# Patient Record
Sex: FEMALE | Race: BLACK OR AFRICAN AMERICAN | NOT HISPANIC OR LATINO
[De-identification: names, ages, dates, MRNs, and addresses within clinical notes are randomized per-mention and may not be internally consistent; named-entity substitution may affect disease eponyms.]

---

## 2017-06-22 ENCOUNTER — RESULT REVIEW (OUTPATIENT)
Age: 62
End: 2017-06-22

## 2017-08-17 ENCOUNTER — OUTPATIENT (OUTPATIENT)
Dept: OUTPATIENT SERVICES | Facility: HOSPITAL | Age: 62
LOS: 1 days | Discharge: HOME | End: 2017-08-17

## 2017-08-18 DIAGNOSIS — F11.20 OPIOID DEPENDENCE, UNCOMPLICATED: ICD-10-CM

## 2020-09-17 ENCOUNTER — APPOINTMENT (OUTPATIENT)
Dept: SURGERY | Facility: CLINIC | Age: 65
End: 2020-09-17

## 2021-06-06 ENCOUNTER — EMERGENCY (EMERGENCY)
Facility: HOSPITAL | Age: 66
LOS: 0 days | Discharge: LEFT BEFORE DISCHARGE | End: 2021-06-06
Attending: EMERGENCY MEDICINE
Payer: MEDICARE

## 2021-06-06 VITALS
OXYGEN SATURATION: 98 % | DIASTOLIC BLOOD PRESSURE: 87 MMHG | HEART RATE: 107 BPM | RESPIRATION RATE: 18 BRPM | WEIGHT: 182.1 LBS | SYSTOLIC BLOOD PRESSURE: 137 MMHG | HEIGHT: 64 IN | TEMPERATURE: 99 F

## 2021-06-06 VITALS
OXYGEN SATURATION: 95 % | HEART RATE: 94 BPM | SYSTOLIC BLOOD PRESSURE: 116 MMHG | TEMPERATURE: 99 F | RESPIRATION RATE: 18 BRPM | DIASTOLIC BLOOD PRESSURE: 78 MMHG

## 2021-06-06 DIAGNOSIS — E11.65 TYPE 2 DIABETES MELLITUS WITH HYPERGLYCEMIA: ICD-10-CM

## 2021-06-06 LAB — GLUCOSE BLDC GLUCOMTR-MCNC: 258 MG/DL — HIGH (ref 70–99)

## 2021-06-06 PROCEDURE — 99283 EMERGENCY DEPT VISIT LOW MDM: CPT

## 2021-06-06 NOTE — ED ADULT NURSE NOTE - OBJECTIVE STATEMENT
65 year ol d female, AAX4, presents to the ED for high glucose levels, PMH HTN.   pt denies headaches, n/v/d, cp, sob, dysuria, frequency, sick contacts, travel. NKDA

## 2021-06-06 NOTE — ED ADULT NURSE REASSESSMENT NOTE - NS ED NURSE REASSESS COMMENT FT1
Called his ride to come pick him up, discharge instructions given. No health info given.   pt refuses to have EKG done, refuses labs and wants to followup with PMD, MD Mikhail made aware

## 2021-06-06 NOTE — ED PROVIDER NOTE - CLINICAL SUMMARY MEDICAL DECISION MAKING FREE TEXT BOX
Hyperglycemia r/o DKA.  Will check labs give IVF and re-eval.  Patient strongly encouraged to follow-up with her PMD.

## 2021-06-06 NOTE — ED PROVIDER NOTE - PROGRESS NOTE DETAILS
Patient's pharmacy [facility] was called.  Patient not on diabetic medication was only prescribed test strips on May 19th

## 2021-06-06 NOTE — ED ADULT TRIAGE NOTE - CHIEF COMPLAINT QUOTE
Recently diagnosed with DM pt c/o of hyperglycemia. No symptoms reported. Pt stated doesn't know how to bring it down

## 2021-06-06 NOTE — ED PROVIDER NOTE - OBJECTIVE STATEMENT
This patient is a 65 year old woman recently diagnosed with DM who presents to the ER for evaluation.  Patient reports elevated blood sugar.  She denies increased frequency, increased thirst, abdominal pain and vomiting.  She states that she was not given medications for her diabetes when she was diagnosed at her primary care doctor office.  She was only given test strips and told to monitor her levels.

## 2023-04-04 ENCOUNTER — APPOINTMENT (OUTPATIENT)
Dept: MRI IMAGING | Facility: HOSPITAL | Age: 68
End: 2023-04-04
Payer: MEDICARE

## 2023-04-04 ENCOUNTER — OUTPATIENT (OUTPATIENT)
Dept: OUTPATIENT SERVICES | Facility: HOSPITAL | Age: 68
LOS: 1 days | End: 2023-04-04
Payer: COMMERCIAL

## 2023-04-04 DIAGNOSIS — K86.2 CYST OF PANCREAS: ICD-10-CM

## 2023-04-04 DIAGNOSIS — N28.1 CYST OF KIDNEY, ACQUIRED: ICD-10-CM

## 2023-04-04 PROCEDURE — 74183 MRI ABD W/O CNTR FLWD CNTR: CPT | Mod: 26

## 2023-04-04 PROCEDURE — 74183 MRI ABD W/O CNTR FLWD CNTR: CPT

## 2023-04-04 PROCEDURE — A9585: CPT

## 2023-04-08 ENCOUNTER — EMERGENCY (EMERGENCY)
Facility: HOSPITAL | Age: 68
LOS: 1 days | Discharge: HOME | End: 2023-04-08
Attending: EMERGENCY MEDICINE
Payer: COMMERCIAL

## 2023-04-08 VITALS
RESPIRATION RATE: 20 BRPM | DIASTOLIC BLOOD PRESSURE: 89 MMHG | SYSTOLIC BLOOD PRESSURE: 132 MMHG | OXYGEN SATURATION: 98 % | HEART RATE: 89 BPM

## 2023-04-08 VITALS
SYSTOLIC BLOOD PRESSURE: 135 MMHG | RESPIRATION RATE: 18 BRPM | HEIGHT: 64 IN | WEIGHT: 167.33 LBS | OXYGEN SATURATION: 98 % | TEMPERATURE: 99 F | DIASTOLIC BLOOD PRESSURE: 94 MMHG | HEART RATE: 100 BPM

## 2023-04-08 LAB
ALBUMIN SERPL ELPH-MCNC: 4 G/DL — SIGNIFICANT CHANGE UP (ref 3.5–5)
ALP SERPL-CCNC: 103 U/L — SIGNIFICANT CHANGE UP (ref 40–120)
ALT FLD-CCNC: 18 U/L DA — SIGNIFICANT CHANGE UP (ref 10–60)
ANION GAP SERPL CALC-SCNC: 9 MMOL/L — SIGNIFICANT CHANGE UP (ref 5–17)
APPEARANCE UR: CLEAR — SIGNIFICANT CHANGE UP
AST SERPL-CCNC: 9 U/L — LOW (ref 10–40)
BACTERIA # UR AUTO: ABNORMAL /HPF
BASOPHILS # BLD AUTO: 0.05 K/UL — SIGNIFICANT CHANGE UP (ref 0–0.2)
BASOPHILS NFR BLD AUTO: 0.3 % — SIGNIFICANT CHANGE UP (ref 0–2)
BILIRUB SERPL-MCNC: 0.3 MG/DL — SIGNIFICANT CHANGE UP (ref 0.2–1.2)
BILIRUB UR-MCNC: NEGATIVE — SIGNIFICANT CHANGE UP
BUN SERPL-MCNC: 16 MG/DL — SIGNIFICANT CHANGE UP (ref 7–18)
CALCIUM SERPL-MCNC: 10.4 MG/DL — SIGNIFICANT CHANGE UP (ref 8.4–10.5)
CHLORIDE SERPL-SCNC: 102 MMOL/L — SIGNIFICANT CHANGE UP (ref 96–108)
CO2 SERPL-SCNC: 29 MMOL/L — SIGNIFICANT CHANGE UP (ref 22–31)
COLOR SPEC: YELLOW — SIGNIFICANT CHANGE UP
CREAT SERPL-MCNC: 1.06 MG/DL — SIGNIFICANT CHANGE UP (ref 0.5–1.3)
DIFF PNL FLD: ABNORMAL
EGFR: 58 ML/MIN/1.73M2 — LOW
EOSINOPHIL # BLD AUTO: 0.07 K/UL — SIGNIFICANT CHANGE UP (ref 0–0.5)
EOSINOPHIL NFR BLD AUTO: 0.5 % — SIGNIFICANT CHANGE UP (ref 0–6)
EPI CELLS # UR: SIGNIFICANT CHANGE UP /HPF
GLUCOSE SERPL-MCNC: 295 MG/DL — HIGH (ref 70–99)
GLUCOSE UR QL: 1000 MG/DL
HCT VFR BLD CALC: 41.4 % — SIGNIFICANT CHANGE UP (ref 34.5–45)
HGB BLD-MCNC: 13.4 G/DL — SIGNIFICANT CHANGE UP (ref 11.5–15.5)
IMM GRANULOCYTES NFR BLD AUTO: 0.3 % — SIGNIFICANT CHANGE UP (ref 0–0.9)
KETONES UR-MCNC: ABNORMAL
LEUKOCYTE ESTERASE UR-ACNC: NEGATIVE — SIGNIFICANT CHANGE UP
LIDOCAIN IGE QN: 41 U/L — LOW (ref 73–393)
LYMPHOCYTES # BLD AUTO: 1.68 K/UL — SIGNIFICANT CHANGE UP (ref 1–3.3)
LYMPHOCYTES # BLD AUTO: 11.4 % — LOW (ref 13–44)
MCHC RBC-ENTMCNC: 26.8 PG — LOW (ref 27–34)
MCHC RBC-ENTMCNC: 32.4 GM/DL — SIGNIFICANT CHANGE UP (ref 32–36)
MCV RBC AUTO: 82.8 FL — SIGNIFICANT CHANGE UP (ref 80–100)
MONOCYTES # BLD AUTO: 0.53 K/UL — SIGNIFICANT CHANGE UP (ref 0–0.9)
MONOCYTES NFR BLD AUTO: 3.6 % — SIGNIFICANT CHANGE UP (ref 2–14)
NEUTROPHILS # BLD AUTO: 12.42 K/UL — HIGH (ref 1.8–7.4)
NEUTROPHILS NFR BLD AUTO: 83.9 % — HIGH (ref 43–77)
NITRITE UR-MCNC: NEGATIVE — SIGNIFICANT CHANGE UP
NRBC # BLD: 0 /100 WBCS — SIGNIFICANT CHANGE UP (ref 0–0)
PH UR: 5 — SIGNIFICANT CHANGE UP (ref 5–8)
PLATELET # BLD AUTO: 261 K/UL — SIGNIFICANT CHANGE UP (ref 150–400)
POTASSIUM SERPL-MCNC: 4.8 MMOL/L — SIGNIFICANT CHANGE UP (ref 3.5–5.3)
POTASSIUM SERPL-SCNC: 4.8 MMOL/L — SIGNIFICANT CHANGE UP (ref 3.5–5.3)
PROT SERPL-MCNC: 7.8 G/DL — SIGNIFICANT CHANGE UP (ref 6–8.3)
PROT UR-MCNC: 30 MG/DL
RBC # BLD: 5 M/UL — SIGNIFICANT CHANGE UP (ref 3.8–5.2)
RBC # FLD: 15.9 % — HIGH (ref 10.3–14.5)
RBC CASTS # UR COMP ASSIST: ABNORMAL /HPF (ref 0–2)
SODIUM SERPL-SCNC: 140 MMOL/L — SIGNIFICANT CHANGE UP (ref 135–145)
SP GR SPEC: 1.02 — SIGNIFICANT CHANGE UP (ref 1.01–1.02)
TROPONIN I, HIGH SENSITIVITY RESULT: 5.9 NG/L — SIGNIFICANT CHANGE UP
UROBILINOGEN FLD QL: NEGATIVE — SIGNIFICANT CHANGE UP
WBC # BLD: 14.8 K/UL — HIGH (ref 3.8–10.5)
WBC # FLD AUTO: 14.8 K/UL — HIGH (ref 3.8–10.5)
WBC UR QL: SIGNIFICANT CHANGE UP /HPF (ref 0–5)

## 2023-04-08 PROCEDURE — 96374 THER/PROPH/DIAG INJ IV PUSH: CPT | Mod: XU

## 2023-04-08 PROCEDURE — 85025 COMPLETE CBC W/AUTO DIFF WBC: CPT

## 2023-04-08 PROCEDURE — 93005 ELECTROCARDIOGRAM TRACING: CPT

## 2023-04-08 PROCEDURE — 74177 CT ABD & PELVIS W/CONTRAST: CPT | Mod: 26,MA

## 2023-04-08 PROCEDURE — 99284 EMERGENCY DEPT VISIT MOD MDM: CPT

## 2023-04-08 PROCEDURE — 81001 URINALYSIS AUTO W/SCOPE: CPT

## 2023-04-08 PROCEDURE — 36415 COLL VENOUS BLD VENIPUNCTURE: CPT

## 2023-04-08 PROCEDURE — 83690 ASSAY OF LIPASE: CPT

## 2023-04-08 PROCEDURE — 80053 COMPREHEN METABOLIC PANEL: CPT

## 2023-04-08 PROCEDURE — 84484 ASSAY OF TROPONIN QUANT: CPT

## 2023-04-08 PROCEDURE — 74177 CT ABD & PELVIS W/CONTRAST: CPT | Mod: MA

## 2023-04-08 PROCEDURE — 99285 EMERGENCY DEPT VISIT HI MDM: CPT | Mod: 25

## 2023-04-08 RX ORDER — FAMOTIDINE 10 MG/ML
1 INJECTION INTRAVENOUS
Qty: 14 | Refills: 0
Start: 2023-04-08 | End: 2023-04-14

## 2023-04-08 RX ORDER — FAMOTIDINE 10 MG/ML
20 INJECTION INTRAVENOUS ONCE
Refills: 0 | Status: COMPLETED | OUTPATIENT
Start: 2023-04-08 | End: 2023-04-08

## 2023-04-08 RX ADMIN — Medication 30 MILLILITER(S): at 13:32

## 2023-04-08 RX ADMIN — FAMOTIDINE 20 MILLIGRAM(S): 10 INJECTION INTRAVENOUS at 13:22

## 2023-04-08 NOTE — ED PROVIDER NOTE - PHYSICAL EXAMINATION
ABD: Abdomen soft, non-distended with epigastric tenderness, negative Castelan sign, no CVA tenderness.

## 2023-04-08 NOTE — ED ADULT NURSE NOTE - OBJECTIVE STATEMENT
68 yo female sitting on a chair c/o abdominal pain that started 2 weeks ago. Patient denies nausea, vomiting, or diarrhea at this time.

## 2023-04-08 NOTE — ED PROVIDER NOTE - PATIENT PORTAL LINK FT
You can access the FollowMyHealth Patient Portal offered by [facility] by registering at the following website: [email or web address]. By joining [facility]’s FollowMyHealth portal, you will also be able to view your health information using other applications (apps) compatible with our system.

## 2023-04-08 NOTE — ED PROVIDER NOTE - NSFOLLOWUPINSTRUCTIONS_ED_ALL_ED_FT
Follow up with your primary care doctor on Monday.  Follow up with the oncologist within 1 week.  The pain that you are experiencing is most likely related to the mass.  Because you felt much better after antacid medications in the ER, I will give you a prescription as it may help you with pain at home.  If you experience any new or worsening symptoms or if you are concerned you can always come back to the emergency for a re-evaluation.  If there were any prescriptions given to you during the visit today take them as prescribed. If you have any questions you can ask the pharmacist. Follow up with your primary care doctor on Monday.  Follow up with the oncologist within 1 week.  The pain that you are experiencing is most likely related to the mass.  Because you felt much better after antacid medications in the ER, I will give you a prescription as it may help you with pain at home.  If you experience any new or worsening symptoms or if you are concerned you can always come back to the emergency for a re-evaluation.  If there were any prescriptions given to you during the visit today take them as prescribed. If you have any questions you can ask the pharmacist.    Abdominal Pain, Adult  Pain in the abdomen (abdominal pain) can be caused by many things. Often, abdominal pain is not serious and it gets better with no treatment or by being treated at home. However, sometimes abdominal pain is serious.    Your health care provider will ask questions about your medical history and do a physical exam to try to determine the cause of your abdominal pain.    Follow these instructions at home:  Medicines    Take over-the-counter and prescription medicines only as told by your health care provider.  Do not take a laxative unless told by your health care provider.  General instructions      Watch your condition for any changes.  Drink enough fluid to keep your urine pale yellow.  Keep all follow-up visits as told by your health care provider. This is important.  Contact a health care provider if:  Your abdominal pain changes or gets worse.  You are not hungry or you lose weight without trying.  You are constipated or have diarrhea for more than 2–3 days.  You have pain when you urinate or have a bowel movement.  Your abdominal pain wakes you up at night.  Your pain gets worse with meals, after eating, or with certain foods.  You are vomiting and cannot keep anything down.  You have a fever.  You have blood in your urine.  Get help right away if:  Your pain does not go away as soon as your health care provider told you to expect.  You cannot stop vomiting.  Your pain is only in areas of the abdomen, such as the right side or the left lower portion of the abdomen. Pain on the right side could be caused by appendicitis.  You have bloody or black stools, or stools that look like tar.  You have severe pain, cramping, or bloating in your abdomen.  You have signs of dehydration, such as:  Dark urine, very little urine, or no urine.  Cracked lips.  Dry mouth.  Sunken eyes.  Sleepiness.  Weakness.  You have trouble breathing or chest pain.  Summary  Often, abdominal pain is not serious and it gets better with no treatment or by being treated at home. However, sometimes abdominal pain is serious.  Watch your condition for any changes.  Take over-the-counter and prescription medicines only as told by your health care provider.  Contact a health care provider if your abdominal pain changes or gets worse.  Get help right away if you have severe pain, cramping, or bloating in your abdomen.  This information is not intended to replace advice given to you by your health care provider. Make sure you discuss any questions you have with your health care provider.

## 2023-04-08 NOTE — ED PROVIDER NOTE - CARE PROVIDER_API CALL
Omar Ayala (MD)  Hematology; Internal Medicine; Medical Oncology  [address], [address]  [place] [address]  Phone: [phone]  Fax: [phone]  Follow Up Time:

## 2023-04-08 NOTE — ED PROVIDER NOTE - ATTENDING APP SHARED VISIT CONTRIBUTION OF CARE
67-year-old female, presents for evaluation of epigastric abdominal pain x2 weeks.  Pain is improved after eating but progressively getting worse and more continuous.  On exam patient was uncomfortable and in pain.  Vital signs stable, afebrile.  EKG shows normal sinus rhythm.  Labs grossly unremarkable with negative troponin.  CT shows left renal mass and pancreatic mass.  Concerning for metastatic cancer.  Patient had earlier this week and MRI of the abdomen with IV contrast which should be able to give me more information about diagnosis.  Patient can follow-up this week with her PMD.  Will give outpatient oncology follow-up for work-up.  Patient improved significantly with GI cocktail.  Will give trial although pain most likely related to mass. Tolerated PO intake.

## 2023-04-08 NOTE — ED PROVIDER NOTE - OBJECTIVE STATEMENT
67 year old female with a PHMx of diabetes, hypertension, liver polyp, and renal cyst presents to the ED for evaluation of abdominal pain for x2 weeks, gradual onset of epigastric abdominal pain. At first, it was intermittent but now it is getting more continuous. In the last few days, pain became much more severe. Food usually helps temporarily with pain but the pain comes back. No radiation to the lower abdomen or the chest. Denies any nausea, vomiting, diarrhea, fevers, chills or night sweats. This week she had an MRI of the abdomen to further assess the renal cyst and liver polyp as per patient but has no results yet. NKDA.

## 2023-04-10 PROBLEM — N28.1 CYST OF KIDNEY, ACQUIRED: Chronic | Status: ACTIVE | Noted: 2023-04-08

## 2023-04-10 PROBLEM — I10 ESSENTIAL (PRIMARY) HYPERTENSION: Chronic | Status: ACTIVE | Noted: 2023-04-08

## 2023-04-10 PROBLEM — Q44.6 CYSTIC DISEASE OF LIVER: Chronic | Status: ACTIVE | Noted: 2023-04-08

## 2023-04-10 PROBLEM — E11.9 TYPE 2 DIABETES MELLITUS WITHOUT COMPLICATIONS: Chronic | Status: ACTIVE | Noted: 2023-04-08

## 2023-04-11 ENCOUNTER — APPOINTMENT (OUTPATIENT)
Dept: SURGICAL ONCOLOGY | Facility: CLINIC | Age: 68
End: 2023-04-11
Payer: MEDICARE

## 2023-04-11 VITALS
DIASTOLIC BLOOD PRESSURE: 89 MMHG | SYSTOLIC BLOOD PRESSURE: 130 MMHG | WEIGHT: 170 LBS | BODY MASS INDEX: 29.02 KG/M2 | OXYGEN SATURATION: 99 % | HEIGHT: 64 IN | HEART RATE: 116 BPM | RESPIRATION RATE: 16 BRPM

## 2023-04-11 DIAGNOSIS — Z86.79 PERSONAL HISTORY OF OTHER DISEASES OF THE CIRCULATORY SYSTEM: ICD-10-CM

## 2023-04-11 DIAGNOSIS — Z87.891 PERSONAL HISTORY OF NICOTINE DEPENDENCE: ICD-10-CM

## 2023-04-11 PROCEDURE — 99204 OFFICE O/P NEW MOD 45 MIN: CPT

## 2023-04-12 ENCOUNTER — APPOINTMENT (OUTPATIENT)
Dept: GASTROENTEROLOGY | Facility: CLINIC | Age: 68
End: 2023-04-12
Payer: MEDICARE

## 2023-04-12 PROCEDURE — 99204 OFFICE O/P NEW MOD 45 MIN: CPT

## 2023-04-12 NOTE — REASON FOR VISIT
[Home] : at home, [unfilled] , at the time of the visit. [Medical Office: ([facility])___] : at the medical office located in  [Patient] : the patient [Self] : self [This encounter was initiated by telehealth (audio with video) and converted to telephone (audio only) due to technical difficulties.] : This encounter was initiated by telehealth (audio with video) and converted to telephone (audio only) due to technical difficulties.

## 2023-04-12 NOTE — PHYSICAL EXAM
[Alert] : alert [Normal Voice/Communication] : normal voice/communication [Healthy Appearing] : healthy appearing [No Acute Distress] : no acute distress [Sclera] : the sclera and conjunctiva were normal [Hearing Threshold Finger Rub Not [place]] : hearing was normal [Normal Lips/Gums] : the lips and gums were normal [Oropharynx] : the oropharynx was normal [Normal Appearance] : the appearance of the neck was normal [No Neck Mass] : no neck mass was observed [No Respiratory Distress] : no respiratory distress [No Acc Muscle Use] : no accessory muscle use [Respiration, Rhythm And Depth] : normal respiratory rhythm and effort [Auscultation Breath Sounds / Voice Sounds] : lungs were clear to auscultation bilaterally [Heart Rate And Rhythm] : heart rate was normal and rhythm regular [Normal S1, S2] : normal S1 and S2 [Murmurs] : no murmurs [Bowel Sounds] : normal bowel sounds [Abdomen Tenderness] : non-tender [No Masses] : no abdominal mass palpated [Abdomen Soft] : soft [] : no hepatosplenomegaly [Oriented To Time, Place, And Person] : oriented to person, place, and time

## 2023-04-13 ENCOUNTER — APPOINTMENT (OUTPATIENT)
Dept: UROLOGY | Facility: CLINIC | Age: 68
End: 2023-04-13

## 2023-04-14 ENCOUNTER — RESULT REVIEW (OUTPATIENT)
Age: 68
End: 2023-04-14

## 2023-04-14 ENCOUNTER — APPOINTMENT (OUTPATIENT)
Dept: GASTROENTEROLOGY | Facility: HOSPITAL | Age: 68
End: 2023-04-14

## 2023-04-14 ENCOUNTER — OUTPATIENT (OUTPATIENT)
Dept: OUTPATIENT SERVICES | Facility: HOSPITAL | Age: 68
LOS: 1 days | End: 2023-04-14
Payer: COMMERCIAL

## 2023-04-14 ENCOUNTER — TRANSCRIPTION ENCOUNTER (OUTPATIENT)
Age: 68
End: 2023-04-14

## 2023-04-14 VITALS
HEART RATE: 87 BPM | RESPIRATION RATE: 17 BRPM | HEIGHT: 64 IN | TEMPERATURE: 98 F | SYSTOLIC BLOOD PRESSURE: 147 MMHG | DIASTOLIC BLOOD PRESSURE: 85 MMHG | WEIGHT: 169.98 LBS

## 2023-04-14 VITALS
SYSTOLIC BLOOD PRESSURE: 147 MMHG | RESPIRATION RATE: 16 BRPM | DIASTOLIC BLOOD PRESSURE: 90 MMHG | HEART RATE: 88 BPM | OXYGEN SATURATION: 98 %

## 2023-04-14 DIAGNOSIS — K86.89 OTHER SPECIFIED DISEASES OF PANCREAS: ICD-10-CM

## 2023-04-14 LAB — GLUCOSE BLDC GLUCOMTR-MCNC: 256 MG/DL — HIGH (ref 70–99)

## 2023-04-14 PROCEDURE — 43239 EGD BIOPSY SINGLE/MULTIPLE: CPT | Mod: 59

## 2023-04-14 PROCEDURE — 43237 ENDOSCOPIC US EXAM ESOPH: CPT

## 2023-04-14 PROCEDURE — 88307 TISSUE EXAM BY PATHOLOGIST: CPT

## 2023-04-14 PROCEDURE — 82962 GLUCOSE BLOOD TEST: CPT

## 2023-04-14 PROCEDURE — 88307 TISSUE EXAM BY PATHOLOGIST: CPT | Mod: 26

## 2023-04-14 PROCEDURE — 88305 TISSUE EXAM BY PATHOLOGIST: CPT | Mod: 26

## 2023-04-14 PROCEDURE — 88173 CYTOPATH EVAL FNA REPORT: CPT | Mod: 26

## 2023-04-14 PROCEDURE — 88305 TISSUE EXAM BY PATHOLOGIST: CPT

## 2023-04-14 PROCEDURE — 88312 SPECIAL STAINS GROUP 1: CPT

## 2023-04-14 PROCEDURE — 88312 SPECIAL STAINS GROUP 1: CPT | Mod: 26

## 2023-04-14 PROCEDURE — 88173 CYTOPATH EVAL FNA REPORT: CPT

## 2023-04-14 PROCEDURE — 43239 EGD BIOPSY SINGLE/MULTIPLE: CPT

## 2023-04-14 PROCEDURE — 43242 EGD US FINE NEEDLE BX/ASPIR: CPT

## 2023-04-14 RX ORDER — ACETAMINOPHEN 500 MG
1000 TABLET ORAL ONCE
Refills: 0 | Status: DISCONTINUED | OUTPATIENT
Start: 2023-04-14 | End: 2023-04-28

## 2023-04-14 RX ORDER — SODIUM CHLORIDE 9 MG/ML
500 INJECTION, SOLUTION INTRAVENOUS
Refills: 0 | Status: COMPLETED | OUTPATIENT
Start: 2023-04-14 | End: 2023-04-14

## 2023-04-14 RX ADMIN — SODIUM CHLORIDE 30 MILLILITER(S): 9 INJECTION, SOLUTION INTRAVENOUS at 08:27

## 2023-04-14 NOTE — ASSESSMENT
[FreeTextEntry1] : This is a 67 year old female here for an evaluation pancreas mass. \par \par Plan \par EUS with FNB \par \par Risks, benefits, and alternatives of EUS discussed at length with patient including but not limited to bleeding , perforation, anesthesia related complication, aspiration, pancreatitis etc. Patient expressed understanding.\par \par EUS for evaluation of polyps, tumors, nodules with +/- biopsy and or cauterization w/ and or w/o clipping. \par  \par \par

## 2023-04-14 NOTE — HISTORY OF PRESENT ILLNESS
[FreeTextEntry1] : This is a 67 year old female here for an evaluation pancreas mass. Referred to us by Dr. Greco.  PMH of DM, HTN, left knee replacement. Denies a family history of colon CA, gastric CA, high risk polyps, Inflammatory and autoimmune disease. She presented to [facility] ED on 4/8/23 with abdominal pain radiating to her LUQ. The pain was of a gradual onset. Medical records from Dr. Greco office stated patient had an MRI on 4/4/23. MRI revealed a 3 cm solid hypo enhancing mass in the pancreatic body with ductal obstruction & parenchymal atrophy in the tail and a Bosniak type IV cyst upper pole left kidney suspicious for primary renal neoplasm. CT A/P from [facility] ED visit revealed ill defined pancreatic body mass c/w pancreas neoplasm; complex enhancing left renal mass concerning for RCC. Currently patient reports decrease appetite. Has early satiety. No N&V. The abdominal pain is intermittent. The pain radiates to the LUQ. The pain lasts about 1 minute and goes away.  No blood or dark tarry stools. Normal caliber. Down ~10  pounds since March. Never had an EGD. Colonoscopy from 7 years ago normal.  \par Smoke/Etoh: 20 pack years. Last smoke January 2023. No ETOh use.  \par Medication: No AC or antiplatelet \par Allergy: Shellfish\par \par

## 2023-04-19 LAB — SURGICAL PATHOLOGY STUDY: SIGNIFICANT CHANGE UP

## 2023-04-20 LAB — NON-GYNECOLOGICAL CYTOLOGY STUDY: SIGNIFICANT CHANGE UP

## 2023-04-28 ENCOUNTER — OUTPATIENT (OUTPATIENT)
Dept: OUTPATIENT SERVICES | Facility: HOSPITAL | Age: 68
LOS: 1 days | Discharge: HOME | End: 2023-04-28
Payer: MEDICARE

## 2023-04-28 ENCOUNTER — LABORATORY RESULT (OUTPATIENT)
Age: 68
End: 2023-04-28

## 2023-04-28 ENCOUNTER — APPOINTMENT (OUTPATIENT)
Dept: HEMATOLOGY ONCOLOGY | Facility: CLINIC | Age: 68
End: 2023-04-28

## 2023-04-28 DIAGNOSIS — D64.9 ANEMIA, UNSPECIFIED: ICD-10-CM

## 2023-04-28 LAB
BASOPHILS # BLD AUTO: 0.04 K/UL — SIGNIFICANT CHANGE UP (ref 0–0.2)
BASOPHILS NFR BLD AUTO: 0.3 % — SIGNIFICANT CHANGE UP (ref 0–2)
EOSINOPHIL # BLD AUTO: 0.1 K/UL — SIGNIFICANT CHANGE UP (ref 0–0.5)
EOSINOPHIL NFR BLD AUTO: 0.8 % — SIGNIFICANT CHANGE UP (ref 0–6)
HCT VFR BLD CALC: 36.3 % — SIGNIFICANT CHANGE UP (ref 34.5–45)
HGB BLD-MCNC: 12.1 G/DL — SIGNIFICANT CHANGE UP (ref 11.5–15.5)
IMM GRANULOCYTES NFR BLD AUTO: 0.5 % — SIGNIFICANT CHANGE UP (ref 0–0.9)
LYMPHOCYTES # BLD AUTO: 1.83 K/UL — SIGNIFICANT CHANGE UP (ref 1–3.3)
LYMPHOCYTES # BLD AUTO: 14.3 % — SIGNIFICANT CHANGE UP (ref 13–44)
MCHC RBC-ENTMCNC: 26.9 PG — LOW (ref 27–34)
MCHC RBC-ENTMCNC: 33.3 G/DL — SIGNIFICANT CHANGE UP (ref 32–36)
MCV RBC AUTO: 80.8 FL — SIGNIFICANT CHANGE UP (ref 80–100)
MONOCYTES # BLD AUTO: 0.53 K/UL — SIGNIFICANT CHANGE UP (ref 0–0.9)
MONOCYTES NFR BLD AUTO: 4.1 % — SIGNIFICANT CHANGE UP (ref 2–14)
NEUTROPHILS # BLD AUTO: 10.27 K/UL — HIGH (ref 1.8–7.4)
NEUTROPHILS NFR BLD AUTO: 80 % — HIGH (ref 43–77)
PLATELET # BLD AUTO: 230 K/UL — SIGNIFICANT CHANGE UP (ref 150–400)
RBC # BLD: 4.49 M/UL — SIGNIFICANT CHANGE UP (ref 3.8–5.2)
RBC # FLD: 15.8 % — HIGH (ref 10.3–14.5)
WBC # BLD: 12.62 K/UL — HIGH (ref 3.8–10.5)
WBC # FLD AUTO: 12.62 K/UL — HIGH (ref 3.8–10.5)

## 2023-05-01 ENCOUNTER — APPOINTMENT (OUTPATIENT)
Dept: NUCLEAR MEDICINE | Facility: IMAGING CENTER | Age: 68
End: 2023-05-01

## 2023-05-01 NOTE — PHYSICAL EXAM
[Normal] : supple, no neck mass and thyroid not enlarged [Normal Neck Lymph Nodes] : normal neck lymph nodes  [Normal Supraclavicular Lymph Nodes] : normal supraclavicular lymph nodes [Normal Groin Lymph Nodes] : normal groin lymph nodes [Normal Axillary Lymph Nodes] : normal axillary lymph nodes [Normal] : oriented to person, place and time, with appropriate affect [FreeTextEntry1] : COVID-19 precautions as per [facility] policy was universally followed  [de-identified] : soft NT ND

## 2023-05-01 NOTE — HISTORY OF PRESENT ILLNESS
[de-identified] : Ms. ENRIKE MORLEY is a 67 year old woman here today for an initial consultation. \par \par Enrike presented to [facility] ED in April of 2023 with complaints of 2 weeks of abdominal pain radiating to her LUQ. \par Her PMH is significant for DM, HTN, liver polyps & renal cysts. \par DM diagnosed in 2022 --- reports her a1c is around 8\par \par She reports that her abdominal pain started as vague epigastric pain then steadily worsened. She was seeing her PCP for management of these symptoms and underwent an abdominal U/S & MRI  (@[facility], will obtain images and reports). \par She has lost ~10 lbs from March to April 2023; appetite is poor. ECOG 0-1 \par Her last colonoscopy was ~7 years ago and otherwise WNL; never had an endoscopy \par \par MR abdomen from 4/4/2023 showed a 3 cm solid hypo enhancing mass in the pancreatic body with ductal obstruction & parenchymal atrophy in the tail and a Bosniak type IV cyst upper pole left kidney suspicious for primary renal neoplasm\par \par CT A/P 4/8/2023 - ill defined pancreatic body mass c/w pancreas neoplasm; complex enhancing left renal mass concerning for RCC\par \par PSH: left knee replacement 2019\par Fam HX: none\par Social HX: ~20 pack year history, stopped in early 2023; no ETOH, retired from job as a  \par \par PCP: Dr. Suzy Bartlett

## 2023-05-01 NOTE — CONSULT LETTER
[Dear  ___] : Dear  [unfilled], [Consult Letter:] : I had the pleasure of evaluating your patient, [unfilled]. [( Thank you for referring [unfilled] for consultation for _____ )] : Thank you for referring [unfilled] for consultation for [unfilled] [Please see my note below.] : Please see my note below. [Consult Closing:] : Thank you very much for allowing me to participate in the care of this patient.  If you have any questions, please do not hesitate to contact me. [Sincerely,] : Sincerely, [FreeTextEntry3] : Addison Greco MD, FICS, FACS\par Director of Surgical Oncology- [facility]\par ,Department of Surgery\par [facility]\par [address]\[place], [place]- [address]\par \par [address]\[place]- [address]\par \par [address]\par [place], [place]- [address]\par \par (mob) [phone]\par (o) [phone]\par (f) [phone]\par

## 2023-05-01 NOTE — ASSESSMENT
[FreeTextEntry1] : IMP:\par 66yo w/ ill-defined pancreas mass (4.2 cm) left renal lesion concerning for RCC\par \par \par PLAN:\par EUS w/ FNB (planned to see Dr. Danny Gallo tomorrow 4/12/2023)\par Will order PET/CT now given suspicious findings on the kidney, r/o further mets \par Pt to be seen in [facility] 5/2/23\par I have discussed the diagnosis, therapeutic plan and options with the patient at length. Patient expressed verbal understanding to proceed with the proposed plan. All questions answered.

## 2023-05-02 ENCOUNTER — RESULT REVIEW (OUTPATIENT)
Age: 68
End: 2023-05-02

## 2023-05-02 ENCOUNTER — NON-APPOINTMENT (OUTPATIENT)
Age: 68
End: 2023-05-02

## 2023-05-02 ENCOUNTER — APPOINTMENT (OUTPATIENT)
Dept: MULTI SPECIALTY CLINIC | Facility: CLINIC | Age: 68
End: 2023-05-02
Payer: MEDICARE

## 2023-05-02 ENCOUNTER — APPOINTMENT (OUTPATIENT)
Dept: PAIN MANAGEMENT | Facility: CLINIC | Age: 68
End: 2023-05-02
Payer: MEDICARE

## 2023-05-02 VITALS
WEIGHT: 161.16 LBS | BODY MASS INDEX: 27.66 KG/M2 | SYSTOLIC BLOOD PRESSURE: 130 MMHG | OXYGEN SATURATION: 97 % | DIASTOLIC BLOOD PRESSURE: 86 MMHG | TEMPERATURE: 96.6 F | HEART RATE: 136 BPM | RESPIRATION RATE: 20 BRPM

## 2023-05-02 LAB
BASOPHILS # BLD AUTO: 0.03 K/UL — SIGNIFICANT CHANGE UP (ref 0–0.2)
BASOPHILS NFR BLD AUTO: 0.3 % — SIGNIFICANT CHANGE UP (ref 0–2)
EOSINOPHIL # BLD AUTO: 0.02 K/UL — SIGNIFICANT CHANGE UP (ref 0–0.5)
EOSINOPHIL NFR BLD AUTO: 0.2 % — SIGNIFICANT CHANGE UP (ref 0–6)
HCT VFR BLD CALC: 39.7 % — SIGNIFICANT CHANGE UP (ref 34.5–45)
HGB BLD-MCNC: 13.2 G/DL — SIGNIFICANT CHANGE UP (ref 11.5–15.5)
IMM GRANULOCYTES NFR BLD AUTO: 0.4 % — SIGNIFICANT CHANGE UP (ref 0–0.9)
LYMPHOCYTES # BLD AUTO: 1.62 K/UL — SIGNIFICANT CHANGE UP (ref 1–3.3)
LYMPHOCYTES # BLD AUTO: 14.8 % — SIGNIFICANT CHANGE UP (ref 13–44)
MCHC RBC-ENTMCNC: 26.9 PG — LOW (ref 27–34)
MCHC RBC-ENTMCNC: 33.2 G/DL — SIGNIFICANT CHANGE UP (ref 32–36)
MCV RBC AUTO: 80.9 FL — SIGNIFICANT CHANGE UP (ref 80–100)
MONOCYTES # BLD AUTO: 0.55 K/UL — SIGNIFICANT CHANGE UP (ref 0–0.9)
MONOCYTES NFR BLD AUTO: 5 % — SIGNIFICANT CHANGE UP (ref 2–14)
NEUTROPHILS # BLD AUTO: 8.71 K/UL — HIGH (ref 1.8–7.4)
NEUTROPHILS NFR BLD AUTO: 79.3 % — HIGH (ref 43–77)
NRBC # BLD: 0 /100 WBCS — SIGNIFICANT CHANGE UP (ref 0–0)
PLATELET # BLD AUTO: 210 K/UL — SIGNIFICANT CHANGE UP (ref 150–400)
RBC # BLD: 4.91 M/UL — SIGNIFICANT CHANGE UP (ref 3.8–5.2)
RBC # FLD: 15.7 % — HIGH (ref 10.3–14.5)
WBC # BLD: 10.97 K/UL — HIGH (ref 3.8–10.5)
WBC # FLD AUTO: 10.97 K/UL — HIGH (ref 3.8–10.5)

## 2023-05-02 PROCEDURE — 93010 ELECTROCARDIOGRAM REPORT: CPT

## 2023-05-02 PROCEDURE — 99204 OFFICE O/P NEW MOD 45 MIN: CPT

## 2023-05-02 PROCEDURE — 99205 OFFICE O/P NEW HI 60 MIN: CPT

## 2023-05-02 NOTE — PHYSICAL EXAM
[Normal muscle bulk without asymmetry] : normal muscle bulk without asymmetry [Normal] : Normal affect [de-identified] : No jaundice evident. [de-identified] : Cranial nerves grossly intact.  No focal deficits appreciated.

## 2023-05-02 NOTE — REVIEW OF SYSTEMS
[Fatigue] : fatigue [Recent Change In Weight] : ~T recent weight change [Abdominal Pain] : abdominal pain [Negative] : Allergic/Immunologic Acitretin Counseling:  I discussed with the patient the risks of acitretin including but not limited to hair loss, dry lips/skin/eyes, liver damage, hyperlipidemia, depression/suicidal ideation, photosensitivity.  Serious rare side effects can include but are not limited to pancreatitis, pseudotumor cerebri, bony changes, clot formation/stroke/heart attack.  Patient understands that alcohol is contraindicated since it can result in liver toxicity and significantly prolong the elimination of the drug by many years.

## 2023-05-02 NOTE — ASSESSMENT
[FreeTextEntry1] : 67 year-old female with mass of the pancreas and associated abdominal pain.  She has a history of substance abuse and is at risk with opioid therapy which would otherwise be indicated for her condition.

## 2023-05-02 NOTE — HISTORY OF PRESENT ILLNESS
[___ wks] : [unfilled] week(s) ago [Sharp] : sharp [Aching] : aching [FreeTextEntry1] : 67 year-old female with a history of a mass of the pancreas who complains of episodic 10/10 mid-epigastric pain which is not associated with any particular activities.  She relates, however, that this pain is now becoming more constant.\par \par The patient reports she is in recovery from substance use and admits to relapsing recently to what she thinks was opioid medication from a friend to treat this pain.

## 2023-05-03 ENCOUNTER — NON-APPOINTMENT (OUTPATIENT)
Age: 68
End: 2023-05-03

## 2023-05-03 LAB
HAV IGM SER QL: NONREACTIVE
HBV CORE IGM SER QL: NONREACTIVE
HBV SURFACE AG SER QL: NONREACTIVE
HCV AB SER QL: NONREACTIVE
HCV S/CO RATIO: 0.09 S/CO

## 2023-05-04 NOTE — HISTORY OF PRESENT ILLNESS
[Abdominal Pain] : abdominal pain [EUS] : EUS [Biopsy] : biopsy performed [Before Surgery] : before surgery [Not staged outside] : not staged outside [Nutrition] : Nutrition [Social Work] : Social Work [Other: ____] : [unfilled] [Restricted in physically strenuous activity but ambulatory and able to carry out work of a light or sedentary nature] : Status 1 - Restricted in physically strenuous activity but ambulatory and able to carry out work of a light or sedentary nature, e.g., light house work, office work [de-identified] : This is a non-billable note*\par \par \par Ms. ENRIKE MORLEY is a 67 year old female who presents for evaluation in our Pancreas Multidisciplinary Clinic. Her PMH includes DM2 (dx ), HTN, left knee replacement. She presented with abdominal pain radiating to her LUQ x 3 weeks. MRI abdomen  noted a 3 cm mass in the body of the pancreas suspicious for primary pancreatic malignancy and also Bosniak type IV cyst upper pole left kidney suspicious for primary renal neoplasm. She was admitted to [facility] on 23 and CT A/P w/ IC showing ill-defined pancreatic body mass consistent with pancreatic neoplasm; and complex enhancing left renal mass concerning for renal cell carcinoma till proven otherwise.\par She underwent EUS on 23 with noted findings of one 3 cm pancreatic body mass, s/p FNA.  Pathology positive for adenocarcinoma. \par \par She reports decrease appetite and early satiety.  Abdominal pain is intermittent and severe when it comes.  She was taking Tylenol#3 after hospital d/c but currently not taking anymore. +Weight loss of 10 pounds since March. \par \par Colonoscopy from 7 years ago normal. \par Smoke/Etoh: 20 pack years. Last smoke 2023. No ETOH use. \par \par ECO      Independent, can walk upstairs. Lives with her sons.  (ECOG 1 due to pain). \par Family Ca hx: None \par \par Labs: \par 23    AST: 9   ALT: 18  ALP:  103  Tbili: 0.3 \par 23   Ca 19-9: 2428    CEA: 15.0    AST:  11    ALT: 12    ALP: 96   Tbili:  0.4     A1c: 10% \par  [TextBox_4] : 4/4/23 [TextBox_39] : [id number] [TextBox_41] : adenocarcinoma [TextBox_43] : 4/14/23 [] : This is not a second opinion [Pancreatic ductal adenocarcinoma] : Pancreatic ductal adenocarcinoma [Locally advanced pancreas cancer (LAPC 2)] : Locally advanced pancreas cancer (LAPC 2) [PV-SMV] : PV-SMV [Celiac axis] : Celiac axis [Less than 180 (abutment)] : less than 180 (abutment) [body] : body [Chemotherapy] : chemotherapy [Curative (aimed at resection)] : curative (aimed at resection) [TextBox_5] : 5/2/23 [TextBox_38] : 8004 [FreeTextEntry3] : 15 [FreeTextEntry4] : 0.4 [TextBox_40] : 4/8/23 [TextBox_56] : 50 [TextBox_74] : GDA coming off common CABRERA, Celiac involved, PS confluence < 180  \par 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  \par Plan for eventual SBRT\par If future surgery – [place] \par Urology and endocrine consults\par PET scan if feasible (need better glucose control)\par \par

## 2023-05-05 ENCOUNTER — NON-APPOINTMENT (OUTPATIENT)
Age: 68
End: 2023-05-05

## 2023-05-05 NOTE — RESULTS/DATA
[FreeTextEntry1] : review of information in the E M [facility] R\par rate 92 normal axis NSR ; possilbe anterior infarction

## 2023-05-05 NOTE — ASSESSMENT
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 Side effects of chemotherapy were reviewed with her\par She is age 67 and has significant co morbidity including diabetes poorly controlled on oral metformin at present. She is asked to see her primary care physician for consideration of an insulin based treatment. \par The patietn may have two tumors as the left renal mass has not had a biopsy. The priority in treating the pancreas cancer was addressed in the multidisciplinary conference. CT/PET will be helpful in addressing the left renal lesion which may be a second primary tumor. If two different tumors are present she is not a candidate for a clinical study.\par I have recommended placement of a Mediport for chemotherapy treatment. Scheduling of Mediport performed with patient and explanation of use of Mediport. A request for Mediport placement was made and our secretarial team will obtain authorization. Blood testing requested today as noted.\par THe patietn has seen pain management physician Dr Anne for management of analgesia\par The patient is scheduled for surgical follow up in future.\par ECG is performed by me and Nursing assistant and results are reviewed\par I have prescribed antinausea medication metoclopramide 10 mg PO TID PN\par RTC in 1 week to sign consent for chemotherapy\par

## 2023-05-05 NOTE — HISTORY OF PRESENT ILLNESS
none [Abdominal Pain] : abdominal pain [EUS] : EUS [Biopsy] : biopsy performed [Before Surgery] : before surgery [Pancreatic ductal adenocarcinoma] : Pancreatic ductal adenocarcinoma [Not staged outside] : not staged outside [Nutrition] : Nutrition [Social Work] : Social Work [Other: ____] : [unfilled] [Restricted in physically strenuous activity but ambulatory and able to carry out work of a light or sedentary nature] : Status 1 - Restricted in physically strenuous activity but ambulatory and able to carry out work of a light or sedentary nature, e.g., light house work, office work [Date: ____________] : Patient's last distress assessment performed on [unfilled]. [1 - Distress Level] : Distress Level: 1 [80: Normal activity with effort; some signs or symptoms of disease.] : 80: Normal activity with effort; some signs or symptoms of disease.  [ECOG Performance Status: 2 - Ambulatory and capable of all self care but unable to carry out any work activities] : Performance Status: 2 - Ambulatory and capable of all self care but unable to carry out any work activities. Up and about more than 50% of waking hours [Disease: _____________________] : Disease: [unfilled] [T: ___] : T[unfilled] [N: ___] : N[unfilled] [M: ___] : M[unfilled] [AJCC Stage: ____] : AJCC Stage: [unfilled] [de-identified] : adenocarcinoma [FreeTextEntry1] : discussion of chemotherapy treatment gemcitabine and Abraxane [de-identified] : see hpi [de-identified] : Ms. ENRIKE MORLEY is a 67 year old female who presents for evaluation in our Pancreas [facility]. Her PMH includes DM2 (dx ), HTN, left knee replacement. She presented with abdominal pain radiating to her LUQ x 3 weeks. MRI abdomen  noted a 3 cm mass in the body of the pancreas suspicious for primary pancreatic malignancy and also Bosniak type IV cyst upper pole left kidney suspicious for primary renal neoplasm. She was admitted to [facility] on 23 and CT A/P w/ IC showing ill-defined pancreatic body mass consistent with pancreatic neoplasm; and complex enhancing left renal mass concerning for renal cell carcinoma till proven otherwise.\par She underwent EUS on 23 with noted findings of one 3 cm pancreatic body mass, s/p FNA.  Pathology positive for adenocarcinoma. \par \par She reports decrease appetite and early satiety.  Abdominal pain is intermittent and severe when it comes.  She was taking Tylenol#3 after hospital d/c but currently not taking anymore. +Weight loss of 10 pounds since March. \par \par Colonoscopy from 7 years ago normal. \par Smoke/Etoh: 20 pack years. Last smoke 2023. No ETOH use. \par \par ECO      Independent, can walk upstairs. Lives with her sons.  (ECOG 1 due to pain). \par Family Ca hx: None \par \par Labs: \par 23    AST: 9   ALT: 18  ALP:  103  Tbili: 0.3 \par 23   Ca 19-9: 2428    CEA: 15.0    AST:  11    ALT: 12    ALP: 96   Tbili:  0.4     A1c: 10% \par  [TextBox_4] : 4/4/23 [TextBox_39] : [id number] [TextBox_41] : adenocarcinoma [TextBox_43] : 4/14/23 [] : This is not a second opinion [TextBox_5] : 5/2/23 [TextBox_40] : 4/8/23

## 2023-05-09 ENCOUNTER — APPOINTMENT (OUTPATIENT)
Dept: UROLOGY | Facility: CLINIC | Age: 68
End: 2023-05-09
Payer: MEDICARE

## 2023-05-09 ENCOUNTER — RESULT REVIEW (OUTPATIENT)
Age: 68
End: 2023-05-09

## 2023-05-09 ENCOUNTER — APPOINTMENT (OUTPATIENT)
Dept: HEMATOLOGY ONCOLOGY | Facility: CLINIC | Age: 68
End: 2023-05-09
Payer: MEDICARE

## 2023-05-09 ENCOUNTER — APPOINTMENT (OUTPATIENT)
Dept: UROLOGY | Facility: CLINIC | Age: 68
End: 2023-05-09

## 2023-05-09 ENCOUNTER — APPOINTMENT (OUTPATIENT)
Dept: SURGICAL ONCOLOGY | Facility: CLINIC | Age: 68
End: 2023-05-09
Payer: MEDICARE

## 2023-05-09 ENCOUNTER — NON-APPOINTMENT (OUTPATIENT)
Age: 68
End: 2023-05-09

## 2023-05-09 ENCOUNTER — LABORATORY RESULT (OUTPATIENT)
Age: 68
End: 2023-05-09

## 2023-05-09 VITALS
DIASTOLIC BLOOD PRESSURE: 85 MMHG | HEART RATE: 81 BPM | TEMPERATURE: 97.6 F | OXYGEN SATURATION: 98 % | WEIGHT: 162.46 LBS | BODY MASS INDEX: 27.74 KG/M2 | HEIGHT: 64 IN | SYSTOLIC BLOOD PRESSURE: 125 MMHG | RESPIRATION RATE: 16 BRPM

## 2023-05-09 VITALS
HEIGHT: 64 IN | DIASTOLIC BLOOD PRESSURE: 81 MMHG | OXYGEN SATURATION: 98 % | HEART RATE: 77 BPM | WEIGHT: 162 LBS | BODY MASS INDEX: 27.66 KG/M2 | SYSTOLIC BLOOD PRESSURE: 127 MMHG | RESPIRATION RATE: 16 BRPM

## 2023-05-09 VITALS
DIASTOLIC BLOOD PRESSURE: 87 MMHG | SYSTOLIC BLOOD PRESSURE: 137 MMHG | HEIGHT: 64 IN | BODY MASS INDEX: 27.83 KG/M2 | WEIGHT: 163 LBS | RESPIRATION RATE: 16 BRPM | HEART RATE: 92 BPM

## 2023-05-09 DIAGNOSIS — N28.89 OTHER SPECIFIED DISORDERS OF KIDNEY AND URETER: ICD-10-CM

## 2023-05-09 LAB
BASOPHILS # BLD AUTO: 0.03 K/UL — SIGNIFICANT CHANGE UP (ref 0–0.2)
BASOPHILS NFR BLD AUTO: 0.3 % — SIGNIFICANT CHANGE UP (ref 0–2)
EOSINOPHIL # BLD AUTO: 0.06 K/UL — SIGNIFICANT CHANGE UP (ref 0–0.5)
EOSINOPHIL NFR BLD AUTO: 0.6 % — SIGNIFICANT CHANGE UP (ref 0–6)
HCT VFR BLD CALC: 37.2 % — SIGNIFICANT CHANGE UP (ref 34.5–45)
HGB BLD-MCNC: 12.1 G/DL — SIGNIFICANT CHANGE UP (ref 11.5–15.5)
IMM GRANULOCYTES NFR BLD AUTO: 0.5 % — SIGNIFICANT CHANGE UP (ref 0–0.9)
LYMPHOCYTES # BLD AUTO: 1.59 K/UL — SIGNIFICANT CHANGE UP (ref 1–3.3)
LYMPHOCYTES # BLD AUTO: 14.9 % — SIGNIFICANT CHANGE UP (ref 13–44)
MCHC RBC-ENTMCNC: 26.7 PG — LOW (ref 27–34)
MCHC RBC-ENTMCNC: 32.5 G/DL — SIGNIFICANT CHANGE UP (ref 32–36)
MCV RBC AUTO: 82.1 FL — SIGNIFICANT CHANGE UP (ref 80–100)
MONOCYTES # BLD AUTO: 0.46 K/UL — SIGNIFICANT CHANGE UP (ref 0–0.9)
MONOCYTES NFR BLD AUTO: 4.3 % — SIGNIFICANT CHANGE UP (ref 2–14)
NEUTROPHILS # BLD AUTO: 8.47 K/UL — HIGH (ref 1.8–7.4)
NEUTROPHILS NFR BLD AUTO: 79.4 % — HIGH (ref 43–77)
NRBC # BLD: 0 /100 WBCS — SIGNIFICANT CHANGE UP (ref 0–0)
PLATELET # BLD AUTO: 252 K/UL — SIGNIFICANT CHANGE UP (ref 150–400)
RBC # BLD: 4.53 M/UL — SIGNIFICANT CHANGE UP (ref 3.8–5.2)
RBC # FLD: 16.2 % — HIGH (ref 10.3–14.5)
WBC # BLD: 10.66 K/UL — HIGH (ref 3.8–10.5)
WBC # FLD AUTO: 10.66 K/UL — HIGH (ref 3.8–10.5)

## 2023-05-09 PROCEDURE — 99214 OFFICE O/P EST MOD 30 MIN: CPT

## 2023-05-09 PROCEDURE — 99204 OFFICE O/P NEW MOD 45 MIN: CPT

## 2023-05-09 RX ORDER — METFORMIN HYDROCHLORIDE 500 MG/1
500 TABLET, COATED ORAL TWICE DAILY
Refills: 0 | Status: COMPLETED | COMMUNITY
End: 2023-05-09

## 2023-05-09 RX ORDER — GLIPIZIDE AND METFORMIN HYDROCHLORIDE 2.5; 5 MG/1; MG/1
2.5-5 TABLET, FILM COATED ORAL
Qty: 60 | Refills: 0 | Status: ACTIVE | COMMUNITY
Start: 2023-05-09

## 2023-05-09 RX ORDER — GLIPIZIDE 2.5 MG/1
TABLET ORAL
Refills: 0 | Status: DISCONTINUED | COMMUNITY
End: 2023-05-09

## 2023-05-09 NOTE — LETTER BODY
[FreeTextEntry2] : Suzy Bartlett MD\par [address]\par [place] [id number] [FreeTextEntry3] : Sincerely,\par \par \par \par Pilo Kelly MD, FACS\par Chief of Urology, [facility]\par  of Urology\par \par [facility]\par [facility]\par [address] [place] [address]\[place] [address]\par P: [phone]\par F: [phone]\par [facility]

## 2023-05-09 NOTE — REASON FOR VISIT
[Follow-Up Visit] : a follow-up [Initial Consultation] : an initial consultation [FreeTextEntry2] : pancreas carcinoma

## 2023-05-09 NOTE — HISTORY OF PRESENT ILLNESS
[Disease: _____________________] : Disease: [unfilled] [T: ___] : T[unfilled] [N: ___] : N[unfilled] [M: ___] : M[unfilled] [AJCC Stage: ____] : AJCC Stage: [unfilled] [1 - Distress Level] : Distress Level: 1 [80: Normal activity with effort; some signs or symptoms of disease.] : 80: Normal activity with effort; some signs or symptoms of disease.  [ECOG Performance Status: 2 - Ambulatory and capable of all self care but unable to carry out any work activities] : Performance Status: 2 - Ambulatory and capable of all self care but unable to carry out any work activities. Up and about more than 50% of waking hours [Abdominal Pain] : abdominal pain [EUS] : EUS [Biopsy] : biopsy performed [Before Surgery] : before surgery [Pancreatic ductal adenocarcinoma] : Pancreatic ductal adenocarcinoma [Not staged outside] : not staged outside [Nutrition] : Nutrition [Social Work] : Social Work [Other: ____] : [unfilled] [Restricted in physically strenuous activity but ambulatory and able to carry out work of a light or sedentary nature] : Status 1 - Restricted in physically strenuous activity but ambulatory and able to carry out work of a light or sedentary nature, e.g., light house work, office work [Date: ____________] : Patient's last distress assessment performed on [unfilled]. [de-identified] : adenocarcinoma [FreeTextEntry1] : discussion of chemotherapy treatment gemcitabine and Abraxane [de-identified] : see hpi; 05/09/2023.She has been taking a combination medication of glipizide and metformin with better blood sugar control.Now on acetaminophen 300 codeine 30 mg PO TID PRN after meeting with pain management and her primary care MD. Pain has been mid line and more noted in day than in evening. No jaundice [de-identified] : Ms. ENRIKE MORLEY is a 67 year old female who presents for evaluation in our Pancreas [facility]idisciplinary Clinic. Her PMH includes DM2 (dx ), HTN, left knee replacement. She presented with abdominal pain radiating to her LUQ x 3 weeks. MRI abdomen  noted a 3 cm mass in the body of the pancreas suspicious for primary pancreatic malignancy and also Bosniak type IV cyst upper pole left kidney suspicious for primary renal neoplasm. She was admitted to [facility] on 23 and CT A/P w/ IC showing ill-defined pancreatic body mass consistent with pancreatic neoplasm; and complex enhancing left renal mass concerning for renal cell carcinoma till proven otherwise.\par She underwent EUS on 23 with noted findings of one 3 cm pancreatic body mass, s/p FNA.  Pathology positive for adenocarcinoma. \par \par She reports decrease appetite and early satiety.  Abdominal pain is intermittent and severe when it comes.  She was taking Tylenol#3 after hospital d/c but currently not taking anymore. +Weight loss of 10 pounds since March. \par \par Colonoscopy from 7 years ago normal. \par Smoke/Etoh: 20 pack years. Last smoke 2023. No ETOH use. \par \par ECO      Independent, can walk upstairs. Lives with her sons.  (ECOG 1 due to pain). \par Family Ca hx: None \par \par Labs: \par 23    AST: 9   ALT: 18  ALP:  103  Tbili: 0.3 \par 23   Ca 19-9: 2428    CEA: 15.0    AST:  11    ALT: 12    ALP: 96   Tbili:  0.4     A1c: 10% \par  [TextBox_4] : 4/4/23 [TextBox_39] : [id number] [TextBox_41] : adenocarcinoma [TextBox_43] : 4/14/23 [] : This is not a second opinion [TextBox_5] : 5/2/23 [TextBox_40] : 4/8/23

## 2023-05-09 NOTE — ASSESSMENT
[FreeTextEntry1] : I reviewed the patient's CT imaging showing both the pancreatic mass and the left renal mass.  I think it is unlikely these 2 are related and the kidney mass is most likely to be a renal cell carcinoma.  I discussed management of her renal mass with her in general and that this would be amenable to a partial nephrectomy.  We also discussed prognosis and follow-up with a renal lesion such as this 1 and that it would unlikely require any other systemic therapy.  Complicating the overall picture however is the pancreatic mass and she is extremely symptomatic from this lesion at this time with abdominal pain that she rates at an 8 or 9 out of 10 and is managing with narcotics.  She is seeing both Dr. Greco and Dr. Mahoney later today to develop a management plan.  I think that for now the renal mass can remain on the back burner temporarily until the pancreatic lesion is addressed.  If there is a surgical plan for the pancreatic lesion to take place, I would consider combining that surgery with a laparoscopic or robotic partial nephrectomy as these lesions are relatively close to 1 another and could be operated on in the same field.\par \par I will confer with both Dr. Greco and Dr. Mahoney after her follow-up visits with both of them today.

## 2023-05-09 NOTE — ASSESSMENT
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 Side effects of chemotherapy were reviewed with her\par She is age 67 and has significant co morbidity including diabetes poorly controlled on oral metformin at present. She is asked to see her primary care physician for consideration of an insulin based treatment. \par The patient may have two tumors as the left renal mass has not had a biopsy. The priority in treating the pancreas cancer was addressed in the multidisciplinary conference. CT/PET will be helpful in addressing the left renal lesion which may be a second primary tumor. If two different tumors are present she is not a candidate for a clinical study.\par I have recommended placement of a Mediport for chemotherapy treatment. Scheduling of Mediport performed with patient and explanation of use of Mediport. A request for Mediport placement was made and our secretarial team will obtain authorization. Blood testing requested today as noted.\par THe patient has seen pain management physician Dr Anne for management of analgesia\par The patient is scheduled for surgical follow up in future.\par ECG is performed by me and Nursing assistant and results are reviewed\par I have prescribed antinausea medication metoclopramide 10 mg PO TID PN\par RTC in 1 week to sign consent for chemotherapy\par 05/09/2023 The patient has returned to office. Physical examination is not changed. She has reviewed the chemotherapy information provided last week. I discussed the use of the gemcitabine and Abraxane as treatment on day 1, 8 and 15 of a 28 day cycle (no treatment on day 22). I assisted our  in scheduling chemotherapy and all questions were answered to her satisfaction. Mediport placement is scheduled for next week.RTC in 3 weeks\par She is now on combination dosing metformin and glyceride.Her primary care physician has prescribed acetaminophen 3000 and codeine 30 TID. \par I prescribed Levaquin 500 mg PO daily PRN fever fro neutropenia  prophylaxis\par \par

## 2023-05-09 NOTE — HISTORY OF PRESENT ILLNESS
[FreeTextEntry1] : Elissa sampson presents to the office today as a new patient. She is a 67-year-old female with a history of DM and HTN she had a CT scan in April 2023 for abdominal pain which revealed a 4cm cystic/sold left renal mass and a separate pancreatic mass. She saw surgery and was told it is cancerous and needs a PET scan. Went for a PET scan but she was hyperglycemic so the test could not be performed. She was switched from Metformin to Glipizide. \par \par She denies a family history of  malignancies. \par She has lost 10lbs in about one month and and states she has a poor appetite but it has been getting better. \par Reports a 20 pack year history, stopped in early 2023. \par \par She reports abdominal pain 8/9 out of 10, she is on Percocet PRN. \par \par She denies any hematuria, dysuria, flank pain, urinary frequency, or urgency. \par \par She is having a port placed in anticipation of chemotherapy (scheduled 5/18)

## 2023-05-10 LAB
ALBUMIN SERPL ELPH-MCNC: 4.4 G/DL
ALP BLD-CCNC: 93 U/L
ALT SERPL-CCNC: 11 U/L
ANION GAP SERPL CALC-SCNC: 17 MMOL/L
AST SERPL-CCNC: 10 U/L
BILIRUB SERPL-MCNC: 0.2 MG/DL
BUN SERPL-MCNC: 15 MG/DL
CALCIUM SERPL-MCNC: 9.9 MG/DL
CANCER AG19-9 SERPL-ACNC: 2328 U/ML
CHLORIDE SERPL-SCNC: 100 MMOL/L
CO2 SERPL-SCNC: 21 MMOL/L
CREAT SERPL-MCNC: 0.75 MG/DL
EGFR: 87 ML/MIN/1.73M2
GLUCOSE SERPL-MCNC: 187 MG/DL
POTASSIUM SERPL-SCNC: 4.5 MMOL/L
PROT SERPL-MCNC: 6.9 G/DL
SODIUM SERPL-SCNC: 138 MMOL/L

## 2023-05-10 NOTE — CONSULT LETTER
[Dear  ___] : Dear  [unfilled], [Consult Letter:] : I had the pleasure of evaluating your patient, [unfilled]. [( Thank you for referring [unfilled] for consultation for _____ )] : Thank you for referring [unfilled] for consultation for [unfilled] [Please see my note below.] : Please see my note below. [Consult Closing:] : Thank you very much for allowing me to participate in the care of this patient.  If you have any questions, please do not hesitate to contact me. [Sincerely,] : Sincerely, [FreeTextEntry3] : Addison Greco MD, FICS, FACS\par Director of Surgical Oncology- [facility]\par ,Department of Surgery\par [facility]\par [address]\[place]- [address]\par \par [address]\[place]- [address]\par \par [address]\par [place], [place]- [address]\par \par (mob) [phone]\par (o) [phone]\par (f) [phone]\par

## 2023-05-10 NOTE — ASSESSMENT
[FreeTextEntry1] : IMP:\par 66yo w/ ill-defined pancreas mass (4.2 cm), bx proven T4 adenocarcinoma w/ encasement of celiac axis; left renal lesion concerning for RCC\par \par s/p EUS biopsy on 4/14/2023, path c/w pancreatic adenocarcinoma\par \par Case discussed at [facility] on 5/2/2023 with plan to defer surgery and offer neoadjuvant chemotherapy up front\par PET/CT was ordered but given her poorly controlled diabetes it has been rescheduled pending better glucose control \par \par \par PLAN:\par planned to start neoadjuvant chemotherapy w/ Dr. Mahoney & mediport placement \par Will reeval after 2 months restaging CT\par Pt to coordinate with [facility] for RPA as well\par I have discussed the diagnosis, therapeutic plan and options with the patient at length. Patient expressed verbal understanding to proceed with the proposed plan. All questions answered.

## 2023-05-10 NOTE — HISTORY OF PRESENT ILLNESS
[de-identified] : Ms. ENRIKE MORLEY is a 67 year old woman here today for a follow-up visit \par \par Enrike presented to [facility] ED in April of 2023 with complaints of 2 weeks of abdominal pain radiating to her LUQ. \par Her PMH is significant for DM, HTN, liver polyps & renal cysts. \par DM diagnosed in 2022 --- reports her a1c is around 8\par \par She reports that her abdominal pain started as vague epigastric pain then steadily worsened. She was seeing her PCP for management of these symptoms and underwent an abdominal U/S & MRI  (@[facility], will obtain images and reports). \par She has lost ~10 lbs from March to April 2023; appetite is poor. ECOG 0-1 \par Her last colonoscopy was ~7 years ago and otherwise WNL; never had an endoscopy \par \par MR abdomen from 4/4/2023 showed a 3 cm solid hypo enhancing mass in the pancreatic body with ductal obstruction & parenchymal atrophy in the tail and a Bosniak type IV cyst upper pole left kidney suspicious for primary renal neoplasm\par \par CT A/P 4/8/2023 - ill defined pancreatic body mass c/w pancreas neoplasm; complex enhancing left renal mass concerning for RCC\par \par PSH: left knee replacement 2019\par Fam HX: none\par Social HX: ~20 pack year history, stopped in early 2023; no ETOH, retired from job as a  \par \par PCP: Dr. Suzy Bartlett \par \par s/p EUS biopsy on 4/14/2023, path c/w pancreatic adenocarcinoma\par \par Case discussed at [facility] on 5/2/2023 with plan to defer surgery and offer neoadjuvant chemotherapy up front\par PET/CT was ordered but given her poorly controlled diabetes it has been rescheduled pending better glucose control

## 2023-05-10 NOTE — PHYSICAL EXAM
[Normal] : supple, no neck mass and thyroid not enlarged [Normal Neck Lymph Nodes] : normal neck lymph nodes  [Normal Supraclavicular Lymph Nodes] : normal supraclavicular lymph nodes [Normal Groin Lymph Nodes] : normal groin lymph nodes [Normal Axillary Lymph Nodes] : normal axillary lymph nodes [Normal] : oriented to person, place and time, with appropriate affect [FreeTextEntry1] : COVID-19 precautions as per [facility] policy was universally followed  [de-identified] : soft NT ND

## 2023-05-11 ENCOUNTER — NON-APPOINTMENT (OUTPATIENT)
Age: 68
End: 2023-05-11

## 2023-05-12 ENCOUNTER — OUTPATIENT (OUTPATIENT)
Dept: OUTPATIENT SERVICES | Facility: HOSPITAL | Age: 68
LOS: 1 days | End: 2023-05-12

## 2023-05-12 VITALS
HEIGHT: 64 IN | RESPIRATION RATE: 16 BRPM | SYSTOLIC BLOOD PRESSURE: 139 MMHG | DIASTOLIC BLOOD PRESSURE: 77 MMHG | WEIGHT: 160.94 LBS | HEART RATE: 92 BPM | OXYGEN SATURATION: 98 % | TEMPERATURE: 99 F

## 2023-05-12 DIAGNOSIS — I10 ESSENTIAL (PRIMARY) HYPERTENSION: ICD-10-CM

## 2023-05-12 DIAGNOSIS — C25.9 MALIGNANT NEOPLASM OF PANCREAS, UNSPECIFIED: ICD-10-CM

## 2023-05-12 DIAGNOSIS — E11.9 TYPE 2 DIABETES MELLITUS WITHOUT COMPLICATIONS: ICD-10-CM

## 2023-05-12 DIAGNOSIS — Z96.652 PRESENCE OF LEFT ARTIFICIAL KNEE JOINT: Chronic | ICD-10-CM

## 2023-05-12 RX ORDER — SODIUM CHLORIDE 9 MG/ML
1000 INJECTION, SOLUTION INTRAVENOUS
Refills: 0 | Status: DISCONTINUED | OUTPATIENT
Start: 2023-05-18 | End: 2023-06-01

## 2023-05-12 NOTE — H&P PST ADULT - NSSUBSTANCEUSE_GEN_ALL_CORE_SD
February 25, 2020       Kendy Guevara MD  [address]  [address]  [facility] [address]  VIA In Basket      Patient: Magalys Benitez   YOB: 2008   Date of Visit: 2/25/2020       Dear Dr. Guevara:    I saw your patient, Magalys Benitez, for an evaluation. Below are my notes for this visit with her.    If you have questions, please do not hesitate to call me.      Sincerely,        Estelita Denis MD        CC: No Recipients  Estelita Denis MD  2/25/2020  2:26 PM  Sign when Signing Visit        STANDARD OFFICE NOTE    Name: Magalys Benitez  Address: [address] [place] [address]  [facility] [address]  Phone Number: [phone]  Date: 02/24/20  Time: 8:21 PM  Provider: Estelita Denis MD  PCP:  Kendy Guevara MD  Referring Provider:No ref. provider found    INDICATION:  Magalys Benitez is a 11 year old female who presents with hospital f/u orbital cellulitis        Maaglys is accompanied by Mother .  Referring provider is No ref. provider found.    No chief complaint on file.          HPI:  HPI  REASON FOR CONSULT:   Magalys Benitez is a 11 year old female who presents with right eyelid pain and swelling, fever        HPI:  Pt is an 10 yo female healthy transferred from [facility] ER with right eye pain and eye lid swelling, redness, and fever.  Illness began 2/5 when patient complained of not feeling well, and of leg pain.  She stayed home from school 2/6.  Pt then improved, returned to school 2/7 but again felt unwell at the end of the school day.  2/8 she developed fever, emesis, right eye pain.  2/9 right eyelid was swollen and T was 103.  She went to PMD 2/10 and complained of eye pain with movement.  She also had emesis, fever.  Pt referred to [facility] ER .   She was AF in the ER.  Labs hsowed WBC 5.3, 60% polys.    CT ORBITS W CONTRAST   Final Result   1.   Extensive sinusitis changes with complete opacification right frontal, ethmoid, sphenoid and maxillary sinuses.     2.   Tiny low density medial  aspect of right orbit suggests possible early tiny subperiosteal abscess.     3.   Preseptal cellulitis right eye with proptosis of right globe.      Pt received ceftriaxone and vancomycin and developed leeanne syndrome, was transferred to [facility].  She continues to receive ceftriaxone and vancomycin with benadryl and administered at slower rate.  Pt complaining of eye pain, headache.  She did tolerate po this morning without emesis.  She has been seen by ENT, who recommended continue IV antibiotics, and nasal sprays.  Ophthamology will see patient today.              Exposures  Sick contacts:No  Attends : Yes, after school program  Travel/Foreign visitors: No  Tuberculosis:No  Foods:No  Animals:Yes, fish  Environmental:Yes, swim team, chlorinated pool     No recent antibiotics  Had URI sx few weeks ago, resolved after few days        Interval History:  2/12:  No fevers.  Taking PO.  Up in chair this morning.  Patient states that she has less pain today.  Both mom and patient feel that eye swelling has decreased.  Patient now able to open eye partly on own.  Denies photophobia.  Mild HA.  No neck pain.  2/13:  No fevers.  Eating well.  Went to playroom yesterday.  Denies pain at rest.  Some pain still with eye movement.  Able to open eye more.  Double vision at times.  Mild HA.  2/14:  No fevers.  Still with some double vision, but improving.  Taking PO well.  No HA this morning.  No eye pain except mild pain when looks to left.  2/15: afebrile, reports feeling well, no discomfort with eye movement, still double vision while watching tv at a distance    Antibiotic History:  Ceftriaxone 2/10-2/11  Vancomycin 2/10-2/11  Unasyn 2/11-     PAST MEDICAL HX:     No history of frequent infections, no prior hospitalizations     PAST SURGICAL HX:  History - past surgical   History reviewed. No pertinent surgical history.            FAMILY HISTORY:  No history of immunodeficiency     SOCIAL HISTORY:  Lives With : parents,  older brother   Outside Activities: swim team            FOLLOW UP:    Hospital Follow Up on Therapy  Inpatient Records Personally Reviewed: Yes  Patient Admitted From: 2/11-2/15  Today is Day of Therapy: 14    No significant interim events/illness.  No fevers.  No difficutly tollerating the antibiotics. No N/V/D. No Abdominal Pain. No Rash  No nausea, diarrhea, or vomiting.  No abdominal pain.  No missed antibiotic doses.    Brief rash on extremities, one dose of benadryl and resolved  No cough  No congestion  HA few days ago, 10-20 min, when pt woke up, resolved without treatment, did not really complain to mom about pain  appetitie normal, gaining weight since acute illness  Back at school, good energy level    2/17 ENT visit --rec flonase x one month duration total, no f/u recommended  2/19 ophthamology visit-- all good by report, no f/u recommended              ALLERGIES:   Allergen Reactions   • Vancomycin Other (See Comments)     Red man syndrome, tolerated slower infusion rate with diphenhydramine.     Current Outpatient Medications   Medication Sig Dispense Refill   • acetaminophen (TYLENOL) 160 MG/5ML suspension Take 15.4 mLs by mouth every 6 hours as needed for Fever or Pain.     • fluticasone (FLONASE) 50 MCG/ACT nasal spray Spray 1 spray in each nostril 2 times daily. 16 g 0   • amoxicillin-clavulanate (AUGMENTIN) 400-57 MG/5ML suspension Take 18.5 mLs by mouth 2 times daily. Please take one dose today, and then continue as prescribed. 518 mL 1   • Pediatric Multiple Vit-C-FA (MULTIVITAMIN CHILDRENS PO) Take 1 tablet by mouth daily.       No current facility-administered medications for this visit.        Immunizations Reviewed: up to date per record    Patient's medications, allergies, past medical, surgical, social and family histories were reviewed and updated as appropriate.      REVIEW OF SYSTEMS:  Review of Systems   Constitutional: Negative.  Negative for fever.   HENT: Negative.  Negative for  congestion, sinus pain and sore throat.    Eyes: Negative.  Negative for photophobia, pain and visual disturbance.   Respiratory: Negative.    Cardiovascular: Negative.    Gastrointestinal: Negative.         Mild occasional loose stools   Endocrine: Negative.    Genitourinary: Negative.    Musculoskeletal: Negative.    Skin:        Brief rash, resolved   Allergic/Immunologic: Negative.    Neurological: Positive for headaches.        Brief HA resolved without intervention   Hematological: Negative.    Psychiatric/Behavioral: Negative.           VITALS:  There were no vitals taken for this visit.      PHYSICAL EXAMS:  Physical Exam  Constitutional:       General: She is active.      Appearance: Normal appearance.   HENT:      Head: Normocephalic and atraumatic.      Right Ear: Tympanic membrane normal.      Left Ear: Tympanic membrane normal.      Mouth/Throat:      Pharynx: Oropharynx is clear. No oropharyngeal exudate.   Eyes:      Extraocular Movements: Extraocular movements intact.      Conjunctiva/sclera: Conjunctivae normal.      Pupils: Pupils are equal, round, and reactive to light.   Cardiovascular:      Rate and Rhythm: Normal rate and regular rhythm.      Heart sounds: Normal heart sounds.   Pulmonary:      Effort: Pulmonary effort is normal.      Breath sounds: Normal breath sounds.   Abdominal:      General: Abdomen is flat. There is no distension.      Palpations: Abdomen is soft.      Tenderness: There is no tenderness.   Musculoskeletal: Normal range of motion.   Lymphadenopathy:      Cervical: No cervical adenopathy.   Skin:     General: Skin is warm.      Capillary Refill: Capillary refill takes less than 2 seconds.      Findings: No rash.   Neurological:      General: No focal deficit present.      Mental Status: She is alert.           LABS:  Please enter a base name.    OTHER RESULTS/IMAGING    EXAM: CT ORBITS W CONTRAST     CLINICAL INDICATION:  Cellulitis with swelling right     COMPARISON: No  previous available.     TECHNIQUE: CT exam of the orbits obtained with IV contrast.  50 mL of Omnipaque 350 contrast administered intravenously at 1.5 mL/sec.  Low dose CT technique with automatic exposure control and patient weight/size adjusted mA modification utilized.     FINDINGS: There is soft tissue swelling right periorbital region consistent with preseptal cellulitis with slight proptosis of right globe compared to normal left side.  Extensive sinus inflammatory change with complete opacification right frontal,   ethmoid, maxillary and sphenoid sinuses are seen with mucosal disease left ethmoid, maxillary and sphenoid sinuses also seen.  There is tiny 10 x 3 mm low density in the medial right orbit adjacent to lamina papyracea posteriorly towards the orbital   apex.  Possible tiny subperiosteal abscess not excluded.  There is otherwise no additional intraorbital lesion, mass or abnormal enhancement.  Extraocular muscles are intact with slight lateral deviation of the medial rectus muscle.  Optic nerve appears   intact.  Left globe and orbital structures are unremarkable.  Intracranial structures are unremarkable.  Osseous structures show no lytic lesion or focal abnormality.        IMPRESSION:  1.   Extensive sinusitis changes with complete opacification right frontal, ethmoid, sphenoid and maxillary sinuses.    2.   Tiny low density medial aspect of right orbit suggests possible early tiny subperiosteal abscess.    3.   Preseptal cellulitis right eye with proptosis of right globe.     Electronically Signed by: CHANTELLE WILSON M.D.   Signed on: 2/10/2020 12:13 PM          Assessment and Plan:  Magalys is a 11 year old female with several day history of fever, worsening right eyelid  swelling and erythema, eye pain and headache, with sinusitis and orbital cellulitis.  Symptoms and physical exam with non tender eye lid swelling but pain and limitation of EOM is consistent with orbital cellulitis, due to spread from  sinuses.  Organisms to consider include S. Pneumoniae, H influenzae non typable, S. Aureus, anaerobes.   Pt improved with amp/sulbactam and transitioned to augmentin, physical exam now normal and all findings present at admission have resolved.    Recomemndations  1.   continue augmentin until done, which will be 2/29, and be 18 days total therapy  2. Continue nasal sprays as per ENT  3.Call if fever, congestion/URI sx not resolving, eye complaints  4.  F/u ID as needed    Assessment and Plan Have Been Discussed in Detail.  Family Has Expressed Verbal Understanding.  All Questions Have Been Answered.    Total time spent with patient 25 minutes. Greater than 50% of the total time spent counseling and/or coordinating care.         Estelita Denis MD            never used/street drug/inhalant/medication abuse

## 2023-05-12 NOTE — H&P PST ADULT - PROBLEM SELECTOR PLAN 1
Pt is scheduled for celiac plexus block with alcohol for neurolysis on 5/16/23.  Verbal and written pre op instructions reviewed with patient and pt able to verbalize understanding.   Verbal and written instructions given with chlorhexidine wash, pt able to verbalize understanding via teach back method.  Pt instructed to take metoclopramide AM of surgery with a sip of water, pt able to verbalize understanding.

## 2023-05-12 NOTE — H&P PST ADULT - NSICDXPASTMEDICALHX_GEN_ALL_CORE_FT
PAST MEDICAL HISTORY:  Asthma     Diabetes     HTN (hypertension)     Liver, polycystic     Malignant neoplasm of pancreas     Renal cyst

## 2023-05-12 NOTE — H&P PST ADULT - NSANTHOSAYNRD_GEN_A_CORE
No. CARA screening performed.  STOP BANG Legend: 0-2 = LOW Risk; 3-4 = INTERMEDIATE Risk; 5-8 = HIGH Risk

## 2023-05-12 NOTE — H&P PST ADULT - HISTORY OF PRESENT ILLNESS
68 yo female with preop dx. malignant neoplasm of pancreas presents to pre surgical testing.   Pt started to have abdominal pain a few months ago, worsening over time.  Pt s/p workup revealing pancreatic mass, s/p EUS biopsy revealing cancer.  Pt has severe abdominal pain, h/o opioid use.  Pt is scheduled for celiac plexus block with alcohol for neurolysis.

## 2023-05-12 NOTE — H&P PST ADULT - PROBLEM SELECTOR PLAN 3
Pt instructed to take losartan, and amlodipine  AM of surgery with a sip of water, pt able to verbalize understanding.

## 2023-05-15 VITALS
DIASTOLIC BLOOD PRESSURE: 73 MMHG | TEMPERATURE: 97 F | SYSTOLIC BLOOD PRESSURE: 121 MMHG | RESPIRATION RATE: 17 BRPM | HEIGHT: 64 IN | OXYGEN SATURATION: 99 % | WEIGHT: 160.94 LBS | HEART RATE: 81 BPM

## 2023-05-15 NOTE — ASU PREOPERATIVE ASSESSMENT, ADULT (IPARK ONLY) - BP NONINVASIVE DIASTOLIC (MM HG)
Pediatric Well Adolescent Exam: 15-21 Years of age    Chief Complaint   Patient presents with   • School Physical       SUBJECTIVE:  Prashanth Ferreira is a 17 year old male who presents for a well child exam.  Patient presents  with Mother who stayed for the subjective portion of the visit     Preferred method of results communication:     Cell Phone:   Telephone Information:   Mobile [phone]     Okay to leave a message containing results? Yes    CONCERNS RAISED TODAY:   Chronic low back pain.  Reports intermittent achiness in his low back.  Denies numbness tingling or weakness rating down his lower extremities.  Denies bowel or bladder incontinence.  Feels a stiffness that is relieved when he cracks his back.  Has not tried anything at home to help the discomfort.    RISK ASSESSMENT (HEADSSS):   Home  Lives with: mother   [x]  YES    []  NO    []  Unknown    Changes in home/work environment?  [x]  YES    []  NO    []  Unknown    Eats meals with family?  [x]  YES    []  NO    []  Unknown    Has family member/adult to turn to for help?  [x]  YES    []  NO    []  Unknown    Is permitted and is able to make independent decisions?  Education  Grade in school:  [address] [place] [address]  [x]  Normal    []  Delayed            Academic performance? As & Bs  [x]  Normal    []  Delayed            Behavior/attention?  [x]  Normal    []  Delayed            Homework?  Eating  Calcium Source: milk, cheese, yogurt  [x]  YES    []  NO    []  Unknown    Eats regular meals including adequate fruits and vegetables?  []  YES    [x]  NO    []  Unknown    Drinks non-sweetened liquids?  Drinks lots of juices, limited water  []  YES    [x]  NO    []  Unknown    Has concerns about body/appearance?  Activities  [x]  YES    []  NO    []  Unknown    Has friends?  [x]  YES    []  NO    []  Unknown    Has at least 1 hour of physical activity per day?  [x]  YES    []  NO    []  Unknown    Electronic screen time less than 2 hours/day except for  homework?  [x]  YES    []  NO    []  Unknown    Volunteers and participates in community activities? Boy  - working on [employer]   [x]  YES    []  NO    []  Unknown    Involved in other  activities (music, drama, etc)?  PROVIDER ONLY QUESTIONS  Drugs  []  YES    []  NO    [x]  Confidential    Uses tobacco, alcohol or drugs?  Safety  [x]  YES    []  NO    []  Unknown    Home is free of violence?  [x]  YES    []  NO    []  Unknown    Uses safety belts/safety equipment?  []  YES    [x]  NO    []  Unknown    Impaired/distracted driving?  [x]  YES    []  NO    []  Unknown    Has peer relationships free of violence?  Sex  []  YES    []  NO    [x]  Confidential   Has had sex?  Suicide/Mental Health  [x]  YES    []  NO    []  Unknown   Has ways to cope with stress?  [x]  YES    []  NO    []  Unknown   Displays self confidence?  []  YES    [x]  NO    []  Unknown   Has problems with sleep?  []  YES    [x]  NO    []  Unknown   Gets depressed, anxious or irritable/ has mood swings?  []  YES    [x]  NO    []  Unknown    Has thoughts about hurting self or considering suicide?    VISION SCREENING:    Hearing Screening    125Hz 250Hz 500Hz 1000Hz 2000Hz 3000Hz 4000Hz 6000Hz 8000Hz   Right ear:            Left ear:               Visual Acuity Screening    Right eye Left eye Both eyes   Without correction:      With correction: 20/20 20/20 20/15       OBJECTIVE:  PAST HISTORIES:  Allergies, Medications, Medical history, Surgical history, Family history reviewed and updated.  Toxic Exposure:   Tobacco Use: Never              Comment: Mother + tobacco use, not in home    IMMUNIZATION STATUS: Up to date per review of the electronic health records.   IMMUNIZATION REACTIONS: None  VARICELLA STATUS: confirmed by vaccine administration     RECENT HEALTH EVENTS:  · Illnesses: []  YES    [x]  NO    []  N/A  · Hospitalizations: []  YES    [x]  NO    []  N/A  · Injuries or Accidents: []  YES    [x]  NO    []  N/A    REVIEW OF SYSTEMS:     All systems reviewed and negative except as documented in \"Concerns raised\".    PHYSICAL EXAM:   VITAL SIGNS:   Visit Vitals  /79 (BP Location: RUE - Right upper extremity, Patient Position: Sitting, Cuff Size: Regular)   Pulse 80   Temp 97.6 °F (36.4 °C) (Temporal)   Ht 5' 10\" (1.778 m)   Wt 66.9 kg (147 lb 6.4 oz)   BMI 21.15 kg/m²    56 %ile (Z= 0.14) based on [facility] (Boys, 2-20 Years) weight-for-age data using vitals from 10/2/2021.  GENERAL:  Well appearing male child.  Alert and active.  SKIN:  Warm, normal turgor. No cyanosis. No rash.  HEAD:  Normocephalic, atraumatic.    EYES:  Conjunctivae appear normal, noninjected, nonicteric  NOSE:  Appears normal without drainage.  EARS:  Normal external auditory canals. Tympanic membranes are transparent with normal landmarks.  THROAT:  Oropharynx with moist mucous membranes without lesions.  NECK:  Supple, no lymphadenopathy or masses.  HEART:  Regular rate and rhythm.  Normal S1, S2.  No murmurs, rubs, gallops.   LUNGS:  Clear to auscultation. No wheezes, rales, rhonchi.  Normal work effort with breathing.  ABDOMEN:  Bowel sounds present. Soft, nontender. No hepatomegaly, splenomegaly or masses.  GENITOURINARY: Patient refused chaperone.  Arash stage V.  Bilateral testes present, nontender.  No inguinal hernias.  EXTREMITIES: Symmetrical muscle mass, strength all extremities.  No effusions.   BACK: Spine straight. No costovertebral angle tenderness.  No spinous process tenderness over lumbar spine.  No pain with active range of motion.  Is limited in forward flexion to 70 degrees due to tight hamstrings.  Mild paraspinal muscular tenderness with palpation.  NEUROLOGIC:  Oriented x4. No gross lateralizing signs or focal deficits.  Normal gait.      Assessment:   Well 17 year old male adolescent.  Well adolescent visit  (primary encounter diagnosis)  Chronic bilateral low back pain without sciatica    Plan:  1. All parental concerns and questions  discussed.  2. Chronic low back pain: Likely related to patient's posture and activity.  Recommend home exercises, handout provided.  Can use ibuprofen as needed.  Discussed next steps if back pain continues or worsens.  Would recommend physical therapy  3. Anticipatory guidance provided, handouts given   4. Tobacco, ETOH, illicit drug avoidance  5. Sexual activity & avoidance / safe sex  6. Puberty  7. Self testicular examination  8. Accident prevention  9. School achievement  10. Family/Peer relationships  11. Regular physical activity  12. Healthy food choices  13. Immunizations per orders.  Risks, benefits, and side effects discussed.  14. Orders for immunizations given today per the recommended schedule.  15. VIS for Immunizations given.    Orders Placed This Encounter   • INFLUENZA QUADRIVALENT SPLIT PRES FREE 0.5 ML VACC, IM (FLULAVAL,FLUARIX,FLUZONE)         Follow up:Return in about 1 year (around 10/2/2022) for 40 minutes, annual wellness visit, w/ PCP.   73

## 2023-05-15 NOTE — ASU PREOPERATIVE ASSESSMENT, ADULT (IPARK ONLY) - FALL HARM RISK - HARM RISK INTERVENTIONS

## 2023-05-16 ENCOUNTER — APPOINTMENT (OUTPATIENT)
Dept: PAIN MANAGEMENT | Facility: AMBULATORY SURGERY CENTER | Age: 68
End: 2023-05-16

## 2023-05-16 ENCOUNTER — TRANSCRIPTION ENCOUNTER (OUTPATIENT)
Age: 68
End: 2023-05-16

## 2023-05-16 ENCOUNTER — OUTPATIENT (OUTPATIENT)
Dept: OUTPATIENT SERVICES | Facility: HOSPITAL | Age: 68
LOS: 1 days | Discharge: HOME | End: 2023-05-16
Payer: MEDICARE

## 2023-05-16 VITALS
SYSTOLIC BLOOD PRESSURE: 110 MMHG | DIASTOLIC BLOOD PRESSURE: 64 MMHG | TEMPERATURE: 97 F | RESPIRATION RATE: 15 BRPM | HEART RATE: 77 BPM | OXYGEN SATURATION: 99 %

## 2023-05-16 DIAGNOSIS — C25.9 MALIGNANT NEOPLASM OF PANCREAS, UNSPECIFIED: ICD-10-CM

## 2023-05-16 DIAGNOSIS — Z96.652 PRESENCE OF LEFT ARTIFICIAL KNEE JOINT: Chronic | ICD-10-CM

## 2023-05-16 PROBLEM — J45.909 UNSPECIFIED ASTHMA, UNCOMPLICATED: Chronic | Status: ACTIVE | Noted: 2023-05-12

## 2023-05-16 LAB — GLUCOSE BLDC GLUCOMTR-MCNC: 159 MG/DL — HIGH (ref 70–99)

## 2023-05-16 PROCEDURE — 64530 N BLOCK INJ CELIAC PELUS: CPT

## 2023-05-16 RX ORDER — SODIUM CHLORIDE 9 MG/ML
1000 INJECTION, SOLUTION INTRAVENOUS
Refills: 0 | Status: DISCONTINUED | OUTPATIENT
Start: 2023-05-16 | End: 2023-05-30

## 2023-05-16 NOTE — PACU DISCHARGE NOTE - COMMENTS
T(C): 36.3 (05-16-23 @ 13:55), Max: 36.3 (05-16-23 @ 13:55)  HR: 87 (05-16-23 @ 14:40) (76 - 87)  BP: 110/69 (05-16-23 @ 14:40) (97/56 - 121/73)  RR: 14 (05-16-23 @ 14:40) (13 - 17)  SpO2: 96% (05-16-23 @ 14:40) (94% - 99%)    No apparent anesthesia complications. Vital signs stable, non-labored breathing with adequate oxygen saturation on room air. Pain and nausea are controlled. All questions answered. Stable for discharge home with an escort.

## 2023-05-16 NOTE — ASU DISCHARGE PLAN (ADULT/PEDIATRIC) - CALL YOUR DOCTOR IF YOU HAVE ANY OF THE FOLLOWING:
Bleeding that does not stop/Swelling that gets worse/Fever greater than (need to indicate Fahrenheit or Celsius)/Numbness, tingling, color or temperature change to extremity Bleeding that does not stop/Swelling that gets worse/Fever greater than (need to indicate Fahrenheit or Celsius)/Wound/Surgical Site with redness, or foul smelling discharge or pus/Numbness, tingling, color or temperature change to extremity/Nausea and vomiting that does not stop

## 2023-05-16 NOTE — PROCEDURE NOTE - NSICDXPROCEDURE_GEN_ALL_CORE_FT
PROCEDURES:  Block of celiac plexus with C-arm fluoroscopic guidance 16-May-2023 20:16:09  Pilo Bennett

## 2023-05-16 NOTE — ASU DISCHARGE PLAN (ADULT/PEDIATRIC) - CARE PROVIDER_API CALL
Pilo Bennett)  Anesthesiology; Pain Medicine  [phone] [place] [address]  Phone: [phone]  Fax: [phone]  Follow Up Time:

## 2023-05-16 NOTE — PROCEDURE NOTE - ADDITIONAL PROCEDURE DETAILS
The patient was brought to the procedure area after written, informed consent was obtained and placed in the prone position.  Monitored anesthesia care was provided by the anesthesia team throughout.  The back was prepped and draped in the usual sterile fashion.  With the aid of fluoroscopy, the T12-L1 level was appreciated and, on each side, the oblique projection was obtained to allow passage of a 6-inch, 20-gauge needle to the anterolateral border of L1 without difficulty.  Correct position was confirmed with a small amount of Omnipaque dye on each side.    A test dose of 3 cc of 2% lidocaine with epinephrine was given bilaterally with no appreciable change in heart rate, followed by incremental instillation of an equal mixture of 20 cc of 2% lidocaine and 0.5% bupivacaine, which was divided equally on each side.  After about 10 minutes, the patient reported that her pre-procedure pain level of 10/10 had decreased to 0/10.    A mixture of 16 of absolute alcohol combined with 4 cc of Omnipaque was then prepared and then divided on each side by giving 3 cc at a time with a combination of both live and intermittent fluoroscopic guidance to assure appropriate distribution of the agent.  The patient tolerated the procedure well and was transferred prone to the stretcher and maintained the prone position for 30 additional minutes in the PACU.  She was discharged home in stable condition and will follow up with us in clinic in several weeks.

## 2023-05-18 ENCOUNTER — OUTPATIENT (OUTPATIENT)
Dept: OUTPATIENT SERVICES | Facility: HOSPITAL | Age: 68
LOS: 1 days | End: 2023-05-18
Payer: COMMERCIAL

## 2023-05-18 ENCOUNTER — RESULT REVIEW (OUTPATIENT)
Age: 68
End: 2023-05-18

## 2023-05-18 ENCOUNTER — TRANSCRIPTION ENCOUNTER (OUTPATIENT)
Age: 68
End: 2023-05-18

## 2023-05-18 VITALS
OXYGEN SATURATION: 97 % | HEIGHT: 64 IN | TEMPERATURE: 98 F | HEART RATE: 99 BPM | RESPIRATION RATE: 18 BRPM | WEIGHT: 162.04 LBS | SYSTOLIC BLOOD PRESSURE: 119 MMHG | DIASTOLIC BLOOD PRESSURE: 76 MMHG

## 2023-05-18 VITALS
OXYGEN SATURATION: 98 % | RESPIRATION RATE: 15 BRPM | DIASTOLIC BLOOD PRESSURE: 87 MMHG | SYSTOLIC BLOOD PRESSURE: 131 MMHG | HEART RATE: 72 BPM

## 2023-05-18 DIAGNOSIS — C25.9 MALIGNANT NEOPLASM OF PANCREAS, UNSPECIFIED: ICD-10-CM

## 2023-05-18 DIAGNOSIS — Z96.652 PRESENCE OF LEFT ARTIFICIAL KNEE JOINT: Chronic | ICD-10-CM

## 2023-05-18 LAB — GLUCOSE BLDC GLUCOMTR-MCNC: 165 MG/DL — HIGH (ref 70–99)

## 2023-05-18 PROCEDURE — 77001 FLUOROGUIDE FOR VEIN DEVICE: CPT

## 2023-05-18 PROCEDURE — C1894: CPT

## 2023-05-18 PROCEDURE — 36561 INSERT TUNNELED CV CATH: CPT

## 2023-05-18 PROCEDURE — C1769: CPT

## 2023-05-18 PROCEDURE — 36561 INSERT TUNNELED CV CATH: CPT | Mod: RT

## 2023-05-18 PROCEDURE — C1788: CPT

## 2023-05-18 PROCEDURE — 82962 GLUCOSE BLOOD TEST: CPT

## 2023-05-18 PROCEDURE — 76937 US GUIDE VASCULAR ACCESS: CPT | Mod: 26

## 2023-05-18 PROCEDURE — 77001 FLUOROGUIDE FOR VEIN DEVICE: CPT | Mod: 26

## 2023-05-18 PROCEDURE — 76937 US GUIDE VASCULAR ACCESS: CPT

## 2023-05-18 RX ORDER — FENTANYL CITRATE 50 UG/ML
25 INJECTION INTRAVENOUS
Refills: 0 | Status: DISCONTINUED | OUTPATIENT
Start: 2023-05-18 | End: 2023-05-18

## 2023-05-18 RX ORDER — METOCLOPRAMIDE HCL 10 MG
1 TABLET ORAL
Refills: 0 | DISCHARGE

## 2023-05-18 RX ORDER — ACETAMINOPHEN 500 MG
1000 TABLET ORAL ONCE
Refills: 0 | Status: DISCONTINUED | OUTPATIENT
Start: 2023-05-18 | End: 2023-06-01

## 2023-05-18 RX ORDER — ACETAMINOPHEN WITH CODEINE 300MG-30MG
1 TABLET ORAL
Refills: 0 | DISCHARGE

## 2023-05-18 NOTE — PRE PROCEDURE NOTE - PRE PROCEDURE EVALUATION
Vascular & Interventional Radiology    HPI: 67y Female with pancreatic ca for chest port.    Allergies: No Known Drug Allergies    Data:  T(C): 36.7  HR: 99  BP: 119/76  RR: 18  SpO2: 97%    Exam  Calm, resting comfortably, NAD, normal respirations.    Plan:   - 67y Female presents for chest port implantation.  - Informed consent obtained.  Vascular & Interventional Radiology    HPI: 67y Female with pancreatic ca for chest port.    Allergies: No Known Drug Allergies    Data:  T(C): 36.7  HR: 99  BP: 119/76  RR: 18  SpO2: 97%    Exam  Calm, resting comfortably, NAD, normal respirations.    Plan:   - 67y Female presents for chest port implantation.  - Informed consent obtained.   - Full code status confirmed  - Sidedness to be determined on intraprocedural ultrasound.

## 2023-05-18 NOTE — ASU PREOP CHECKLIST - STERILIZATION AFFIRMATION
[facility] PM assessment complete. Denies any needs at this time. n/a

## 2023-05-18 NOTE — ASU DISCHARGE PLAN (ADULT/PEDIATRIC) - NS MD DC FALL RISK RISK
For information on Fall & Injury Prevention, visit: [email or web address] OR  [email or web address] OR  https://www.cdc.gov/steadi/patient.html

## 2023-05-18 NOTE — ASU DISCHARGE PLAN (ADULT/PEDIATRIC) - NURSING INSTRUCTIONS
Please feel free to contact us at [phone] if any problems arise. After 6PM, Monday through Friday, on weekends and on holidays, please call [phone] and ask for the radiology resident on call to be paged.

## 2023-05-18 NOTE — ASU PATIENT PROFILE, ADULT - MEDICATION ADMINISTRATION INFO, PROFILE
Progress Note     Patient: Julissa Stone               Sex: female           MRN: [id number]       YOB: 1942      Age:  79 year old               Subjective:  NAEON. Complaining of pain but it is well controlled. +N, +V 1x    ROS: No F/C, SOB, CP      Objective:  Visit Vitals  /71   Pulse 71   Temp 97.7 °F (36.5 °C) (Temporal)   Resp 16   Ht 5' 3\" (1.6 m)   Wt 59.5 kg (131 lb 2.8 oz)   SpO2 97%   BMI 23.24 kg/m²       Intake/Output     Intake/Output Summary (Last 24 hours) at 7/15/2021 0841  Last data filed at 7/15/2021 0717  Gross per 24 hour   Intake 2091.54 ml   Output 2665 ml   Net -573.46 ml     Intake/Output last 3 shifts:  I/O last 3 completed shifts:  In: 2091.5 [I.V.:2091.5]  Out: 2650 [Urine:2625; Blood:25]    Gen: NAD  PULM: NLB  Card: RRR  Abdomen: Soft, ND, ATTP, incision sites are clean, dry and intact  Skin: WWP    Medication  Scheduled Meds:  • acetaminophen  650 mg Oral 4 times per day   • gabapentin  100 mg Oral TID   • enoxaparin  40 mg Subcutaneous Q24H   • magnesium sulfate  2 g Intravenous Once   • amLODIPine  5 mg Oral QAM   • levothyroxine  50 mcg Oral QAM AC   • pantoprazole  40 mg Oral 2 times per day   • polyethylene glycol  17 g Oral Daily   • spironolactone  25 mg Oral Daily     Continuous Infusions:  PRN Meds:.traMADol, morphine, ondansetron    Lab/Data Reviewed:  Recent Results (from the past 24 hour(s))   CBC with Automated Differential (performable only)    Collection Time: 07/15/21  5:00 AM   Result Value Ref Range    WBC 7.2 4.2 - 11.0 K/mcL    RBC 3.08 (L) 4.00 - 5.20 mil/mcL    HGB 10.3 (L) 12.0 - 15.5 g/dL    HCT 30.9 (L) 36.0 - 46.5 %    .3 (H) 78.0 - 100.0 fl    MCH 33.4 26.0 - 34.0 pg    MCHC 33.3 32.0 - 36.5 g/dL    RDW-CV 19.2 (H) 11.0 - 15.0 %    RDW-SD 67.4 (H) 39.0 - 50.0 fL     140 - 450 K/mcL    NRBC 0 <=0 /100 WBC    Neutrophil, Percent 82 %    Lymphocytes, Percent 8 %    Mono, Percent 10 %    Eosinophils, Percent 0 %    Basophils,  Percent 0 %    Immature Granulocytes 0 %    Absolute Neutrophils 5.8 1.8 - 7.7 K/mcL    Absolute Lymphocytes 0.6 (L) 1.0 - 4.0 K/mcL    Absolute Monocytes 0.7 0.3 - 0.9 K/mcL    Absolute Eosinophils  0.0 0.0 - 0.5 K/mcL    Absolute Basophils 0.0 0.0 - 0.3 K/mcL    Absolute Immmature Granulocytes 0.0 0.0 - 0.2 K/mcL   Basic Metabolic Panel    Collection Time: 07/15/21  5:00 AM   Result Value Ref Range    Fasting Status      Sodium 128 (L) 135 - 145 mmol/L    Potassium 3.8 3.4 - 5.1 mmol/L    Chloride 95 (L) 98 - 107 mmol/L    Carbon Dioxide 26 21 - 32 mmol/L    Anion Gap 11 10 - 20 mmol/L    Glucose 101 (H) 65 - 99 mg/dL    BUN 8 6 - 20 mg/dL    Creatinine 0.61 0.51 - 0.95 mg/dL    Glomerular Filtration Rate 87 (L) >90 mL/min/1.73m2    BUN/ Creatinine Ratio 13 7 - 25    Calcium 9.3 8.4 - 10.2 mg/dL   Magnesium    Collection Time: 07/15/21  5:00 AM   Result Value Ref Range    Magnesium 1.7 1.7 - 2.4 mg/dL   Phosphorus    Collection Time: 07/15/21  5:00 AM   Result Value Ref Range    Phosphorus 4.5 2.4 - 4.7 mg/dL         Assessment/Plan  This patient is a 79 year old female w/ pmhx of HTN, hypothyroidism, DVT who is now POD1 from a ROBOTIC-ASSISTED INCISIONAL HERNIA REPAIR WITH MESH and robotic right inguinal hernia repair with mesh    -patient is making good progress and will continue to assess postoperative milestones  -to discontinue arciniega and void trial in 6 hours  -to start CLD  -on DVT ppx with 40 of lovenox based on pharmacy recs. Reportedly her DVT is resolved and she is no longer on apixaban.   -PT/OT  -Encourage OOB, SCDs, IS    Will discuss with attending and Surgery team    Arjun Maloney M.D  PGY-1 Surgery     no concerns

## 2023-05-18 NOTE — ASU PATIENT PROFILE, ADULT - PRO ARRIVE FROM
PROCEDURE NOTE    Ignacio Subramanian    [id number]   1972      Procedure:  EGD, biopsy; Colonoscopy biopsy. Pre Procedure Diagnosis:  Chest Pain, Chronic Diarrhea    Post Procedure Diagnosis:  Normal EGD and Normal Colonoscopy to ileum. Indication:  55year old male with atypical chest pain with negative cardiac workup (including nuclear stress test). He has history ofGERD. Chest pain is worse at night  No dysphagia or odynophagia. He has diarrhea 2 to 3 x a day. He is on omerazole. Instrument:  Olympus , PCF H 190 High Definition    Anesthesia:  Monitored anesthesia care. See anesthesia record. Description of Procedure:  After Informed Consent, the patient was placed in the left lateral decubitus position. Anesthesia was given. The scope was passed and advanced to the third portion of the duodenum. The duodenum was normal.  The pylorus was normal.  The stomach was readily distensible, with good contractions noted, and had no mucosal abnormalities. The gastroesophageal junction was normal on direct and retroflexed view and was located 40 cm from the central incisors. The esophagus was normal.  The larynx was normal.     The patient was then repositioned for colonoscopy. Rectal exam revealed good sphincter tone and no abnormalities. The prostrate was normal..  The scope was passed and advanced into the cecal cap where the appendiceal orifice was identified and photographed. The preparation was good. The cecum and ileocecal valve were normal.  The last 15 cm of ileum was normal.  The ascending colon was normal.  The transverse colon was normal.  The descending colon was normal.  The sigmoid colon was normal.  The rectum was normal on direct and retroflexed view. Scope withdrawal time was more than six minutes. The patient tolerated the procedures well.       Estimated blood loss:  None    Complications:  None immediate    Specimens:  Antral biopsies for JOSE test.  For histology:  A: 3rd portion of duodenum  B: esophagus  C: ileum  D. Random Colon    Scope Events:   Scope In: 7853    At Cecum: 7213   Scope Out: 8004   Scope Withdrawal Time: 6      Recommendations:  Continue PPI  Await pathology  Will call/write/email with path results and recommendations.       CC:  Dr. Rosa Maria Leigh home

## 2023-05-22 ENCOUNTER — RESULT REVIEW (OUTPATIENT)
Age: 68
End: 2023-05-22

## 2023-05-22 ENCOUNTER — NON-APPOINTMENT (OUTPATIENT)
Age: 68
End: 2023-05-22

## 2023-05-22 ENCOUNTER — APPOINTMENT (OUTPATIENT)
Dept: INFUSION THERAPY | Facility: HOSPITAL | Age: 68
End: 2023-05-22

## 2023-05-22 ENCOUNTER — APPOINTMENT (OUTPATIENT)
Dept: HEMATOLOGY ONCOLOGY | Facility: CLINIC | Age: 68
End: 2023-05-22

## 2023-05-22 LAB
ALBUMIN SERPL ELPH-MCNC: 3.9 G/DL — SIGNIFICANT CHANGE UP (ref 3.3–5)
ALP SERPL-CCNC: 80 U/L — SIGNIFICANT CHANGE UP (ref 40–120)
ALT FLD-CCNC: 15 U/L — SIGNIFICANT CHANGE UP (ref 10–45)
ANION GAP SERPL CALC-SCNC: 15 MMOL/L — SIGNIFICANT CHANGE UP (ref 5–17)
AST SERPL-CCNC: 14 U/L — SIGNIFICANT CHANGE UP (ref 10–40)
BILIRUB SERPL-MCNC: 0.3 MG/DL — SIGNIFICANT CHANGE UP (ref 0.2–1.2)
BUN SERPL-MCNC: 14 MG/DL — SIGNIFICANT CHANGE UP (ref 7–23)
CALCIUM SERPL-MCNC: 9.3 MG/DL — SIGNIFICANT CHANGE UP (ref 8.4–10.5)
CANCER AG19-9 SERPL-ACNC: 1704 U/ML — HIGH
CHLORIDE SERPL-SCNC: 102 MMOL/L — SIGNIFICANT CHANGE UP (ref 96–108)
CO2 SERPL-SCNC: 24 MMOL/L — SIGNIFICANT CHANGE UP (ref 22–31)
CREAT SERPL-MCNC: 0.72 MG/DL — SIGNIFICANT CHANGE UP (ref 0.5–1.3)
EGFR: 92 ML/MIN/1.73M2 — SIGNIFICANT CHANGE UP
GLUCOSE SERPL-MCNC: 136 MG/DL — HIGH (ref 70–99)
POTASSIUM SERPL-MCNC: 3.9 MMOL/L — SIGNIFICANT CHANGE UP (ref 3.5–5.3)
POTASSIUM SERPL-SCNC: 3.9 MMOL/L — SIGNIFICANT CHANGE UP (ref 3.5–5.3)
PROT SERPL-MCNC: 6.1 G/DL — SIGNIFICANT CHANGE UP (ref 6–8.3)
SODIUM SERPL-SCNC: 141 MMOL/L — SIGNIFICANT CHANGE UP (ref 135–145)

## 2023-05-23 DIAGNOSIS — C25.9 MALIGNANT NEOPLASM OF PANCREAS, UNSPECIFIED: ICD-10-CM

## 2023-05-23 DIAGNOSIS — R11.2 NAUSEA WITH VOMITING, UNSPECIFIED: ICD-10-CM

## 2023-05-23 DIAGNOSIS — Z51.11 ENCOUNTER FOR ANTINEOPLASTIC CHEMOTHERAPY: ICD-10-CM

## 2023-05-26 ENCOUNTER — NON-APPOINTMENT (OUTPATIENT)
Age: 68
End: 2023-05-26

## 2023-05-26 ENCOUNTER — APPOINTMENT (OUTPATIENT)
Dept: HEMATOLOGY ONCOLOGY | Facility: CLINIC | Age: 68
End: 2023-05-26
Payer: MEDICARE

## 2023-05-26 PROCEDURE — 99441: CPT

## 2023-05-30 ENCOUNTER — RESULT REVIEW (OUTPATIENT)
Age: 68
End: 2023-05-30

## 2023-05-30 ENCOUNTER — APPOINTMENT (OUTPATIENT)
Dept: HEMATOLOGY ONCOLOGY | Facility: CLINIC | Age: 68
End: 2023-05-30
Payer: MEDICARE

## 2023-05-30 ENCOUNTER — APPOINTMENT (OUTPATIENT)
Dept: INFUSION THERAPY | Facility: HOSPITAL | Age: 68
End: 2023-05-30

## 2023-05-30 ENCOUNTER — NON-APPOINTMENT (OUTPATIENT)
Age: 68
End: 2023-05-30

## 2023-05-30 DIAGNOSIS — Z45.2 ENCOUNTER FOR ADJUSTMENT AND MANAGEMENT OF VASCULAR ACCESS DEVICE: ICD-10-CM

## 2023-05-30 DIAGNOSIS — C25.9 MALIGNANT NEOPLASM OF PANCREAS, UNSPECIFIED: ICD-10-CM

## 2023-05-30 LAB
BASOPHILS # BLD AUTO: 0.02 K/UL — SIGNIFICANT CHANGE UP (ref 0–0.2)
BASOPHILS NFR BLD AUTO: 0.3 % — SIGNIFICANT CHANGE UP (ref 0–2)
EOSINOPHIL # BLD AUTO: 0.08 K/UL — SIGNIFICANT CHANGE UP (ref 0–0.5)
EOSINOPHIL NFR BLD AUTO: 1.1 % — SIGNIFICANT CHANGE UP (ref 0–6)
HCT VFR BLD CALC: 33.8 % — LOW (ref 34.5–45)
HGB BLD-MCNC: 11.4 G/DL — LOW (ref 11.5–15.5)
IMM GRANULOCYTES NFR BLD AUTO: 0.5 % — SIGNIFICANT CHANGE UP (ref 0–0.9)
LYMPHOCYTES # BLD AUTO: 2.12 K/UL — SIGNIFICANT CHANGE UP (ref 1–3.3)
LYMPHOCYTES # BLD AUTO: 29 % — SIGNIFICANT CHANGE UP (ref 13–44)
MCHC RBC-ENTMCNC: 27.5 PG — SIGNIFICANT CHANGE UP (ref 27–34)
MCHC RBC-ENTMCNC: 33.7 G/DL — SIGNIFICANT CHANGE UP (ref 32–36)
MCV RBC AUTO: 81.4 FL — SIGNIFICANT CHANGE UP (ref 80–100)
MONOCYTES # BLD AUTO: 0.35 K/UL — SIGNIFICANT CHANGE UP (ref 0–0.9)
MONOCYTES NFR BLD AUTO: 4.8 % — SIGNIFICANT CHANGE UP (ref 2–14)
NEUTROPHILS # BLD AUTO: 4.69 K/UL — SIGNIFICANT CHANGE UP (ref 1.8–7.4)
NEUTROPHILS NFR BLD AUTO: 64.3 % — SIGNIFICANT CHANGE UP (ref 43–77)
NRBC # BLD: 0 /100 WBCS — SIGNIFICANT CHANGE UP (ref 0–0)
PLATELET # BLD AUTO: 203 K/UL — SIGNIFICANT CHANGE UP (ref 150–400)
RBC # BLD: 4.15 M/UL — SIGNIFICANT CHANGE UP (ref 3.8–5.2)
RBC # FLD: 15.9 % — HIGH (ref 10.3–14.5)
WBC # BLD: 7.3 K/UL — SIGNIFICANT CHANGE UP (ref 3.8–10.5)
WBC # FLD AUTO: 7.3 K/UL — SIGNIFICANT CHANGE UP (ref 3.8–10.5)

## 2023-05-30 PROCEDURE — 99213 OFFICE O/P EST LOW 20 MIN: CPT

## 2023-05-31 LAB
ALBUMIN SERPL ELPH-MCNC: 4.2 G/DL — SIGNIFICANT CHANGE UP (ref 3.3–5)
ALP SERPL-CCNC: 77 U/L — SIGNIFICANT CHANGE UP (ref 40–120)
ALT FLD-CCNC: 15 U/L — SIGNIFICANT CHANGE UP (ref 10–45)
ANION GAP SERPL CALC-SCNC: 15 MMOL/L — SIGNIFICANT CHANGE UP (ref 5–17)
AST SERPL-CCNC: 12 U/L — SIGNIFICANT CHANGE UP (ref 10–40)
BILIRUB SERPL-MCNC: 0.2 MG/DL — SIGNIFICANT CHANGE UP (ref 0.2–1.2)
BUN SERPL-MCNC: 18 MG/DL — SIGNIFICANT CHANGE UP (ref 7–23)
CALCIUM SERPL-MCNC: 9.5 MG/DL — SIGNIFICANT CHANGE UP (ref 8.4–10.5)
CANCER AG19-9 SERPL-ACNC: 1526 U/ML — HIGH
CHLORIDE SERPL-SCNC: 98 MMOL/L — SIGNIFICANT CHANGE UP (ref 96–108)
CO2 SERPL-SCNC: 24 MMOL/L — SIGNIFICANT CHANGE UP (ref 22–31)
CREAT SERPL-MCNC: 0.91 MG/DL — SIGNIFICANT CHANGE UP (ref 0.5–1.3)
EGFR: 69 ML/MIN/1.73M2 — SIGNIFICANT CHANGE UP
GLUCOSE SERPL-MCNC: 124 MG/DL — HIGH (ref 70–99)
POTASSIUM SERPL-MCNC: 5 MMOL/L — SIGNIFICANT CHANGE UP (ref 3.5–5.3)
POTASSIUM SERPL-SCNC: 5 MMOL/L — SIGNIFICANT CHANGE UP (ref 3.5–5.3)
PROT SERPL-MCNC: 6.6 G/DL — SIGNIFICANT CHANGE UP (ref 6–8.3)
SODIUM SERPL-SCNC: 138 MMOL/L — SIGNIFICANT CHANGE UP (ref 135–145)

## 2023-05-31 NOTE — RESULTS/DATA
[FreeTextEntry1] : review of information in the E [facility]\par rate 92 normal axis NSR ; possilbe anterior infarction\par \par GI Ca 19-9 -> 2428 > 2328 > 1704 >  1526 (trending down) \par \par May 30, 2023 - CBC - WBC = 7.3, Hgb = 11.4, PLT = 203, MCV = 81.4\par CMP - Blood glucose = 124 (non fasting)\par GI Ca 19-9 - 1526 (trending down)

## 2023-05-31 NOTE — REVIEW OF SYSTEMS
[Fatigue] : fatigue [Recent Change In Weight] : ~T recent weight change [Abdominal Pain] : abdominal pain [Negative] : Allergic/Immunologic [Vomiting] : no vomiting [Constipation] : no constipation [FreeTextEntry7] : intermittent with nausea

## 2023-05-31 NOTE — PHYSICAL EXAM
[Ambulatory and capable of all self care but unable to carry out any work activities] : Status 2- Ambulatory and capable of all self care but unable to carry out any work activities. Up and about more than 50% of waking hours [Normal] : affect appropriate [de-identified] : Right Anterior chest wall Mediport site appears stable, no bleeding, edema or erythema / discharge noted

## 2023-05-31 NOTE — ASSESSMENT
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 Side effects of chemotherapy were reviewed with her\par She is age 67 and has significant co morbidity including diabetes poorly controlled on oral metformin at present. She is asked to see her primary care physician for consideration of an insulin based treatment. \par The patient may have two tumors as the left renal mass has not had a biopsy. The priority in treating the pancreas cancer was addressed in the multidisciplinary conference. CT/PET will be helpful in addressing the left renal lesion which may be a second primary tumor. If two different tumors are present she is not a candidate for a clinical study.\par I have recommended placement of a Mediport for chemotherapy treatment. Scheduling of Mediport performed with patient and explanation of use of Mediport. A request for Mediport placement was made and our secretarial team will obtain authorization. Blood testing requested today as noted.\par THe patient has seen pain management physician Dr Anne for management of analgesia\par The patient is scheduled for surgical follow up in future.\par ECG is performed by me and Nursing assistant and results are reviewed\par I have prescribed antinausea medication metoclopramide 10 mg PO TID PN\par RTC in 1 week to sign consent for chemotherapy\par 05/09/2023 The patient has returned to office. Physical examination is not changed. She has reviewed the chemotherapy information provided last week. I discussed the use of the gemcitabine and Abraxane as treatment on day 1, 8 and 15 of a 28 day cycle (no treatment on day 22). I assisted our  in scheduling chemotherapy and all questions were answered to her satisfaction. Mediport placement is scheduled for next week.RTC in 3 weeks\par She is now on combination dosing metformin and glyceride.Her primary care physician has prescribed acetaminophen 3000 and codeine 30 TID. 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 Seen in a f/u visit today \par 1) Pancreas Cancer - scheduled for Cycle # 2 Gemcitabine & Abraxane, labs with treatment \par 2) Left Renal Mass awaiting Biopsy Seen by Dr. Pil[facility] - per Dr. Kelly's note - for now the renal mass can remain on the back burner temporarily until the pancreatic lesion is addressed. If there is a surgical plan for the pancreatic lesion to take place, I would consider combining that surgery with a laparoscopic or robotic partial nephrectomy as these lesions are relatively close to 1 another and could be operated on in the same field.\par 3) Surgical Onc - Dr. Greco - Seen for Surgical Intervention  - Will re-eval after 2 months restaging CT\par 4) Abd pain, Erosive Gastritis on Omeprazole (prescribed by GI),  - Tylenol w codeine renewed - Pain management consult requested & Nutritional Evaluation requested. \par All questions, concerns were addressed with patient during this visit to her satisfaction, she verbalized understanding. \par RTC in 3 weeks\par Case management, care plan d/w Dr. Tommy Mahoney \par \par

## 2023-05-31 NOTE — HISTORY OF PRESENT ILLNESS
[Disease: _____________________] : Disease: [unfilled] [T: ___] : T[unfilled] [N: ___] : N[unfilled] [M: ___] : M[unfilled] [AJCC Stage: ____] : AJCC Stage: [unfilled] [Date: ____________] : Patient's last distress assessment performed on [unfilled]. [1 - Distress Level] : Distress Level: 1 [80: Normal activity with effort; some signs or symptoms of disease.] : 80: Normal activity with effort; some signs or symptoms of disease.  [ECOG Performance Status: 2 - Ambulatory and capable of all self care but unable to carry out any work activities] : Performance Status: 2 - Ambulatory and capable of all self care but unable to carry out any work activities. Up and about more than 50% of waking hours [Abdominal Pain] : abdominal pain [EUS] : EUS [Biopsy] : biopsy performed [Before Surgery] : before surgery [Pancreatic ductal adenocarcinoma] : Pancreatic ductal adenocarcinoma [Not staged outside] : not staged outside [Nutrition] : Nutrition [Social Work] : Social Work [Other: ____] : [unfilled] [Restricted in physically strenuous activity but ambulatory and able to carry out work of a light or sedentary nature] : Status 1 - Restricted in physically strenuous activity but ambulatory and able to carry out work of a light or sedentary nature, e.g., light house work, office work [de-identified] : adenocarcinoma [FreeTextEntry1] :  gemcitabine and Abraxane [de-identified] : see hpi; 05/09/2023.She has been taking a combination medication of glipizide and metformin with better blood sugar control.Now on acetaminophen 300 codeine 30 mg PO TID PRN after meeting with pain management and her primary care MD. Pain has been mid line and more noted in day than in evening. No jaundice\par \par May 30, 2023 - Seen in a f/u visit today scheduled for Gemcitabine & Abraxane for Pancreas cancer. Patient had called our office last week for c/o nausea, abd pain, poor appetite. She was prescribed Omeprazole by GI for Gastritis she had not taken. We advised her to take Metoclopramide for nausea, poor appetite and Omeprazole for Gastritis. \par Since taking the medications for past few days she has been feeling much better. She remains on Levaquin for Neutropenia prophylaxia. \par She denies bleeding, constipation, diarrhea, fever, chills, falls.  [de-identified] : Ms. ENRIKE MORLEY is a 67 year old female who presents for evaluation in our Pancreas [facility]. Her PMH includes DM2 (dx ), HTN, left knee replacement. She presented with abdominal pain radiating to her LUQ x 3 weeks. MRI abdomen  noted a 3 cm mass in the body of the pancreas suspicious for primary pancreatic malignancy and also Bosniak type IV cyst upper pole left kidney suspicious for primary renal neoplasm. She was admitted to [facility] on 23 and CT A/P w/ IC showing ill-defined pancreatic body mass consistent with pancreatic neoplasm; and complex enhancing left renal mass concerning for renal cell carcinoma till proven otherwise.\par She underwent EUS on 23 with noted findings of one 3 cm pancreatic body mass, s/p FNA.  Pathology positive for adenocarcinoma. \par \par She reports decrease appetite and early satiety.  Abdominal pain is intermittent and severe when it comes.  She was taking Tylenol#3 after hospital d/c but currently not taking anymore. +Weight loss of 10 pounds since March. \par \par Colonoscopy from 7 years ago normal. \par Smoke/Etoh: 20 pack years. Last smoke 2023. No ETOH use. \par \par ECO      Independent, can walk upstairs. Lives with her sons.  (ECOG 1 due to pain). \par Family Ca hx: None \par \par Labs: \par 23    AST: 9   ALT: 18  ALP:  103  Tbili: 0.3 \par 23   Ca 19-9: 2428    CEA: 15.0    AST:  11    ALT: 12    ALP: 96   Tbili:  0.4     A1c: 10% \par  [TextBox_4] : 4/4/23 [TextBox_39] : [id number] [TextBox_41] : adenocarcinoma [TextBox_43] : 4/14/23 [] : This is not a second opinion [TextBox_5] : 5/2/23 [TextBox_40] : 4/8/23

## 2023-06-06 ENCOUNTER — RESULT REVIEW (OUTPATIENT)
Age: 68
End: 2023-06-06

## 2023-06-06 ENCOUNTER — NON-APPOINTMENT (OUTPATIENT)
Age: 68
End: 2023-06-06

## 2023-06-06 ENCOUNTER — APPOINTMENT (OUTPATIENT)
Dept: INFUSION THERAPY | Facility: HOSPITAL | Age: 68
End: 2023-06-06

## 2023-06-06 ENCOUNTER — APPOINTMENT (OUTPATIENT)
Dept: HEMATOLOGY ONCOLOGY | Facility: CLINIC | Age: 68
End: 2023-06-06

## 2023-06-06 LAB
ALBUMIN SERPL ELPH-MCNC: 4.2 G/DL — SIGNIFICANT CHANGE UP (ref 3.3–5)
ALP SERPL-CCNC: 82 U/L — SIGNIFICANT CHANGE UP (ref 40–120)
ALT FLD-CCNC: 37 U/L — SIGNIFICANT CHANGE UP (ref 10–45)
ANION GAP SERPL CALC-SCNC: 13 MMOL/L — SIGNIFICANT CHANGE UP (ref 5–17)
AST SERPL-CCNC: 20 U/L — SIGNIFICANT CHANGE UP (ref 10–40)
BILIRUB SERPL-MCNC: <0.2 MG/DL — SIGNIFICANT CHANGE UP (ref 0.2–1.2)
BUN SERPL-MCNC: 30 MG/DL — HIGH (ref 7–23)
CALCIUM SERPL-MCNC: 9.3 MG/DL — SIGNIFICANT CHANGE UP (ref 8.4–10.5)
CANCER AG19-9 SERPL-ACNC: 1339 U/ML — HIGH
CHLORIDE SERPL-SCNC: 102 MMOL/L — SIGNIFICANT CHANGE UP (ref 96–108)
CO2 SERPL-SCNC: 24 MMOL/L — SIGNIFICANT CHANGE UP (ref 22–31)
CREAT SERPL-MCNC: 0.94 MG/DL — SIGNIFICANT CHANGE UP (ref 0.5–1.3)
EGFR: 67 ML/MIN/1.73M2 — SIGNIFICANT CHANGE UP
GLUCOSE SERPL-MCNC: 94 MG/DL — SIGNIFICANT CHANGE UP (ref 70–99)
POTASSIUM SERPL-MCNC: 4.1 MMOL/L — SIGNIFICANT CHANGE UP (ref 3.5–5.3)
POTASSIUM SERPL-SCNC: 4.1 MMOL/L — SIGNIFICANT CHANGE UP (ref 3.5–5.3)
PROT SERPL-MCNC: 6.5 G/DL — SIGNIFICANT CHANGE UP (ref 6–8.3)
SODIUM SERPL-SCNC: 138 MMOL/L — SIGNIFICANT CHANGE UP (ref 135–145)

## 2023-06-12 ENCOUNTER — NON-APPOINTMENT (OUTPATIENT)
Age: 68
End: 2023-06-12

## 2023-06-20 ENCOUNTER — RESULT REVIEW (OUTPATIENT)
Age: 68
End: 2023-06-20

## 2023-06-20 ENCOUNTER — APPOINTMENT (OUTPATIENT)
Dept: INFUSION THERAPY | Facility: HOSPITAL | Age: 68
End: 2023-06-20

## 2023-06-21 ENCOUNTER — OUTPATIENT (OUTPATIENT)
Dept: OUTPATIENT SERVICES | Facility: HOSPITAL | Age: 68
LOS: 1 days | Discharge: HOME | End: 2023-06-21

## 2023-06-21 DIAGNOSIS — D64.9 ANEMIA, UNSPECIFIED: ICD-10-CM

## 2023-06-21 DIAGNOSIS — Z96.652 PRESENCE OF LEFT ARTIFICIAL KNEE JOINT: Chronic | ICD-10-CM

## 2023-06-21 LAB
ALBUMIN SERPL ELPH-MCNC: 3.9 G/DL — SIGNIFICANT CHANGE UP (ref 3.3–5)
ALP SERPL-CCNC: 80 U/L — SIGNIFICANT CHANGE UP (ref 40–120)
ALT FLD-CCNC: 15 U/L — SIGNIFICANT CHANGE UP (ref 10–45)
ANION GAP SERPL CALC-SCNC: 15 MMOL/L — SIGNIFICANT CHANGE UP (ref 5–17)
AST SERPL-CCNC: 12 U/L — SIGNIFICANT CHANGE UP (ref 10–40)
BILIRUB SERPL-MCNC: 0.2 MG/DL — SIGNIFICANT CHANGE UP (ref 0.2–1.2)
BUN SERPL-MCNC: 18 MG/DL — SIGNIFICANT CHANGE UP (ref 7–23)
CALCIUM SERPL-MCNC: 9.3 MG/DL — SIGNIFICANT CHANGE UP (ref 8.4–10.5)
CANCER AG19-9 SERPL-ACNC: 636 U/ML — HIGH
CHLORIDE SERPL-SCNC: 103 MMOL/L — SIGNIFICANT CHANGE UP (ref 96–108)
CO2 SERPL-SCNC: 24 MMOL/L — SIGNIFICANT CHANGE UP (ref 22–31)
CREAT SERPL-MCNC: 0.84 MG/DL — SIGNIFICANT CHANGE UP (ref 0.5–1.3)
EGFR: 76 ML/MIN/1.73M2 — SIGNIFICANT CHANGE UP
GLUCOSE SERPL-MCNC: 70 MG/DL — SIGNIFICANT CHANGE UP (ref 70–99)
POTASSIUM SERPL-MCNC: 4.4 MMOL/L — SIGNIFICANT CHANGE UP (ref 3.5–5.3)
POTASSIUM SERPL-SCNC: 4.4 MMOL/L — SIGNIFICANT CHANGE UP (ref 3.5–5.3)
PROT SERPL-MCNC: 6.4 G/DL — SIGNIFICANT CHANGE UP (ref 6–8.3)
SODIUM SERPL-SCNC: 142 MMOL/L — SIGNIFICANT CHANGE UP (ref 135–145)

## 2023-06-27 ENCOUNTER — RESULT REVIEW (OUTPATIENT)
Age: 68
End: 2023-06-27

## 2023-06-27 ENCOUNTER — APPOINTMENT (OUTPATIENT)
Dept: INFUSION THERAPY | Facility: HOSPITAL | Age: 68
End: 2023-06-27

## 2023-06-27 DIAGNOSIS — R11.2 NAUSEA WITH VOMITING, UNSPECIFIED: ICD-10-CM

## 2023-06-27 DIAGNOSIS — Z51.11 ENCOUNTER FOR ANTINEOPLASTIC CHEMOTHERAPY: ICD-10-CM

## 2023-06-27 DIAGNOSIS — C25.9 MALIGNANT NEOPLASM OF PANCREAS, UNSPECIFIED: ICD-10-CM

## 2023-06-27 LAB
ALBUMIN SERPL ELPH-MCNC: 4.2 G/DL — SIGNIFICANT CHANGE UP (ref 3.3–5)
ALP SERPL-CCNC: 77 U/L — SIGNIFICANT CHANGE UP (ref 40–120)
ALT FLD-CCNC: 47 U/L — HIGH (ref 10–45)
ANION GAP SERPL CALC-SCNC: 10 MMOL/L — SIGNIFICANT CHANGE UP (ref 5–17)
AST SERPL-CCNC: 32 U/L — SIGNIFICANT CHANGE UP (ref 10–40)
BASOPHILS # BLD AUTO: 0.07 K/UL — SIGNIFICANT CHANGE UP (ref 0–0.2)
BASOPHILS NFR BLD AUTO: 1.9 % — SIGNIFICANT CHANGE UP (ref 0–2)
BILIRUB SERPL-MCNC: 0.2 MG/DL — SIGNIFICANT CHANGE UP (ref 0.2–1.2)
BUN SERPL-MCNC: 27 MG/DL — HIGH (ref 7–23)
CALCIUM SERPL-MCNC: 10.1 MG/DL — SIGNIFICANT CHANGE UP (ref 8.4–10.5)
CANCER AG19-9 SERPL-ACNC: 384 U/ML — HIGH
CHLORIDE SERPL-SCNC: 102 MMOL/L — SIGNIFICANT CHANGE UP (ref 96–108)
CO2 SERPL-SCNC: 27 MMOL/L — SIGNIFICANT CHANGE UP (ref 22–31)
CREAT SERPL-MCNC: 0.95 MG/DL — SIGNIFICANT CHANGE UP (ref 0.5–1.3)
EGFR: 66 ML/MIN/1.73M2 — SIGNIFICANT CHANGE UP
EOSINOPHIL # BLD AUTO: 0.03 K/UL — SIGNIFICANT CHANGE UP (ref 0–0.5)
EOSINOPHIL NFR BLD AUTO: 0.8 % — SIGNIFICANT CHANGE UP (ref 0–6)
GLUCOSE SERPL-MCNC: 84 MG/DL — SIGNIFICANT CHANGE UP (ref 70–99)
HCT VFR BLD CALC: 27.3 % — LOW (ref 34.5–45)
HGB BLD-MCNC: 9 G/DL — LOW (ref 11.5–15.5)
IMM GRANULOCYTES NFR BLD AUTO: 3 % — HIGH (ref 0–0.9)
LYMPHOCYTES # BLD AUTO: 1.48 K/UL — SIGNIFICANT CHANGE UP (ref 1–3.3)
LYMPHOCYTES # BLD AUTO: 41 % — SIGNIFICANT CHANGE UP (ref 13–44)
MCHC RBC-ENTMCNC: 27.5 PG — SIGNIFICANT CHANGE UP (ref 27–34)
MCHC RBC-ENTMCNC: 33 G/DL — SIGNIFICANT CHANGE UP (ref 32–36)
MCV RBC AUTO: 83.5 FL — SIGNIFICANT CHANGE UP (ref 80–100)
MONOCYTES # BLD AUTO: 0.42 K/UL — SIGNIFICANT CHANGE UP (ref 0–0.9)
MONOCYTES NFR BLD AUTO: 11.6 % — SIGNIFICANT CHANGE UP (ref 2–14)
NEUTROPHILS # BLD AUTO: 1.5 K/UL — LOW (ref 1.8–7.4)
NEUTROPHILS NFR BLD AUTO: 41.7 % — LOW (ref 43–77)
NRBC # BLD: 0 /100 WBCS — SIGNIFICANT CHANGE UP (ref 0–0)
PLATELET # BLD AUTO: 428 K/UL — HIGH (ref 150–400)
POTASSIUM SERPL-MCNC: 5 MMOL/L — SIGNIFICANT CHANGE UP (ref 3.5–5.3)
POTASSIUM SERPL-SCNC: 5 MMOL/L — SIGNIFICANT CHANGE UP (ref 3.5–5.3)
PROT SERPL-MCNC: 6.4 G/DL — SIGNIFICANT CHANGE UP (ref 6–8.3)
RBC # BLD: 3.27 M/UL — LOW (ref 3.8–5.2)
RBC # FLD: 17.3 % — HIGH (ref 10.3–14.5)
SODIUM SERPL-SCNC: 138 MMOL/L — SIGNIFICANT CHANGE UP (ref 135–145)
WBC # BLD: 3.61 K/UL — LOW (ref 3.8–10.5)
WBC # FLD AUTO: 3.61 K/UL — LOW (ref 3.8–10.5)

## 2023-06-30 ENCOUNTER — NON-APPOINTMENT (OUTPATIENT)
Age: 68
End: 2023-06-30

## 2023-06-30 ENCOUNTER — APPOINTMENT (OUTPATIENT)
Dept: HEMATOLOGY ONCOLOGY | Facility: CLINIC | Age: 68
End: 2023-06-30
Payer: MEDICARE

## 2023-06-30 ENCOUNTER — APPOINTMENT (OUTPATIENT)
Dept: GERIATRICS | Facility: CLINIC | Age: 68
End: 2023-06-30
Payer: MEDICARE

## 2023-06-30 VITALS
OXYGEN SATURATION: 94 % | RESPIRATION RATE: 16 BRPM | BODY MASS INDEX: 27.51 KG/M2 | HEART RATE: 117 BPM | SYSTOLIC BLOOD PRESSURE: 116 MMHG | DIASTOLIC BLOOD PRESSURE: 71 MMHG | TEMPERATURE: 96.7 F | HEIGHT: 64 IN | WEIGHT: 161.16 LBS

## 2023-06-30 DIAGNOSIS — I10 ESSENTIAL (PRIMARY) HYPERTENSION: ICD-10-CM

## 2023-06-30 DIAGNOSIS — K29.60 OTHER GASTRITIS W/OUT BLEEDING: ICD-10-CM

## 2023-06-30 PROCEDURE — 99205 OFFICE O/P NEW HI 60 MIN: CPT

## 2023-06-30 PROCEDURE — 99213 OFFICE O/P EST LOW 20 MIN: CPT

## 2023-06-30 RX ORDER — LEVOFLOXACIN 500 MG/1
500 TABLET, FILM COATED ORAL DAILY
Qty: 10 | Refills: 0 | Status: DISCONTINUED | COMMUNITY
Start: 2023-05-09 | End: 2023-06-30

## 2023-06-30 RX ORDER — ACETAMINOPHEN AND CODEINE PHOSPHATE 300; 30 MG/1; MG/1
300-30 TABLET ORAL
Qty: 30 | Refills: 0 | Status: DISCONTINUED | COMMUNITY
Start: 2023-05-26 | End: 2023-06-30

## 2023-06-30 RX ORDER — ACETAMINOPHEN AND CODEINE PHOSPHATE 300; 30 MG/1; MG/1
300-30 TABLET ORAL 3 TIMES DAILY
Qty: 30 | Refills: 0 | Status: DISCONTINUED | COMMUNITY
Start: 2023-05-02 | End: 2023-06-30

## 2023-06-30 NOTE — REVIEW OF SYSTEMS
Date of Consult:  8/6/2019   Referring Provider:    Primary Care Provider:  Dominguez Cervantes MD    REASON FOR VISIT:    Chief Complaint   Patient presents with   • Follow-up     US/Blood test results        History of Present Illness: Patient is a 60 year old female who presents for evaluation following an episode of left flank and back pain as well as abnormal US of the abdomen.  Pt was complaining of left flank and back pain a few weeks ago.  She followed up with her primary doctor who asked her to come in for a abdominal ultrasound.  The abdominal ultrasound showed a couple of gallstones she was also noted to have mild to moderate left hydronephrosis along with mild right hydronephrosis.  She decided to come in to speak about the results of the ultrasound and gallbladder findings.  Currently, she denies any complaints.    Patient Active Problem List   Diagnosis   • Calculus of gallbladder without cholecystitis without obstruction   • Left flank pain       Current Outpatient Medications   Medication Sig   • Acetaminophen (TYLENOL PO)      No current facility-administered medications for this visit.        ALLERGIES:  No Known Allergies     PAST MEDICAL HISTORY:  There is no previous medical history on file.    PAST SURGICAL HISTORY:  There is no previous surgical history on file.    Family History   Problem Relation Age of Onset   • Cancer, Rectal Neg Hx    • Stomach Cancer Neg Hx    • Cancer, Colon Neg Hx    • Colon Polyps Neg Hx        Social History     Socioeconomic History   • Marital status: /Civil Union     Spouse name: Not on file   • Number of children: Not on file   • Years of education: Not on file   • Highest education level: Not on file   Occupational History   • Not on file   Social Needs   • Financial resource strain: Not on file   • Food insecurity:     Worry: Not on file     Inability: Not on file   • Transportation needs:     Medical: Not on file     Non-medical: Not on file   Tobacco Use    • Smoking status: Never Smoker   • Smokeless tobacco: Never Used   Substance and Sexual Activity   • Alcohol use: Never     Frequency: Never   • Drug use: Not on file   • Sexual activity: Not on file   Lifestyle   • Physical activity:     Days per week: Not on file     Minutes per session: Not on file   • Stress: Not on file   Relationships   • Social connections:     Talks on phone: Not on file     Gets together: Not on file     Attends [employer] service: Not on file     Active member of club or organization: Not on file     Attends meetings of clubs or organizations: Not on file     Relationship status: Not on file   • Intimate partner violence:     Fear of current or ex partner: Not on file     Emotionally abused: Not on file     Physically abused: Not on file     Forced sexual activity: Not on file   Other Topics Concern   • Not on file   Social History Narrative   • Not on file          Review of Systems   All other systems reviewed and are negative.      Physical Exam   Constitutional: She is oriented to person, place, and time. No distress.   HENT:   Head: Normocephalic.   Eyes: Conjunctivae are normal.   Neck: Neck supple.   Cardiovascular: Normal rate.   Pulmonary/Chest: Effort normal and breath sounds normal.   Abdominal: Soft. Bowel sounds are normal. She exhibits no distension. There is no tenderness.   Neurological: She is alert and oriented to person, place, and time.   Skin: No rash noted.   Psychiatric: She has a normal mood and affect.       ASSESSMENT:   Problem List Items Addressed This Visit        Digestive    Calculus of gallbladder without cholecystitis without obstruction - Primary     Abdominal ultrasound report reviewed.  There is presence of 2 stones in the gallbladder.  Her liver enzymes are normal.  Ultrasound did not mention any evidence of gallbladder wall thickening or pericholecystic fluid that would indicate inflammation of the gallbladder.  The pain that she has is also not in  the right upper quadrant or epigastric area.  At this time, I would treat her as a patient with asymptomatic gallstones and recommend monitoring.  I educated her on symptoms that could be a manifestation of gallbladder disease.  I asked her to call me if she develops any upper abdominal especially epigastric or right upper quadrant pain.  She verbalized understanding.            Urinary    Left flank pain     Pt has been c/o left flank pain. US results were reviewed. There is mild to moderate hydronephrosis on the left side and mild on the right side.  I explained that her flank pain could be a manifestation of MSK pain or arising from the urinary tract.  I asked her to speak with Dr Cervantes or see Dr Todd who she knows, for this problem.                [Negative] : Allergic/Immunologic [Fatigue] : no fatigue [Recent Change In Weight] : ~T no recent weight change [Abdominal Pain] : no abdominal pain [Vomiting] : no vomiting [Constipation] : no constipation [FreeTextEntry7] : intermittent nausea takes Metoclopramide prn with relief

## 2023-06-30 NOTE — PHYSICAL EXAM
[Ambulatory and capable of all self care but unable to carry out any work activities] : Status 2- Ambulatory and capable of all self care but unable to carry out any work activities. Up and about more than 50% of waking hours [Normal] : affect appropriate [de-identified] : wore a wig  [de-identified] : wears glasses  [de-identified] : Right Anterior chest wall Mediport site appears stable, no bleeding, edema or erythema / discharge noted

## 2023-06-30 NOTE — HISTORY OF PRESENT ILLNESS
[Disease: _____________________] : Disease: [unfilled] [T: ___] : T[unfilled] [N: ___] : N[unfilled] [M: ___] : M[unfilled] [AJCC Stage: ____] : AJCC Stage: [unfilled] [Date: ____________] : Patient's last distress assessment performed on [unfilled]. [80: Normal activity with effort; some signs or symptoms of disease.] : 80: Normal activity with effort; some signs or symptoms of disease.  [ECOG Performance Status: 2 - Ambulatory and capable of all self care but unable to carry out any work activities] : Performance Status: 2 - Ambulatory and capable of all self care but unable to carry out any work activities. Up and about more than 50% of waking hours [Abdominal Pain] : abdominal pain [EUS] : EUS [Biopsy] : biopsy performed [Before Surgery] : before surgery [Pancreatic ductal adenocarcinoma] : Pancreatic ductal adenocarcinoma [Not staged outside] : not staged outside [Nutrition] : Nutrition [Social Work] : Social Work [Other: ____] : [unfilled] [Restricted in physically strenuous activity but ambulatory and able to carry out work of a light or sedentary nature] : Status 1 - Restricted in physically strenuous activity but ambulatory and able to carry out work of a light or sedentary nature, e.g., light house work, office work [0 - No Distress] : Distress Level: 0 [de-identified] : adenocarcinoma [FreeTextEntry1] :  gemcitabine and Abraxane [de-identified] : see hpi; 05/09/2023.She has been taking a combination medication of glipizide and metformin with better blood sugar control.Now on acetaminophen 300 codeine 30 mg PO TID PRN after meeting with pain management and her primary care MD. Pain has been mid line and more noted in day than in evening. No jaundice\par \par May 30, 2023 - Seen in a f/u visit today scheduled for Gemcitabine & Abraxane for Pancreas cancer. Patient had called our office last week for c/o nausea, abd pain, poor appetite. She was prescribed Omeprazole by GI for Gastritis she had not taken. We advised her to take Metoclopramide for nausea, poor appetite and Omeprazole for Gastritis. \par Since taking the medications for past few days she has been feeling much better. She remains on Levaquin for Neutropenia prophylaxia. \par She denies bleeding, constipation, diarrhea, fever, chills, falls. \par \par June 30, 2023 - Patient seen today accompanied with friend Ms. Jessica Betancourt. \par Patient feels well denies interval change in medical status, No nausea, reports improved appetite, no GI complaints, no pain. Seen / evaluated by our Nutritionist team reports helpful tips and compliance with recommendations. \par Today she was seen by Palliative / Pain management Dr. Malik no pain meds started. \par She reports she was seen by her PCP last week, she ran out of her BP medications awaiting renewal by PCP.  [de-identified] : Ms. ENRIKE MORLEY is a 67 year old female who presents for evaluation in our Pancreas Multidisciplinary Clinic. Her PMH includes DM2 (dx ), HTN, left knee replacement. She presented with abdominal pain radiating to her LUQ x 3 weeks. MRI abdomen  noted a 3 cm mass in the body of the pancreas suspicious for primary pancreatic malignancy and also Bosniak type IV cyst upper pole left kidney suspicious for primary renal neoplasm. She was admitted to [facility] on 23 and CT A/P w/ IC showing ill-defined pancreatic body mass consistent with pancreatic neoplasm; and complex enhancing left renal mass concerning for renal cell carcinoma till proven otherwise.\par She underwent EUS on 23 with noted findings of one 3 cm pancreatic body mass, s/p FNA.  Pathology positive for adenocarcinoma. \par \par She reports decrease appetite and early satiety.  Abdominal pain is intermittent and severe when it comes.  She was taking Tylenol#3 after hospital d/c but currently not taking anymore. +Weight loss of 10 pounds since March. \par \par Colonoscopy from 7 years ago normal. \par Smoke/Etoh: 20 pack years. Last smoke 2023. No ETOH use. \par \par ECO      Independent, can walk upstairs. Lives with her sons.  (ECOG 1 due to pain). \par Family Ca hx: None \par \par Labs: \par 23    AST: 9   ALT: 18  ALP:  103  Tbili: 0.3 \par 23   Ca 19-9: 2428    CEA: 15.0    AST:  11    ALT: 12    ALP: 96   Tbili:  0.4     A1c: 10% \par  [TextBox_4] : 4/4/23 [TextBox_39] : [id number] [TextBox_41] : adenocarcinoma [TextBox_43] : 4/14/23 [] : This is not a second opinion [TextBox_5] : 5/2/23 [TextBox_40] : 4/8/23

## 2023-06-30 NOTE — ASSESSMENT
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 Side effects of chemotherapy were reviewed with her\par She is age 67 and has significant co morbidity including diabetes poorly controlled on oral metformin at present. She is asked to see her primary care physician for consideration of an insulin based treatment. \par The patient may have two tumors as the left renal mass has not had a biopsy. The priority in treating the pancreas cancer was addressed in the multidisciplinary conference. CT/PET will be helpful in addressing the left renal lesion which may be a second primary tumor. If two different tumors are present she is not a candidate for a clinical study.\par I have recommended placement of a Mediport for chemotherapy treatment. Scheduling of Mediport performed with patient and explanation of use of Mediport. A request for Mediport placement was made and our secretarial team will obtain authorization. Blood testing requested today as noted.\par THe patient has seen pain management physician Dr Anne for management of analgesia\par The patient is scheduled for surgical follow up in future.\par ECG is performed by me and Nursing assistant and results are reviewed\par I have prescribed antinausea medication metoclopramide 10 mg PO TID PN\par RTC in 1 week to sign consent for chemotherapy\par 05/09/2023 The patient has returned to office. Physical examination is not changed. She has reviewed the chemotherapy information provided last week. I discussed the use of the gemcitabine and Abraxane as treatment on day 1, 8 and 15 of a 28 day cycle (no treatment on day 22). I assisted our  in scheduling chemotherapy and all questions were answered to her satisfaction. Mediport placement is scheduled for next week.RTC in 3 weeks\par She is now on combination dosing metformin and glyceride.Her primary care physician has prescribed acetaminophen 3000 and codeine 30 TID. 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 Seen in a f/u visit today \par 1) Pancreas Cancer - scheduled for Cycle # 2 Gemcitabine & Abraxane, labs with treatment \par 2) Left Renal Mass awaiting Biopsy Seen by Dr. Pilo [facility] - per Dr. Kelly's note - for now the renal mass can remain on the back burner temporarily until the pancreatic lesion is addressed. If there is a surgical plan for the pancreatic lesion to take place, I would consider combining that surgery with a laparoscopic or robotic partial nephrectomy as these lesions are relatively close to 1 another and could be operated on in the same field.\par 3) Surgical Onc - Dr. Greco - Seen for Surgical Intervention  - Will re-eval after 2 months restaging CT\par 4) Abd pain, Erosive Gastritis on Omeprazole (prescribed by GI),  - Tylenol w codeine renewed - Pain management consult requested & Nutritional Evaluation requested. \par All questions, concerns were addressed with patient during this visit to her satisfaction, she verbalized understanding. \par RTC in 3 weeks\par Case management, care plan d/w Dr. 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 Seen in a f/u visit today \par 1) Pancreas Cancer - s/p Cycle 2 Day 8 treatment on 6/27/23 scheduled for Cycle 2 Day 15 Gemcitabine & Abraxane on Monday July 3, 2023. Labs from 6/27/23 discussed with patient copies provided \par 2) Mediport Right ACW - EMLA cream prescription sent to local pharmacy. \par 3) Surgical Onc - Dr. Greco - Seen for Surgical Intervention  - Will re-eval after 2 months restaging CT\par 4) Left Renal Mass awaiting Biopsy Seen by Dr. Pilo [facility] - per Dr. Kelly's note - for now the renal mass can remain on the back burner temporarily until the pancreatic lesion is addressed. If there is a surgical plan for the pancreatic lesion to take place, I would consider combining that surgery with a laparoscopic or robotic partial nephrectomy as these lesions are relatively close to 1 another and could be operated on in the same field.\par 5) Abd pain, Erosive Gastritis on Omeprazole (prescribed by GI),  - Tylenol w codeine renewed - Pain management consult requested & Nutritional Evaluation requested. \par All questions, concerns were addressed with patient & friend Jessica during this visit to her satisfaction, she verbalized understanding.  RTC in 3 weeks\par Case management, care plan d/w Dr. Tommy Mahoney \par

## 2023-06-30 NOTE — REASON FOR VISIT
[Follow-Up Visit] : a follow-up [Friend] : friend [Other: _____] : [unfilled] [FreeTextEntry2] : pancreas carcinoma

## 2023-07-03 ENCOUNTER — RESULT REVIEW (OUTPATIENT)
Age: 68
End: 2023-07-03

## 2023-07-03 ENCOUNTER — APPOINTMENT (OUTPATIENT)
Dept: INFUSION THERAPY | Facility: HOSPITAL | Age: 68
End: 2023-07-03

## 2023-07-03 LAB
BASOPHILS # BLD AUTO: 0 K/UL — SIGNIFICANT CHANGE UP (ref 0–0.2)
BASOPHILS NFR BLD AUTO: 0 % — SIGNIFICANT CHANGE UP (ref 0–2)
DACRYOCYTES BLD QL SMEAR: SLIGHT — SIGNIFICANT CHANGE UP
ELLIPTOCYTES BLD QL SMEAR: SLIGHT — SIGNIFICANT CHANGE UP
EOSINOPHIL # BLD AUTO: 0.12 K/UL — SIGNIFICANT CHANGE UP (ref 0–0.5)
EOSINOPHIL NFR BLD AUTO: 3 % — SIGNIFICANT CHANGE UP (ref 0–6)
HCT VFR BLD CALC: 25.9 % — LOW (ref 34.5–45)
HGB BLD-MCNC: 8.8 G/DL — LOW (ref 11.5–15.5)
HYPOCHROMIA BLD QL: SLIGHT — SIGNIFICANT CHANGE UP
LYMPHOCYTES # BLD AUTO: 1.26 K/UL — SIGNIFICANT CHANGE UP (ref 1–3.3)
LYMPHOCYTES # BLD AUTO: 32 % — SIGNIFICANT CHANGE UP (ref 13–44)
MCHC RBC-ENTMCNC: 28.2 PG — SIGNIFICANT CHANGE UP (ref 27–34)
MCHC RBC-ENTMCNC: 34 G/DL — SIGNIFICANT CHANGE UP (ref 32–36)
MCV RBC AUTO: 83 FL — SIGNIFICANT CHANGE UP (ref 80–100)
MONOCYTES # BLD AUTO: 0.16 K/UL — SIGNIFICANT CHANGE UP (ref 0–0.9)
MONOCYTES NFR BLD AUTO: 4 % — SIGNIFICANT CHANGE UP (ref 2–14)
NEUTROPHILS # BLD AUTO: 2.4 K/UL — SIGNIFICANT CHANGE UP (ref 1.8–7.4)
NEUTROPHILS NFR BLD AUTO: 61 % — SIGNIFICANT CHANGE UP (ref 43–77)
NRBC # BLD: 0 /100 — SIGNIFICANT CHANGE UP (ref 0–0)
NRBC # BLD: SIGNIFICANT CHANGE UP /100 WBCS (ref 0–0)
PLAT MORPH BLD: NORMAL — SIGNIFICANT CHANGE UP
PLATELET # BLD AUTO: 255 K/UL — SIGNIFICANT CHANGE UP (ref 150–400)
POIKILOCYTOSIS BLD QL AUTO: SLIGHT — SIGNIFICANT CHANGE UP
RBC # BLD: 3.12 M/UL — LOW (ref 3.8–5.2)
RBC # FLD: 17.4 % — HIGH (ref 10.3–14.5)
RBC BLD AUTO: ABNORMAL
SCHISTOCYTES BLD QL AUTO: SLIGHT — SIGNIFICANT CHANGE UP
WBC # BLD: 3.94 K/UL — SIGNIFICANT CHANGE UP (ref 3.8–10.5)
WBC # FLD AUTO: 3.94 K/UL — SIGNIFICANT CHANGE UP (ref 3.8–10.5)

## 2023-07-04 LAB
ALBUMIN SERPL ELPH-MCNC: 4 G/DL — SIGNIFICANT CHANGE UP (ref 3.3–5)
ALP SERPL-CCNC: 73 U/L — SIGNIFICANT CHANGE UP (ref 40–120)
ALT FLD-CCNC: 77 U/L — HIGH (ref 10–45)
ANION GAP SERPL CALC-SCNC: 12 MMOL/L — SIGNIFICANT CHANGE UP (ref 5–17)
AST SERPL-CCNC: 37 U/L — SIGNIFICANT CHANGE UP (ref 10–40)
BILIRUB SERPL-MCNC: 0.3 MG/DL — SIGNIFICANT CHANGE UP (ref 0.2–1.2)
BUN SERPL-MCNC: 17 MG/DL — SIGNIFICANT CHANGE UP (ref 7–23)
CALCIUM SERPL-MCNC: 9.4 MG/DL — SIGNIFICANT CHANGE UP (ref 8.4–10.5)
CHLORIDE SERPL-SCNC: 102 MMOL/L — SIGNIFICANT CHANGE UP (ref 96–108)
CO2 SERPL-SCNC: 26 MMOL/L — SIGNIFICANT CHANGE UP (ref 22–31)
CREAT SERPL-MCNC: 0.77 MG/DL — SIGNIFICANT CHANGE UP (ref 0.5–1.3)
EGFR: 84 ML/MIN/1.73M2 — SIGNIFICANT CHANGE UP
GLUCOSE SERPL-MCNC: 67 MG/DL — LOW (ref 70–99)
POTASSIUM SERPL-MCNC: 4.5 MMOL/L — SIGNIFICANT CHANGE UP (ref 3.5–5.3)
POTASSIUM SERPL-SCNC: 4.5 MMOL/L — SIGNIFICANT CHANGE UP (ref 3.5–5.3)
PROT SERPL-MCNC: 6.2 G/DL — SIGNIFICANT CHANGE UP (ref 6–8.3)
SODIUM SERPL-SCNC: 140 MMOL/L — SIGNIFICANT CHANGE UP (ref 135–145)

## 2023-07-11 ENCOUNTER — APPOINTMENT (OUTPATIENT)
Dept: ENDOCRINOLOGY | Facility: CLINIC | Age: 68
End: 2023-07-11
Payer: MEDICARE

## 2023-07-11 VITALS
SYSTOLIC BLOOD PRESSURE: 112 MMHG | HEIGHT: 64 IN | OXYGEN SATURATION: 95 % | HEART RATE: 108 BPM | WEIGHT: 152 LBS | DIASTOLIC BLOOD PRESSURE: 72 MMHG | BODY MASS INDEX: 25.95 KG/M2

## 2023-07-11 LAB
GLUCOSE BLDC GLUCOMTR-MCNC: 109
HBA1C MFR BLD HPLC: 7.8

## 2023-07-11 PROCEDURE — 99204 OFFICE O/P NEW MOD 45 MIN: CPT

## 2023-07-11 PROCEDURE — 82962 GLUCOSE BLOOD TEST: CPT

## 2023-07-11 NOTE — PHYSICAL EXAM
[Alert] : alert [Well Nourished] : well nourished [No Acute Distress] : no acute distress [Well Developed] : well developed [Normal Sclera/Conjunctiva] : normal sclera/conjunctiva [EOMI] : extra ocular movement intact [No Proptosis] : no proptosis [Normal Oropharynx] : the oropharynx was normal [Thyroid Not Enlarged] : the thyroid was not enlarged [No Respiratory Distress] : no respiratory distress [No Accessory Muscle Use] : no accessory muscle use [Clear to Auscultation] : lungs were clear to auscultation bilaterally [Normal S1, S2] : normal S1 and S2 [Normal Rate] : heart rate was normal [Regular Rhythm] : with a regular rhythm [No Edema] : no peripheral edema [Pedal Pulses Normal] : the pedal pulses are present [Normal Bowel Sounds] : normal bowel sounds [Not Tender] : non-tender [Not Distended] : not distended [Soft] : abdomen soft [Normal Anterior Cervical Nodes] : no anterior cervical lymphadenopathy [Normal Posterior Cervical Nodes] : no posterior cervical lymphadenopathy [No Spinal Tenderness] : no spinal tenderness [Spine Straight] : spine straight [No Stigmata of Cushings Syndrome] : no stigmata of Cushings Syndrome [Normal Gait] : normal gait [Normal Strength/Tone] : muscle strength and tone were normal [No Rash] : no rash [Acanthosis Nigricans] : no acanthosis nigricans [Normal] : normal [Position Sense Dec.] : normal position sense at the level of the toes [Diminished Throughout Both Feet] : normal tactile sensation with monofilament testing throughout both feet [Normal Reflexes] : deep tendon reflexes were 2+ and symmetric [No Tremors] : no tremors [Oriented x3] : oriented to person, place, and time [FreeTextEntry2] : hyperkeratosis of nails

## 2023-07-11 NOTE — HISTORY OF PRESENT ILLNESS
[FreeTextEntry1] : 67yoF with Pancreatic cancer presents for initial palliative care visit, referred by oncology.\par PMH significant for DM2, HTN and substance abuse. \par \par Pt developed abdominal pain 3/2023, imaging performed 04/04/2023 showed a 3cm mass in the body of the pancreas; There was a mass encasing the celiac axis. She was admitted to [facility] on 4/8/23, EUS bx on 4/14/23 showed adenocarcinoma in the body of the pancreas. There was the presence of a L renal mass not biopsied and thought to be a second primary disease. The consensus opinion at the Multidisciplinary Pancreas Cancer Conference was to defer surgery and to offer chemotherapy. \par History of diabetes for ten years; she is currently not on insulin and she has recently begun treatment with metformin. THere is no history of jaundice or acholic stolol. \par \par Main reason for which patient is referred is symptom management.\par Patient underwent celiac plexus nerve block on 5/16/23 which was successful, she is experiencing significantly less pain now, not using any analgesics. \par Her appetite has been lower, she is eating smaller portions of food more frequently throughout the day.  Is interested in utilizing medicinal cannabis. \par \par ROS:\par + weight loss of about 20lbs from her baseline\par + appetite has been improving\par + fatigue\par + nausea - uses metoclopramide once daily\par + mood has been good, tries to stay positive \par Denies trouble sleeping, constipation, diarrhea, \par \par Uses a cane for assistance with ambulation due to feeling fatigued and imbalanced. \par \par Patient is , has two adult sons who live with her. She drives herself, apartments in her home. Patient is independent in ADL. Patient reports he is in AA and find the support there helpful. She enjoys sculpting, reading and recently started learning how to play the [employer]. She is retired from her job as an . \par \par PCP: Dr. Suzy Bartlett\par \par I-STOP Ref#:  [id number]

## 2023-07-11 NOTE — ASSESSMENT
[______] : HCP: [unfilled] [FreeTextEntry1] : 67yoF with: \par \par # Pancreatic cancer - On neoadjuvant [place]/Abraxane.  Med onc follow up.\par \par # Pain 2/2 Neoplasm - mujch improved after celiac plexus block. Pt not requiring analgesics presently. \par \par # Low appetite - Medical cannabis certification completed today. Provided cannabis education, overview of state program, and discussed adverse effects in detail. Counseled that vaporized cannabis is not the preferred route of administration due to the fact that both short-term and long-term risks associated with vaporizing oils are not yet fully understood. Recommend starting with 1:1 THC:CBD formulation at low dose of THC (~2mg/dose).\par \par # Nausea - Metoclopramide refilled. \par \par # Encounter for palliative care- Emotional support provided \par Explained the role of palliative care in enhancing quality of life in the setting of serious illness.\par Health Care Proxy (HCP) form completed in office today after explanation of the role of a HCP.\par \par Follow up in 1 month, call sooner with questions or issues.

## 2023-07-11 NOTE — HISTORY OF PRESENT ILLNESS
[FreeTextEntry1] : 67 yearF here for assessment for Type 2 diabetes mellitus\par \par last A1c: 7.8%\par \par Patient with past medical history as below, remarkable for adenoca of pancreas, left renal mass\par \par Thirst and frequent urination:  no \par Dry skin:  no \par Vision problems: stable \par Burning pain or tingling in the feet, legs, hands, other areas: manageable \par High blood pressure:  no \par Extreme hunger: no \par Frequent and/or recurring urinary infections: no \par Skin infections:  no  \par Slow healing of cuts: no \par \par  \par Screening \par Ophthalmology: follows\par Podiatry: due for visit  \par EGFR: 84\par \par \par Current diabetic medication regimen (verified with patient): \par glipizide 2.5-500mg po bid (reports started 4 mths ago)\par \par \par POCT: 109mg/dl

## 2023-07-17 ENCOUNTER — APPOINTMENT (OUTPATIENT)
Dept: HEMATOLOGY ONCOLOGY | Facility: CLINIC | Age: 68
End: 2023-07-17
Payer: MEDICARE

## 2023-07-17 ENCOUNTER — RESULT REVIEW (OUTPATIENT)
Age: 68
End: 2023-07-17

## 2023-07-17 VITALS
DIASTOLIC BLOOD PRESSURE: 79 MMHG | BODY MASS INDEX: 27.36 KG/M2 | TEMPERATURE: 97.2 F | WEIGHT: 159.39 LBS | SYSTOLIC BLOOD PRESSURE: 125 MMHG | HEART RATE: 105 BPM | OXYGEN SATURATION: 97 %

## 2023-07-17 DIAGNOSIS — R63.0 ANOREXIA: ICD-10-CM

## 2023-07-17 LAB
BASOPHILS # BLD AUTO: 0.04 K/UL — SIGNIFICANT CHANGE UP (ref 0–0.2)
BASOPHILS NFR BLD AUTO: 0.5 % — SIGNIFICANT CHANGE UP (ref 0–2)
EOSINOPHIL # BLD AUTO: 0.11 K/UL — SIGNIFICANT CHANGE UP (ref 0–0.5)
EOSINOPHIL NFR BLD AUTO: 1.4 % — SIGNIFICANT CHANGE UP (ref 0–6)
HCT VFR BLD CALC: 27.7 % — LOW (ref 34.5–45)
HGB BLD-MCNC: 9.1 G/DL — LOW (ref 11.5–15.5)
IMM GRANULOCYTES NFR BLD AUTO: 1 % — HIGH (ref 0–0.9)
LYMPHOCYTES # BLD AUTO: 1.21 K/UL — SIGNIFICANT CHANGE UP (ref 1–3.3)
LYMPHOCYTES # BLD AUTO: 15.3 % — SIGNIFICANT CHANGE UP (ref 13–44)
MCHC RBC-ENTMCNC: 28.3 PG — SIGNIFICANT CHANGE UP (ref 27–34)
MCHC RBC-ENTMCNC: 32.9 G/DL — SIGNIFICANT CHANGE UP (ref 32–36)
MCV RBC AUTO: 86 FL — SIGNIFICANT CHANGE UP (ref 80–100)
MONOCYTES # BLD AUTO: 0.59 K/UL — SIGNIFICANT CHANGE UP (ref 0–0.9)
MONOCYTES NFR BLD AUTO: 7.5 % — SIGNIFICANT CHANGE UP (ref 2–14)
NEUTROPHILS # BLD AUTO: 5.88 K/UL — SIGNIFICANT CHANGE UP (ref 1.8–7.4)
NEUTROPHILS NFR BLD AUTO: 74.3 % — SIGNIFICANT CHANGE UP (ref 43–77)
NRBC # BLD: 0 /100 WBCS — SIGNIFICANT CHANGE UP (ref 0–0)
PLATELET # BLD AUTO: 318 K/UL — SIGNIFICANT CHANGE UP (ref 150–400)
RBC # BLD: 3.22 M/UL — LOW (ref 3.8–5.2)
RBC # FLD: 21.6 % — HIGH (ref 10.3–14.5)
WBC # BLD: 7.91 K/UL — SIGNIFICANT CHANGE UP (ref 3.8–10.5)
WBC # FLD AUTO: 7.91 K/UL — SIGNIFICANT CHANGE UP (ref 3.8–10.5)

## 2023-07-17 PROCEDURE — 99213 OFFICE O/P EST LOW 20 MIN: CPT

## 2023-07-17 NOTE — RESULTS/DATA
[FreeTextEntry1] : review of information in the [facility]\par rate 92 normal axis NSR ; possilbe anterior infarction\par \par GI Ca 19-9 -> 2428 > 2328 > 1704 >  1526 (trending down) \par \par May 30, 2023 - CBC - WBC = 7.3, Hgb = 11.4, PLT = 203, MCV = 81.4\par CMP - Blood glucose = 124 (non fasting)\par GI Ca 19-9 - 1526 (trending down) \par \par July 3, 2023 - CBC - WBC = 3.9, Hgb = 8.8, PLT = 255

## 2023-07-17 NOTE — HISTORY OF PRESENT ILLNESS
[Disease: _____________________] : Disease: [unfilled] [T: ___] : T[unfilled] [N: ___] : N[unfilled] [M: ___] : M[unfilled] [AJCC Stage: ____] : AJCC Stage: [unfilled] [Date: ____________] : Patient's last distress assessment performed on [unfilled]. [0 - No Distress] : Distress Level: 0 [80: Normal activity with effort; some signs or symptoms of disease.] : 80: Normal activity with effort; some signs or symptoms of disease.  [ECOG Performance Status: 2 - Ambulatory and capable of all self care but unable to carry out any work activities] : Performance Status: 2 - Ambulatory and capable of all self care but unable to carry out any work activities. Up and about more than 50% of waking hours [Abdominal Pain] : abdominal pain [EUS] : EUS [Biopsy] : biopsy performed [Before Surgery] : before surgery [Pancreatic ductal adenocarcinoma] : Pancreatic ductal adenocarcinoma [Not staged outside] : not staged outside [Nutrition] : Nutrition [Social Work] : Social Work [Other: ____] : [unfilled] [Restricted in physically strenuous activity but ambulatory and able to carry out work of a light or sedentary nature] : Status 1 - Restricted in physically strenuous activity but ambulatory and able to carry out work of a light or sedentary nature, e.g., light house work, office work [de-identified] : adenocarcinoma [FreeTextEntry1] :  gemcitabine and Abraxane [de-identified] : see hpi; 05/09/2023.She has been taking a combination medication of glipizide and metformin with better blood sugar control.Now on acetaminophen 300 codeine 30 mg PO TID PRN after meeting with pain management and her primary care MD. Pain has been mid line and more noted in day than in evening. No jaundice\par \par May 30, 2023 - Seen in a f/u visit today scheduled for Gemcitabine & Abraxane for Pancreas cancer. Patient had called our office last week for c/o nausea, abd pain, poor appetite. She was prescribed Omeprazole by GI for Gastritis she had not taken. We advised her to take Metoclopramide for nausea, poor appetite and Omeprazole for Gastritis. \par Since taking the medications for past few days she has been feeling much better. She remains on Levaquin for Neutropenia prophylaxis. \par She denies bleeding, constipation, diarrhea, fever, chills, falls. \par \par June 30, 2023 - Patient seen today accompanied with friend Ms. Jessica Betancourt. \par Patient feels well denies interval change in medical status, No nausea, reports improved appetite, no GI complaints, no pain. Seen / evaluated by our Nutritionist team reports helpful tips and compliance with recommendations. \par Today she was seen by Palliative / Pain management Dr. Malik no pain meds started. \par She reports she was seen by her PCP last week, she ran out of her BP medications awaiting renewal by PCP. \par \par July 17, 2023 -  Patient seen today in a follow up visit accompanied with friend Ms. Jessica Serafin. \par She reports episodes of non bloody diarrhea / loose stools post treatment lasts for day and a half does not take any anti diarrhea medications, follows dietary restriction with resolution of symptoms. \par Denies N, V, fever, anorexia, weight loss, febrile illness, swollen glands, change in medical condition since last visit.  [de-identified] : Ms. ENRIKE MORLEY is a 67 year old female who presents for evaluation in our Pancreas Multidisciplinary Clinic. Her PMH includes DM2 (dx ), HTN, left knee replacement. She presented with abdominal pain radiating to her LUQ x 3 weeks. MRI abdomen  noted a 3 cm mass in the body of the pancreas suspicious for primary pancreatic malignancy and also Bosniak type IV cyst upper pole left kidney suspicious for primary renal neoplasm. She was admitted to [facility] on 23 and CT A/P w/ IC showing ill-defined pancreatic body mass consistent with pancreatic neoplasm; and complex enhancing left renal mass concerning for renal cell carcinoma till proven otherwise.\par She underwent EUS on 23 with noted findings of one 3 cm pancreatic body mass, s/p FNA.  Pathology positive for adenocarcinoma. \par \par She reports decrease appetite and early satiety.  Abdominal pain is intermittent and severe when it comes.  She was taking Tylenol#3 after hospital d/c but currently not taking anymore. +Weight loss of 10 pounds since March. \par \par Colonoscopy from 7 years ago normal. \par Smoke/Etoh: 20 pack years. Last smoke 2023. No ETOH use. \par \par ECO      Independent, can walk upstairs. Lives with her sons.  (ECOG 1 due to pain). \par Family Ca hx: None \par \par Labs: \par 23    AST: 9   ALT: 18  ALP:  103  Tbili: 0.3 \par 23   Ca 19-9: 2428    CEA: 15.0    AST:  11    ALT: 12    ALP: 96   Tbili:  0.4     A1c: 10% \par  [TextBox_4] : 4/4/23 [TextBox_39] : [id number] [TextBox_41] : adenocarcinoma [TextBox_43] : 4/14/23 [] : This is not a second opinion [TextBox_5] : 5/2/23 [TextBox_40] : 4/8/23

## 2023-07-17 NOTE — REVIEW OF SYSTEMS
[Negative] : Allergic/Immunologic [Fatigue] : no fatigue [Recent Change In Weight] : ~T no recent weight change [Abdominal Pain] : no abdominal pain [Vomiting] : no vomiting [Constipation] : no constipation [FreeTextEntry7] : intermittent nausea takes Metoclopramide prn with relief, episodes of non bloody loose stools post chemotherapy lasts < 2 days

## 2023-07-17 NOTE — PHYSICAL EXAM
[Ambulatory and capable of all self care but unable to carry out any work activities] : Status 2- Ambulatory and capable of all self care but unable to carry out any work activities. Up and about more than 50% of waking hours [Normal] : affect appropriate [de-identified] : wore a wig  [de-identified] : wears glasses  [de-identified] : Right Anterior chest wall Mediport site appears stable, no bleeding, edema or erythema / discharge noted

## 2023-07-17 NOTE — ASSESSMENT
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 Side effects of chemotherapy were reviewed with her\par She is age 67 and has significant co morbidity including diabetes poorly controlled on oral metformin at present. She is asked to see her primary care physician for consideration of an insulin based treatment. \par The patient may have two tumors as the left renal mass has not had a biopsy. The priority in treating the pancreas cancer was addressed in the multidisciplinary conference. CT/PET will be helpful in addressing the left renal lesion which may be a second primary tumor. If two different tumors are present she is not a candidate for a clinical study.\par I have recommended placement of a Mediport for chemotherapy treatment. Scheduling of Mediport performed with patient and explanation of use of Mediport. A request for Mediport placement was made and our secretarial team will obtain authorization. Blood testing requested today as noted.\par THe patient has seen pain management physician Dr Anne for management of analgesia\par The patient is scheduled for surgical follow up in future.\par ECG is performed by me and Nursing assistant and results are reviewed\par I have prescribed antinausea medication metoclopramide 10 mg PO TID PN\par RTC in 1 week to sign consent for chemotherapy\par 05/09/2023 The patient has returned to office. Physical examination is not changed. She has reviewed the chemotherapy information provided last week. I discussed the use of the gemcitabine and Abraxane as treatment on day 1, 8 and 15 of a 28 day cycle (no treatment on day 22). I assisted our  in scheduling chemotherapy and all questions were answered to her satisfaction. Mediport placement is scheduled for next week.RTC in 3 weeks\par She is now on combination dosing metformin and glyceride.Her primary care physician has prescribed acetaminophen 3000 and codeine 30 TID. 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 Seen in a f/u visit today \par 1) Pancreas Cancer - scheduled for Cycle # 2 Gemcitabine & Abraxane, labs with treatment \par 2) Left Renal Mass awaiting Biopsy Seen by Dr. Pilo [facility] - per Dr. Kelly's note - for now the renal mass can remain on the back burner temporarily until the pancreatic lesion is addressed. If there is a surgical plan for the pancreatic lesion to take place, I would consider combining that surgery with a laparoscopic or robotic partial nephrectomy as these lesions are relatively close to 1 another and could be operated on in the same field.\par 3) Surgical Onc - Dr. Greco - Seen for Surgical Intervention  - Will re-eval after 2 months restaging CT\par 4) Abd pain, Erosive Gastritis on Omeprazole (prescribed by GI),  - Tylenol w codeine renewed - Pain management consult requested & Nutritional Evaluation requested. \par All questions, concerns were addressed with patient during this visit to her satisfaction, she verbalized understanding. \par RTC in 3 weeks\par Case management, care plan d/w Dr. 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 Seen in a f/u visit today \par 1) Pancreas Cancer - s/p Cycle 2 Day 8 treatment on 6/27/23 scheduled for Cycle 2 Day 15 Gemcitabine & Abraxane on Monday July 3, 2023. Labs from 6/27/23 discussed with patient copies provided \par 2) Mediport Right ACW - EMLA cream prescription sent to local pharmacy. \par 3) Surgical Onc - Dr. Greco - Seen for Surgical Intervention  - Will re-eval after 2 months restaging CT\par 4) Left Renal Mass awaiting Biopsy Seen by Dr. Pilo [facility] - per Dr. Kelly's note - for now the renal mass can remain on the back burner temporarily until the pancreatic lesion is addressed. If there is a surgical plan for the pancreatic lesion to take place, I would consider combining that surgery with a laparoscopic or robotic partial nephrectomy as these lesions are relatively close to 1 another and could be operated on in the same field.\par 5) Abd pain, Erosive Gastritis on Omeprazole (prescribed by GI),  - Tylenol w codeine renewed - Pain management consult requested & Nutritional Evaluation requested. \par All questions, concerns were addressed with patient & friend Jessica during this visit to her satisfaction, she verbalized understanding.  RTC in 3 weeks\par Case management, care plan d/w Dr. 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 Seen in a f/u visit today \par 1) Pancreas Cancer on Gemcitabine & Abraxane via Mediport - \par scheduled for Cycle # 3- Day 1 on 7/18/23, Day 8 on 7/25/2023, Day 15 on 8/1/2023 \par 2) Anemia - Hgb = 8.8 on July 3, 2023 - today's repeat Hgb 9.1 will add anemia labs to tomorrow's treatment visit\par Recommend to add dietary iron to improve iron in blood - add red meat 2-3 x week\par 3) Diarrhea - Loperamide sent to local pharmacy, PO hydration, electrolyte supplementation was emphasized - pt advised how to take Loperamide with verbal understanding\par 4) Surgical Onc - Dr. Greco - Seen for Surgical Intervention  recommended to f/u for re-eval after 2 months restaging CT - pt has not made an apptn as of yet - Patient was counseled to make a f/u visit as per recs\par 5)  Left Renal Mass awaiting Biopsy Seen by Dr. Pilo [facility] - per Dr. Kelly's note - for now the renal mass can remain on the back burner temporarily until the pancreatic lesion is addressed. If there is a surgical plan for the pancreatic lesion to take place, I would consider combining that surgery with a laparoscopic or robotic partial nephrectomy as these lesions are relatively close to 1 another and could be operated on in the same field.\par 6) Abd pain, Erosive Gastritis on Omeprazole - no reported GI symptoms at this time - GI f/u as per their discretion\par 7) Pain management following - \par Patient advised to maintain a diary to keep a log of symptoms, f/u visits, recommendations, list of current medications. All questions, concerns were addressed with patient & friend Jessica during this visit to her satisfaction, she verbalized understanding.  RTC in 4 weeks or prn as needed. \par Case management, care plan d/w Dr. Tommy Mahoney \par \par

## 2023-07-18 ENCOUNTER — RESULT REVIEW (OUTPATIENT)
Age: 68
End: 2023-07-18

## 2023-07-18 ENCOUNTER — APPOINTMENT (OUTPATIENT)
Dept: HEMATOLOGY ONCOLOGY | Facility: CLINIC | Age: 68
End: 2023-07-18

## 2023-07-18 ENCOUNTER — APPOINTMENT (OUTPATIENT)
Dept: INFUSION THERAPY | Facility: HOSPITAL | Age: 68
End: 2023-07-18

## 2023-07-18 LAB
ALBUMIN SERPL ELPH-MCNC: 4 G/DL — SIGNIFICANT CHANGE UP (ref 3.3–5)
ALP SERPL-CCNC: 72 U/L — SIGNIFICANT CHANGE UP (ref 40–120)
ALT FLD-CCNC: 21 U/L — SIGNIFICANT CHANGE UP (ref 10–45)
ANION GAP SERPL CALC-SCNC: 10 MMOL/L — SIGNIFICANT CHANGE UP (ref 5–17)
AST SERPL-CCNC: 16 U/L — SIGNIFICANT CHANGE UP (ref 10–40)
BILIRUB SERPL-MCNC: 0.2 MG/DL — SIGNIFICANT CHANGE UP (ref 0.2–1.2)
BUN SERPL-MCNC: 16 MG/DL — SIGNIFICANT CHANGE UP (ref 7–23)
CALCIUM SERPL-MCNC: 9.2 MG/DL — SIGNIFICANT CHANGE UP (ref 8.4–10.5)
CANCER AG19-9 SERPL-ACNC: 212 U/ML — HIGH
CHLORIDE SERPL-SCNC: 107 MMOL/L — SIGNIFICANT CHANGE UP (ref 96–108)
CO2 SERPL-SCNC: 25 MMOL/L — SIGNIFICANT CHANGE UP (ref 22–31)
CREAT SERPL-MCNC: 0.79 MG/DL — SIGNIFICANT CHANGE UP (ref 0.5–1.3)
EGFR: 82 ML/MIN/1.73M2 — SIGNIFICANT CHANGE UP
FERRITIN SERPL-MCNC: 317 NG/ML — SIGNIFICANT CHANGE UP (ref 13–330)
FOLATE SERPL-MCNC: 9.7 NG/ML — SIGNIFICANT CHANGE UP
GLUCOSE SERPL-MCNC: 122 MG/DL — HIGH (ref 70–99)
POTASSIUM SERPL-MCNC: 4.5 MMOL/L — SIGNIFICANT CHANGE UP (ref 3.5–5.3)
POTASSIUM SERPL-SCNC: 4.5 MMOL/L — SIGNIFICANT CHANGE UP (ref 3.5–5.3)
PROT SERPL-MCNC: 5.9 G/DL — LOW (ref 6–8.3)
SODIUM SERPL-SCNC: 142 MMOL/L — SIGNIFICANT CHANGE UP (ref 135–145)
VIT B12 SERPL-MCNC: 1446 PG/ML — HIGH (ref 232–1245)

## 2023-07-25 ENCOUNTER — APPOINTMENT (OUTPATIENT)
Dept: INFUSION THERAPY | Facility: HOSPITAL | Age: 68
End: 2023-07-25

## 2023-07-25 ENCOUNTER — RESULT REVIEW (OUTPATIENT)
Age: 68
End: 2023-07-25

## 2023-07-25 ENCOUNTER — NON-APPOINTMENT (OUTPATIENT)
Age: 68
End: 2023-07-25

## 2023-07-25 ENCOUNTER — APPOINTMENT (OUTPATIENT)
Dept: HEMATOLOGY ONCOLOGY | Facility: CLINIC | Age: 68
End: 2023-07-25
Payer: MEDICARE

## 2023-07-25 ENCOUNTER — APPOINTMENT (OUTPATIENT)
Dept: GERIATRICS | Facility: CLINIC | Age: 68
End: 2023-07-25
Payer: MEDICARE

## 2023-07-25 LAB
ANISOCYTOSIS BLD QL: SLIGHT — SIGNIFICANT CHANGE UP
BASOPHILS # BLD AUTO: 0.07 K/UL — SIGNIFICANT CHANGE UP (ref 0–0.2)
BASOPHILS NFR BLD AUTO: 2 % — SIGNIFICANT CHANGE UP (ref 0–2)
BLASTS # FLD: 1 % — HIGH (ref 0–0)
DACRYOCYTES BLD QL SMEAR: SLIGHT — SIGNIFICANT CHANGE UP
EOSINOPHIL # BLD AUTO: 0 K/UL — SIGNIFICANT CHANGE UP (ref 0–0.5)
EOSINOPHIL NFR BLD AUTO: 0 % — SIGNIFICANT CHANGE UP (ref 0–6)
HCT VFR BLD CALC: 27.3 % — LOW (ref 34.5–45)
HGB BLD-MCNC: 9 G/DL — LOW (ref 11.5–15.5)
HYPOCHROMIA BLD QL: SLIGHT — SIGNIFICANT CHANGE UP
LYMPHOCYTES # BLD AUTO: 1.57 K/UL — SIGNIFICANT CHANGE UP (ref 1–3.3)
LYMPHOCYTES # BLD AUTO: 44 % — SIGNIFICANT CHANGE UP (ref 13–44)
MCHC RBC-ENTMCNC: 28.8 PG — SIGNIFICANT CHANGE UP (ref 27–34)
MCHC RBC-ENTMCNC: 33 G/DL — SIGNIFICANT CHANGE UP (ref 32–36)
MCV RBC AUTO: 87.2 FL — SIGNIFICANT CHANGE UP (ref 80–100)
MONOCYTES # BLD AUTO: 0.32 K/UL — SIGNIFICANT CHANGE UP (ref 0–0.9)
MONOCYTES NFR BLD AUTO: 9 % — SIGNIFICANT CHANGE UP (ref 2–14)
MYELOCYTES NFR BLD: 2 % — HIGH (ref 0–0)
NEUTROPHILS # BLD AUTO: 1.5 K/UL — LOW (ref 1.8–7.4)
NEUTROPHILS NFR BLD AUTO: 42 % — LOW (ref 43–77)
NRBC # BLD: 3 /100 — HIGH (ref 0–0)
NRBC # BLD: SIGNIFICANT CHANGE UP /100 WBCS (ref 0–0)
PLAT MORPH BLD: NORMAL — SIGNIFICANT CHANGE UP
PLATELET # BLD AUTO: 482 K/UL — HIGH (ref 150–400)
POIKILOCYTOSIS BLD QL AUTO: SLIGHT — SIGNIFICANT CHANGE UP
POLYCHROMASIA BLD QL SMEAR: SLIGHT — SIGNIFICANT CHANGE UP
RBC # BLD: 3.13 M/UL — LOW (ref 3.8–5.2)
RBC # FLD: 21 % — HIGH (ref 10.3–14.5)
RBC BLD AUTO: ABNORMAL
SCHISTOCYTES BLD QL AUTO: SLIGHT — SIGNIFICANT CHANGE UP
TARGETS BLD QL SMEAR: SLIGHT — SIGNIFICANT CHANGE UP
WBC # BLD: 3.57 K/UL — LOW (ref 3.8–10.5)
WBC # FLD AUTO: 3.57 K/UL — LOW (ref 3.8–10.5)

## 2023-07-25 PROCEDURE — 99214 OFFICE O/P EST MOD 30 MIN: CPT

## 2023-07-25 NOTE — ASSESSMENT
[______] : HCP: [unfilled] [FreeTextEntry1] : 67yoF with: \par \par # Pancreatic cancer - On neoadjuvant [place]/Abraxane.  Med onc follow up.\par \par # Pain 2/2 Neoplasm - much improved after celiac plexus block. Pt not requiring analgesics presently. \par \par # Nausea - C/w Metoclopramide PRN. Discussed how medical cannabis may be beneficial\par \par # Diarrhea - C/w binding foods. May benefit from PRN Imodium following treatment. \par \par # Sleep disturbances - C/w melatonin. Discussed the importance of good sleep hygiene. \par -Medical cannabis certification completed previously, discussed how it could be beneficial to assist with sleep. \par \par # Encounter for palliative care- Emotional support provided \par Explained the role of palliative care in enhancing quality of life in the setting of serious illness.\par Health Care Proxy (HCP) form completed in office today after explanation of the role of a HCP.\par \par Follow up in 6 weeks, call sooner with questions or issues.

## 2023-07-25 NOTE — PHYSICAL EXAM
[General Appearance - Alert] : alert [General Appearance - In No Acute Distress] : in no acute distress [Sclera] : the sclera and conjunctiva were normal [Normal Oral Mucosa] : normal oral mucosa [Neck Appearance] : the appearance of the neck was normal [] : no respiratory distress [Auscultation Breath Sounds / Voice Sounds] : lungs were clear to auscultation bilaterally [Heart Rate And Rhythm] : heart rate was normal and rhythm regular [Heart Sounds] : normal S1 and S2 [Edema] : there was no peripheral edema [Bowel Sounds] : normal bowel sounds [Abdomen Soft] : soft [Abdomen Tenderness] : non-tender [Abnormal Walk] : normal gait [Skin Color & Pigmentation] : normal skin color and pigmentation [No Focal Deficits] : no focal deficits [Oriented To Time, Place, And Person] : oriented to person, place, and time [Affect] : the affect was normal [General Appearance - Well Developed] : well developed [Outer Ear] : the ears and nose were normal in appearance [Hearing Threshold Finger Rub Not [place]] : hearing was normal [Examination Of The Oral Cavity] : the lips and gums were normal [Respiration, Rhythm And Depth] : normal respiratory rhythm and effort [Mood] : the mood was normal

## 2023-07-25 NOTE — HISTORY OF PRESENT ILLNESS
[FreeTextEntry1] : 67yoF with Pancreatic cancer presents for follow up palliative care visit, referred by oncology.\par PMH significant for DM2, HTN and substance abuse. \par \par Pt developed abdominal pain 3/2023, imaging performed 04/04/2023 showed a 3cm mass in the body of the pancreas; There was a mass encasing the celiac axis. She was admitted to [facility] on 4/8/23, EUS bx on 4/14/23 showed adenocarcinoma in the body of the pancreas. There was the presence of a L renal mass not biopsied and thought to be a second primary disease. The consensus opinion at the Multidisciplinary Pancreas Cancer Conference was to defer surgery and to offer chemotherapy. \par History of diabetes for ten years; she is currently not on insulin and she has recently begun treatment with metformin. THere is no history of jaundice or acholic stolol. \par \par Main reason for which patient is referred is symptom management.\par Patient underwent celiac plexus nerve block on 5/16/23 which was successful, she is experiencing significantly less pain now, not using any analgesics. \par Her appetite has been lower, she is eating smaller portions of food more frequently throughout the day.  Is interested in utilizing medicinal cannabis. \par \par Interval history (7/25/23): \par Patient seen for follow up visit in treatment room. Treatment remains [place]/Abraxane, tolerating it well. \par Abdominal pain has resolved following celiac plexus block. She is no longer requiring analgesics. \par Appetite is good. Nausea controlled with PRN metoclopramide daily. She experiences diarrhea following treatment for roughly 48 hours, she manages this with eating foods that are binding. She reports ongoing dyspnea on exertion, this remains stable and resolves quickly with rest. Ambulates with a cane. \par Intermittent difficultly sleeping, she reports she is a light sleeper. She uses PRN melatonin which is beneficial. Mood is good, she maintains a positive outlook. \par \par ROS:\par + weight loss of about 20lbs from her baseline\par + appetite has been improving\par + fatigue\par + nausea - uses metoclopramide once daily\par + mood has been good, tries to stay positive \par Denies trouble sleeping, constipation.\par \par Uses a cane for assistance with ambulation due to feeling fatigued and imbalanced. \par \par Patient is , has two adult sons who live with her. She drives herself, apartments in her home. Patient is independent in ADL. Patient reports he is in AA and find the support there helpful. She enjoys sculpting, reading and recently started learning how to play the guitar. She is retired from her job as an . \par \par PCP: Dr. Suzy Bartlett\par \par I-STOP Ref#:  [id number]

## 2023-07-26 LAB
ALBUMIN SERPL ELPH-MCNC: 4.1 G/DL — SIGNIFICANT CHANGE UP (ref 3.3–5)
ALP SERPL-CCNC: 78 U/L — SIGNIFICANT CHANGE UP (ref 40–120)
ALT FLD-CCNC: 51 U/L — HIGH (ref 10–45)
ANION GAP SERPL CALC-SCNC: 15 MMOL/L — SIGNIFICANT CHANGE UP (ref 5–17)
AST SERPL-CCNC: 32 U/L — SIGNIFICANT CHANGE UP (ref 10–40)
BILIRUB SERPL-MCNC: 0.2 MG/DL — SIGNIFICANT CHANGE UP (ref 0.2–1.2)
BUN SERPL-MCNC: 16 MG/DL — SIGNIFICANT CHANGE UP (ref 7–23)
CALCIUM SERPL-MCNC: 9.4 MG/DL — SIGNIFICANT CHANGE UP (ref 8.4–10.5)
CHLORIDE SERPL-SCNC: 106 MMOL/L — SIGNIFICANT CHANGE UP (ref 96–108)
CO2 SERPL-SCNC: 22 MMOL/L — SIGNIFICANT CHANGE UP (ref 22–31)
CREAT SERPL-MCNC: 0.79 MG/DL — SIGNIFICANT CHANGE UP (ref 0.5–1.3)
EGFR: 82 ML/MIN/1.73M2 — SIGNIFICANT CHANGE UP
GLUCOSE SERPL-MCNC: 100 MG/DL — HIGH (ref 70–99)
POTASSIUM SERPL-MCNC: 4.5 MMOL/L — SIGNIFICANT CHANGE UP (ref 3.5–5.3)
POTASSIUM SERPL-SCNC: 4.5 MMOL/L — SIGNIFICANT CHANGE UP (ref 3.5–5.3)
PROT SERPL-MCNC: 6.1 G/DL — SIGNIFICANT CHANGE UP (ref 6–8.3)
SODIUM SERPL-SCNC: 144 MMOL/L — SIGNIFICANT CHANGE UP (ref 135–145)

## 2023-08-01 ENCOUNTER — APPOINTMENT (OUTPATIENT)
Dept: HEMATOLOGY ONCOLOGY | Facility: CLINIC | Age: 68
End: 2023-08-01
Payer: MEDICARE

## 2023-08-01 ENCOUNTER — RESULT REVIEW (OUTPATIENT)
Age: 68
End: 2023-08-01

## 2023-08-01 ENCOUNTER — APPOINTMENT (OUTPATIENT)
Dept: HEMATOLOGY ONCOLOGY | Facility: CLINIC | Age: 68
End: 2023-08-01

## 2023-08-01 ENCOUNTER — APPOINTMENT (OUTPATIENT)
Dept: INFUSION THERAPY | Facility: HOSPITAL | Age: 68
End: 2023-08-01

## 2023-08-01 DIAGNOSIS — T45.1X5A TOXIC GASTROENTERITIS AND COLITIS: ICD-10-CM

## 2023-08-01 DIAGNOSIS — K52.1 TOXIC GASTROENTERITIS AND COLITIS: ICD-10-CM

## 2023-08-01 LAB
ANISOCYTOSIS BLD QL: SLIGHT — SIGNIFICANT CHANGE UP
BASOPHILS # BLD AUTO: 0 K/UL — SIGNIFICANT CHANGE UP (ref 0–0.2)
BASOPHILS NFR BLD AUTO: 0 % — SIGNIFICANT CHANGE UP (ref 0–2)
DACRYOCYTES BLD QL SMEAR: SLIGHT — SIGNIFICANT CHANGE UP
EOSINOPHIL # BLD AUTO: 0.02 K/UL — SIGNIFICANT CHANGE UP (ref 0–0.5)
EOSINOPHIL NFR BLD AUTO: 1 % — SIGNIFICANT CHANGE UP (ref 0–6)
HCT VFR BLD CALC: 28.2 % — LOW (ref 34.5–45)
HGB BLD-MCNC: 9.4 G/DL — LOW (ref 11.5–15.5)
LYMPHOCYTES # BLD AUTO: 1.39 K/UL — SIGNIFICANT CHANGE UP (ref 1–3.3)
LYMPHOCYTES # BLD AUTO: 63 % — HIGH (ref 13–44)
MCHC RBC-ENTMCNC: 28.8 PG — SIGNIFICANT CHANGE UP (ref 27–34)
MCHC RBC-ENTMCNC: 33.3 G/DL — SIGNIFICANT CHANGE UP (ref 32–36)
MCV RBC AUTO: 86.5 FL — SIGNIFICANT CHANGE UP (ref 80–100)
METAMYELOCYTES # FLD: 1 % — HIGH (ref 0–0)
MONOCYTES # BLD AUTO: 0.18 K/UL — SIGNIFICANT CHANGE UP (ref 0–0.9)
MONOCYTES NFR BLD AUTO: 8 % — SIGNIFICANT CHANGE UP (ref 2–14)
MYELOCYTES NFR BLD: 1 % — HIGH (ref 0–0)
NEUTROPHILS # BLD AUTO: 0.57 K/UL — LOW (ref 1.8–7.4)
NEUTROPHILS NFR BLD AUTO: 26 % — LOW (ref 43–77)
NRBC # BLD: 2 /100 — HIGH (ref 0–0)
NRBC # BLD: SIGNIFICANT CHANGE UP /100 WBCS (ref 0–0)
PLAT MORPH BLD: NORMAL — SIGNIFICANT CHANGE UP
PLATELET # BLD AUTO: 250 K/UL — SIGNIFICANT CHANGE UP (ref 150–400)
POIKILOCYTOSIS BLD QL AUTO: SLIGHT — SIGNIFICANT CHANGE UP
POLYCHROMASIA BLD QL SMEAR: SLIGHT — SIGNIFICANT CHANGE UP
RBC # BLD: 3.26 M/UL — LOW (ref 3.8–5.2)
RBC # FLD: 20.5 % — HIGH (ref 10.3–14.5)
RBC BLD AUTO: ABNORMAL
SCHISTOCYTES BLD QL AUTO: SLIGHT — SIGNIFICANT CHANGE UP
WBC # BLD: 2.2 K/UL — LOW (ref 3.8–10.5)
WBC # FLD AUTO: 2.2 K/UL — LOW (ref 3.8–10.5)

## 2023-08-01 PROCEDURE — 99213 OFFICE O/P EST LOW 20 MIN: CPT

## 2023-08-02 ENCOUNTER — NON-APPOINTMENT (OUTPATIENT)
Age: 68
End: 2023-08-02

## 2023-08-02 PROBLEM — K52.1 CHEMOTHERAPY-INDUCED DIARRHEA: Status: ACTIVE | Noted: 2023-07-17

## 2023-08-15 ENCOUNTER — RESULT REVIEW (OUTPATIENT)
Age: 68
End: 2023-08-15

## 2023-08-15 ENCOUNTER — APPOINTMENT (OUTPATIENT)
Dept: INFUSION THERAPY | Facility: HOSPITAL | Age: 68
End: 2023-08-15

## 2023-08-15 ENCOUNTER — APPOINTMENT (OUTPATIENT)
Dept: HEMATOLOGY ONCOLOGY | Facility: CLINIC | Age: 68
End: 2023-08-15
Payer: MEDICARE

## 2023-08-15 DIAGNOSIS — R82.90 UNSPECIFIED ABNORMAL FINDINGS IN URINE: ICD-10-CM

## 2023-08-15 LAB
ALBUMIN SERPL ELPH-MCNC: 4.2 G/DL — SIGNIFICANT CHANGE UP (ref 3.3–5)
ALP SERPL-CCNC: 73 U/L — SIGNIFICANT CHANGE UP (ref 40–120)
ALT FLD-CCNC: 8 U/L — LOW (ref 10–45)
ANION GAP SERPL CALC-SCNC: 10 MMOL/L — SIGNIFICANT CHANGE UP (ref 5–17)
AST SERPL-CCNC: 18 U/L — SIGNIFICANT CHANGE UP (ref 10–40)
BASOPHILS # BLD AUTO: 0.04 K/UL — SIGNIFICANT CHANGE UP (ref 0–0.2)
BASOPHILS NFR BLD AUTO: 0.4 % — SIGNIFICANT CHANGE UP (ref 0–2)
BILIRUB SERPL-MCNC: 0.2 MG/DL — SIGNIFICANT CHANGE UP (ref 0.2–1.2)
BUN SERPL-MCNC: 19 MG/DL — SIGNIFICANT CHANGE UP (ref 7–23)
CALCIUM SERPL-MCNC: 9.4 MG/DL — SIGNIFICANT CHANGE UP (ref 8.4–10.5)
CHLORIDE SERPL-SCNC: 104 MMOL/L — SIGNIFICANT CHANGE UP (ref 96–108)
CO2 SERPL-SCNC: 27 MMOL/L — SIGNIFICANT CHANGE UP (ref 22–31)
CREAT SERPL-MCNC: 0.89 MG/DL — SIGNIFICANT CHANGE UP (ref 0.5–1.3)
EGFR: 71 ML/MIN/1.73M2 — SIGNIFICANT CHANGE UP
EOSINOPHIL # BLD AUTO: 0.14 K/UL — SIGNIFICANT CHANGE UP (ref 0–0.5)
EOSINOPHIL NFR BLD AUTO: 1.5 % — SIGNIFICANT CHANGE UP (ref 0–6)
GLUCOSE SERPL-MCNC: 137 MG/DL — HIGH (ref 70–99)
HCT VFR BLD CALC: 30.9 % — LOW (ref 34.5–45)
HGB BLD-MCNC: 10.2 G/DL — LOW (ref 11.5–15.5)
IMM GRANULOCYTES NFR BLD AUTO: 1 % — HIGH (ref 0–0.9)
LYMPHOCYTES # BLD AUTO: 1.57 K/UL — SIGNIFICANT CHANGE UP (ref 1–3.3)
LYMPHOCYTES # BLD AUTO: 16.3 % — SIGNIFICANT CHANGE UP (ref 13–44)
MCHC RBC-ENTMCNC: 28.4 PG — SIGNIFICANT CHANGE UP (ref 27–34)
MCHC RBC-ENTMCNC: 33 G/DL — SIGNIFICANT CHANGE UP (ref 32–36)
MCV RBC AUTO: 86.1 FL — SIGNIFICANT CHANGE UP (ref 80–100)
MONOCYTES # BLD AUTO: 1.13 K/UL — HIGH (ref 0–0.9)
MONOCYTES NFR BLD AUTO: 11.7 % — SIGNIFICANT CHANGE UP (ref 2–14)
NEUTROPHILS # BLD AUTO: 6.67 K/UL — SIGNIFICANT CHANGE UP (ref 1.8–7.4)
NEUTROPHILS NFR BLD AUTO: 69.1 % — SIGNIFICANT CHANGE UP (ref 43–77)
NRBC # BLD: 0 /100 WBCS — SIGNIFICANT CHANGE UP (ref 0–0)
PLATELET # BLD AUTO: 338 K/UL — SIGNIFICANT CHANGE UP (ref 150–400)
POTASSIUM SERPL-MCNC: 4.2 MMOL/L — SIGNIFICANT CHANGE UP (ref 3.5–5.3)
POTASSIUM SERPL-SCNC: 4.2 MMOL/L — SIGNIFICANT CHANGE UP (ref 3.5–5.3)
PROT SERPL-MCNC: 6.8 G/DL — SIGNIFICANT CHANGE UP (ref 6–8.3)
RBC # BLD: 3.59 M/UL — LOW (ref 3.8–5.2)
RBC # FLD: 19.6 % — HIGH (ref 10.3–14.5)
SODIUM SERPL-SCNC: 141 MMOL/L — SIGNIFICANT CHANGE UP (ref 135–145)
WBC # BLD: 9.65 K/UL — SIGNIFICANT CHANGE UP (ref 3.8–10.5)
WBC # FLD AUTO: 9.65 K/UL — SIGNIFICANT CHANGE UP (ref 3.8–10.5)

## 2023-08-15 PROCEDURE — 99213 OFFICE O/P EST LOW 20 MIN: CPT

## 2023-08-16 ENCOUNTER — APPOINTMENT (OUTPATIENT)
Age: 68
End: 2023-08-16
Payer: MEDICARE

## 2023-08-16 VITALS
HEART RATE: 116 BPM | BODY MASS INDEX: 27.14 KG/M2 | HEIGHT: 64 IN | DIASTOLIC BLOOD PRESSURE: 89 MMHG | SYSTOLIC BLOOD PRESSURE: 148 MMHG | OXYGEN SATURATION: 93 % | WEIGHT: 159 LBS

## 2023-08-16 PROBLEM — R82.90 FOUL SMELLING URINE: Status: ACTIVE | Noted: 2023-08-15

## 2023-08-16 LAB
APPEARANCE UR: ABNORMAL
BACTERIA # UR AUTO: ABNORMAL /HPF
BILIRUB UR-MCNC: NEGATIVE — SIGNIFICANT CHANGE UP
CAST: 3 /LPF — SIGNIFICANT CHANGE UP (ref 0–4)
COLOR SPEC: YELLOW — SIGNIFICANT CHANGE UP
DIFF PNL FLD: ABNORMAL
GLUCOSE UR QL: NEGATIVE MG/DL — SIGNIFICANT CHANGE UP
KETONES UR-MCNC: NEGATIVE MG/DL — SIGNIFICANT CHANGE UP
LEUKOCYTE ESTERASE UR-ACNC: ABNORMAL
NITRITE UR-MCNC: POSITIVE
PH UR: 6 — SIGNIFICANT CHANGE UP (ref 5–8)
PROT UR-MCNC: 30 MG/DL
RBC CASTS # UR COMP ASSIST: 1 /HPF — SIGNIFICANT CHANGE UP (ref 0–4)
SP GR SPEC: 1.02 — SIGNIFICANT CHANGE UP (ref 1–1.03)
SQUAMOUS # UR AUTO: 6 /HPF — HIGH (ref 0–5)
UROBILINOGEN FLD QL: 0.2 MG/DL — SIGNIFICANT CHANGE UP (ref 0.2–1)
WBC UR QL: 320 /HPF — HIGH (ref 0–5)

## 2023-08-16 PROCEDURE — 99214 OFFICE O/P EST MOD 30 MIN: CPT

## 2023-08-16 NOTE — PHYSICAL EXAM
[Ambulatory and capable of all self care but unable to carry out any work activities] : Status 2- Ambulatory and capable of all self care but unable to carry out any work activities. Up and about more than 50% of waking hours [Normal] : affect appropriate [de-identified] : wore a wig, sitting comfortably on recliner in NAD, eating snacks  [de-identified] : wears glasses  [de-identified] : Right Anterior chest wall Mediport site appears stable, no bleeding, edema or erythema / discharge noted

## 2023-08-16 NOTE — ASSESSMENT
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 Side effects of chemotherapy were reviewed with her She is age 67 and has significant co morbidity including diabetes poorly controlled on oral metformin at present. She is asked to see her primary care physician for consideration of an insulin based treatment.  The patient may have two tumors as the left renal mass has not had a biopsy. The priority in treating the pancreas cancer was addressed in the multidisciplinary conference. CT/PET will be helpful in addressing the left renal lesion which may be a second primary tumor. If two different tumors are present she is not a candidate for a clinical study. I have recommended placement of a Mediport for chemotherapy treatment. Scheduling of Mediport performed with patient and explanation of use of Mediport. A request for Mediport placement was made and our secretarial team will obtain authorization. Blood testing requested today as noted. THe patient has seen pain management physician Dr Anne for management of analgesia The patient is scheduled for surgical follow up in future. ECG is performed by me and Nursing assistant and results are reviewed I have prescribed antinausea medication metoclopramide 10 mg PO TID PN RTC in 1 week to sign consent for chemotherapy 05/09/2023 The patient has returned to office. Physical examination is not changed. She has reviewed the chemotherapy information provided last week. I discussed the use of the gemcitabine and Abraxane as treatment on day 1, 8 and 15 of a 28 day cycle (no treatment on day 22). I assisted our  in scheduling chemotherapy and all questions were answered to her satisfaction. Mediport placement is scheduled for next week.RTC in 3 weeks She is now on combination dosing metformin and glyceride.Her primary care physician has prescribed acetaminophen 3000 and codeine 30 TID.  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 Seen in a f/u visit today  1) Pancreas Cancer - scheduled for Cycle # 2 Gemcitabine & Abraxane, labs with treatment  2) Left Renal Mass awaiting Biopsy Seen by Dr. Pilo [facility] - per Dr. Kelly's note - for now the renal mass can remain on the back burner temporarily until the pancreatic lesion is addressed. If there is a surgical plan for the pancreatic lesion to take place, I would consider combining that surgery with a laparoscopic or robotic partial nephrectomy as these lesions are relatively close to 1 another and could be operated on in the same field. 3) Surgical Onc - Dr. Greco - Seen for Surgical Intervention  - Will re-eval after 2 months restaging CT 4) Abd pain, Erosive Gastritis on Omeprazole (prescribed by GI),  - Tylenol w codeine renewed - Pain management consult requested & Nutritional Evaluation requested.  All questions, concerns were addressed with patient during this visit to her satisfaction, she verbalized understanding.  RTC in 3 weeks Case management, care plan d/w Dr. 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 Seen in a f/u visit today  1) Pancreas Cancer - s/p Cycle 2 Day 8 treatment on 6/27/23 scheduled for Cycle 2 Day 15 Gemcitabine & Abraxane on Monday July 3, 2023. Labs from 6/27/23 discussed with patient copies provided  2) Mediport Right ACW - EMLA cream prescription sent to local pharmacy.  3) Surgical Onc - Dr. Greco - Seen for Surgical Intervention  - Will re-eval after 2 months restaging CT 4) Left Renal Mass awaiting Biopsy Seen by Dr. Pilo [facility] - per Dr. Kelly's note - for now the renal mass can remain on the back burner temporarily until the pancreatic lesion is addressed. If there is a surgical plan for the pancreatic lesion to take place, I would consider combining that surgery with a laparoscopic or robotic partial nephrectomy as these lesions are relatively close to 1 another and could be operated on in the same field. 5) Abd pain, Erosive Gastritis on Omeprazole (prescribed by GI),  - Tylenol w codeine renewed - Pain management consult requested & Nutritional Evaluation requested.  All questions, concerns were addressed with patient & friend Jessica during this visit to her satisfaction, she verbalized understanding.  RTC in 3 weeks Case management, care plan d/w Dr. 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 Seen in a f/u visit today  1) Pancreas Cancer on Gemcitabine & Abraxane via Mediport -  scheduled for Cycle # 3- Day 1 on 7/18/23, Day 8 on 7/25/2023, Day 15 on 8/1/2023  2) Anemia - Hgb = 8.8 on July 3, 2023 - today's repeat Hgb 9.1 will add anemia labs to tomorrow's treatment visit Recommend to add dietary iron to improve iron in blood - add red meat 2-3 x week 3) Diarrhea - Loperamide sent to local pharmacy, PO hydration, electrolyte supplementation was emphasized - pt advised how to take Loperamide with verbal understanding 4) Surgical Onc - Dr. Greco - Seen for Surgical Intervention  recommended to f/u for re-eval after 2 months restaging CT - pt has not made an apptn as of yet - Patient was counseled to make a f/u visit as per recs 5)  Left Renal Mass awaiting Biopsy Seen by Dr. Pilo [facility] - per Dr. Kelly's note - for now the renal mass can remain on the back burner temporarily until the pancreatic lesion is addressed. If there is a surgical plan for the pancreatic lesion to take place, I would consider combining that surgery with a laparoscopic or robotic partial nephrectomy as these lesions are relatively close to 1 another and could be operated on in the same field. 6) Abd pain, Erosive Gastritis on Omeprazole - no reported GI symptoms at this time - GI f/u as per their discretion 7) Pain management following -  Patient advised to maintain a diary to keep a log of symptoms, f/u visits, recommendations, list of current medications. All questions, concerns were addressed with patient & friend Jessica during this visit to her satisfaction, she verbalized understanding.  RTC in 4 weeks or prn as needed.  Case management, care plan d/w Dr. Tommy Mahoney   August 1, 2023 - Patient seen in treatment room for low ANC.  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 1) Leukopenia & Neutropenia - CBC - WBC = 2.2, Hgb = 9.4, PLT = 250 000, MCV = 86.5  Neutrophil % = 26%, ANC = 0.57  Will hold treatment for today due to Leukopenia & Neutropenia - Cipro 500 mg PO qd started for ppx - Will hold off on initiating growth factors at this time and reevaluate.  Patient was advised on Neutropenia precautions, wear mask, wash hands frequently and avoid sick contacts.  2) Normocytic Anemia - Hgb = 9.4 no bleeding, no indication for blood transfusion will continue to monitor Ferritin & Folate levels WNL, B12 level = 1446 (Elevated)  3) Surgical Onc - Dr. Greco - Seen for Surgical Intervention recommended to f/u for re-eval after 2 months restaging CT - pt has not made an apptn as of yet - Patient was counseled to make a f/u visit as per recs 4)  Left Renal Mass awaiting Biopsy Seen by Dr. Pilo [facility] - per Dr. Kelly's note - for now the renal mass can remain on the back burner temporarily until the pancreatic lesion is addressed. If there is a surgical plan for the pancreatic lesion to take place, I would consider combining that surgery with a laparoscopic or robotic partial nephrectomy as these lesions are relatively close to 1 another and could be operated on in the same field. All questions, concerns were addressed with patient during this encounter, she verbalized understanding.  RTC in 2 weeks scheduled for Will repeat CBC trend.   Addendum: Above findings, labs were discussed with Dr. 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  1) Pancreas Cancer - Patient scheduled today for Cycle # 4 Day 1 of Gemcitabine & Abraxane. & scheduled for Cycle # 4 Day 8 on August 22, 2023 & Day # 15 on August 29, 2023.  Today's CBC - WBC = 9.65, ANC = 6.67, Hgb = 10.2, PLT = 338, MCV = 86.1, CMP unremarkable,  2) R/o UTI - Dysuria, frequency lasted 1 day now w foul smelling urine - UA, Urine Culture results pending. will treat empirically for presumed UTI - Cipro 500 mg PO bid x 7 days was sent to [facility] ([place])  2) Surgical Onc - f/u apptn w Dr. Greco scheduled for 8/16/23.  3) Left Renal Mass awaiting Biopsy Seen by Dr. Pilo [facility] - per Dr. Kelly's note - for now the renal mass can remain on the back burner temporarily until the pancreatic lesion is addressed. If there is a surgical plan for the pancreatic lesion to take place, I would consider combining that surgery with a laparoscopic or robotic partial nephrectomy as these lesions are relatively close to 1 another and could be operated on in the same field. All questions, concerns were addressed with patient during this encounter, she verbalized understanding.  RTC in 2 weeks  Case management, care plan d/w Dr. Tommy Mahoney.   August 16, 2023 -Addendum Note:  - 8/15/23 UA + for Nitrites, Leukocyte Esterase, trace blood,  Urine culture testing - continue Cipro 500 mg PO bid for 7 days - patient aware of the plan.

## 2023-08-16 NOTE — HISTORY OF PRESENT ILLNESS
[Disease: _____________________] : Disease: [unfilled] [T: ___] : T[unfilled] [N: ___] : N[unfilled] [M: ___] : M[unfilled] [AJCC Stage: ____] : AJCC Stage: [unfilled] [Date: ____________] : Patient's last distress assessment performed on [unfilled]. [0 - No Distress] : Distress Level: 0 [80: Normal activity with effort; some signs or symptoms of disease.] : 80: Normal activity with effort; some signs or symptoms of disease.  [ECOG Performance Status: 2 - Ambulatory and capable of all self care but unable to carry out any work activities] : Performance Status: 2 - Ambulatory and capable of all self care but unable to carry out any work activities. Up and about more than 50% of waking hours [Abdominal Pain] : abdominal pain [EUS] : EUS [Biopsy] : biopsy performed [Before Surgery] : before surgery [Pancreatic ductal adenocarcinoma] : Pancreatic ductal adenocarcinoma [Not staged outside] : not staged outside [Nutrition] : Nutrition [Social Work] : Social Work [Other: ____] : [unfilled] [Restricted in physically strenuous activity but ambulatory and able to carry out work of a light or sedentary nature] : Status 1 - Restricted in physically strenuous activity but ambulatory and able to carry out work of a light or sedentary nature, e.g., light house work, office work [de-identified] : adenocarcinoma [FreeTextEntry1] :  gemcitabine and Abraxane [de-identified] : see hpi; 05/09/2023.She has been taking a combination medication of glipizide and metformin with better blood sugar control.Now on acetaminophen 300 codeine 30 mg PO TID PRN after meeting with pain management and her primary care MD. Pain has been mid line and more noted in day than in evening. No jaundice  May 30, 2023 - Seen in a f/u visit today scheduled for Gemcitabine & Abraxane for Pancreas cancer. Patient had called our office last week for c/o nausea, abd pain, poor appetite. She was prescribed Omeprazole by GI for Gastritis she had not taken. We advised her to take Metoclopramide for nausea, poor appetite and Omeprazole for Gastritis.  Since taking the medications for past few days she has been feeling much better. She remains on Levaquin for Neutropenia prophylaxis.  She denies bleeding, constipation, diarrhea, fever, chills, falls.   June 30, 2023 - Patient seen today accompanied with friend Ms. Jessica Betancourt.  Patient feels well denies interval change in medical status, No nausea, reports improved appetite, no GI complaints, no pain. Seen / evaluated by our Nutritionist team reports helpful tips and compliance with recommendations.  Today she was seen by Palliative / Pain management Dr. Malik no pain meds started.  She reports she was seen by her PCP last week, she ran out of her BP medications awaiting renewal by PCP.   July 17, 2023 -  Patient seen today in a follow up visit accompanied with friend Ms. Jessica Betancourt.  She reports episodes of non bloody diarrhea / loose stools post treatment lasts for day and a half does not take any anti diarrhea medications, follows dietary restriction with resolution of symptoms.  Denies N, V, fever, anorexia, weight loss, febrile illness, swollen glands, change in medical condition since last visit.   August 1, 2023 - Received a call from Keli JIMÉNEZ from treatment room patient scheduled for Gemcitabine & Abraxane and CBC shows Neutropenia Patient seen and evaluated, she feels well, asymptomatic, no cough, fever, chills, fatigue, no N, V, poor appetite, swollen glands, night sweats, bleeding, bruising. Denies any recent infection, hospitalization. Reports good appetite. No falls, presented for treatment today by herself, son drove her here.   August 15, 2023 - Received a call from Huang JIMÉNEZ from treatment room to evaluate patient with c/o Dysuria on Friday 8/11/23 lasted 1 day. Patient seen and evaluated in the treatment room.  She reports on Friday she experienced dysuria, frequency of urination, no hematuria, no urgency, fever, chills, pelvic pain, abdominal pain / discomfort. Symptoms of dysuria and frequency of urination has resolved but has foul smelling urine, no urine discoloration noted.  [de-identified] : Ms. ENRIKE MORLEY is a 67 year old female who presents for evaluation in our Pancreas Multidisciplinary Clinic. Her PMH includes DM2 (dx ), HTN, left knee replacement. She presented with abdominal pain radiating to her LUQ x 3 weeks. MRI abdomen  noted a 3 cm mass in the body of the pancreas suspicious for primary pancreatic malignancy and also Bosniak type IV cyst upper pole left kidney suspicious for primary renal neoplasm. [facility] and CT A/P w/ IC showing ill-defined pancreatic body mass consistent with pancreatic neoplasm; and complex enhancing left renal mass concerning for renal cell carcinoma till proven otherwise.\par  She underwent EUS on 23 with noted findings of one 3 cm pancreatic body mass, s/p FNA.  Pathology positive for adenocarcinoma. \par  \par  She reports decrease appetite and early satiety.  Abdominal pain is intermittent and severe when it comes.  She was taking Tylenol#3 after hospital d/c but currently not taking anymore. +Weight loss of 10 pounds since March. \par  \par  Colonoscopy from 7 years ago normal. \par  Smoke/Etoh: 20 pack years. Last smoke 2023. No ETOH use. \par  \par  ECO      Independent, can walk upstairs. Lives with her sons.  (ECOG 1 due to pain). \par  Family Ca hx: None \par  \par  Labs: \par  23    AST: 9   ALT: 18  ALP:  103  Tbili: 0.3 \par  23   Ca 19-9: 2428    CEA: 15.0    AST:  11    ALT: 12    ALP: 96   Tbili:  0.4     A1c: 10% \par   [TextBox_4] : 4/4/23 [TextBox_39] : [id number] [TextBox_41] : adenocarcinoma [TextBox_43] : 4/14/23 [] : This is not a second opinion [TextBox_5] : 5/2/23 [TextBox_40] : 4/8/23

## 2023-08-16 NOTE — RESULTS/DATA
[FreeTextEntry1] : review of information in the E M H R rate 92 normal axis NSR ; possilbe anterior infarction  GI Ca 19-9 -> 2428 > 2328 > 1704 >  1526 (trending down)   May 30, 2023 - CBC - WBC = 7.3, Hgb = 11.4, PLT = 203, MCV = 81.4 CMP - Blood glucose = 124 (non fasting) GI Ca 19-9 - 1526 (trending down)   July 3, 2023 - CBC - WBC = 3.9, Hgb = 8.8, PLT = 255   August 1, 2023 - CBC - WBC = 2.2, Hgb = 9.4, PLT = 250 000, MCV = 86.5 Neutrophil % = 26%, ANC = 0.57

## 2023-08-16 NOTE — REVIEW OF SYSTEMS
[Negative] : Allergic/Immunologic [Fatigue] : no fatigue [Recent Change In Weight] : ~T no recent weight change [Abdominal Pain] : no abdominal pain [Vomiting] : no vomiting [Constipation] : no constipation

## 2023-08-18 ENCOUNTER — APPOINTMENT (OUTPATIENT)
Dept: HEMATOLOGY ONCOLOGY | Facility: CLINIC | Age: 68
End: 2023-08-18

## 2023-08-20 ENCOUNTER — OUTPATIENT (OUTPATIENT)
Dept: OUTPATIENT SERVICES | Facility: HOSPITAL | Age: 68
LOS: 1 days | Discharge: HOME | End: 2023-08-20

## 2023-08-20 DIAGNOSIS — Z96.652 PRESENCE OF LEFT ARTIFICIAL KNEE JOINT: Chronic | ICD-10-CM

## 2023-08-20 DIAGNOSIS — D64.9 ANEMIA, UNSPECIFIED: ICD-10-CM

## 2023-08-22 ENCOUNTER — RESULT REVIEW (OUTPATIENT)
Age: 68
End: 2023-08-22

## 2023-08-22 ENCOUNTER — APPOINTMENT (OUTPATIENT)
Dept: INFUSION THERAPY | Facility: HOSPITAL | Age: 68
End: 2023-08-22

## 2023-08-22 DIAGNOSIS — C25.9 MALIGNANT NEOPLASM OF PANCREAS, UNSPECIFIED: ICD-10-CM

## 2023-08-22 DIAGNOSIS — R11.2 NAUSEA WITH VOMITING, UNSPECIFIED: ICD-10-CM

## 2023-08-22 DIAGNOSIS — Z51.11 ENCOUNTER FOR ANTINEOPLASTIC CHEMOTHERAPY: ICD-10-CM

## 2023-08-22 LAB
ALBUMIN SERPL ELPH-MCNC: 4.2 G/DL — SIGNIFICANT CHANGE UP (ref 3.3–5)
ALP SERPL-CCNC: 74 U/L — SIGNIFICANT CHANGE UP (ref 40–120)
ALT FLD-CCNC: 25 U/L — SIGNIFICANT CHANGE UP (ref 10–45)
ANION GAP SERPL CALC-SCNC: 9 MMOL/L — SIGNIFICANT CHANGE UP (ref 5–17)
ANISOCYTOSIS BLD QL: SLIGHT — SIGNIFICANT CHANGE UP
AST SERPL-CCNC: 25 U/L — SIGNIFICANT CHANGE UP (ref 10–40)
BASOPHILS # BLD AUTO: 0 K/UL — SIGNIFICANT CHANGE UP (ref 0–0.2)
BASOPHILS NFR BLD AUTO: 0 % — SIGNIFICANT CHANGE UP (ref 0–2)
BILIRUB SERPL-MCNC: 0.2 MG/DL — SIGNIFICANT CHANGE UP (ref 0.2–1.2)
BUN SERPL-MCNC: 19 MG/DL — SIGNIFICANT CHANGE UP (ref 7–23)
CALCIUM SERPL-MCNC: 9.9 MG/DL — SIGNIFICANT CHANGE UP (ref 8.4–10.5)
CHLORIDE SERPL-SCNC: 100 MMOL/L — SIGNIFICANT CHANGE UP (ref 96–108)
CO2 SERPL-SCNC: 28 MMOL/L — SIGNIFICANT CHANGE UP (ref 22–31)
CREAT SERPL-MCNC: 0.83 MG/DL — SIGNIFICANT CHANGE UP (ref 0.5–1.3)
DACRYOCYTES BLD QL SMEAR: SLIGHT — SIGNIFICANT CHANGE UP
EGFR: 77 ML/MIN/1.73M2 — SIGNIFICANT CHANGE UP
EOSINOPHIL # BLD AUTO: 0.04 K/UL — SIGNIFICANT CHANGE UP (ref 0–0.5)
EOSINOPHIL NFR BLD AUTO: 1 % — SIGNIFICANT CHANGE UP (ref 0–6)
GLUCOSE SERPL-MCNC: 121 MG/DL — HIGH (ref 70–99)
HCT VFR BLD CALC: 31.2 % — LOW (ref 34.5–45)
HGB BLD-MCNC: 10.5 G/DL — LOW (ref 11.5–15.5)
LYMPHOCYTES # BLD AUTO: 2.18 K/UL — SIGNIFICANT CHANGE UP (ref 1–3.3)
LYMPHOCYTES # BLD AUTO: 53 % — HIGH (ref 13–44)
MCHC RBC-ENTMCNC: 28.9 PG — SIGNIFICANT CHANGE UP (ref 27–34)
MCHC RBC-ENTMCNC: 33.7 G/DL — SIGNIFICANT CHANGE UP (ref 32–36)
MCV RBC AUTO: 86 FL — SIGNIFICANT CHANGE UP (ref 80–100)
MONOCYTES # BLD AUTO: 0.37 K/UL — SIGNIFICANT CHANGE UP (ref 0–0.9)
MONOCYTES NFR BLD AUTO: 9 % — SIGNIFICANT CHANGE UP (ref 2–14)
NEUTROPHILS # BLD AUTO: 1.52 K/UL — LOW (ref 1.8–7.4)
NEUTROPHILS NFR BLD AUTO: 37 % — LOW (ref 43–77)
NRBC # BLD: 0 /100 — SIGNIFICANT CHANGE UP (ref 0–0)
NRBC # BLD: SIGNIFICANT CHANGE UP /100 WBCS (ref 0–0)
PLAT MORPH BLD: NORMAL — SIGNIFICANT CHANGE UP
PLATELET # BLD AUTO: 266 K/UL — SIGNIFICANT CHANGE UP (ref 150–400)
POIKILOCYTOSIS BLD QL AUTO: SLIGHT — SIGNIFICANT CHANGE UP
POTASSIUM SERPL-MCNC: 4.5 MMOL/L — SIGNIFICANT CHANGE UP (ref 3.5–5.3)
POTASSIUM SERPL-SCNC: 4.5 MMOL/L — SIGNIFICANT CHANGE UP (ref 3.5–5.3)
PROT SERPL-MCNC: 6.8 G/DL — SIGNIFICANT CHANGE UP (ref 6–8.3)
RBC # BLD: 3.63 M/UL — LOW (ref 3.8–5.2)
RBC # FLD: 18.8 % — HIGH (ref 10.3–14.5)
RBC BLD AUTO: ABNORMAL
SODIUM SERPL-SCNC: 138 MMOL/L — SIGNIFICANT CHANGE UP (ref 135–145)
WBC # BLD: 4.12 K/UL — SIGNIFICANT CHANGE UP (ref 3.8–10.5)
WBC # FLD AUTO: 4.12 K/UL — SIGNIFICANT CHANGE UP (ref 3.8–10.5)

## 2023-08-29 ENCOUNTER — APPOINTMENT (OUTPATIENT)
Dept: GERIATRICS | Facility: CLINIC | Age: 68
End: 2023-08-29
Payer: MEDICARE

## 2023-08-29 ENCOUNTER — APPOINTMENT (OUTPATIENT)
Dept: GERIATRICS | Facility: CLINIC | Age: 68
End: 2023-08-29

## 2023-08-29 VITALS
DIASTOLIC BLOOD PRESSURE: 74 MMHG | BODY MASS INDEX: 26.64 KG/M2 | HEART RATE: 118 BPM | SYSTOLIC BLOOD PRESSURE: 108 MMHG | TEMPERATURE: 96.3 F | OXYGEN SATURATION: 98 % | RESPIRATION RATE: 16 BRPM | WEIGHT: 155.2 LBS

## 2023-08-29 DIAGNOSIS — R63.4 ABNORMAL WEIGHT LOSS: ICD-10-CM

## 2023-08-29 DIAGNOSIS — G89.3 NEOPLASM RELATED PAIN (ACUTE) (CHRONIC): ICD-10-CM

## 2023-08-29 PROCEDURE — 99214 OFFICE O/P EST MOD 30 MIN: CPT

## 2023-08-29 NOTE — ASSESSMENT
[FreeTextEntry1] : 67yoF with:   # Pancreatic cancer - On neoadjuvant [place]/Abraxane.  Med onc follow up.  # Pain 2/2 Neoplasm - much improved after celiac plexus block. Pt not requiring analgesics presently.  - Recommend heat application to her shoulder. May use PRN Tylenol. Will follow up on results of CT scan.   # Nausea - C/w Metoclopramide PRN. Discussed how medical cannabis may be beneficial.  # Unintentional weight loss - Recommend small, frequent meals focused on high-calorie, high-protein, nutrient dense healthy foods.  # Diarrhea - Improved; C/w binding foods. May benefit from PRN Imodium following treatment.   # Sleep disturbances - C/w melatonin. Discussed the importance of good sleep hygiene.  -Medical cannabis certification completed previously, discussed how it could be beneficial to assist with sleep.   # Encounter for palliative care- Emotional support provided  Explained the role of palliative care in enhancing quality of life in the setting of serious illness. Health Care Proxy (HCP) form completed in office today after explanation of the role of a HCP.  Follow up in 1 month, call sooner with questions or issues.

## 2023-08-29 NOTE — HISTORY OF PRESENT ILLNESS
[FreeTextEntry1] : 67yoF with Pancreatic cancer presents for follow up palliative care visit, referred by oncology. PMH significant for DM2, HTN and substance abuse.   Pt developed abdominal pain 3/2023, imaging performed 04/04/2023 showed a 3cm mass in the body of the pancreas; There was a mass encasing the celiac axis. She was admitted to [facility] on 4/8/23, EUS bx on 4/14/23 showed adenocarcinoma in the body of the pancreas. There was the presence of a L renal mass not biopsied and thought to be a second primary disease. The consensus opinion at the Multidisciplinary Pancreas Cancer Conference was to defer surgery and to offer chemotherapy.  History of diabetes for ten years; she is currently not on insulin and she has recently begun treatment with metformin. THere is no history of jaundice or acholic stolol.   Main reason for which patient is referred is symptom management. Patient underwent celiac plexus nerve block on 5/16/23 which was successful, she is experiencing significantly less pain now, not using any analgesics.  Her appetite has been lower, she is eating smaller portions of food more frequently throughout the day.  Is interested in utilizing medicinal cannabis.   Interval history (8/29/23):  Patient seen for follow up visit. Treatment remains [place]/Abraxane, tolerating it well. She will have an upcoming CT scan this Friday to evaluate treatment response.  Abdominal pain has resolved following celiac plexus block. She is no longer requiring analgesics. She reports new intermittent pain to her upper left chest area and shoulder. She describes this as a muscular pain, as if she slept wrong on her left shoulder. This pain can reach 6/10 however resolves quickly. This has been stable since it began last week and typically occurs once a day. She does not associate any precipitating factors but experienced it during the visit while reaching her arm out to take water. Additionally, she reports intermittent twinges of "lightening" pain, location varies throughout her body. She reports numbness and tingling to her left arm that comes and goes, as well as persistent numbness and tingling to her bilateral feet.  Appetite is good, she is eating well despite a recent 4lb weight loss. Nausea controlled with PRN metoclopramide daily. Moving her bowels every other day, which is her baseline. She reports ongoing dyspnea on exertion, this remains stable and resolves quickly with rest. Ambulates with a cane. Intermittent difficultly sleeping, she reports she is a light sleeper. Mood is good, she maintains a positive outlook.   ROS: + weight loss of about 20lbs from her baseline, additional 4lbs past 2 weeks + appetite has been improving + fatigue + nausea - uses metoclopramide once daily + mood has been good, tries to stay positive  Denies trouble sleeping, constipation.  Uses a cane for assistance with ambulation due to feeling fatigued and imbalanced.   Patient is , has two adult sons who live with her. She drives herself, apartments in her home. Patient is independent in ADL. Patient reports he is in AA and find the support there helpful. She enjoys sculpting, reading and recently started learning how to play the guitar. She is retired from her job as an .   PCP: Dr. Suzy Bartlett  I-STOP Ref#:  [id number]

## 2023-08-30 NOTE — ASSESSMENT
[FreeTextEntry1] : IMP: 66yo w/ ill-defined pancreas mass (4.2 cm), bx proven T4 adenocarcinoma w/ encasement of celiac axis; left renal lesion concerning for RCC  s/p EUS biopsy on 4/14/2023, path c/w pancreatic adenocarcinoma  Case discussed at [facility] on 5/2/2023 with plan to defer surgery and offer neoadjuvant chemotherapy up front PET/CT was ordered but given her poorly controlled diabetes it has been rescheduled pending better glucose control   8/16/2023: Stopped chemotherapy due to neutropenia and restarted 3rd cycle yesterday and scheduled to complete 4th cycle end of August. Complaining of foul urine x 5 days and was given cipro yesterday. Denies dysuria,  abdominal pain, nausea, vomiting, fevers/chills.   PLAN: - Continue chemotherapy with Dr. Mahoney - Dr. Mahoney will order restaging CT after 4th cycle of chemotherapy. - Depending on CT, will determine if we want to continue current treatment or change of her chemo regimen - RTO end of September for telehealth visit  - [facility] discussion  I have discussed the diagnosis, therapeutic plan and options with the patient at length. Patient expressed verbal understanding to proceed with the proposed plan. All questions answered.

## 2023-08-30 NOTE — HISTORY OF PRESENT ILLNESS
[de-identified] : Ms. ENRIKE MORLEY is a 67 year old woman here today for a follow-up visit  PSH: left knee replacement 2019 Fam HX: none Social HX: ~20 pack year history, stopped in early 2023; no ETOH, retired from job as a    Enrike presented to [facility] ED in April of 2023 with complaints of 2 weeks of abdominal pain radiating to her LUQ.  Her PMH is significant for DM, HTN, liver polyps & renal cysts.  DM diagnosed in 2022 --- reports her a1c is around 8  She reports that her abdominal pain started as vague epigastric pain then steadily worsened. She was seeing her PCP for management of these symptoms and underwent an abdominal U/S & MRI  (@[facility], will obtain images and reports).  She has lost ~10 lbs from March to April 2023; appetite is poor. ECOG 0-1  Her last colonoscopy was ~7 years ago and otherwise WNL; never had an endoscopy   MR abdomen from 4/4/2023 showed a 3 cm solid hypo enhancing mass in the pancreatic body with ductal obstruction & parenchymal atrophy in the tail and a Bosniak type IV cyst upper pole left kidney suspicious for primary renal neoplasm  CT A/P 4/8/2023 - ill defined pancreatic body mass c/w pancreas neoplasm; complex enhancing left renal mass concerning for RCC  s/p EUS biopsy on 4/14/2023, path c/w pancreatic adenocarcinoma  Case discussed at [facility] on 5/2/2023 with plan to defer surgery and offer neoadjuvant chemotherapy up front PET/CT was ordered but given her poorly controlled diabetes it has been rescheduled pending better glucose control   5/2023: Started Gemcitabine & Abraxane   8/16/2023: Patient took a break from chemotherapy due to neutropenia and restarted 3rd cycle yesterday. Scheduled to complete 4th cycle end of August. Complaining of foul urine x 5 days and was prescribed cipro yesterday. Denies dysuria, abdominal pain, nausea, vomiting, fevers/chills.   PCP: Dr. Suzy Bartlett

## 2023-08-30 NOTE — CONSULT LETTER
[Dear  ___] : Dear  [unfilled], [Consult Letter:] : I had the pleasure of evaluating your patient, [unfilled]. [( Thank you for referring [unfilled] for consultation for _____ )] : Thank you for referring [unfilled] for consultation for [unfilled] [Please see my note below.] : Please see my note below. [Consult Closing:] : Thank you very much for allowing me to participate in the care of this patient.  If you have any questions, please do not hesitate to contact me. [Sincerely,] : Sincerely, [FreeTextEntry3] : Addison Greco MD, FICS, FACS\par  Director of Surgical Oncology- [facility]\par  ,Department of Surgery\par  [facility]\par  [address]\[place]  [place]- [address]\par  \par  [address]\[place]  [place]- [address]\par  \par  [address]\[place]  [place], [place]- [address]\par  \par  (mob) [phone]\par  (o) [phone]\par  (f) [phone]\par

## 2023-08-30 NOTE — PHYSICAL EXAM
[Normal] : supple, no neck mass and thyroid not enlarged [Normal Neck Lymph Nodes] : normal neck lymph nodes  [Normal Supraclavicular Lymph Nodes] : normal supraclavicular lymph nodes [Normal Groin Lymph Nodes] : normal groin lymph nodes [Normal Axillary Lymph Nodes] : normal axillary lymph nodes [Normal] : oriented to person, place and time, with appropriate affect [FreeTextEntry1] : COVID-19 precautions as per [facility] policy was universally followed  [de-identified] : Abdomen soft, non-tender, non-distended, and no palpable masses.

## 2023-08-31 ENCOUNTER — RESULT REVIEW (OUTPATIENT)
Age: 68
End: 2023-08-31

## 2023-08-31 ENCOUNTER — APPOINTMENT (OUTPATIENT)
Dept: INFUSION THERAPY | Facility: HOSPITAL | Age: 68
End: 2023-08-31

## 2023-08-31 ENCOUNTER — NON-APPOINTMENT (OUTPATIENT)
Age: 68
End: 2023-08-31

## 2023-08-31 LAB
ALBUMIN SERPL ELPH-MCNC: 4.1 G/DL — SIGNIFICANT CHANGE UP (ref 3.3–5)
ALP SERPL-CCNC: 69 U/L — SIGNIFICANT CHANGE UP (ref 40–120)
ALT FLD-CCNC: 22 U/L — SIGNIFICANT CHANGE UP (ref 10–45)
ANION GAP SERPL CALC-SCNC: 12 MMOL/L — SIGNIFICANT CHANGE UP (ref 5–17)
AST SERPL-CCNC: 22 U/L — SIGNIFICANT CHANGE UP (ref 10–40)
BASOPHILS # BLD AUTO: 0.01 K/UL — SIGNIFICANT CHANGE UP (ref 0–0.2)
BASOPHILS NFR BLD AUTO: 0.2 % — SIGNIFICANT CHANGE UP (ref 0–2)
BILIRUB SERPL-MCNC: 0.2 MG/DL — SIGNIFICANT CHANGE UP (ref 0.2–1.2)
BUN SERPL-MCNC: 19 MG/DL — SIGNIFICANT CHANGE UP (ref 7–23)
CALCIUM SERPL-MCNC: 9.1 MG/DL — SIGNIFICANT CHANGE UP (ref 8.4–10.5)
CANCER AG19-9 SERPL-ACNC: 89 U/ML — HIGH
CHLORIDE SERPL-SCNC: 102 MMOL/L — SIGNIFICANT CHANGE UP (ref 96–108)
CO2 SERPL-SCNC: 27 MMOL/L — SIGNIFICANT CHANGE UP (ref 22–31)
CREAT SERPL-MCNC: 0.81 MG/DL — SIGNIFICANT CHANGE UP (ref 0.5–1.3)
EGFR: 80 ML/MIN/1.73M2 — SIGNIFICANT CHANGE UP
EOSINOPHIL # BLD AUTO: 0.03 K/UL — SIGNIFICANT CHANGE UP (ref 0–0.5)
EOSINOPHIL NFR BLD AUTO: 0.6 % — SIGNIFICANT CHANGE UP (ref 0–6)
GLUCOSE SERPL-MCNC: 116 MG/DL — HIGH (ref 70–99)
HCT VFR BLD CALC: 28.8 % — LOW (ref 34.5–45)
HGB BLD-MCNC: 9.6 G/DL — LOW (ref 11.5–15.5)
IMM GRANULOCYTES NFR BLD AUTO: 0.6 % — SIGNIFICANT CHANGE UP (ref 0–0.9)
LYMPHOCYTES # BLD AUTO: 1.43 K/UL — SIGNIFICANT CHANGE UP (ref 1–3.3)
LYMPHOCYTES # BLD AUTO: 29.1 % — SIGNIFICANT CHANGE UP (ref 13–44)
MCHC RBC-ENTMCNC: 28.7 PG — SIGNIFICANT CHANGE UP (ref 27–34)
MCHC RBC-ENTMCNC: 33.3 G/DL — SIGNIFICANT CHANGE UP (ref 32–36)
MCV RBC AUTO: 86.2 FL — SIGNIFICANT CHANGE UP (ref 80–100)
MONOCYTES # BLD AUTO: 0.38 K/UL — SIGNIFICANT CHANGE UP (ref 0–0.9)
MONOCYTES NFR BLD AUTO: 7.7 % — SIGNIFICANT CHANGE UP (ref 2–14)
NEUTROPHILS # BLD AUTO: 3.04 K/UL — SIGNIFICANT CHANGE UP (ref 1.8–7.4)
NEUTROPHILS NFR BLD AUTO: 61.8 % — SIGNIFICANT CHANGE UP (ref 43–77)
NRBC # BLD: 0 /100 WBCS — SIGNIFICANT CHANGE UP (ref 0–0)
PLATELET # BLD AUTO: 128 K/UL — LOW (ref 150–400)
POTASSIUM SERPL-MCNC: 3.8 MMOL/L — SIGNIFICANT CHANGE UP (ref 3.5–5.3)
POTASSIUM SERPL-SCNC: 3.8 MMOL/L — SIGNIFICANT CHANGE UP (ref 3.5–5.3)
PROT SERPL-MCNC: 6.5 G/DL — SIGNIFICANT CHANGE UP (ref 6–8.3)
RBC # BLD: 3.34 M/UL — LOW (ref 3.8–5.2)
RBC # FLD: 18.9 % — HIGH (ref 10.3–14.5)
SODIUM SERPL-SCNC: 141 MMOL/L — SIGNIFICANT CHANGE UP (ref 135–145)
WBC # BLD: 4.92 K/UL — SIGNIFICANT CHANGE UP (ref 3.8–10.5)
WBC # FLD AUTO: 4.92 K/UL — SIGNIFICANT CHANGE UP (ref 3.8–10.5)

## 2023-09-02 ENCOUNTER — APPOINTMENT (OUTPATIENT)
Dept: CT IMAGING | Facility: IMAGING CENTER | Age: 68
End: 2023-09-02
Payer: MEDICARE

## 2023-09-02 ENCOUNTER — OUTPATIENT (OUTPATIENT)
Dept: OUTPATIENT SERVICES | Facility: HOSPITAL | Age: 68
LOS: 1 days | End: 2023-09-02
Payer: COMMERCIAL

## 2023-09-02 DIAGNOSIS — C25.9 MALIGNANT NEOPLASM OF PANCREAS, UNSPECIFIED: ICD-10-CM

## 2023-09-02 DIAGNOSIS — N28.89 OTHER SPECIFIED DISORDERS OF KIDNEY AND URETER: ICD-10-CM

## 2023-09-02 DIAGNOSIS — E11.9 TYPE 2 DIABETES MELLITUS WITHOUT COMPLICATIONS: ICD-10-CM

## 2023-09-02 PROCEDURE — 74178 CT ABD&PLV WO CNTR FLWD CNTR: CPT

## 2023-09-02 PROCEDURE — 71260 CT THORAX DX C+: CPT | Mod: 26

## 2023-09-02 PROCEDURE — 74178 CT ABD&PLV WO CNTR FLWD CNTR: CPT | Mod: 26

## 2023-09-02 PROCEDURE — 71260 CT THORAX DX C+: CPT

## 2023-09-05 NOTE — REASON FOR VISIT
[Follow-Up Visit] : a follow-up [Initial MDC Visit] : an initial MDC visit for [Pancreatic Cancer] : pancreatic cancer

## 2023-09-06 NOTE — HISTORY OF PRESENT ILLNESS
[Disease: _____________________] : Disease: [unfilled] [T: ___] : T[unfilled] [N: ___] : N[unfilled] [M: ___] : M[unfilled] [AJCC Stage: ____] : AJCC Stage: [unfilled] [de-identified] : adenocarcinoma [Abdominal Pain] : abdominal pain [EUS] : EUS [Biopsy] : biopsy performed [Not staged outside] : not staged outside [Pancreatic ductal adenocarcinoma] : Pancreatic ductal adenocarcinoma [Locally advanced pancreas cancer (LAPC 2)] : Locally advanced pancreas cancer (LAPC 2) [PV-SMV] : PV-SMV [Celiac axis] : Celiac axis [Less than 180 (abutment)] : less than 180 (abutment) [body] : body [Nutrition] : Nutrition [Social Work] : Social Work [Other: ____] : [unfilled] [Restricted in physically strenuous activity but ambulatory and able to carry out work of a light or sedentary nature] : Status 1 - Restricted in physically strenuous activity but ambulatory and able to carry out work of a light or sedentary nature, e.g., light house work, office work [Before Surgery] : before surgery [de-identified] : This is a non-billable note*  INITIAL PMDC ***  Ms. ENRIKE MORLEY is a 67 year old female who presents for evaluation in our Pancreas Multidisciplinary Clinic. Her PMH includes DM2 (dx ), HTN, left knee replacement. She presented with abdominal pain radiating to her LUQ x 3 weeks. MRI abdomen  noted a 3 cm mass in the body of the pancreas suspicious for primary pancreatic malignancy and also Bosniak type IV cyst upper pole left kidney suspicious for primary renal neoplasm. She was admitted to [facility] on 23 and CT A/P w/ IC showing ill-defined pancreatic body mass consistent with pancreatic neoplasm; and complex enhancing left renal mass concerning for renal cell carcinoma till proven otherwise. She underwent EUS on 23 with noted findings of one 3 cm pancreatic body mass, s/p FNA.  Pathology positive for adenocarcinoma.   She reports decrease appetite and early satiety.  Abdominal pain is intermittent and severe when it comes.  She was taking Tylenol#3 after hospital d/c but currently not taking anymore. +Weight loss of 10 pounds since March.   Colonoscopy from 7 years ago normal.  Smoke/Etoh: 20 pack years. Last smoke 2023. No ETOH use.   ECO      Independent, can walk upstairs. Lives with her sons.  (ECOG 1 due to pain).  Family Ca hx: None   Labs:  23    AST: 9   ALT: 18  ALP:  103  Tbili: 0.3  23   Ca 19-9: 2428    CEA: 15.0    AST:  11    ALT: 12    ALP: 96   Tbili:  0.4     A1c: 10%   [TextBox_4] : 4/4/23 [TextBox_39] : [id number] [TextBox_41] : adenocarcinoma [TextBox_43] : 4/14/23 [FreeTextEntry3] : 15 [TextBox_56] : 50 [] : no [Stable disease] : stable disease [Chemotherapy] : chemotherapy [Curative (aimed at resection)] : curative (aimed at resection) [TextBox_5] : 9/5/23  [TextBox_38] : 212 [FreeTextEntry4] : 0.2  [TextBox_40] : 9/2/23  [TextBox_74] : smaller appearance, POD in vessels -soft tissue around celiac.  Left renal mass- Renal cell carcinoma.  No mets.   [FreeTextEntry6] : Cont w/ 2-3 months of systemic therapy    Re- review in 2 months  Urology referral
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
## 2023-09-06 NOTE — HISTORY OF PRESENT ILLNESS
[Disease: _____________________] : Disease: [unfilled] [T: ___] : T[unfilled] [N: ___] : N[unfilled] [M: ___] : M[unfilled] [AJCC Stage: ____] : AJCC Stage: [unfilled] [de-identified] : adenocarcinoma [Abdominal Pain] : abdominal pain [EUS] : EUS [Biopsy] : biopsy performed [Not staged outside] : not staged outside [Pancreatic ductal adenocarcinoma] : Pancreatic ductal adenocarcinoma [Locally advanced pancreas cancer (LAPC 2)] : Locally advanced pancreas cancer (LAPC 2) [PV-SMV] : PV-SMV [Celiac axis] : Celiac axis [Less than 180 (abutment)] : less than 180 (abutment) [body] : body [Nutrition] : Nutrition [Social Work] : Social Work [Other: ____] : [unfilled] [Restricted in physically strenuous activity but ambulatory and able to carry out work of a light or sedentary nature] : Status 1 - Restricted in physically strenuous activity but ambulatory and able to carry out work of a light or sedentary nature, e.g., light house work, office work [Before Surgery] : before surgery [de-identified] : This is a non-billable note*  INITIAL PMDC ***  Ms. ENRIKE MORLEY is a 67 year old female who presents for evaluation in our [facility]. Her PMH includes DM2 (dx ), HTN, left knee replacement. She presented with abdominal pain radiating to her LUQ x 3 weeks. MRI abdomen  noted a 3 cm mass in the body of the pancreas suspicious for primary pancreatic malignancy and also Bosniak type IV cyst upper pole left kidney suspicious for primary renal neoplasm. She was admitted to [facility] on 23 and CT A/P w/ IC showing ill-defined pancreatic body mass consistent with pancreatic neoplasm; and complex enhancing left renal mass concerning for renal cell carcinoma till proven otherwise. She underwent EUS on 23 with noted findings of one 3 cm pancreatic body mass, s/p FNA.  Pathology positive for adenocarcinoma.   She reports decrease appetite and early satiety.  Abdominal pain is intermittent and severe when it comes.  She was taking Tylenol#3 after hospital d/c but currently not taking anymore. +Weight loss of 10 pounds since March.   Colonoscopy from 7 years ago normal.  Smoke/Etoh: 20 pack years. Last smoke 2023. No ETOH use.   ECO      Independent, can walk upstairs. Lives with her sons.  (ECOG 1 due to pain).  Family Ca hx: None   Labs:  23    AST: 9   ALT: 18  ALP:  103  Tbili: 0.3  23   Ca 19-9: 2428    CEA: 15.0    AST:  11    ALT: 12    ALP: 96   Tbili:  0.4     A1c: 10%   [TextBox_4] : 4/4/23 [TextBox_39] : [id number] [TextBox_41] : adenocarcinoma [TextBox_43] : 4/14/23 [FreeTextEntry3] : 15 [TextBox_56] : 50 [] : no [Stable disease] : stable disease [Chemotherapy] : chemotherapy [Curative (aimed at resection)] : curative (aimed at resection) [TextBox_5] : 9/5/23  [TextBox_38] : 212 [FreeTextEntry4] : 0.2  [TextBox_40] : 9/2/23  [TextBox_74] : smaller appearance, POD in vessels -soft tissue around celiac.  Left renal mass- Renal cell carcinoma.  No mets.   [FreeTextEntry6] : Cont w/ 2-3 months of systemic therapy    Re- review in 2 months  Urology referral

## 2023-09-12 ENCOUNTER — APPOINTMENT (OUTPATIENT)
Dept: INFUSION THERAPY | Facility: HOSPITAL | Age: 68
End: 2023-09-12

## 2023-09-12 ENCOUNTER — RESULT REVIEW (OUTPATIENT)
Age: 68
End: 2023-09-12

## 2023-09-12 ENCOUNTER — APPOINTMENT (OUTPATIENT)
Dept: HEMATOLOGY ONCOLOGY | Facility: CLINIC | Age: 68
End: 2023-09-12
Payer: MEDICARE

## 2023-09-12 ENCOUNTER — APPOINTMENT (OUTPATIENT)
Dept: HEMATOLOGY ONCOLOGY | Facility: CLINIC | Age: 68
End: 2023-09-12

## 2023-09-12 VITALS
BODY MASS INDEX: 27.02 KG/M2 | HEART RATE: 74 BPM | TEMPERATURE: 97.2 F | SYSTOLIC BLOOD PRESSURE: 162 MMHG | DIASTOLIC BLOOD PRESSURE: 85 MMHG | RESPIRATION RATE: 16 BRPM | WEIGHT: 158.29 LBS | OXYGEN SATURATION: 95 % | HEIGHT: 64 IN

## 2023-09-12 LAB
ALBUMIN SERPL ELPH-MCNC: 4.1 G/DL — SIGNIFICANT CHANGE UP (ref 3.3–5)
ALP SERPL-CCNC: 71 U/L — SIGNIFICANT CHANGE UP (ref 40–120)
ALT FLD-CCNC: 26 U/L — SIGNIFICANT CHANGE UP (ref 10–45)
ANION GAP SERPL CALC-SCNC: 11 MMOL/L — SIGNIFICANT CHANGE UP (ref 5–17)
AST SERPL-CCNC: 24 U/L — SIGNIFICANT CHANGE UP (ref 10–40)
BASOPHILS # BLD AUTO: 0.03 K/UL — SIGNIFICANT CHANGE UP (ref 0–0.2)
BASOPHILS NFR BLD AUTO: 0.4 % — SIGNIFICANT CHANGE UP (ref 0–2)
BILIRUB SERPL-MCNC: 0.2 MG/DL — SIGNIFICANT CHANGE UP (ref 0.2–1.2)
BUN SERPL-MCNC: 17 MG/DL — SIGNIFICANT CHANGE UP (ref 7–23)
CALCIUM SERPL-MCNC: 9.1 MG/DL — SIGNIFICANT CHANGE UP (ref 8.4–10.5)
CHLORIDE SERPL-SCNC: 103 MMOL/L — SIGNIFICANT CHANGE UP (ref 96–108)
CO2 SERPL-SCNC: 28 MMOL/L — SIGNIFICANT CHANGE UP (ref 22–31)
CREAT SERPL-MCNC: 0.87 MG/DL — SIGNIFICANT CHANGE UP (ref 0.5–1.3)
EGFR: 73 ML/MIN/1.73M2 — SIGNIFICANT CHANGE UP
EOSINOPHIL # BLD AUTO: 0.09 K/UL — SIGNIFICANT CHANGE UP (ref 0–0.5)
EOSINOPHIL NFR BLD AUTO: 1.1 % — SIGNIFICANT CHANGE UP (ref 0–6)
GLUCOSE SERPL-MCNC: 144 MG/DL — HIGH (ref 70–99)
HCT VFR BLD CALC: 30.4 % — LOW (ref 34.5–45)
HGB BLD-MCNC: 9.8 G/DL — LOW (ref 11.5–15.5)
IMM GRANULOCYTES NFR BLD AUTO: 0.7 % — SIGNIFICANT CHANGE UP (ref 0–0.9)
LYMPHOCYTES # BLD AUTO: 1.52 K/UL — SIGNIFICANT CHANGE UP (ref 1–3.3)
LYMPHOCYTES # BLD AUTO: 18.9 % — SIGNIFICANT CHANGE UP (ref 13–44)
MCHC RBC-ENTMCNC: 28.4 PG — SIGNIFICANT CHANGE UP (ref 27–34)
MCHC RBC-ENTMCNC: 32.2 G/DL — SIGNIFICANT CHANGE UP (ref 32–36)
MCV RBC AUTO: 88.1 FL — SIGNIFICANT CHANGE UP (ref 80–100)
MONOCYTES # BLD AUTO: 0.8 K/UL — SIGNIFICANT CHANGE UP (ref 0–0.9)
MONOCYTES NFR BLD AUTO: 10 % — SIGNIFICANT CHANGE UP (ref 2–14)
NEUTROPHILS # BLD AUTO: 5.54 K/UL — SIGNIFICANT CHANGE UP (ref 1.8–7.4)
NEUTROPHILS NFR BLD AUTO: 68.9 % — SIGNIFICANT CHANGE UP (ref 43–77)
NRBC # BLD: 0 /100 WBCS — SIGNIFICANT CHANGE UP (ref 0–0)
PLATELET # BLD AUTO: 295 K/UL — SIGNIFICANT CHANGE UP (ref 150–400)
POTASSIUM SERPL-MCNC: 4.6 MMOL/L — SIGNIFICANT CHANGE UP (ref 3.5–5.3)
POTASSIUM SERPL-SCNC: 4.6 MMOL/L — SIGNIFICANT CHANGE UP (ref 3.5–5.3)
PROT SERPL-MCNC: 6.5 G/DL — SIGNIFICANT CHANGE UP (ref 6–8.3)
RBC # BLD: 3.45 M/UL — LOW (ref 3.8–5.2)
RBC # FLD: 19.7 % — HIGH (ref 10.3–14.5)
SODIUM SERPL-SCNC: 142 MMOL/L — SIGNIFICANT CHANGE UP (ref 135–145)
WBC # BLD: 8.04 K/UL — SIGNIFICANT CHANGE UP (ref 3.8–10.5)
WBC # FLD AUTO: 8.04 K/UL — SIGNIFICANT CHANGE UP (ref 3.8–10.5)

## 2023-09-12 PROCEDURE — 99214 OFFICE O/P EST MOD 30 MIN: CPT

## 2023-09-19 ENCOUNTER — RESULT REVIEW (OUTPATIENT)
Age: 68
End: 2023-09-19

## 2023-09-19 ENCOUNTER — APPOINTMENT (OUTPATIENT)
Dept: INFUSION THERAPY | Facility: HOSPITAL | Age: 68
End: 2023-09-19

## 2023-09-19 LAB
ALBUMIN SERPL ELPH-MCNC: 4.4 G/DL — SIGNIFICANT CHANGE UP (ref 3.3–5)
ALP SERPL-CCNC: 74 U/L — SIGNIFICANT CHANGE UP (ref 40–120)
ALT FLD-CCNC: 23 U/L — SIGNIFICANT CHANGE UP (ref 10–45)
ANION GAP SERPL CALC-SCNC: 12 MMOL/L — SIGNIFICANT CHANGE UP (ref 5–17)
AST SERPL-CCNC: 22 U/L — SIGNIFICANT CHANGE UP (ref 10–40)
BASOPHILS # BLD AUTO: 0.05 K/UL — SIGNIFICANT CHANGE UP (ref 0–0.2)
BASOPHILS NFR BLD AUTO: 1.6 % — SIGNIFICANT CHANGE UP (ref 0–2)
BILIRUB SERPL-MCNC: 0.2 MG/DL — SIGNIFICANT CHANGE UP (ref 0.2–1.2)
BUN SERPL-MCNC: 18 MG/DL — SIGNIFICANT CHANGE UP (ref 7–23)
CALCIUM SERPL-MCNC: 9.7 MG/DL — SIGNIFICANT CHANGE UP (ref 8.4–10.5)
CHLORIDE SERPL-SCNC: 100 MMOL/L — SIGNIFICANT CHANGE UP (ref 96–108)
CO2 SERPL-SCNC: 25 MMOL/L — SIGNIFICANT CHANGE UP (ref 22–31)
CREAT SERPL-MCNC: 0.87 MG/DL — SIGNIFICANT CHANGE UP (ref 0.5–1.3)
EGFR: 73 ML/MIN/1.73M2 — SIGNIFICANT CHANGE UP
EOSINOPHIL # BLD AUTO: 0.02 K/UL — SIGNIFICANT CHANGE UP (ref 0–0.5)
EOSINOPHIL NFR BLD AUTO: 0.6 % — SIGNIFICANT CHANGE UP (ref 0–6)
GLUCOSE SERPL-MCNC: 171 MG/DL — HIGH (ref 70–99)
HCT VFR BLD CALC: 31.3 % — LOW (ref 34.5–45)
HGB BLD-MCNC: 10.2 G/DL — LOW (ref 11.5–15.5)
IMM GRANULOCYTES NFR BLD AUTO: 1.9 % — HIGH (ref 0–0.9)
LYMPHOCYTES # BLD AUTO: 1.15 K/UL — SIGNIFICANT CHANGE UP (ref 1–3.3)
LYMPHOCYTES # BLD AUTO: 36.3 % — SIGNIFICANT CHANGE UP (ref 13–44)
MCHC RBC-ENTMCNC: 28.2 PG — SIGNIFICANT CHANGE UP (ref 27–34)
MCHC RBC-ENTMCNC: 32.6 G/DL — SIGNIFICANT CHANGE UP (ref 32–36)
MCV RBC AUTO: 86.5 FL — SIGNIFICANT CHANGE UP (ref 80–100)
MONOCYTES # BLD AUTO: 0.38 K/UL — SIGNIFICANT CHANGE UP (ref 0–0.9)
MONOCYTES NFR BLD AUTO: 12 % — SIGNIFICANT CHANGE UP (ref 2–14)
NEUTROPHILS # BLD AUTO: 1.51 K/UL — LOW (ref 1.8–7.4)
NEUTROPHILS NFR BLD AUTO: 47.6 % — SIGNIFICANT CHANGE UP (ref 43–77)
NRBC # BLD: 0 /100 WBCS — SIGNIFICANT CHANGE UP (ref 0–0)
PLATELET # BLD AUTO: 364 K/UL — SIGNIFICANT CHANGE UP (ref 150–400)
POTASSIUM SERPL-MCNC: 4.8 MMOL/L — SIGNIFICANT CHANGE UP (ref 3.5–5.3)
POTASSIUM SERPL-SCNC: 4.8 MMOL/L — SIGNIFICANT CHANGE UP (ref 3.5–5.3)
PROT SERPL-MCNC: 7 G/DL — SIGNIFICANT CHANGE UP (ref 6–8.3)
RBC # BLD: 3.62 M/UL — LOW (ref 3.8–5.2)
RBC # FLD: 19 % — HIGH (ref 10.3–14.5)
SODIUM SERPL-SCNC: 137 MMOL/L — SIGNIFICANT CHANGE UP (ref 135–145)
WBC # BLD: 3.17 K/UL — LOW (ref 3.8–10.5)
WBC # FLD AUTO: 3.17 K/UL — LOW (ref 3.8–10.5)

## 2023-09-20 ENCOUNTER — APPOINTMENT (OUTPATIENT)
Age: 68
End: 2023-09-20
Payer: MEDICARE

## 2023-09-20 PROCEDURE — 99214 OFFICE O/P EST MOD 30 MIN: CPT

## 2023-09-26 ENCOUNTER — APPOINTMENT (OUTPATIENT)
Dept: INFUSION THERAPY | Facility: HOSPITAL | Age: 68
End: 2023-09-26

## 2023-09-26 ENCOUNTER — APPOINTMENT (OUTPATIENT)
Dept: HEMATOLOGY ONCOLOGY | Facility: CLINIC | Age: 68
End: 2023-09-26

## 2023-09-29 ENCOUNTER — RESULT REVIEW (OUTPATIENT)
Age: 68
End: 2023-09-29

## 2023-09-29 ENCOUNTER — APPOINTMENT (OUTPATIENT)
Dept: HEMATOLOGY ONCOLOGY | Facility: CLINIC | Age: 68
End: 2023-09-29

## 2023-09-29 ENCOUNTER — APPOINTMENT (OUTPATIENT)
Dept: INFUSION THERAPY | Facility: HOSPITAL | Age: 68
End: 2023-09-29

## 2023-09-29 ENCOUNTER — LABORATORY RESULT (OUTPATIENT)
Age: 68
End: 2023-09-29

## 2023-09-29 LAB
BASOPHILS # BLD AUTO: 0.01 K/UL — SIGNIFICANT CHANGE UP (ref 0–0.2)
BASOPHILS NFR BLD AUTO: 0.2 % — SIGNIFICANT CHANGE UP (ref 0–2)
EOSINOPHIL # BLD AUTO: 0.04 K/UL — SIGNIFICANT CHANGE UP (ref 0–0.5)
EOSINOPHIL NFR BLD AUTO: 0.7 % — SIGNIFICANT CHANGE UP (ref 0–6)
HCT VFR BLD CALC: 28.8 % — LOW (ref 34.5–45)
HGB BLD-MCNC: 9.7 G/DL — LOW (ref 11.5–15.5)
IMM GRANULOCYTES NFR BLD AUTO: 0.5 % — SIGNIFICANT CHANGE UP (ref 0–0.9)
LYMPHOCYTES # BLD AUTO: 1.39 K/UL — SIGNIFICANT CHANGE UP (ref 1–3.3)
LYMPHOCYTES # BLD AUTO: 23 % — SIGNIFICANT CHANGE UP (ref 13–44)
MCHC RBC-ENTMCNC: 28.5 PG — SIGNIFICANT CHANGE UP (ref 27–34)
MCHC RBC-ENTMCNC: 33.7 G/DL — SIGNIFICANT CHANGE UP (ref 32–36)
MCV RBC AUTO: 84.7 FL — SIGNIFICANT CHANGE UP (ref 80–100)
MONOCYTES # BLD AUTO: 0.59 K/UL — SIGNIFICANT CHANGE UP (ref 0–0.9)
MONOCYTES NFR BLD AUTO: 9.8 % — SIGNIFICANT CHANGE UP (ref 2–14)
NEUTROPHILS # BLD AUTO: 3.99 K/UL — SIGNIFICANT CHANGE UP (ref 1.8–7.4)
NEUTROPHILS NFR BLD AUTO: 65.8 % — SIGNIFICANT CHANGE UP (ref 43–77)
NRBC # BLD: 0 /100 WBCS — SIGNIFICANT CHANGE UP (ref 0–0)
PLATELET # BLD AUTO: 158 K/UL — SIGNIFICANT CHANGE UP (ref 150–400)
RBC # BLD: 3.4 M/UL — LOW (ref 3.8–5.2)
RBC # FLD: 19.4 % — HIGH (ref 10.3–14.5)
WBC # BLD: 6.05 K/UL — SIGNIFICANT CHANGE UP (ref 3.8–10.5)
WBC # FLD AUTO: 6.05 K/UL — SIGNIFICANT CHANGE UP (ref 3.8–10.5)

## 2023-10-10 ENCOUNTER — APPOINTMENT (OUTPATIENT)
Dept: INFUSION THERAPY | Facility: HOSPITAL | Age: 68
End: 2023-10-10

## 2023-10-10 ENCOUNTER — NON-APPOINTMENT (OUTPATIENT)
Age: 68
End: 2023-10-10

## 2023-10-11 ENCOUNTER — NON-APPOINTMENT (OUTPATIENT)
Age: 68
End: 2023-10-11

## 2023-10-16 ENCOUNTER — APPOINTMENT (OUTPATIENT)
Dept: HEMATOLOGY ONCOLOGY | Facility: CLINIC | Age: 68
End: 2023-10-16
Payer: MEDICARE

## 2023-10-16 ENCOUNTER — RESULT REVIEW (OUTPATIENT)
Age: 68
End: 2023-10-16

## 2023-10-16 ENCOUNTER — APPOINTMENT (OUTPATIENT)
Dept: INFUSION THERAPY | Facility: HOSPITAL | Age: 68
End: 2023-10-16

## 2023-10-16 VITALS
WEIGHT: 154.32 LBS | RESPIRATION RATE: 18 BRPM | DIASTOLIC BLOOD PRESSURE: 87 MMHG | BODY MASS INDEX: 26.49 KG/M2 | OXYGEN SATURATION: 96 % | TEMPERATURE: 96.8 F | HEART RATE: 99 BPM | SYSTOLIC BLOOD PRESSURE: 147 MMHG

## 2023-10-16 LAB
ANISOCYTOSIS BLD QL: SLIGHT — SIGNIFICANT CHANGE UP
BASOPHILS # BLD AUTO: 0 K/UL — SIGNIFICANT CHANGE UP (ref 0–0.2)
BASOPHILS NFR BLD AUTO: 0 % — SIGNIFICANT CHANGE UP (ref 0–2)
DACRYOCYTES BLD QL SMEAR: SLIGHT — SIGNIFICANT CHANGE UP
EOSINOPHIL # BLD AUTO: 0.08 K/UL — SIGNIFICANT CHANGE UP (ref 0–0.5)
EOSINOPHIL NFR BLD AUTO: 1 % — SIGNIFICANT CHANGE UP (ref 0–6)
HCT VFR BLD CALC: 31.5 % — LOW (ref 34.5–45)
HGB BLD-MCNC: 10.1 G/DL — LOW (ref 11.5–15.5)
LYMPHOCYTES # BLD AUTO: 1.33 K/UL — SIGNIFICANT CHANGE UP (ref 1–3.3)
LYMPHOCYTES # BLD AUTO: 16 % — SIGNIFICANT CHANGE UP (ref 13–44)
MCHC RBC-ENTMCNC: 27 PG — SIGNIFICANT CHANGE UP (ref 27–34)
MCHC RBC-ENTMCNC: 32.1 G/DL — SIGNIFICANT CHANGE UP (ref 32–36)
MCV RBC AUTO: 84.2 FL — SIGNIFICANT CHANGE UP (ref 80–100)
MONOCYTES # BLD AUTO: 0.42 K/UL — SIGNIFICANT CHANGE UP (ref 0–0.9)
MONOCYTES NFR BLD AUTO: 5 % — SIGNIFICANT CHANGE UP (ref 2–14)
NEUTROPHILS # BLD AUTO: 6.5 K/UL — SIGNIFICANT CHANGE UP (ref 1.8–7.4)
NEUTROPHILS NFR BLD AUTO: 78 % — HIGH (ref 43–77)
NRBC # BLD: 1 /100 — HIGH (ref 0–0)
NRBC # BLD: SIGNIFICANT CHANGE UP /100 WBCS (ref 0–0)
PLAT MORPH BLD: NORMAL — SIGNIFICANT CHANGE UP
PLATELET # BLD AUTO: 452 K/UL — HIGH (ref 150–400)
POIKILOCYTOSIS BLD QL AUTO: SLIGHT — SIGNIFICANT CHANGE UP
POLYCHROMASIA BLD QL SMEAR: SLIGHT — SIGNIFICANT CHANGE UP
RBC # BLD: 3.74 M/UL — LOW (ref 3.8–5.2)
RBC # FLD: 20.6 % — HIGH (ref 10.3–14.5)
RBC BLD AUTO: ABNORMAL
SCHISTOCYTES BLD QL AUTO: SLIGHT — SIGNIFICANT CHANGE UP
WBC # BLD: 8.33 K/UL — SIGNIFICANT CHANGE UP (ref 3.8–10.5)
WBC # FLD AUTO: 8.33 K/UL — SIGNIFICANT CHANGE UP (ref 3.8–10.5)

## 2023-10-16 PROCEDURE — 99214 OFFICE O/P EST MOD 30 MIN: CPT

## 2023-10-16 RX ORDER — OMEGA-3/DHA/EPA/FISH OIL 300-1000MG
400 CAPSULE ORAL
Qty: 60 | Refills: 3 | Status: ACTIVE | COMMUNITY
Start: 2023-10-16 | End: 1900-01-01

## 2023-10-17 ENCOUNTER — APPOINTMENT (OUTPATIENT)
Dept: UROLOGY | Facility: CLINIC | Age: 68
End: 2023-10-17
Payer: MEDICARE

## 2023-10-17 ENCOUNTER — NON-APPOINTMENT (OUTPATIENT)
Age: 68
End: 2023-10-17

## 2023-10-17 VITALS
HEART RATE: 99 BPM | RESPIRATION RATE: 17 BRPM | HEIGHT: 64 IN | WEIGHT: 153 LBS | BODY MASS INDEX: 26.12 KG/M2 | SYSTOLIC BLOOD PRESSURE: 129 MMHG | DIASTOLIC BLOOD PRESSURE: 83 MMHG

## 2023-10-17 LAB
ALBUMIN SERPL ELPH-MCNC: 4 G/DL — SIGNIFICANT CHANGE UP (ref 3.3–5)
ALBUMIN SERPL ELPH-MCNC: 4 G/DL — SIGNIFICANT CHANGE UP (ref 3.3–5)
ALP SERPL-CCNC: 69 U/L — SIGNIFICANT CHANGE UP (ref 40–120)
ALP SERPL-CCNC: 69 U/L — SIGNIFICANT CHANGE UP (ref 40–120)
ALT FLD-CCNC: 20 U/L — SIGNIFICANT CHANGE UP (ref 10–45)
ALT FLD-CCNC: 20 U/L — SIGNIFICANT CHANGE UP (ref 10–45)
ANION GAP SERPL CALC-SCNC: 13 MMOL/L — SIGNIFICANT CHANGE UP (ref 5–17)
ANION GAP SERPL CALC-SCNC: 13 MMOL/L — SIGNIFICANT CHANGE UP (ref 5–17)
AST SERPL-CCNC: 27 U/L — SIGNIFICANT CHANGE UP (ref 10–40)
AST SERPL-CCNC: 27 U/L — SIGNIFICANT CHANGE UP (ref 10–40)
BILIRUB SERPL-MCNC: 0.3 MG/DL — SIGNIFICANT CHANGE UP (ref 0.2–1.2)
BILIRUB SERPL-MCNC: 0.3 MG/DL — SIGNIFICANT CHANGE UP (ref 0.2–1.2)
BUN SERPL-MCNC: 16 MG/DL — SIGNIFICANT CHANGE UP (ref 7–23)
BUN SERPL-MCNC: 16 MG/DL — SIGNIFICANT CHANGE UP (ref 7–23)
CALCIUM SERPL-MCNC: 9.6 MG/DL — SIGNIFICANT CHANGE UP (ref 8.4–10.5)
CALCIUM SERPL-MCNC: 9.6 MG/DL — SIGNIFICANT CHANGE UP (ref 8.4–10.5)
CHLORIDE SERPL-SCNC: 101 MMOL/L — SIGNIFICANT CHANGE UP (ref 96–108)
CHLORIDE SERPL-SCNC: 101 MMOL/L — SIGNIFICANT CHANGE UP (ref 96–108)
CO2 SERPL-SCNC: 23 MMOL/L — SIGNIFICANT CHANGE UP (ref 22–31)
CO2 SERPL-SCNC: 23 MMOL/L — SIGNIFICANT CHANGE UP (ref 22–31)
CREAT SERPL-MCNC: 0.79 MG/DL — SIGNIFICANT CHANGE UP (ref 0.5–1.3)
CREAT SERPL-MCNC: 0.79 MG/DL — SIGNIFICANT CHANGE UP (ref 0.5–1.3)
EGFR: 82 ML/MIN/1.73M2 — SIGNIFICANT CHANGE UP
EGFR: 82 ML/MIN/1.73M2 — SIGNIFICANT CHANGE UP
GLUCOSE SERPL-MCNC: 90 MG/DL — SIGNIFICANT CHANGE UP (ref 70–99)
GLUCOSE SERPL-MCNC: 90 MG/DL — SIGNIFICANT CHANGE UP (ref 70–99)
POTASSIUM SERPL-MCNC: 4.6 MMOL/L — SIGNIFICANT CHANGE UP (ref 3.5–5.3)
POTASSIUM SERPL-MCNC: 4.6 MMOL/L — SIGNIFICANT CHANGE UP (ref 3.5–5.3)
POTASSIUM SERPL-SCNC: 4.6 MMOL/L — SIGNIFICANT CHANGE UP (ref 3.5–5.3)
POTASSIUM SERPL-SCNC: 4.6 MMOL/L — SIGNIFICANT CHANGE UP (ref 3.5–5.3)
PROT SERPL-MCNC: 6.6 G/DL — SIGNIFICANT CHANGE UP (ref 6–8.3)
PROT SERPL-MCNC: 6.6 G/DL — SIGNIFICANT CHANGE UP (ref 6–8.3)
SODIUM SERPL-SCNC: 136 MMOL/L — SIGNIFICANT CHANGE UP (ref 135–145)
SODIUM SERPL-SCNC: 136 MMOL/L — SIGNIFICANT CHANGE UP (ref 135–145)

## 2023-10-17 PROCEDURE — 99214 OFFICE O/P EST MOD 30 MIN: CPT

## 2023-10-19 ENCOUNTER — OUTPATIENT (OUTPATIENT)
Dept: OUTPATIENT SERVICES | Facility: HOSPITAL | Age: 68
LOS: 1 days | Discharge: HOME | End: 2023-10-19

## 2023-10-19 DIAGNOSIS — Z96.652 PRESENCE OF LEFT ARTIFICIAL KNEE JOINT: Chronic | ICD-10-CM

## 2023-10-19 DIAGNOSIS — D64.9 ANEMIA, UNSPECIFIED: ICD-10-CM

## 2023-10-23 ENCOUNTER — RESULT REVIEW (OUTPATIENT)
Age: 68
End: 2023-10-23

## 2023-10-23 ENCOUNTER — APPOINTMENT (OUTPATIENT)
Dept: HEMATOLOGY ONCOLOGY | Facility: CLINIC | Age: 68
End: 2023-10-23

## 2023-10-23 ENCOUNTER — APPOINTMENT (OUTPATIENT)
Dept: INFUSION THERAPY | Facility: HOSPITAL | Age: 68
End: 2023-10-23

## 2023-10-23 LAB
ALBUMIN SERPL ELPH-MCNC: 3.9 G/DL — SIGNIFICANT CHANGE UP (ref 3.3–5)
ALBUMIN SERPL ELPH-MCNC: 3.9 G/DL — SIGNIFICANT CHANGE UP (ref 3.3–5)
ALP SERPL-CCNC: 70 U/L — SIGNIFICANT CHANGE UP (ref 40–120)
ALP SERPL-CCNC: 70 U/L — SIGNIFICANT CHANGE UP (ref 40–120)
ALT FLD-CCNC: 29 U/L — SIGNIFICANT CHANGE UP (ref 10–45)
ALT FLD-CCNC: 29 U/L — SIGNIFICANT CHANGE UP (ref 10–45)
ANION GAP SERPL CALC-SCNC: 9 MMOL/L — SIGNIFICANT CHANGE UP (ref 5–17)
ANION GAP SERPL CALC-SCNC: 9 MMOL/L — SIGNIFICANT CHANGE UP (ref 5–17)
AST SERPL-CCNC: 29 U/L — SIGNIFICANT CHANGE UP (ref 10–40)
AST SERPL-CCNC: 29 U/L — SIGNIFICANT CHANGE UP (ref 10–40)
BASOPHILS # BLD AUTO: 0.04 K/UL — SIGNIFICANT CHANGE UP (ref 0–0.2)
BASOPHILS # BLD AUTO: 0.04 K/UL — SIGNIFICANT CHANGE UP (ref 0–0.2)
BASOPHILS NFR BLD AUTO: 1.4 % — SIGNIFICANT CHANGE UP (ref 0–2)
BASOPHILS NFR BLD AUTO: 1.4 % — SIGNIFICANT CHANGE UP (ref 0–2)
BILIRUB SERPL-MCNC: 0.2 MG/DL — SIGNIFICANT CHANGE UP (ref 0.2–1.2)
BILIRUB SERPL-MCNC: 0.2 MG/DL — SIGNIFICANT CHANGE UP (ref 0.2–1.2)
BUN SERPL-MCNC: 18 MG/DL — SIGNIFICANT CHANGE UP (ref 7–23)
BUN SERPL-MCNC: 18 MG/DL — SIGNIFICANT CHANGE UP (ref 7–23)
CALCIUM SERPL-MCNC: 9.3 MG/DL — SIGNIFICANT CHANGE UP (ref 8.4–10.5)
CALCIUM SERPL-MCNC: 9.3 MG/DL — SIGNIFICANT CHANGE UP (ref 8.4–10.5)
CHLORIDE SERPL-SCNC: 104 MMOL/L — SIGNIFICANT CHANGE UP (ref 96–108)
CHLORIDE SERPL-SCNC: 104 MMOL/L — SIGNIFICANT CHANGE UP (ref 96–108)
CO2 SERPL-SCNC: 28 MMOL/L — SIGNIFICANT CHANGE UP (ref 22–31)
CO2 SERPL-SCNC: 28 MMOL/L — SIGNIFICANT CHANGE UP (ref 22–31)
CREAT SERPL-MCNC: 0.81 MG/DL — SIGNIFICANT CHANGE UP (ref 0.5–1.3)
CREAT SERPL-MCNC: 0.81 MG/DL — SIGNIFICANT CHANGE UP (ref 0.5–1.3)
EGFR: 80 ML/MIN/1.73M2 — SIGNIFICANT CHANGE UP
EGFR: 80 ML/MIN/1.73M2 — SIGNIFICANT CHANGE UP
EOSINOPHIL # BLD AUTO: 0.02 K/UL — SIGNIFICANT CHANGE UP (ref 0–0.5)
EOSINOPHIL # BLD AUTO: 0.02 K/UL — SIGNIFICANT CHANGE UP (ref 0–0.5)
EOSINOPHIL NFR BLD AUTO: 0.7 % — SIGNIFICANT CHANGE UP (ref 0–6)
EOSINOPHIL NFR BLD AUTO: 0.7 % — SIGNIFICANT CHANGE UP (ref 0–6)
GLUCOSE SERPL-MCNC: 145 MG/DL — HIGH (ref 70–99)
GLUCOSE SERPL-MCNC: 145 MG/DL — HIGH (ref 70–99)
HCT VFR BLD CALC: 29.6 % — LOW (ref 34.5–45)
HCT VFR BLD CALC: 29.6 % — LOW (ref 34.5–45)
HGB BLD-MCNC: 9.8 G/DL — LOW (ref 11.5–15.5)
HGB BLD-MCNC: 9.8 G/DL — LOW (ref 11.5–15.5)
IMM GRANULOCYTES NFR BLD AUTO: 0.7 % — SIGNIFICANT CHANGE UP (ref 0–0.9)
IMM GRANULOCYTES NFR BLD AUTO: 0.7 % — SIGNIFICANT CHANGE UP (ref 0–0.9)
LYMPHOCYTES # BLD AUTO: 1.09 K/UL — SIGNIFICANT CHANGE UP (ref 1–3.3)
LYMPHOCYTES # BLD AUTO: 1.09 K/UL — SIGNIFICANT CHANGE UP (ref 1–3.3)
LYMPHOCYTES # BLD AUTO: 38.5 % — SIGNIFICANT CHANGE UP (ref 13–44)
LYMPHOCYTES # BLD AUTO: 38.5 % — SIGNIFICANT CHANGE UP (ref 13–44)
MCHC RBC-ENTMCNC: 27.8 PG — SIGNIFICANT CHANGE UP (ref 27–34)
MCHC RBC-ENTMCNC: 27.8 PG — SIGNIFICANT CHANGE UP (ref 27–34)
MCHC RBC-ENTMCNC: 33.1 G/DL — SIGNIFICANT CHANGE UP (ref 32–36)
MCHC RBC-ENTMCNC: 33.1 G/DL — SIGNIFICANT CHANGE UP (ref 32–36)
MCV RBC AUTO: 84.1 FL — SIGNIFICANT CHANGE UP (ref 80–100)
MCV RBC AUTO: 84.1 FL — SIGNIFICANT CHANGE UP (ref 80–100)
MONOCYTES # BLD AUTO: 0.29 K/UL — SIGNIFICANT CHANGE UP (ref 0–0.9)
MONOCYTES # BLD AUTO: 0.29 K/UL — SIGNIFICANT CHANGE UP (ref 0–0.9)
MONOCYTES NFR BLD AUTO: 10.2 % — SIGNIFICANT CHANGE UP (ref 2–14)
MONOCYTES NFR BLD AUTO: 10.2 % — SIGNIFICANT CHANGE UP (ref 2–14)
NEUTROPHILS # BLD AUTO: 1.37 K/UL — LOW (ref 1.8–7.4)
NEUTROPHILS # BLD AUTO: 1.37 K/UL — LOW (ref 1.8–7.4)
NEUTROPHILS NFR BLD AUTO: 48.5 % — SIGNIFICANT CHANGE UP (ref 43–77)
NEUTROPHILS NFR BLD AUTO: 48.5 % — SIGNIFICANT CHANGE UP (ref 43–77)
NRBC # BLD: 0 /100 WBCS — SIGNIFICANT CHANGE UP (ref 0–0)
NRBC # BLD: 0 /100 WBCS — SIGNIFICANT CHANGE UP (ref 0–0)
PLATELET # BLD AUTO: 291 K/UL — SIGNIFICANT CHANGE UP (ref 150–400)
PLATELET # BLD AUTO: 291 K/UL — SIGNIFICANT CHANGE UP (ref 150–400)
POTASSIUM SERPL-MCNC: 4.8 MMOL/L — SIGNIFICANT CHANGE UP (ref 3.5–5.3)
POTASSIUM SERPL-MCNC: 4.8 MMOL/L — SIGNIFICANT CHANGE UP (ref 3.5–5.3)
POTASSIUM SERPL-SCNC: 4.8 MMOL/L — SIGNIFICANT CHANGE UP (ref 3.5–5.3)
POTASSIUM SERPL-SCNC: 4.8 MMOL/L — SIGNIFICANT CHANGE UP (ref 3.5–5.3)
PROT SERPL-MCNC: 6.4 G/DL — SIGNIFICANT CHANGE UP (ref 6–8.3)
PROT SERPL-MCNC: 6.4 G/DL — SIGNIFICANT CHANGE UP (ref 6–8.3)
RBC # BLD: 3.52 M/UL — LOW (ref 3.8–5.2)
RBC # BLD: 3.52 M/UL — LOW (ref 3.8–5.2)
RBC # FLD: 20.4 % — HIGH (ref 10.3–14.5)
RBC # FLD: 20.4 % — HIGH (ref 10.3–14.5)
SODIUM SERPL-SCNC: 140 MMOL/L — SIGNIFICANT CHANGE UP (ref 135–145)
SODIUM SERPL-SCNC: 140 MMOL/L — SIGNIFICANT CHANGE UP (ref 135–145)
WBC # BLD: 2.83 K/UL — LOW (ref 3.8–10.5)
WBC # BLD: 2.83 K/UL — LOW (ref 3.8–10.5)
WBC # FLD AUTO: 2.83 K/UL — LOW (ref 3.8–10.5)
WBC # FLD AUTO: 2.83 K/UL — LOW (ref 3.8–10.5)

## 2023-10-30 ENCOUNTER — RESULT REVIEW (OUTPATIENT)
Age: 68
End: 2023-10-30

## 2023-10-30 ENCOUNTER — APPOINTMENT (OUTPATIENT)
Dept: INFUSION THERAPY | Facility: HOSPITAL | Age: 68
End: 2023-10-30

## 2023-10-30 ENCOUNTER — APPOINTMENT (OUTPATIENT)
Dept: HEMATOLOGY ONCOLOGY | Facility: CLINIC | Age: 68
End: 2023-10-30

## 2023-10-30 DIAGNOSIS — Z51.11 ENCOUNTER FOR ANTINEOPLASTIC CHEMOTHERAPY: ICD-10-CM

## 2023-10-30 DIAGNOSIS — C25.9 MALIGNANT NEOPLASM OF PANCREAS, UNSPECIFIED: ICD-10-CM

## 2023-10-30 DIAGNOSIS — R11.2 NAUSEA WITH VOMITING, UNSPECIFIED: ICD-10-CM

## 2023-10-30 LAB
ALBUMIN SERPL ELPH-MCNC: 4 G/DL — SIGNIFICANT CHANGE UP (ref 3.3–5)
ALBUMIN SERPL ELPH-MCNC: 4 G/DL — SIGNIFICANT CHANGE UP (ref 3.3–5)
ALP SERPL-CCNC: 84 U/L — SIGNIFICANT CHANGE UP (ref 40–120)
ALP SERPL-CCNC: 84 U/L — SIGNIFICANT CHANGE UP (ref 40–120)
ALT FLD-CCNC: 23 U/L — SIGNIFICANT CHANGE UP (ref 10–45)
ALT FLD-CCNC: 23 U/L — SIGNIFICANT CHANGE UP (ref 10–45)
ANION GAP SERPL CALC-SCNC: 11 MMOL/L — SIGNIFICANT CHANGE UP (ref 5–17)
ANION GAP SERPL CALC-SCNC: 11 MMOL/L — SIGNIFICANT CHANGE UP (ref 5–17)
AST SERPL-CCNC: 26 U/L — SIGNIFICANT CHANGE UP (ref 10–40)
AST SERPL-CCNC: 26 U/L — SIGNIFICANT CHANGE UP (ref 10–40)
BASOPHILS # BLD AUTO: 0.04 K/UL — SIGNIFICANT CHANGE UP (ref 0–0.2)
BASOPHILS # BLD AUTO: 0.04 K/UL — SIGNIFICANT CHANGE UP (ref 0–0.2)
BASOPHILS NFR BLD AUTO: 0.4 % — SIGNIFICANT CHANGE UP (ref 0–2)
BASOPHILS NFR BLD AUTO: 0.4 % — SIGNIFICANT CHANGE UP (ref 0–2)
BILIRUB SERPL-MCNC: 0.2 MG/DL — SIGNIFICANT CHANGE UP (ref 0.2–1.2)
BILIRUB SERPL-MCNC: 0.2 MG/DL — SIGNIFICANT CHANGE UP (ref 0.2–1.2)
BUN SERPL-MCNC: 16 MG/DL — SIGNIFICANT CHANGE UP (ref 7–23)
BUN SERPL-MCNC: 16 MG/DL — SIGNIFICANT CHANGE UP (ref 7–23)
CALCIUM SERPL-MCNC: 9.3 MG/DL — SIGNIFICANT CHANGE UP (ref 8.4–10.5)
CALCIUM SERPL-MCNC: 9.3 MG/DL — SIGNIFICANT CHANGE UP (ref 8.4–10.5)
CHLORIDE SERPL-SCNC: 102 MMOL/L — SIGNIFICANT CHANGE UP (ref 96–108)
CHLORIDE SERPL-SCNC: 102 MMOL/L — SIGNIFICANT CHANGE UP (ref 96–108)
CO2 SERPL-SCNC: 27 MMOL/L — SIGNIFICANT CHANGE UP (ref 22–31)
CO2 SERPL-SCNC: 27 MMOL/L — SIGNIFICANT CHANGE UP (ref 22–31)
CREAT SERPL-MCNC: 0.87 MG/DL — SIGNIFICANT CHANGE UP (ref 0.5–1.3)
CREAT SERPL-MCNC: 0.87 MG/DL — SIGNIFICANT CHANGE UP (ref 0.5–1.3)
EGFR: 73 ML/MIN/1.73M2 — SIGNIFICANT CHANGE UP
EGFR: 73 ML/MIN/1.73M2 — SIGNIFICANT CHANGE UP
EOSINOPHIL # BLD AUTO: 0.14 K/UL — SIGNIFICANT CHANGE UP (ref 0–0.5)
EOSINOPHIL # BLD AUTO: 0.14 K/UL — SIGNIFICANT CHANGE UP (ref 0–0.5)
EOSINOPHIL NFR BLD AUTO: 1.4 % — SIGNIFICANT CHANGE UP (ref 0–6)
EOSINOPHIL NFR BLD AUTO: 1.4 % — SIGNIFICANT CHANGE UP (ref 0–6)
GLUCOSE SERPL-MCNC: 135 MG/DL — HIGH (ref 70–99)
GLUCOSE SERPL-MCNC: 135 MG/DL — HIGH (ref 70–99)
HCT VFR BLD CALC: 32.5 % — LOW (ref 34.5–45)
HCT VFR BLD CALC: 32.5 % — LOW (ref 34.5–45)
HGB BLD-MCNC: 10.7 G/DL — LOW (ref 11.5–15.5)
HGB BLD-MCNC: 10.7 G/DL — LOW (ref 11.5–15.5)
IMM GRANULOCYTES NFR BLD AUTO: 0.8 % — SIGNIFICANT CHANGE UP (ref 0–0.9)
IMM GRANULOCYTES NFR BLD AUTO: 0.8 % — SIGNIFICANT CHANGE UP (ref 0–0.9)
LYMPHOCYTES # BLD AUTO: 1.43 K/UL — SIGNIFICANT CHANGE UP (ref 1–3.3)
LYMPHOCYTES # BLD AUTO: 1.43 K/UL — SIGNIFICANT CHANGE UP (ref 1–3.3)
LYMPHOCYTES # BLD AUTO: 13.8 % — SIGNIFICANT CHANGE UP (ref 13–44)
LYMPHOCYTES # BLD AUTO: 13.8 % — SIGNIFICANT CHANGE UP (ref 13–44)
MCHC RBC-ENTMCNC: 27.9 PG — SIGNIFICANT CHANGE UP (ref 27–34)
MCHC RBC-ENTMCNC: 27.9 PG — SIGNIFICANT CHANGE UP (ref 27–34)
MCHC RBC-ENTMCNC: 32.9 G/DL — SIGNIFICANT CHANGE UP (ref 32–36)
MCHC RBC-ENTMCNC: 32.9 G/DL — SIGNIFICANT CHANGE UP (ref 32–36)
MCV RBC AUTO: 84.6 FL — SIGNIFICANT CHANGE UP (ref 80–100)
MCV RBC AUTO: 84.6 FL — SIGNIFICANT CHANGE UP (ref 80–100)
MONOCYTES # BLD AUTO: 0.63 K/UL — SIGNIFICANT CHANGE UP (ref 0–0.9)
MONOCYTES # BLD AUTO: 0.63 K/UL — SIGNIFICANT CHANGE UP (ref 0–0.9)
MONOCYTES NFR BLD AUTO: 6.1 % — SIGNIFICANT CHANGE UP (ref 2–14)
MONOCYTES NFR BLD AUTO: 6.1 % — SIGNIFICANT CHANGE UP (ref 2–14)
NEUTROPHILS # BLD AUTO: 8.03 K/UL — HIGH (ref 1.8–7.4)
NEUTROPHILS # BLD AUTO: 8.03 K/UL — HIGH (ref 1.8–7.4)
NEUTROPHILS NFR BLD AUTO: 77.5 % — HIGH (ref 43–77)
NEUTROPHILS NFR BLD AUTO: 77.5 % — HIGH (ref 43–77)
NRBC # BLD: 0 /100 WBCS — SIGNIFICANT CHANGE UP (ref 0–0)
NRBC # BLD: 0 /100 WBCS — SIGNIFICANT CHANGE UP (ref 0–0)
PLATELET # BLD AUTO: 172 K/UL — SIGNIFICANT CHANGE UP (ref 150–400)
PLATELET # BLD AUTO: 172 K/UL — SIGNIFICANT CHANGE UP (ref 150–400)
POTASSIUM SERPL-MCNC: 4.2 MMOL/L — SIGNIFICANT CHANGE UP (ref 3.5–5.3)
POTASSIUM SERPL-MCNC: 4.2 MMOL/L — SIGNIFICANT CHANGE UP (ref 3.5–5.3)
POTASSIUM SERPL-SCNC: 4.2 MMOL/L — SIGNIFICANT CHANGE UP (ref 3.5–5.3)
POTASSIUM SERPL-SCNC: 4.2 MMOL/L — SIGNIFICANT CHANGE UP (ref 3.5–5.3)
PROT SERPL-MCNC: 6.7 G/DL — SIGNIFICANT CHANGE UP (ref 6–8.3)
PROT SERPL-MCNC: 6.7 G/DL — SIGNIFICANT CHANGE UP (ref 6–8.3)
RBC # BLD: 3.84 M/UL — SIGNIFICANT CHANGE UP (ref 3.8–5.2)
RBC # BLD: 3.84 M/UL — SIGNIFICANT CHANGE UP (ref 3.8–5.2)
RBC # FLD: 20.6 % — HIGH (ref 10.3–14.5)
RBC # FLD: 20.6 % — HIGH (ref 10.3–14.5)
SODIUM SERPL-SCNC: 139 MMOL/L — SIGNIFICANT CHANGE UP (ref 135–145)
SODIUM SERPL-SCNC: 139 MMOL/L — SIGNIFICANT CHANGE UP (ref 135–145)
WBC # BLD: 10.35 K/UL — SIGNIFICANT CHANGE UP (ref 3.8–10.5)
WBC # BLD: 10.35 K/UL — SIGNIFICANT CHANGE UP (ref 3.8–10.5)
WBC # FLD AUTO: 10.35 K/UL — SIGNIFICANT CHANGE UP (ref 3.8–10.5)
WBC # FLD AUTO: 10.35 K/UL — SIGNIFICANT CHANGE UP (ref 3.8–10.5)

## 2023-11-06 ENCOUNTER — RESULT REVIEW (OUTPATIENT)
Age: 68
End: 2023-11-06

## 2023-11-06 ENCOUNTER — APPOINTMENT (OUTPATIENT)
Dept: INFUSION THERAPY | Facility: HOSPITAL | Age: 68
End: 2023-11-06

## 2023-11-06 ENCOUNTER — APPOINTMENT (OUTPATIENT)
Dept: HEMATOLOGY ONCOLOGY | Facility: CLINIC | Age: 68
End: 2023-11-06

## 2023-11-06 LAB
ALBUMIN SERPL ELPH-MCNC: 4.2 G/DL — SIGNIFICANT CHANGE UP (ref 3.3–5)
ALBUMIN SERPL ELPH-MCNC: 4.2 G/DL — SIGNIFICANT CHANGE UP (ref 3.3–5)
ALP SERPL-CCNC: 86 U/L — SIGNIFICANT CHANGE UP (ref 40–120)
ALP SERPL-CCNC: 86 U/L — SIGNIFICANT CHANGE UP (ref 40–120)
ALT FLD-CCNC: 23 U/L — SIGNIFICANT CHANGE UP (ref 10–45)
ALT FLD-CCNC: 23 U/L — SIGNIFICANT CHANGE UP (ref 10–45)
ANION GAP SERPL CALC-SCNC: 11 MMOL/L — SIGNIFICANT CHANGE UP (ref 5–17)
ANION GAP SERPL CALC-SCNC: 11 MMOL/L — SIGNIFICANT CHANGE UP (ref 5–17)
AST SERPL-CCNC: 24 U/L — SIGNIFICANT CHANGE UP (ref 10–40)
AST SERPL-CCNC: 24 U/L — SIGNIFICANT CHANGE UP (ref 10–40)
BASOPHILS # BLD AUTO: 0.03 K/UL — SIGNIFICANT CHANGE UP (ref 0–0.2)
BASOPHILS # BLD AUTO: 0.03 K/UL — SIGNIFICANT CHANGE UP (ref 0–0.2)
BASOPHILS NFR BLD AUTO: 0.6 % — SIGNIFICANT CHANGE UP (ref 0–2)
BASOPHILS NFR BLD AUTO: 0.6 % — SIGNIFICANT CHANGE UP (ref 0–2)
BILIRUB SERPL-MCNC: 0.3 MG/DL — SIGNIFICANT CHANGE UP (ref 0.2–1.2)
BILIRUB SERPL-MCNC: 0.3 MG/DL — SIGNIFICANT CHANGE UP (ref 0.2–1.2)
BUN SERPL-MCNC: 13 MG/DL — SIGNIFICANT CHANGE UP (ref 7–23)
BUN SERPL-MCNC: 13 MG/DL — SIGNIFICANT CHANGE UP (ref 7–23)
CALCIUM SERPL-MCNC: 9.6 MG/DL — SIGNIFICANT CHANGE UP (ref 8.4–10.5)
CALCIUM SERPL-MCNC: 9.6 MG/DL — SIGNIFICANT CHANGE UP (ref 8.4–10.5)
CHLORIDE SERPL-SCNC: 99 MMOL/L — SIGNIFICANT CHANGE UP (ref 96–108)
CHLORIDE SERPL-SCNC: 99 MMOL/L — SIGNIFICANT CHANGE UP (ref 96–108)
CO2 SERPL-SCNC: 27 MMOL/L — SIGNIFICANT CHANGE UP (ref 22–31)
CO2 SERPL-SCNC: 27 MMOL/L — SIGNIFICANT CHANGE UP (ref 22–31)
CREAT SERPL-MCNC: 0.77 MG/DL — SIGNIFICANT CHANGE UP (ref 0.5–1.3)
CREAT SERPL-MCNC: 0.77 MG/DL — SIGNIFICANT CHANGE UP (ref 0.5–1.3)
EGFR: 84 ML/MIN/1.73M2 — SIGNIFICANT CHANGE UP
EGFR: 84 ML/MIN/1.73M2 — SIGNIFICANT CHANGE UP
EOSINOPHIL # BLD AUTO: 0.02 K/UL — SIGNIFICANT CHANGE UP (ref 0–0.5)
EOSINOPHIL # BLD AUTO: 0.02 K/UL — SIGNIFICANT CHANGE UP (ref 0–0.5)
EOSINOPHIL NFR BLD AUTO: 0.4 % — SIGNIFICANT CHANGE UP (ref 0–6)
EOSINOPHIL NFR BLD AUTO: 0.4 % — SIGNIFICANT CHANGE UP (ref 0–6)
GLUCOSE SERPL-MCNC: 138 MG/DL — HIGH (ref 70–99)
GLUCOSE SERPL-MCNC: 138 MG/DL — HIGH (ref 70–99)
HCT VFR BLD CALC: 30.4 % — LOW (ref 34.5–45)
HCT VFR BLD CALC: 30.4 % — LOW (ref 34.5–45)
HGB BLD-MCNC: 10.3 G/DL — LOW (ref 11.5–15.5)
HGB BLD-MCNC: 10.3 G/DL — LOW (ref 11.5–15.5)
IMM GRANULOCYTES NFR BLD AUTO: 4.1 % — HIGH (ref 0–0.9)
IMM GRANULOCYTES NFR BLD AUTO: 4.1 % — HIGH (ref 0–0.9)
LYMPHOCYTES # BLD AUTO: 0.97 K/UL — LOW (ref 1–3.3)
LYMPHOCYTES # BLD AUTO: 0.97 K/UL — LOW (ref 1–3.3)
LYMPHOCYTES # BLD AUTO: 20.7 % — SIGNIFICANT CHANGE UP (ref 13–44)
LYMPHOCYTES # BLD AUTO: 20.7 % — SIGNIFICANT CHANGE UP (ref 13–44)
MCHC RBC-ENTMCNC: 27.9 PG — SIGNIFICANT CHANGE UP (ref 27–34)
MCHC RBC-ENTMCNC: 27.9 PG — SIGNIFICANT CHANGE UP (ref 27–34)
MCHC RBC-ENTMCNC: 33.9 G/DL — SIGNIFICANT CHANGE UP (ref 32–36)
MCHC RBC-ENTMCNC: 33.9 G/DL — SIGNIFICANT CHANGE UP (ref 32–36)
MCV RBC AUTO: 82.4 FL — SIGNIFICANT CHANGE UP (ref 80–100)
MCV RBC AUTO: 82.4 FL — SIGNIFICANT CHANGE UP (ref 80–100)
MONOCYTES # BLD AUTO: 0.28 K/UL — SIGNIFICANT CHANGE UP (ref 0–0.9)
MONOCYTES # BLD AUTO: 0.28 K/UL — SIGNIFICANT CHANGE UP (ref 0–0.9)
MONOCYTES NFR BLD AUTO: 6 % — SIGNIFICANT CHANGE UP (ref 2–14)
MONOCYTES NFR BLD AUTO: 6 % — SIGNIFICANT CHANGE UP (ref 2–14)
NEUTROPHILS # BLD AUTO: 3.19 K/UL — SIGNIFICANT CHANGE UP (ref 1.8–7.4)
NEUTROPHILS # BLD AUTO: 3.19 K/UL — SIGNIFICANT CHANGE UP (ref 1.8–7.4)
NEUTROPHILS NFR BLD AUTO: 68.2 % — SIGNIFICANT CHANGE UP (ref 43–77)
NEUTROPHILS NFR BLD AUTO: 68.2 % — SIGNIFICANT CHANGE UP (ref 43–77)
NRBC # BLD: 0 /100 WBCS — SIGNIFICANT CHANGE UP (ref 0–0)
NRBC # BLD: 0 /100 WBCS — SIGNIFICANT CHANGE UP (ref 0–0)
PLATELET # BLD AUTO: 135 K/UL — LOW (ref 150–400)
PLATELET # BLD AUTO: 135 K/UL — LOW (ref 150–400)
POTASSIUM SERPL-MCNC: 4.3 MMOL/L — SIGNIFICANT CHANGE UP (ref 3.5–5.3)
POTASSIUM SERPL-MCNC: 4.3 MMOL/L — SIGNIFICANT CHANGE UP (ref 3.5–5.3)
POTASSIUM SERPL-SCNC: 4.3 MMOL/L — SIGNIFICANT CHANGE UP (ref 3.5–5.3)
POTASSIUM SERPL-SCNC: 4.3 MMOL/L — SIGNIFICANT CHANGE UP (ref 3.5–5.3)
PROT SERPL-MCNC: 7 G/DL — SIGNIFICANT CHANGE UP (ref 6–8.3)
PROT SERPL-MCNC: 7 G/DL — SIGNIFICANT CHANGE UP (ref 6–8.3)
RBC # BLD: 3.69 M/UL — LOW (ref 3.8–5.2)
RBC # BLD: 3.69 M/UL — LOW (ref 3.8–5.2)
RBC # FLD: 19.8 % — HIGH (ref 10.3–14.5)
RBC # FLD: 19.8 % — HIGH (ref 10.3–14.5)
SODIUM SERPL-SCNC: 137 MMOL/L — SIGNIFICANT CHANGE UP (ref 135–145)
SODIUM SERPL-SCNC: 137 MMOL/L — SIGNIFICANT CHANGE UP (ref 135–145)
WBC # BLD: 4.68 K/UL — SIGNIFICANT CHANGE UP (ref 3.8–10.5)
WBC # BLD: 4.68 K/UL — SIGNIFICANT CHANGE UP (ref 3.8–10.5)
WBC # FLD AUTO: 4.68 K/UL — SIGNIFICANT CHANGE UP (ref 3.8–10.5)
WBC # FLD AUTO: 4.68 K/UL — SIGNIFICANT CHANGE UP (ref 3.8–10.5)

## 2023-11-15 ENCOUNTER — APPOINTMENT (OUTPATIENT)
Dept: HEMATOLOGY ONCOLOGY | Facility: CLINIC | Age: 68
End: 2023-11-15
Payer: MEDICARE

## 2023-11-15 VITALS
TEMPERATURE: 97 F | HEART RATE: 95 BPM | OXYGEN SATURATION: 98 % | SYSTOLIC BLOOD PRESSURE: 181 MMHG | RESPIRATION RATE: 16 BRPM | DIASTOLIC BLOOD PRESSURE: 94 MMHG | WEIGHT: 154.96 LBS | HEIGHT: 64 IN | BODY MASS INDEX: 26.46 KG/M2

## 2023-11-15 VITALS — SYSTOLIC BLOOD PRESSURE: 166 MMHG | DIASTOLIC BLOOD PRESSURE: 95 MMHG

## 2023-11-15 PROCEDURE — 99215 OFFICE O/P EST HI 40 MIN: CPT

## 2023-11-20 ENCOUNTER — RESULT REVIEW (OUTPATIENT)
Age: 68
End: 2023-11-20

## 2023-11-20 ENCOUNTER — NON-APPOINTMENT (OUTPATIENT)
Age: 68
End: 2023-11-20

## 2023-11-20 ENCOUNTER — APPOINTMENT (OUTPATIENT)
Dept: HEMATOLOGY ONCOLOGY | Facility: CLINIC | Age: 68
End: 2023-11-20

## 2023-11-20 ENCOUNTER — APPOINTMENT (OUTPATIENT)
Dept: INFUSION THERAPY | Facility: HOSPITAL | Age: 68
End: 2023-11-20

## 2023-11-20 LAB
ALBUMIN SERPL ELPH-MCNC: 4.1 G/DL — SIGNIFICANT CHANGE UP (ref 3.3–5)
ALBUMIN SERPL ELPH-MCNC: 4.1 G/DL — SIGNIFICANT CHANGE UP (ref 3.3–5)
ALP SERPL-CCNC: 78 U/L — SIGNIFICANT CHANGE UP (ref 40–120)
ALP SERPL-CCNC: 78 U/L — SIGNIFICANT CHANGE UP (ref 40–120)
ALT FLD-CCNC: 11 U/L — SIGNIFICANT CHANGE UP (ref 10–45)
ALT FLD-CCNC: 11 U/L — SIGNIFICANT CHANGE UP (ref 10–45)
ANION GAP SERPL CALC-SCNC: 10 MMOL/L — SIGNIFICANT CHANGE UP (ref 5–17)
ANION GAP SERPL CALC-SCNC: 10 MMOL/L — SIGNIFICANT CHANGE UP (ref 5–17)
AST SERPL-CCNC: 20 U/L — SIGNIFICANT CHANGE UP (ref 10–40)
AST SERPL-CCNC: 20 U/L — SIGNIFICANT CHANGE UP (ref 10–40)
BASOPHILS # BLD AUTO: 0.04 K/UL — SIGNIFICANT CHANGE UP (ref 0–0.2)
BASOPHILS # BLD AUTO: 0.04 K/UL — SIGNIFICANT CHANGE UP (ref 0–0.2)
BASOPHILS NFR BLD AUTO: 0.7 % — SIGNIFICANT CHANGE UP (ref 0–2)
BASOPHILS NFR BLD AUTO: 0.7 % — SIGNIFICANT CHANGE UP (ref 0–2)
BILIRUB SERPL-MCNC: 0.2 MG/DL — SIGNIFICANT CHANGE UP (ref 0.2–1.2)
BILIRUB SERPL-MCNC: 0.2 MG/DL — SIGNIFICANT CHANGE UP (ref 0.2–1.2)
BUN SERPL-MCNC: 16 MG/DL — SIGNIFICANT CHANGE UP (ref 7–23)
BUN SERPL-MCNC: 16 MG/DL — SIGNIFICANT CHANGE UP (ref 7–23)
CALCIUM SERPL-MCNC: 9.3 MG/DL — SIGNIFICANT CHANGE UP (ref 8.4–10.5)
CALCIUM SERPL-MCNC: 9.3 MG/DL — SIGNIFICANT CHANGE UP (ref 8.4–10.5)
CHLORIDE SERPL-SCNC: 102 MMOL/L — SIGNIFICANT CHANGE UP (ref 96–108)
CHLORIDE SERPL-SCNC: 102 MMOL/L — SIGNIFICANT CHANGE UP (ref 96–108)
CO2 SERPL-SCNC: 27 MMOL/L — SIGNIFICANT CHANGE UP (ref 22–31)
CO2 SERPL-SCNC: 27 MMOL/L — SIGNIFICANT CHANGE UP (ref 22–31)
CREAT SERPL-MCNC: 0.82 MG/DL — SIGNIFICANT CHANGE UP (ref 0.5–1.3)
CREAT SERPL-MCNC: 0.82 MG/DL — SIGNIFICANT CHANGE UP (ref 0.5–1.3)
EGFR: 78 ML/MIN/1.73M2 — SIGNIFICANT CHANGE UP
EGFR: 78 ML/MIN/1.73M2 — SIGNIFICANT CHANGE UP
EOSINOPHIL # BLD AUTO: 0.07 K/UL — SIGNIFICANT CHANGE UP (ref 0–0.5)
EOSINOPHIL # BLD AUTO: 0.07 K/UL — SIGNIFICANT CHANGE UP (ref 0–0.5)
EOSINOPHIL NFR BLD AUTO: 1.2 % — SIGNIFICANT CHANGE UP (ref 0–6)
EOSINOPHIL NFR BLD AUTO: 1.2 % — SIGNIFICANT CHANGE UP (ref 0–6)
GLUCOSE SERPL-MCNC: 153 MG/DL — HIGH (ref 70–99)
GLUCOSE SERPL-MCNC: 153 MG/DL — HIGH (ref 70–99)
HCT VFR BLD CALC: 29.4 % — LOW (ref 34.5–45)
HCT VFR BLD CALC: 29.4 % — LOW (ref 34.5–45)
HGB BLD-MCNC: 9.7 G/DL — LOW (ref 11.5–15.5)
HGB BLD-MCNC: 9.7 G/DL — LOW (ref 11.5–15.5)
IMM GRANULOCYTES NFR BLD AUTO: 1.9 % — HIGH (ref 0–0.9)
IMM GRANULOCYTES NFR BLD AUTO: 1.9 % — HIGH (ref 0–0.9)
LYMPHOCYTES # BLD AUTO: 0.68 K/UL — LOW (ref 1–3.3)
LYMPHOCYTES # BLD AUTO: 0.68 K/UL — LOW (ref 1–3.3)
LYMPHOCYTES # BLD AUTO: 11.6 % — LOW (ref 13–44)
LYMPHOCYTES # BLD AUTO: 11.6 % — LOW (ref 13–44)
MCHC RBC-ENTMCNC: 27.7 PG — SIGNIFICANT CHANGE UP (ref 27–34)
MCHC RBC-ENTMCNC: 27.7 PG — SIGNIFICANT CHANGE UP (ref 27–34)
MCHC RBC-ENTMCNC: 33 G/DL — SIGNIFICANT CHANGE UP (ref 32–36)
MCHC RBC-ENTMCNC: 33 G/DL — SIGNIFICANT CHANGE UP (ref 32–36)
MCV RBC AUTO: 84 FL — SIGNIFICANT CHANGE UP (ref 80–100)
MCV RBC AUTO: 84 FL — SIGNIFICANT CHANGE UP (ref 80–100)
MONOCYTES # BLD AUTO: 0.45 K/UL — SIGNIFICANT CHANGE UP (ref 0–0.9)
MONOCYTES # BLD AUTO: 0.45 K/UL — SIGNIFICANT CHANGE UP (ref 0–0.9)
MONOCYTES NFR BLD AUTO: 7.7 % — SIGNIFICANT CHANGE UP (ref 2–14)
MONOCYTES NFR BLD AUTO: 7.7 % — SIGNIFICANT CHANGE UP (ref 2–14)
NEUTROPHILS # BLD AUTO: 4.51 K/UL — SIGNIFICANT CHANGE UP (ref 1.8–7.4)
NEUTROPHILS # BLD AUTO: 4.51 K/UL — SIGNIFICANT CHANGE UP (ref 1.8–7.4)
NEUTROPHILS NFR BLD AUTO: 76.9 % — SIGNIFICANT CHANGE UP (ref 43–77)
NEUTROPHILS NFR BLD AUTO: 76.9 % — SIGNIFICANT CHANGE UP (ref 43–77)
NRBC # BLD: 0 /100 WBCS — SIGNIFICANT CHANGE UP (ref 0–0)
NRBC # BLD: 0 /100 WBCS — SIGNIFICANT CHANGE UP (ref 0–0)
PLATELET # BLD AUTO: 384 K/UL — SIGNIFICANT CHANGE UP (ref 150–400)
PLATELET # BLD AUTO: 384 K/UL — SIGNIFICANT CHANGE UP (ref 150–400)
POTASSIUM SERPL-MCNC: 4.2 MMOL/L — SIGNIFICANT CHANGE UP (ref 3.5–5.3)
POTASSIUM SERPL-MCNC: 4.2 MMOL/L — SIGNIFICANT CHANGE UP (ref 3.5–5.3)
POTASSIUM SERPL-SCNC: 4.2 MMOL/L — SIGNIFICANT CHANGE UP (ref 3.5–5.3)
POTASSIUM SERPL-SCNC: 4.2 MMOL/L — SIGNIFICANT CHANGE UP (ref 3.5–5.3)
PROT SERPL-MCNC: 6.7 G/DL — SIGNIFICANT CHANGE UP (ref 6–8.3)
PROT SERPL-MCNC: 6.7 G/DL — SIGNIFICANT CHANGE UP (ref 6–8.3)
RBC # BLD: 3.5 M/UL — LOW (ref 3.8–5.2)
RBC # BLD: 3.5 M/UL — LOW (ref 3.8–5.2)
RBC # FLD: 21.2 % — HIGH (ref 10.3–14.5)
RBC # FLD: 21.2 % — HIGH (ref 10.3–14.5)
SODIUM SERPL-SCNC: 139 MMOL/L — SIGNIFICANT CHANGE UP (ref 135–145)
SODIUM SERPL-SCNC: 139 MMOL/L — SIGNIFICANT CHANGE UP (ref 135–145)
WBC # BLD: 5.86 K/UL — SIGNIFICANT CHANGE UP (ref 3.8–10.5)
WBC # BLD: 5.86 K/UL — SIGNIFICANT CHANGE UP (ref 3.8–10.5)
WBC # FLD AUTO: 5.86 K/UL — SIGNIFICANT CHANGE UP (ref 3.8–10.5)
WBC # FLD AUTO: 5.86 K/UL — SIGNIFICANT CHANGE UP (ref 3.8–10.5)

## 2023-11-27 ENCOUNTER — RESULT REVIEW (OUTPATIENT)
Age: 68
End: 2023-11-27

## 2023-11-27 ENCOUNTER — APPOINTMENT (OUTPATIENT)
Dept: INFUSION THERAPY | Facility: HOSPITAL | Age: 68
End: 2023-11-27

## 2023-11-27 ENCOUNTER — APPOINTMENT (OUTPATIENT)
Dept: HEMATOLOGY ONCOLOGY | Facility: CLINIC | Age: 68
End: 2023-11-27

## 2023-11-27 LAB
BASOPHILS # BLD AUTO: 0.04 K/UL — SIGNIFICANT CHANGE UP (ref 0–0.2)
BASOPHILS # BLD AUTO: 0.04 K/UL — SIGNIFICANT CHANGE UP (ref 0–0.2)
BASOPHILS NFR BLD AUTO: 1.3 % — SIGNIFICANT CHANGE UP (ref 0–2)
BASOPHILS NFR BLD AUTO: 1.3 % — SIGNIFICANT CHANGE UP (ref 0–2)
EOSINOPHIL # BLD AUTO: 0.02 K/UL — SIGNIFICANT CHANGE UP (ref 0–0.5)
EOSINOPHIL # BLD AUTO: 0.02 K/UL — SIGNIFICANT CHANGE UP (ref 0–0.5)
EOSINOPHIL NFR BLD AUTO: 0.7 % — SIGNIFICANT CHANGE UP (ref 0–6)
EOSINOPHIL NFR BLD AUTO: 0.7 % — SIGNIFICANT CHANGE UP (ref 0–6)
HCT VFR BLD CALC: 28.3 % — LOW (ref 34.5–45)
HCT VFR BLD CALC: 28.3 % — LOW (ref 34.5–45)
HGB BLD-MCNC: 9.3 G/DL — LOW (ref 11.5–15.5)
HGB BLD-MCNC: 9.3 G/DL — LOW (ref 11.5–15.5)
IMM GRANULOCYTES NFR BLD AUTO: 1.7 % — HIGH (ref 0–0.9)
IMM GRANULOCYTES NFR BLD AUTO: 1.7 % — HIGH (ref 0–0.9)
LYMPHOCYTES # BLD AUTO: 1.35 K/UL — SIGNIFICANT CHANGE UP (ref 1–3.3)
LYMPHOCYTES # BLD AUTO: 1.35 K/UL — SIGNIFICANT CHANGE UP (ref 1–3.3)
LYMPHOCYTES # BLD AUTO: 45.3 % — HIGH (ref 13–44)
LYMPHOCYTES # BLD AUTO: 45.3 % — HIGH (ref 13–44)
MCHC RBC-ENTMCNC: 28 PG — SIGNIFICANT CHANGE UP (ref 27–34)
MCHC RBC-ENTMCNC: 28 PG — SIGNIFICANT CHANGE UP (ref 27–34)
MCHC RBC-ENTMCNC: 32.9 G/DL — SIGNIFICANT CHANGE UP (ref 32–36)
MCHC RBC-ENTMCNC: 32.9 G/DL — SIGNIFICANT CHANGE UP (ref 32–36)
MCV RBC AUTO: 85.2 FL — SIGNIFICANT CHANGE UP (ref 80–100)
MCV RBC AUTO: 85.2 FL — SIGNIFICANT CHANGE UP (ref 80–100)
MONOCYTES # BLD AUTO: 0.4 K/UL — SIGNIFICANT CHANGE UP (ref 0–0.9)
MONOCYTES # BLD AUTO: 0.4 K/UL — SIGNIFICANT CHANGE UP (ref 0–0.9)
MONOCYTES NFR BLD AUTO: 13.4 % — SIGNIFICANT CHANGE UP (ref 2–14)
MONOCYTES NFR BLD AUTO: 13.4 % — SIGNIFICANT CHANGE UP (ref 2–14)
NEUTROPHILS # BLD AUTO: 1.12 K/UL — LOW (ref 1.8–7.4)
NEUTROPHILS # BLD AUTO: 1.12 K/UL — LOW (ref 1.8–7.4)
NEUTROPHILS NFR BLD AUTO: 37.6 % — LOW (ref 43–77)
NEUTROPHILS NFR BLD AUTO: 37.6 % — LOW (ref 43–77)
NRBC # BLD: 0 /100 WBCS — SIGNIFICANT CHANGE UP (ref 0–0)
NRBC # BLD: 0 /100 WBCS — SIGNIFICANT CHANGE UP (ref 0–0)
PLATELET # BLD AUTO: 453 K/UL — HIGH (ref 150–400)
PLATELET # BLD AUTO: 453 K/UL — HIGH (ref 150–400)
RBC # BLD: 3.32 M/UL — LOW (ref 3.8–5.2)
RBC # BLD: 3.32 M/UL — LOW (ref 3.8–5.2)
RBC # FLD: 20.6 % — HIGH (ref 10.3–14.5)
RBC # FLD: 20.6 % — HIGH (ref 10.3–14.5)
WBC # BLD: 2.98 K/UL — LOW (ref 3.8–10.5)
WBC # BLD: 2.98 K/UL — LOW (ref 3.8–10.5)
WBC # FLD AUTO: 2.98 K/UL — LOW (ref 3.8–10.5)
WBC # FLD AUTO: 2.98 K/UL — LOW (ref 3.8–10.5)

## 2023-11-28 ENCOUNTER — APPOINTMENT (OUTPATIENT)
Dept: INFUSION THERAPY | Facility: HOSPITAL | Age: 68
End: 2023-11-28

## 2023-11-29 ENCOUNTER — NON-APPOINTMENT (OUTPATIENT)
Age: 68
End: 2023-11-29

## 2023-11-29 ENCOUNTER — APPOINTMENT (OUTPATIENT)
Dept: SURGICAL ONCOLOGY | Facility: CLINIC | Age: 68
End: 2023-11-29

## 2023-11-29 DIAGNOSIS — Z51.89 ENCOUNTER FOR OTHER SPECIFIED AFTERCARE: ICD-10-CM

## 2023-12-04 ENCOUNTER — RESULT REVIEW (OUTPATIENT)
Age: 68
End: 2023-12-04

## 2023-12-04 ENCOUNTER — APPOINTMENT (OUTPATIENT)
Dept: HEMATOLOGY ONCOLOGY | Facility: CLINIC | Age: 68
End: 2023-12-04

## 2023-12-04 ENCOUNTER — APPOINTMENT (OUTPATIENT)
Dept: INFUSION THERAPY | Facility: HOSPITAL | Age: 68
End: 2023-12-04

## 2023-12-04 LAB
ALBUMIN SERPL ELPH-MCNC: 3.9 G/DL — SIGNIFICANT CHANGE UP (ref 3.3–5)
ALBUMIN SERPL ELPH-MCNC: 3.9 G/DL — SIGNIFICANT CHANGE UP (ref 3.3–5)
ALP SERPL-CCNC: 84 U/L — SIGNIFICANT CHANGE UP (ref 40–120)
ALP SERPL-CCNC: 84 U/L — SIGNIFICANT CHANGE UP (ref 40–120)
ALT FLD-CCNC: 11 U/L — SIGNIFICANT CHANGE UP (ref 10–45)
ALT FLD-CCNC: 11 U/L — SIGNIFICANT CHANGE UP (ref 10–45)
ANION GAP SERPL CALC-SCNC: 12 MMOL/L — SIGNIFICANT CHANGE UP (ref 5–17)
ANION GAP SERPL CALC-SCNC: 12 MMOL/L — SIGNIFICANT CHANGE UP (ref 5–17)
ANISOCYTOSIS BLD QL: SLIGHT — SIGNIFICANT CHANGE UP
ANISOCYTOSIS BLD QL: SLIGHT — SIGNIFICANT CHANGE UP
AST SERPL-CCNC: 15 U/L — SIGNIFICANT CHANGE UP (ref 10–40)
AST SERPL-CCNC: 15 U/L — SIGNIFICANT CHANGE UP (ref 10–40)
BASOPHILS # BLD AUTO: 0 K/UL — SIGNIFICANT CHANGE UP (ref 0–0.2)
BASOPHILS # BLD AUTO: 0 K/UL — SIGNIFICANT CHANGE UP (ref 0–0.2)
BASOPHILS NFR BLD AUTO: 0 % — SIGNIFICANT CHANGE UP (ref 0–2)
BASOPHILS NFR BLD AUTO: 0 % — SIGNIFICANT CHANGE UP (ref 0–2)
BILIRUB SERPL-MCNC: 0.2 MG/DL — SIGNIFICANT CHANGE UP (ref 0.2–1.2)
BILIRUB SERPL-MCNC: 0.2 MG/DL — SIGNIFICANT CHANGE UP (ref 0.2–1.2)
BUN SERPL-MCNC: 19 MG/DL — SIGNIFICANT CHANGE UP (ref 7–23)
BUN SERPL-MCNC: 19 MG/DL — SIGNIFICANT CHANGE UP (ref 7–23)
CALCIUM SERPL-MCNC: 9.1 MG/DL — SIGNIFICANT CHANGE UP (ref 8.4–10.5)
CALCIUM SERPL-MCNC: 9.1 MG/DL — SIGNIFICANT CHANGE UP (ref 8.4–10.5)
CHLORIDE SERPL-SCNC: 102 MMOL/L — SIGNIFICANT CHANGE UP (ref 96–108)
CHLORIDE SERPL-SCNC: 102 MMOL/L — SIGNIFICANT CHANGE UP (ref 96–108)
CO2 SERPL-SCNC: 26 MMOL/L — SIGNIFICANT CHANGE UP (ref 22–31)
CO2 SERPL-SCNC: 26 MMOL/L — SIGNIFICANT CHANGE UP (ref 22–31)
CREAT SERPL-MCNC: 0.85 MG/DL — SIGNIFICANT CHANGE UP (ref 0.5–1.3)
CREAT SERPL-MCNC: 0.85 MG/DL — SIGNIFICANT CHANGE UP (ref 0.5–1.3)
DACRYOCYTES BLD QL SMEAR: SLIGHT — SIGNIFICANT CHANGE UP
DACRYOCYTES BLD QL SMEAR: SLIGHT — SIGNIFICANT CHANGE UP
EGFR: 75 ML/MIN/1.73M2 — SIGNIFICANT CHANGE UP
EGFR: 75 ML/MIN/1.73M2 — SIGNIFICANT CHANGE UP
ELLIPTOCYTES BLD QL SMEAR: SLIGHT — SIGNIFICANT CHANGE UP
ELLIPTOCYTES BLD QL SMEAR: SLIGHT — SIGNIFICANT CHANGE UP
EOSINOPHIL # BLD AUTO: 0.11 K/UL — SIGNIFICANT CHANGE UP (ref 0–0.5)
EOSINOPHIL # BLD AUTO: 0.11 K/UL — SIGNIFICANT CHANGE UP (ref 0–0.5)
EOSINOPHIL NFR BLD AUTO: 1 % — SIGNIFICANT CHANGE UP (ref 0–6)
EOSINOPHIL NFR BLD AUTO: 1 % — SIGNIFICANT CHANGE UP (ref 0–6)
GLUCOSE SERPL-MCNC: 183 MG/DL — HIGH (ref 70–99)
GLUCOSE SERPL-MCNC: 183 MG/DL — HIGH (ref 70–99)
HCT VFR BLD CALC: 30.8 % — LOW (ref 34.5–45)
HCT VFR BLD CALC: 30.8 % — LOW (ref 34.5–45)
HGB BLD-MCNC: 10.3 G/DL — LOW (ref 11.5–15.5)
HGB BLD-MCNC: 10.3 G/DL — LOW (ref 11.5–15.5)
LYMPHOCYTES # BLD AUTO: 0.86 K/UL — LOW (ref 1–3.3)
LYMPHOCYTES # BLD AUTO: 0.86 K/UL — LOW (ref 1–3.3)
LYMPHOCYTES # BLD AUTO: 8 % — LOW (ref 13–44)
LYMPHOCYTES # BLD AUTO: 8 % — LOW (ref 13–44)
MCHC RBC-ENTMCNC: 27.9 PG — SIGNIFICANT CHANGE UP (ref 27–34)
MCHC RBC-ENTMCNC: 27.9 PG — SIGNIFICANT CHANGE UP (ref 27–34)
MCHC RBC-ENTMCNC: 33.4 G/DL — SIGNIFICANT CHANGE UP (ref 32–36)
MCHC RBC-ENTMCNC: 33.4 G/DL — SIGNIFICANT CHANGE UP (ref 32–36)
MCV RBC AUTO: 83.5 FL — SIGNIFICANT CHANGE UP (ref 80–100)
MCV RBC AUTO: 83.5 FL — SIGNIFICANT CHANGE UP (ref 80–100)
MONOCYTES # BLD AUTO: 0.75 K/UL — SIGNIFICANT CHANGE UP (ref 0–0.9)
MONOCYTES # BLD AUTO: 0.75 K/UL — SIGNIFICANT CHANGE UP (ref 0–0.9)
MONOCYTES NFR BLD AUTO: 7 % — SIGNIFICANT CHANGE UP (ref 2–14)
MONOCYTES NFR BLD AUTO: 7 % — SIGNIFICANT CHANGE UP (ref 2–14)
MYELOCYTES NFR BLD: 2 % — HIGH (ref 0–0)
MYELOCYTES NFR BLD: 2 % — HIGH (ref 0–0)
NEUTROPHILS # BLD AUTO: 8.81 K/UL — HIGH (ref 1.8–7.4)
NEUTROPHILS # BLD AUTO: 8.81 K/UL — HIGH (ref 1.8–7.4)
NEUTROPHILS NFR BLD AUTO: 82 % — HIGH (ref 43–77)
NEUTROPHILS NFR BLD AUTO: 82 % — HIGH (ref 43–77)
NRBC # BLD: 0 /100 — SIGNIFICANT CHANGE UP (ref 0–0)
NRBC # BLD: 0 /100 — SIGNIFICANT CHANGE UP (ref 0–0)
NRBC # BLD: SIGNIFICANT CHANGE UP /100 WBCS (ref 0–0)
NRBC # BLD: SIGNIFICANT CHANGE UP /100 WBCS (ref 0–0)
PLAT MORPH BLD: NORMAL — SIGNIFICANT CHANGE UP
PLAT MORPH BLD: NORMAL — SIGNIFICANT CHANGE UP
PLATELET # BLD AUTO: 169 K/UL — SIGNIFICANT CHANGE UP (ref 150–400)
PLATELET # BLD AUTO: 169 K/UL — SIGNIFICANT CHANGE UP (ref 150–400)
POIKILOCYTOSIS BLD QL AUTO: SLIGHT — SIGNIFICANT CHANGE UP
POIKILOCYTOSIS BLD QL AUTO: SLIGHT — SIGNIFICANT CHANGE UP
POTASSIUM SERPL-MCNC: 3.8 MMOL/L — SIGNIFICANT CHANGE UP (ref 3.5–5.3)
POTASSIUM SERPL-MCNC: 3.8 MMOL/L — SIGNIFICANT CHANGE UP (ref 3.5–5.3)
POTASSIUM SERPL-SCNC: 3.8 MMOL/L — SIGNIFICANT CHANGE UP (ref 3.5–5.3)
POTASSIUM SERPL-SCNC: 3.8 MMOL/L — SIGNIFICANT CHANGE UP (ref 3.5–5.3)
PROT SERPL-MCNC: 6.6 G/DL — SIGNIFICANT CHANGE UP (ref 6–8.3)
PROT SERPL-MCNC: 6.6 G/DL — SIGNIFICANT CHANGE UP (ref 6–8.3)
RBC # BLD: 3.69 M/UL — LOW (ref 3.8–5.2)
RBC # BLD: 3.69 M/UL — LOW (ref 3.8–5.2)
RBC # FLD: 20.4 % — HIGH (ref 10.3–14.5)
RBC # FLD: 20.4 % — HIGH (ref 10.3–14.5)
RBC BLD AUTO: ABNORMAL
RBC BLD AUTO: ABNORMAL
SCHISTOCYTES BLD QL AUTO: SLIGHT — SIGNIFICANT CHANGE UP
SCHISTOCYTES BLD QL AUTO: SLIGHT — SIGNIFICANT CHANGE UP
SODIUM SERPL-SCNC: 140 MMOL/L — SIGNIFICANT CHANGE UP (ref 135–145)
SODIUM SERPL-SCNC: 140 MMOL/L — SIGNIFICANT CHANGE UP (ref 135–145)
TARGETS BLD QL SMEAR: SLIGHT — SIGNIFICANT CHANGE UP
TARGETS BLD QL SMEAR: SLIGHT — SIGNIFICANT CHANGE UP
WBC # BLD: 10.74 K/UL — HIGH (ref 3.8–10.5)
WBC # BLD: 10.74 K/UL — HIGH (ref 3.8–10.5)
WBC # FLD AUTO: 10.74 K/UL — HIGH (ref 3.8–10.5)
WBC # FLD AUTO: 10.74 K/UL — HIGH (ref 3.8–10.5)

## 2023-12-04 RX ORDER — GLIPIZIDE/METFORMIN HCL 2.5-500 MG
1 TABLET ORAL
Refills: 0 | DISCHARGE

## 2023-12-04 RX ORDER — AMLODIPINE BESYLATE 2.5 MG/1
1 TABLET ORAL
Refills: 0 | DISCHARGE

## 2023-12-04 RX ORDER — LOSARTAN POTASSIUM 100 MG/1
1 TABLET, FILM COATED ORAL
Refills: 0 | DISCHARGE

## 2023-12-04 RX ORDER — PREGABALIN 225 MG/1
0 CAPSULE ORAL
Refills: 0 | DISCHARGE

## 2023-12-11 ENCOUNTER — NON-APPOINTMENT (OUTPATIENT)
Age: 68
End: 2023-12-11

## 2023-12-11 ENCOUNTER — RESULT REVIEW (OUTPATIENT)
Age: 68
End: 2023-12-11

## 2023-12-11 ENCOUNTER — APPOINTMENT (OUTPATIENT)
Dept: INFUSION THERAPY | Facility: HOSPITAL | Age: 68
End: 2023-12-11

## 2023-12-11 ENCOUNTER — APPOINTMENT (OUTPATIENT)
Dept: HEMATOLOGY ONCOLOGY | Facility: CLINIC | Age: 68
End: 2023-12-11

## 2023-12-11 LAB
ALBUMIN SERPL ELPH-MCNC: 4.2 G/DL — SIGNIFICANT CHANGE UP (ref 3.3–5)
ALBUMIN SERPL ELPH-MCNC: 4.2 G/DL — SIGNIFICANT CHANGE UP (ref 3.3–5)
ALP SERPL-CCNC: 86 U/L — SIGNIFICANT CHANGE UP (ref 40–120)
ALP SERPL-CCNC: 86 U/L — SIGNIFICANT CHANGE UP (ref 40–120)
ALT FLD-CCNC: 17 U/L — SIGNIFICANT CHANGE UP (ref 10–45)
ALT FLD-CCNC: 17 U/L — SIGNIFICANT CHANGE UP (ref 10–45)
ANION GAP SERPL CALC-SCNC: 12 MMOL/L — SIGNIFICANT CHANGE UP (ref 5–17)
ANION GAP SERPL CALC-SCNC: 12 MMOL/L — SIGNIFICANT CHANGE UP (ref 5–17)
AST SERPL-CCNC: 21 U/L — SIGNIFICANT CHANGE UP (ref 10–40)
AST SERPL-CCNC: 21 U/L — SIGNIFICANT CHANGE UP (ref 10–40)
BASOPHILS # BLD AUTO: 0.04 K/UL — SIGNIFICANT CHANGE UP (ref 0–0.2)
BASOPHILS # BLD AUTO: 0.04 K/UL — SIGNIFICANT CHANGE UP (ref 0–0.2)
BASOPHILS NFR BLD AUTO: 0.8 % — SIGNIFICANT CHANGE UP (ref 0–2)
BASOPHILS NFR BLD AUTO: 0.8 % — SIGNIFICANT CHANGE UP (ref 0–2)
BILIRUB SERPL-MCNC: 0.4 MG/DL — SIGNIFICANT CHANGE UP (ref 0.2–1.2)
BILIRUB SERPL-MCNC: 0.4 MG/DL — SIGNIFICANT CHANGE UP (ref 0.2–1.2)
BUN SERPL-MCNC: 21 MG/DL — SIGNIFICANT CHANGE UP (ref 7–23)
BUN SERPL-MCNC: 21 MG/DL — SIGNIFICANT CHANGE UP (ref 7–23)
CALCIUM SERPL-MCNC: 9.7 MG/DL — SIGNIFICANT CHANGE UP (ref 8.4–10.5)
CALCIUM SERPL-MCNC: 9.7 MG/DL — SIGNIFICANT CHANGE UP (ref 8.4–10.5)
CHLORIDE SERPL-SCNC: 100 MMOL/L — SIGNIFICANT CHANGE UP (ref 96–108)
CHLORIDE SERPL-SCNC: 100 MMOL/L — SIGNIFICANT CHANGE UP (ref 96–108)
CO2 SERPL-SCNC: 28 MMOL/L — SIGNIFICANT CHANGE UP (ref 22–31)
CO2 SERPL-SCNC: 28 MMOL/L — SIGNIFICANT CHANGE UP (ref 22–31)
CREAT SERPL-MCNC: 0.86 MG/DL — SIGNIFICANT CHANGE UP (ref 0.5–1.3)
CREAT SERPL-MCNC: 0.86 MG/DL — SIGNIFICANT CHANGE UP (ref 0.5–1.3)
EGFR: 74 ML/MIN/1.73M2 — SIGNIFICANT CHANGE UP
EGFR: 74 ML/MIN/1.73M2 — SIGNIFICANT CHANGE UP
EOSINOPHIL # BLD AUTO: 0.02 K/UL — SIGNIFICANT CHANGE UP (ref 0–0.5)
EOSINOPHIL # BLD AUTO: 0.02 K/UL — SIGNIFICANT CHANGE UP (ref 0–0.5)
EOSINOPHIL NFR BLD AUTO: 0.4 % — SIGNIFICANT CHANGE UP (ref 0–6)
EOSINOPHIL NFR BLD AUTO: 0.4 % — SIGNIFICANT CHANGE UP (ref 0–6)
GLUCOSE SERPL-MCNC: 149 MG/DL — HIGH (ref 70–99)
GLUCOSE SERPL-MCNC: 149 MG/DL — HIGH (ref 70–99)
HCT VFR BLD CALC: 30.9 % — LOW (ref 34.5–45)
HCT VFR BLD CALC: 30.9 % — LOW (ref 34.5–45)
HGB BLD-MCNC: 10.3 G/DL — LOW (ref 11.5–15.5)
HGB BLD-MCNC: 10.3 G/DL — LOW (ref 11.5–15.5)
IMM GRANULOCYTES NFR BLD AUTO: 0.8 % — SIGNIFICANT CHANGE UP (ref 0–0.9)
IMM GRANULOCYTES NFR BLD AUTO: 0.8 % — SIGNIFICANT CHANGE UP (ref 0–0.9)
LYMPHOCYTES # BLD AUTO: 0.94 K/UL — LOW (ref 1–3.3)
LYMPHOCYTES # BLD AUTO: 0.94 K/UL — LOW (ref 1–3.3)
LYMPHOCYTES # BLD AUTO: 19.7 % — SIGNIFICANT CHANGE UP (ref 13–44)
LYMPHOCYTES # BLD AUTO: 19.7 % — SIGNIFICANT CHANGE UP (ref 13–44)
MCHC RBC-ENTMCNC: 27.7 PG — SIGNIFICANT CHANGE UP (ref 27–34)
MCHC RBC-ENTMCNC: 27.7 PG — SIGNIFICANT CHANGE UP (ref 27–34)
MCHC RBC-ENTMCNC: 33.3 G/DL — SIGNIFICANT CHANGE UP (ref 32–36)
MCHC RBC-ENTMCNC: 33.3 G/DL — SIGNIFICANT CHANGE UP (ref 32–36)
MCV RBC AUTO: 83.1 FL — SIGNIFICANT CHANGE UP (ref 80–100)
MCV RBC AUTO: 83.1 FL — SIGNIFICANT CHANGE UP (ref 80–100)
MONOCYTES # BLD AUTO: 0.29 K/UL — SIGNIFICANT CHANGE UP (ref 0–0.9)
MONOCYTES # BLD AUTO: 0.29 K/UL — SIGNIFICANT CHANGE UP (ref 0–0.9)
MONOCYTES NFR BLD AUTO: 6.1 % — SIGNIFICANT CHANGE UP (ref 2–14)
MONOCYTES NFR BLD AUTO: 6.1 % — SIGNIFICANT CHANGE UP (ref 2–14)
NEUTROPHILS # BLD AUTO: 3.43 K/UL — SIGNIFICANT CHANGE UP (ref 1.8–7.4)
NEUTROPHILS # BLD AUTO: 3.43 K/UL — SIGNIFICANT CHANGE UP (ref 1.8–7.4)
NEUTROPHILS NFR BLD AUTO: 72.2 % — SIGNIFICANT CHANGE UP (ref 43–77)
NEUTROPHILS NFR BLD AUTO: 72.2 % — SIGNIFICANT CHANGE UP (ref 43–77)
NRBC # BLD: 0 /100 WBCS — SIGNIFICANT CHANGE UP (ref 0–0)
NRBC # BLD: 0 /100 WBCS — SIGNIFICANT CHANGE UP (ref 0–0)
PLATELET # BLD AUTO: 183 K/UL — SIGNIFICANT CHANGE UP (ref 150–400)
PLATELET # BLD AUTO: 183 K/UL — SIGNIFICANT CHANGE UP (ref 150–400)
POTASSIUM SERPL-MCNC: 4.1 MMOL/L — SIGNIFICANT CHANGE UP (ref 3.5–5.3)
POTASSIUM SERPL-MCNC: 4.1 MMOL/L — SIGNIFICANT CHANGE UP (ref 3.5–5.3)
POTASSIUM SERPL-SCNC: 4.1 MMOL/L — SIGNIFICANT CHANGE UP (ref 3.5–5.3)
POTASSIUM SERPL-SCNC: 4.1 MMOL/L — SIGNIFICANT CHANGE UP (ref 3.5–5.3)
PROT SERPL-MCNC: 7.2 G/DL — SIGNIFICANT CHANGE UP (ref 6–8.3)
PROT SERPL-MCNC: 7.2 G/DL — SIGNIFICANT CHANGE UP (ref 6–8.3)
RBC # BLD: 3.72 M/UL — LOW (ref 3.8–5.2)
RBC # BLD: 3.72 M/UL — LOW (ref 3.8–5.2)
RBC # FLD: 19.1 % — HIGH (ref 10.3–14.5)
RBC # FLD: 19.1 % — HIGH (ref 10.3–14.5)
SODIUM SERPL-SCNC: 139 MMOL/L — SIGNIFICANT CHANGE UP (ref 135–145)
SODIUM SERPL-SCNC: 139 MMOL/L — SIGNIFICANT CHANGE UP (ref 135–145)
WBC # BLD: 4.76 K/UL — SIGNIFICANT CHANGE UP (ref 3.8–10.5)
WBC # BLD: 4.76 K/UL — SIGNIFICANT CHANGE UP (ref 3.8–10.5)
WBC # FLD AUTO: 4.76 K/UL — SIGNIFICANT CHANGE UP (ref 3.8–10.5)
WBC # FLD AUTO: 4.76 K/UL — SIGNIFICANT CHANGE UP (ref 3.8–10.5)

## 2023-12-12 ENCOUNTER — OUTPATIENT (OUTPATIENT)
Dept: OUTPATIENT SERVICES | Facility: HOSPITAL | Age: 68
LOS: 1 days | Discharge: HOME | End: 2023-12-12

## 2023-12-12 DIAGNOSIS — Z96.652 PRESENCE OF LEFT ARTIFICIAL KNEE JOINT: Chronic | ICD-10-CM

## 2023-12-12 DIAGNOSIS — D64.9 ANEMIA, UNSPECIFIED: ICD-10-CM

## 2023-12-19 ENCOUNTER — APPOINTMENT (OUTPATIENT)
Dept: HEMATOLOGY ONCOLOGY | Facility: CLINIC | Age: 68
End: 2023-12-19
Payer: MEDICARE

## 2023-12-19 VITALS
BODY MASS INDEX: 26.12 KG/M2 | WEIGHT: 153 LBS | DIASTOLIC BLOOD PRESSURE: 84 MMHG | TEMPERATURE: 98.6 F | OXYGEN SATURATION: 97 % | RESPIRATION RATE: 16 BRPM | HEART RATE: 80 BPM | SYSTOLIC BLOOD PRESSURE: 133 MMHG | HEIGHT: 63.98 IN

## 2023-12-19 DIAGNOSIS — R20.2 PARESTHESIA OF SKIN: ICD-10-CM

## 2023-12-19 PROCEDURE — 99213 OFFICE O/P EST LOW 20 MIN: CPT

## 2023-12-19 RX ORDER — CIPROFLOXACIN HYDROCHLORIDE 500 MG/1
500 TABLET, FILM COATED ORAL
Qty: 28 | Refills: 0 | Status: DISCONTINUED | COMMUNITY
Start: 2023-08-15 | End: 2023-12-19

## 2023-12-19 RX ORDER — CYANOCOBALAMIN (VITAMIN B-12) 1000 MCG
1000 TABLET, EXTENDED RELEASE ORAL
Refills: 0 | Status: ACTIVE | COMMUNITY
Start: 2023-12-19

## 2023-12-19 RX ORDER — CIPROFLOXACIN HYDROCHLORIDE 500 MG/1
500 TABLET, FILM COATED ORAL DAILY
Qty: 14 | Refills: 0 | Status: DISCONTINUED | COMMUNITY
Start: 2023-08-01 | End: 2023-12-19

## 2023-12-19 RX ORDER — AMLODIPINE BESYLATE 10 MG/1
10 TABLET ORAL DAILY
Refills: 0 | Status: ACTIVE | COMMUNITY
Start: 2023-12-19

## 2023-12-19 NOTE — ASSESSMENT
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 Side effects of chemotherapy were reviewed with her She is age 67 and has significant co morbidity including diabetes poorly controlled on oral metformin at present. She is asked to see her primary care physician for consideration of an insulin based treatment. The patient may have two tumors as the left renal mass has not had a biopsy. The priority in treating the pancreas cancer was addressed in the multidisciplinary conference. CT/PET will be helpful in addressing the left renal lesion which may be a second primary tumor. If two different tumors are present she is not a candidate for a clinical study. I have recommended placement of a Mediport for chemotherapy treatment. Scheduling of Mediport performed with patient and explanation of use of Mediport. A request for Mediport placement was made and our secretarial team will obtain authorization. Blood testing requested today as noted. THe patient has seen pain management physician Dr Anne for management of analgesia The patient is scheduled for surgical follow up in future. ECG is performed by me and Nursing assistant and results are reviewed I have prescribed antinausea medication metoclopramide 10 mg PO TID PN RTC in 1 week to sign consent for chemotherapy 05/09/2023 The patient has returned to office. Physical examination is not changed. She has reviewed the chemotherapy information provided last week. I discussed the use of the gemcitabine and Abraxane as treatment on day 1, 8 and 15 of a 28 day cycle (no treatment on day 22). I assisted our  in scheduling chemotherapy and all questions were answered to her satisfaction. Mediport placement is scheduled for next week.RTC in 3 weeks She is now on combination dosing metformin and glyceride.Her primary care physician has prescribed acetaminophen 3000 and codeine 30 TID. 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 Seen in a f/u visit today 1) Pancreas Cancer - scheduled for Cycle # 2 Gemcitabine & Abraxane, labs with treatment 2) Left Renal Mass awaiting Biopsy Seen by Dr. Pilo Kelly [facility] - per Dr. Kelly's note - for now the renal mass can remain on the back burner temporarily until the pancreatic lesion is addressed. If there is a surgical plan for the pancreatic lesion to take place, I would consider combining that surgery with a laparoscopic or robotic partial nephrectomy as these lesions are relatively close to 1 another and could be operated on in the same field. 3) Surgical Onc - Dr. Greco - Seen for Surgical Intervention - Will re-eval after 2 months restaging CT 4) Abd pain, Erosive Gastritis on Omeprazole (prescribed by GI), - Tylenol w codeine renewed - Pain management consult requested & Nutritional Evaluation requested. All questions, concerns were addressed with patient during this visit to her satisfaction, she verbalized understanding. RTC in 3 weeks Case management, care plan d/w Dr. 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 Seen in a f/u visit today 1) Pancreas Cancer - s/p Cycle 2 Day 8 treatment on 6/27/23 scheduled for Cycle 2 Day 15 Gemcitabine & Abraxane on Monday July 3, 2023. Labs from 6/27/23 discussed with patient copies provided 2) Mediport Right ACW - EMLA cream prescription sent to local pharmacy. 3) Surgical Onc - Dr. Greco - Seen for Surgical Intervention - Will re-eval after 2 months restaging CT 4) Left Renal Mass awaiting Biopsy Seen by Dr. Pilo Kelly [facility] - per Dr. Kelly's note - for now the renal mass can remain on the back burner temporarily until the pancreatic lesion is addressed. If there is a surgical plan for the pancreatic lesion to take place, I would consider combining that surgery with a laparoscopic or robotic partial nephrectomy as these lesions are relatively close to 1 another and could be operated on in the same field. 5) Abd pain, Erosive Gastritis on Omeprazole (prescribed by GI), - Tylenol w codeine renewed - Pain management consult requested & Nutritional Evaluation requested. All questions, concerns were addressed with patient & friend Jessica during this visit to her satisfaction, she verbalized understanding. RTC in 3 weeks Case management, care plan d/w Dr. 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 Seen in a f/u visit today 1) Pancreas Cancer on Gemcitabine & Abraxane via Mediport - scheduled for Cycle # 3- Day 1 on 7/18/23, Day 8 on 7/25/2023, Day 15 on 8/1/2023 2) Anemia - Hgb = 8.8 on July 3, 2023 - today's repeat Hgb 9.1 will add anemia labs to tomorrow's treatment visit Recommend to add dietary iron to improve iron in blood - add red meat 2-3 x week 3) Diarrhea - Loperamide sent to local pharmacy, PO hydration, electrolyte supplementation was emphasized - pt advised how to take Loperamide with verbal understanding 4) Surgical Onc - Dr. Greco - Seen for Surgical Intervention recommended to f/u for re-eval after 2 months restaging CT - pt has not made an apptn as of yet - Patient was counseled to make a f/u visit as per recs 5) Left Renal Mass awaiting Biopsy Seen by Dr. Pilo [facility] - per Dr. Kelly's note - for now the renal mass can remain on the back burner temporarily until the pancreatic lesion is addressed. If there is a surgical plan for the pancreatic lesion to take place, I would consider combining that surgery with a laparoscopic or robotic partial nephrectomy as these lesions are relatively close to 1 another and could be operated on in the same field. 6) Abd pain, Erosive Gastritis on Omeprazole - no reported GI symptoms at this time - GI f/u as per their discretion 7) Pain management following - Patient advised to maintain a diary to keep a log of symptoms, f/u visits, recommendations, list of current medications. All questions, concerns were addressed with patient & friend Jessica during this visit to her satisfaction, she verbalized understanding. RTC in 4 weeks or prn as needed. Case management, care plan d/w Dr. Tommy Mahoney  August 1, 2023 - Patient seen in treatment room for low ANC. 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 1) Leukopenia & Neutropenia - CBC - WBC = 2.2, Hgb = 9.4, PLT = 250 000, MCV = 86.5  Neutrophil % = 26%, ANC = 0.57 Will hold treatment for today due to Leukopenia & Neutropenia - Cipro 500 mg PO qd started for ppx - Will hold off on initiating growth factors at this time and reevaluate. Patient was advised on Neutropenia precautions, wear mask, wash hands frequently and avoid sick contacts. 2) Normocytic Anemia - Hgb = 9.4 no bleeding, no indication for blood transfusion will continue to monitor Ferritin & Folate levels WNL, B12 level = 1446 (Elevated) 3) Surgical Onc - Dr. Greco - Seen for Surgical Intervention recommended to f/u for re-eval after 2 months restaging CT - pt has not made an apptn as of yet - Patient was counseled to make a f/u visit as per recs 4) Left Renal Mass awaiting Biopsy Seen by Dr. Pilo [facility] - per Dr. Kelly's note - for now the renal mass can remain on the back burner temporarily until the pancreatic lesion is addressed. If there is a surgical plan for the pancreatic lesion to take place, I would consider combining that surgery with a laparoscopic or robotic partial nephrectomy as these lesions are relatively close to 1 another and could be operated on in the same field. All questions, concerns were addressed with patient during this encounter, she verbalized understanding. RTC in 2 weeks scheduled for Will repeat CBC trend.  Addendum: Above findings, labs were discussed with Dr. 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 1) Pancreas Cancer - Patient scheduled today for Cycle # 4 Day 1 of Gemcitabine & Abraxane. & scheduled for Cycle # 4 Day 8 on August 22, 2023 & Day # 15 on August 29, 2023. Today's CBC - WBC = 9.65, ANC = 6.67, Hgb = 10.2, PLT = 338, MCV = 86.1, CMP unremarkable, 2) R/o UTI - Dysuria, frequency lasted 1 day now w foul smelling urine - UA, Urine Culture results pending. will treat empirically for presumed UTI - Cipro 500 mg PO bid x 7 days was sent to [facility] 2) Surgical Onc - f/u apptn w Dr. Greco scheduled for 8/16/23. 3) Left Renal Mass awaiting Biopsy Seen by Dr. Pilo [facility] - per Dr. Kelly's note - for now the renal mass can remain on the back burner temporarily until the pancreatic lesion is addressed. If there is a surgical plan for the pancreatic lesion to take place, I would consider combining that surgery with a laparoscopic or robotic partial nephrectomy as these lesions are relatively close to 1 another and could be operated on in the same field. All questions, concerns were addressed with patient during this encounter, she verbalized understanding. RTC in 2 weeks Case management, care plan d/w Dr. Tommy Mahoney.  August 16, 2023 -Addendum Note: - 8/15/23 UA + for Nitrites, Leukocyte Esterase, trace blood, Urine culture testing - continue Cipro 500 mg PO bid for 7 days - patient aware of the plan.  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 1) Pancreas Cancer - s/p Cycle 5 Day 1 treatment today. Scheduled for Cycle 5 - Day 8 on 9/19/23 & Day 15 on 9/26/23. CT C/A/P - September 5, 2023 - Compared to 4/8/2023 interval decrease in overall size of the pancreatic body mass, however increased soft tissue encasement of the celiac artery and SMA. Mild increase in size of enhancing solid and cystic left renal mass suspicious for renal cell carcinoma. No evidence of metastatic disease Results d/w with patient in the office and son Jaidne over the phone - copy of report provided. 2) Patient advised to f/u w Urology Dr. Kelly for increase in size of Left Renal mass suspicious for RCC. - patient and son were advised to call and expedite the visit. All questions, concerns were addressed with patient in person and son Jaiden over the phone during this visit to their satisfaction, they verbalized understanding of the plan. RTC in 2-3 weeks or prn Case management, care plan d/w Dr. Tommy Mahoney.  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  1) Pancreas Cancer - scheduled for Cycle 6 Day 1 Gemcitabine & Abraxane. Has apptn for C6 D2 on 10/23/23 &  Cycle 6 D15 on 10/30/2023.  Per [facility] recommendation to repeat CT scans in November 2023 to evaluate interval change and plan for surgical intervention - Repeat CT C/A/P ordered for November 1, 2023.  2) Left Renal Mass - f/u with Dr. Kelly Urology scheduled for 10/17/23  3) Surgery f/u w Dr. Sahni advised 4) Endocrine evaluation advised  All questions, concerns were addressed with patient during this visit to her satisfaction, she verbalized understanding of the plan. RTO in November 2nd week with Dr. Mahoney to discuss results of scan. Case management, care plan d/w Dr. Tommy Mahoney. 11/15/2023 she will begin cycle 7 gemcitabine Abraxane on third week of November. Plan to repeat CT scan of chest abdomen and pelvis on 1/08/2024. We are hoping for reduction in size of pancreas mass and if not achieved, we will consider radiation therapy post cycle 8 of treatment (cycle 8 will begin in late December 2023. Leeanna questions of the patient answered to her satisfaction. She is advised to avoid salt in her diet and to follow up with primary care MD for antihypertension medication renewal.  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 1) Pancreas Cancer - scheduled Gemcitabine & Abraxane on 12/26/2023. Labs from 12/11/23 reviewed copies provided Next CT scan ordered for January 2024 - copy of requestion provided. 2) Left Renal Mass - Seen / followed by Urology for Surgical intervention - f/u w Urology advised 3) Peripheral Neuropathy - Pt takes Mag supplement prn - advised to take bid daily will re evaluate 4) Endocrine f/u w Dr. Sanford advised All questions, concerns were addressed with patient during this visit to her satisfaction, she verbalized understanding of the plan. RTO in January 2024 with Dr. Mahoney to discuss results of scan. Case management, care plan d/w Dr. Tommy Mahoney.

## 2023-12-19 NOTE — PHYSICAL EXAM
[Ambulatory and capable of all self care but unable to carry out any work activities] : Status 2- Ambulatory and capable of all self care but unable to carry out any work activities. Up and about more than 50% of waking hours [Normal] : affect appropriate [de-identified] : wore a wig,  [de-identified] : wears glasses  [de-identified] : Right Anterior chest wall Mediport site appears stable, no bleeding, edema or erythema / discharge noted

## 2023-12-19 NOTE — REVIEW OF SYSTEMS
[Negative] : Allergic/Immunologic [Fatigue] : no fatigue [Recent Change In Weight] : ~T no recent weight change [Abdominal Pain] : no abdominal pain [Vomiting] : no vomiting [Constipation] : no constipation [FreeTextEntry9] : ambulates with a cane, no falls

## 2023-12-19 NOTE — HISTORY OF PRESENT ILLNESS
[Disease: _____________________] : Disease: [unfilled] [T: ___] : T[unfilled] [N: ___] : N[unfilled] [M: ___] : M[unfilled] [AJCC Stage: ____] : AJCC Stage: [unfilled] [Date: ____________] : Patient's last distress assessment performed on [unfilled]. [0 - No Distress] : Distress Level: 0 [ECOG Performance Status: 2 - Ambulatory and capable of all self care but unable to carry out any work activities] : Performance Status: 2 - Ambulatory and capable of all self care but unable to carry out any work activities. Up and about more than 50% of waking hours [80: Normal activity with effort; some signs or symptoms of disease.] : 80: Normal activity with effort; some signs or symptoms of disease.  [Abdominal Pain] : abdominal pain [EUS] : EUS [Biopsy] : biopsy performed [Before Surgery] : before surgery [Pancreatic ductal adenocarcinoma] : Pancreatic ductal adenocarcinoma [Not staged outside] : not staged outside [Nutrition] : Nutrition [Social Work] : Social Work [Other: ____] : [unfilled] [Restricted in physically strenuous activity but ambulatory and able to carry out work of a light or sedentary nature] : Status 1 - Restricted in physically strenuous activity but ambulatory and able to carry out work of a light or sedentary nature, e.g., light house work, office work [de-identified] : adenocarcinoma [FreeTextEntry1] :  gemcitabine and Abraxane cycle 7 11/2023 [de-identified] : see hpi; 05/09/2023.She has been taking a combination medication of glipizide and metformin with better blood sugar control.Now on acetaminophen 300 codeine 30 mg PO TID PRN after meeting with pain management and her primary care MD. Pain has been mid line and more noted in day than in evening. No jaundice  May 30, 2023 - Seen in a f/u visit today scheduled for Gemcitabine & Abraxane for Pancreas cancer. Patient had called our office last week for c/o nausea, abd pain, poor appetite. She was prescribed Omeprazole by GI for Gastritis she had not taken. We advised her to take Metoclopramide for nausea, poor appetite and Omeprazole for Gastritis.  Since taking the medications for past few days she has been feeling much better. She remains on Levaquin for Neutropenia prophylaxis.  She denies bleeding, constipation, diarrhea, fever, chills, falls.   June 30, 2023 - Patient seen today accompanied with friend Ms. Jessica Betacnourt.  Patient feels well denies interval change in medical status, No nausea, reports improved appetite, no GI complaints, no pain. Seen / evaluated by our Nutritionist team reports helpful tips and compliance with recommendations.  Today she was seen by Palliative / Pain management Dr. Malik no pain meds started.  She reports she was seen by her PCP last week, she ran out of her BP medications awaiting renewal by PCP.   July 17, 2023 -  Patient seen today in a follow up visit accompanied with friend Ms. Jessica Betancourt.  She reports episodes of non bloody diarrhea / loose stools post treatment lasts for day and a half does not take any anti diarrhea medications, follows dietary restriction with resolution of symptoms.  Denies N, V, fever, anorexia, weight loss, febrile illness, swollen glands, change in medical condition since last visit.   August 1, 2023 - Received a call from Keli JIMÉNEZ from treatment room patient scheduled for Gemcitabine & Abraxane and CBC shows Neutropenia Patient seen and evaluated, she feels well, asymptomatic, no cough, fever, chills, fatigue, no N, V, poor appetite, swollen glands, night sweats, bleeding, bruising. Denies any recent infection, hospitalization. Reports good appetite. No falls, presented for treatment today by herself, son drove her here.   August 15, 2023 - Received a call from Huang JIMÉNEZ from treatment room to evaluate patient with c/o Dysuria on Friday 8/11/23 lasted 1 day. Patient seen and evaluated in the treatment room.  She reports on Friday she experienced dysuria, frequency of urination, no hematuria, no urgency, fever, chills, pelvic pain, abdominal pain / discomfort. Symptoms of dysuria and frequency of urination has resolved but has foul smelling urine, no urine discoloration noted.   September 12, 2023 - Patient seen in a f/u visit today presented by herself to this appointment, Pt's son Jaiden was on the phone call during the visit.  She is s/p Cycle # 5 Day 1 Gemcitabine & Abraxane treatment. She feels well overall tolerating treatment. No residual UTI symptoms no malodorous urine completed course of antibiotics as prescribed.   October 16, 2023 - Seen in a f/u visit today, presented by herself to this appointment. Feels well reports good appetite, no nausea, vomiting, diarrhea, abd / back pain.  She feels intermittent tingling sensation in her toes describes as mild. She has a f/u visit with Podiatrist (sees every 6 months).  She has appointment with Dr. Pil[facility] tomorrow to discuss plan for possible surgery.  11/15/2023 seen at one month interval. No weight change; no pain noted. no diarrhea  December 19, 2023 - Seen in a f/u visit today ambulated with a cane, presented by herself to this appointment. Reports mild numbness in bilateral fingers and toes & discoloration of tips of fingers and around nail beds. She has artificial nails on.  Feels well denies interval change in medical status, hospitalization, febrile illness, no N, V, diarrhea, no abd / back pain, no loss of appetite.  Scheduled for Next Cycle of Gemzar / Abraxane on 12/26/2023.  [de-identified] : Ms. ENRIKE MORLEY is a 67 year old female who presents for evaluation in our Pancreas [facility]. Her PMH includes DM2 (dx ), HTN, left knee replacement. She presented with abdominal pain radiating to her LUQ x 3 weeks. MRI abdomen  noted a 3 cm mass in the body of the pancreas suspicious for primary pancreatic malignancy and also Bosniak type IV cyst upper pole left kidney suspicious for primary renal neoplasm. She was admitted to [facility] on 23 and CT A/P w/ IC showing ill-defined pancreatic body mass consistent with pancreatic neoplasm; and complex enhancing left renal mass concerning for renal cell carcinoma till proven otherwise.\par  She underwent EUS on 23 with noted findings of one 3 cm pancreatic body mass, s/p FNA.  Pathology positive for adenocarcinoma. \par  \par  She reports decrease appetite and early satiety.  Abdominal pain is intermittent and severe when it comes.  She was taking Tylenol#3 after hospital d/c but currently not taking anymore. +Weight loss of 10 pounds since March. \par  \par  Colonoscopy from 7 years ago normal. \par  Smoke/Etoh: 20 pack years. Last smoke 2023. No ETOH use. \par  \par  ECO      Independent, can walk upstairs. Lives with her sons.  (ECOG 1 due to pain). \par  Family Ca hx: None \par  \par  Labs: \par  23    AST: 9   ALT: 18  ALP:  103  Tbili: 0.3 \par  23   Ca 19-9: 2428    CEA: 15.0    AST:  11    ALT: 12    ALP: 96   Tbili:  0.4     A1c: 10% \par   [TextBox_4] : 4/4/23 [TextBox_39] : [id number] [TextBox_41] : adenocarcinoma [TextBox_43] : 4/14/23 [] : This is not a second opinion [TextBox_5] : 5/2/23 [TextBox_40] : 4/8/23

## 2023-12-26 ENCOUNTER — APPOINTMENT (OUTPATIENT)
Dept: INFUSION THERAPY | Facility: HOSPITAL | Age: 68
End: 2023-12-26

## 2023-12-26 ENCOUNTER — APPOINTMENT (OUTPATIENT)
Dept: HEMATOLOGY ONCOLOGY | Facility: CLINIC | Age: 68
End: 2023-12-26

## 2023-12-26 ENCOUNTER — RESULT REVIEW (OUTPATIENT)
Age: 68
End: 2023-12-26

## 2023-12-26 ENCOUNTER — APPOINTMENT (OUTPATIENT)
Dept: GERIATRICS | Facility: CLINIC | Age: 68
End: 2023-12-26

## 2023-12-26 LAB
ALBUMIN SERPL ELPH-MCNC: 3.8 G/DL — SIGNIFICANT CHANGE UP (ref 3.3–5)
ALBUMIN SERPL ELPH-MCNC: 3.8 G/DL — SIGNIFICANT CHANGE UP (ref 3.3–5)
ALP SERPL-CCNC: 72 U/L — SIGNIFICANT CHANGE UP (ref 40–120)
ALP SERPL-CCNC: 72 U/L — SIGNIFICANT CHANGE UP (ref 40–120)
ALT FLD-CCNC: 15 U/L — SIGNIFICANT CHANGE UP (ref 10–45)
ALT FLD-CCNC: 15 U/L — SIGNIFICANT CHANGE UP (ref 10–45)
ANION GAP SERPL CALC-SCNC: 11 MMOL/L — SIGNIFICANT CHANGE UP (ref 5–17)
ANION GAP SERPL CALC-SCNC: 11 MMOL/L — SIGNIFICANT CHANGE UP (ref 5–17)
AST SERPL-CCNC: 22 U/L — SIGNIFICANT CHANGE UP (ref 10–40)
AST SERPL-CCNC: 22 U/L — SIGNIFICANT CHANGE UP (ref 10–40)
BASOPHILS # BLD AUTO: 0.04 K/UL — SIGNIFICANT CHANGE UP (ref 0–0.2)
BASOPHILS # BLD AUTO: 0.04 K/UL — SIGNIFICANT CHANGE UP (ref 0–0.2)
BASOPHILS NFR BLD AUTO: 0.6 % — SIGNIFICANT CHANGE UP (ref 0–2)
BASOPHILS NFR BLD AUTO: 0.6 % — SIGNIFICANT CHANGE UP (ref 0–2)
BILIRUB SERPL-MCNC: 0.2 MG/DL — SIGNIFICANT CHANGE UP (ref 0.2–1.2)
BILIRUB SERPL-MCNC: 0.2 MG/DL — SIGNIFICANT CHANGE UP (ref 0.2–1.2)
BUN SERPL-MCNC: 20 MG/DL — SIGNIFICANT CHANGE UP (ref 7–23)
BUN SERPL-MCNC: 20 MG/DL — SIGNIFICANT CHANGE UP (ref 7–23)
CALCIUM SERPL-MCNC: 9.3 MG/DL — SIGNIFICANT CHANGE UP (ref 8.4–10.5)
CALCIUM SERPL-MCNC: 9.3 MG/DL — SIGNIFICANT CHANGE UP (ref 8.4–10.5)
CHLORIDE SERPL-SCNC: 103 MMOL/L — SIGNIFICANT CHANGE UP (ref 96–108)
CHLORIDE SERPL-SCNC: 103 MMOL/L — SIGNIFICANT CHANGE UP (ref 96–108)
CO2 SERPL-SCNC: 26 MMOL/L — SIGNIFICANT CHANGE UP (ref 22–31)
CO2 SERPL-SCNC: 26 MMOL/L — SIGNIFICANT CHANGE UP (ref 22–31)
CREAT SERPL-MCNC: 0.77 MG/DL — SIGNIFICANT CHANGE UP (ref 0.5–1.3)
CREAT SERPL-MCNC: 0.77 MG/DL — SIGNIFICANT CHANGE UP (ref 0.5–1.3)
EGFR: 84 ML/MIN/1.73M2 — SIGNIFICANT CHANGE UP
EGFR: 84 ML/MIN/1.73M2 — SIGNIFICANT CHANGE UP
EOSINOPHIL # BLD AUTO: 0.16 K/UL — SIGNIFICANT CHANGE UP (ref 0–0.5)
EOSINOPHIL # BLD AUTO: 0.16 K/UL — SIGNIFICANT CHANGE UP (ref 0–0.5)
EOSINOPHIL NFR BLD AUTO: 2.4 % — SIGNIFICANT CHANGE UP (ref 0–6)
EOSINOPHIL NFR BLD AUTO: 2.4 % — SIGNIFICANT CHANGE UP (ref 0–6)
GLUCOSE SERPL-MCNC: 111 MG/DL — HIGH (ref 70–99)
GLUCOSE SERPL-MCNC: 111 MG/DL — HIGH (ref 70–99)
HCT VFR BLD CALC: 28.6 % — LOW (ref 34.5–45)
HCT VFR BLD CALC: 28.6 % — LOW (ref 34.5–45)
HGB BLD-MCNC: 9.4 G/DL — LOW (ref 11.5–15.5)
HGB BLD-MCNC: 9.4 G/DL — LOW (ref 11.5–15.5)
IMM GRANULOCYTES NFR BLD AUTO: 1.6 % — HIGH (ref 0–0.9)
IMM GRANULOCYTES NFR BLD AUTO: 1.6 % — HIGH (ref 0–0.9)
LYMPHOCYTES # BLD AUTO: 0.83 K/UL — LOW (ref 1–3.3)
LYMPHOCYTES # BLD AUTO: 0.83 K/UL — LOW (ref 1–3.3)
LYMPHOCYTES # BLD AUTO: 12.4 % — LOW (ref 13–44)
LYMPHOCYTES # BLD AUTO: 12.4 % — LOW (ref 13–44)
MCHC RBC-ENTMCNC: 27.6 PG — SIGNIFICANT CHANGE UP (ref 27–34)
MCHC RBC-ENTMCNC: 27.6 PG — SIGNIFICANT CHANGE UP (ref 27–34)
MCHC RBC-ENTMCNC: 32.9 G/DL — SIGNIFICANT CHANGE UP (ref 32–36)
MCHC RBC-ENTMCNC: 32.9 G/DL — SIGNIFICANT CHANGE UP (ref 32–36)
MCV RBC AUTO: 84.1 FL — SIGNIFICANT CHANGE UP (ref 80–100)
MCV RBC AUTO: 84.1 FL — SIGNIFICANT CHANGE UP (ref 80–100)
MONOCYTES # BLD AUTO: 0.6 K/UL — SIGNIFICANT CHANGE UP (ref 0–0.9)
MONOCYTES # BLD AUTO: 0.6 K/UL — SIGNIFICANT CHANGE UP (ref 0–0.9)
MONOCYTES NFR BLD AUTO: 9 % — SIGNIFICANT CHANGE UP (ref 2–14)
MONOCYTES NFR BLD AUTO: 9 % — SIGNIFICANT CHANGE UP (ref 2–14)
NEUTROPHILS # BLD AUTO: 4.96 K/UL — SIGNIFICANT CHANGE UP (ref 1.8–7.4)
NEUTROPHILS # BLD AUTO: 4.96 K/UL — SIGNIFICANT CHANGE UP (ref 1.8–7.4)
NEUTROPHILS NFR BLD AUTO: 74 % — SIGNIFICANT CHANGE UP (ref 43–77)
NEUTROPHILS NFR BLD AUTO: 74 % — SIGNIFICANT CHANGE UP (ref 43–77)
NRBC # BLD: 0 /100 WBCS — SIGNIFICANT CHANGE UP (ref 0–0)
NRBC # BLD: 0 /100 WBCS — SIGNIFICANT CHANGE UP (ref 0–0)
PLATELET # BLD AUTO: 403 K/UL — HIGH (ref 150–400)
PLATELET # BLD AUTO: 403 K/UL — HIGH (ref 150–400)
POTASSIUM SERPL-MCNC: 4.4 MMOL/L — SIGNIFICANT CHANGE UP (ref 3.5–5.3)
POTASSIUM SERPL-MCNC: 4.4 MMOL/L — SIGNIFICANT CHANGE UP (ref 3.5–5.3)
POTASSIUM SERPL-SCNC: 4.4 MMOL/L — SIGNIFICANT CHANGE UP (ref 3.5–5.3)
POTASSIUM SERPL-SCNC: 4.4 MMOL/L — SIGNIFICANT CHANGE UP (ref 3.5–5.3)
PROT SERPL-MCNC: 6.5 G/DL — SIGNIFICANT CHANGE UP (ref 6–8.3)
PROT SERPL-MCNC: 6.5 G/DL — SIGNIFICANT CHANGE UP (ref 6–8.3)
RBC # BLD: 3.4 M/UL — LOW (ref 3.8–5.2)
RBC # BLD: 3.4 M/UL — LOW (ref 3.8–5.2)
RBC # FLD: 21 % — HIGH (ref 10.3–14.5)
RBC # FLD: 21 % — HIGH (ref 10.3–14.5)
SODIUM SERPL-SCNC: 139 MMOL/L — SIGNIFICANT CHANGE UP (ref 135–145)
SODIUM SERPL-SCNC: 139 MMOL/L — SIGNIFICANT CHANGE UP (ref 135–145)
WBC # BLD: 6.7 K/UL — SIGNIFICANT CHANGE UP (ref 3.8–10.5)
WBC # BLD: 6.7 K/UL — SIGNIFICANT CHANGE UP (ref 3.8–10.5)
WBC # FLD AUTO: 6.7 K/UL — SIGNIFICANT CHANGE UP (ref 3.8–10.5)
WBC # FLD AUTO: 6.7 K/UL — SIGNIFICANT CHANGE UP (ref 3.8–10.5)

## 2023-12-27 DIAGNOSIS — R11.2 NAUSEA WITH VOMITING, UNSPECIFIED: ICD-10-CM

## 2023-12-27 DIAGNOSIS — C25.9 MALIGNANT NEOPLASM OF PANCREAS, UNSPECIFIED: ICD-10-CM

## 2023-12-27 DIAGNOSIS — Z51.11 ENCOUNTER FOR ANTINEOPLASTIC CHEMOTHERAPY: ICD-10-CM

## 2023-12-28 NOTE — DATA REVIEWED
[FreeTextEntry1] : CT CAP (09/02/2023) INTERPRETATION:  CLINICAL INFORMATION: Pancreatic tumor and kidney mass.  ADDITIONAL CLINICAL INFORMATION: Malignant neoplasm of kidney C64.9  COMPARISON: 4/8/2023  CONTRAST/COMPLICATIONS: IV Contrast: Omnipaque 350 (accession [id number]), IV contrast documented in unlinked concurrent exam (accession [id number])  90 cc administered   10 cc discarded Oral Contrast: Water Complications: None reported at time of study completion  PROCEDURE: CT of the Chest, Abdomen and Pelvis was performed. Precontrast, Arterial and Delayed phases were acquired though the abdomen. Sagittal and coronal reformats were performed.  FINDINGS: CHEST: LUNGS AND LARGE AIRWAYS: Patent central airways. Emphysema. No suspicious pulmonary nodule. PLEURA: No pleural effusion. VESSELS: Within normal limits. HEART: Heart size is normal. No pericardial effusion. MEDIASTINUM AND GUIDO: No lymphadenopathy. CHEST WALL AND LOWER NECK: Right chest wall port with catheter tip terminating in the SVC.  ABDOMEN AND PELVIS: LIVER: Within normal limits. BILE DUCTS: Normal caliber. GALLBLADDER: Within normal limits. SPLEEN: Within normal limits. PANCREAS: Interval decrease in overall size of pancreas body mass measuring 4.0 x 2.0 cm, previously 5.0 x 2.9 cm. However, there is increased soft tissue encasing the celiac artery and SMA (604, 41). 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 Upstream pancreatic ductal dilatation and parenchymal atrophy is unchanged ADRENALS: Within normal limits. KIDNEYS/URETERS: Enhancing solid cystic mass of the interpolar left kidney is mildly increased in size measuring 4.6 x 3.6 cm, previously 4.4 x 3.2 cm.  BLADDER: Within normal limits. REPRODUCTIVE ORGANS: Small uterine fibroids.  BOWEL: No bowel obstruction. Appendix is normal. PERITONEUM: No ascites. VESSELS: Within normal limits. RETROPERITONEUM/LYMPH NODES: No lymphadenopathy. ABDOMINAL WALL: Within normal limits. BONES: Degenerative changes of the spine.  IMPRESSION: 1.  Compared to 4/8/2023, interval decrease in overall size of the pancreatic body mass, however increased soft tissue encasement of the celiac artery and SMA. 2.  Mild increase in size of enhancing solid and cystic left renal mass suspicious for renal cell carcinoma. 3.  No evidence of metastatic disease.   --- End of Report ---

## 2023-12-28 NOTE — PHYSICAL EXAM
[General Appearance - Alert] : alert [General Appearance - In No Acute Distress] : in no acute distress [General Appearance - Well Nourished] : well nourished [General Appearance - Well Developed] : well developed [Sclera] : the sclera and conjunctiva were normal [Outer Ear] : the ears and nose were normal in appearance [Hearing Threshold Finger Rub Not [place]] : hearing was normal [Examination Of The Oral Cavity] : the lips and gums were normal [Neck Appearance] : the appearance of the neck was normal [Respiration, Rhythm And Depth] : normal respiratory rhythm and effort [Auscultation Breath Sounds / Voice Sounds] : lungs were clear to auscultation bilaterally [Heart Rate And Rhythm] : heart rate was normal and rhythm regular [Heart Sounds] : normal S1 and S2 [Edema] : there was no peripheral edema [Bowel Sounds] : normal bowel sounds [Abdomen Soft] : soft [Abdomen Tenderness] : non-tender [Abnormal Walk] : normal gait [] : no rash [No Focal Deficits] : no focal deficits [Oriented To Time, Place, And Person] : oriented to person, place, and time [Affect] : the affect was normal [Mood] : the mood was normal [FreeTextEntry1] : HR 96 on auscultation

## 2023-12-28 NOTE — ASSESSMENT
[______] : HCP: [unfilled] [FreeTextEntry1] : Adenocarcinoma of pancreas (157.9) (C25.9) Encounter for palliative care (V66.7) (Z51.5) Neoplasm related pain (338.3) (G89.3) Unintentional weight loss (783.21) (R63.4) Sleep disturbances (780.50) (G47.9) 67yoF with:  # Pancreatic cancer - On neoadjuvant [place]/Abraxane. Med onc follow up.  # Pain 2/2 Neoplasm - much improved after celiac plexus block. Pt not requiring analgesics presently. - Recommend heat application to her shoulder. May use PRN Tylenol. Will follow up on results of CT scan.  # Nausea - C/w Metoclopramide PRN. Discussed how medical cannabis may be beneficial.  # Unintentional weight loss - Recommend small, frequent meals focused on high-calorie, high-protein, nutrient dense healthy foods.  # Diarrhea - Improved; C/w binding foods. May benefit from PRN Imodium following treatment.  # Sleep disturbances - C/w melatonin. Discussed the importance of good sleep hygiene. -Medical cannabis certification completed previously, discussed how it could be beneficial to assist with sleep.  # Encounter for palliative care- Emotional support provided Explained the role of palliative care in enhancing quality of life in the setting of serious illness. Health Care Proxy (HCP) form completed in office today after explanation of the role of a HCP.  Follow up in 1 month, call sooner with questions or issues.     Advance Directives: HCP: Stephen Chen ([phone]), Jaiden Chen ([phone]).

## 2023-12-28 NOTE — HISTORY OF PRESENT ILLNESS
[FreeTextEntry1] : 67yoF with Pancreatic cancer presents for follow up palliative care visit, referred by oncology. PMH significant for DM2, HTN and substance abuse.   Pt developed abdominal pain 3/2023, imaging performed 04/04/2023 showed a 3cm mass in the body of the pancreas; There was a mass encasing the celiac axis. She was admitted to [facility] on 4/8/23, EUS bx on 4/14/23 showed adenocarcinoma in the body of the pancreas. There was the presence of a L renal mass not biopsied and thought to be a second primary disease. The consensus opinion at the Multidisciplinary Pancreas Cancer Conference was to defer surgery and to offer chemotherapy.  History of diabetes for ten years; she is currently not on insulin and she has recently begun treatment with metformin. THere is no history of jaundice or acholic stolol.   Main reason for which patient is referred is symptom management. Patient underwent celiac plexus nerve block on 5/16/23 which was successful, she is experiencing significantly less pain now, not using any analgesics.  Her appetite has been lower, she is eating smaller portions of food more frequently throughout the day.  Is interested in utilizing medicinal cannabis.   Interval history (8/29/23):  Patient seen for follow up visit. Treatment remains [place]/Abraxane, tolerating it well. She will have an upcoming CT scan this Friday to evaluate treatment response.  Abdominal pain has resolved following celiac plexus block. She is no longer requiring analgesics. She reports new intermittent pain to her upper left chest area and shoulder. She describes this as a muscular pain, as if she slept wrong on her left shoulder. This pain can reach 6/10 however resolves quickly. This has been stable since it began last week and typically occurs once a day. She does not associate any precipitating factors but experienced it during the visit while reaching her arm out to take water. Additionally, she reports intermittent twinges of "lightening" pain, location varies throughout her body. She reports numbness and tingling to her left arm that comes and goes, as well as persistent numbness and tingling to her bilateral feet.  Appetite is good, she is eating well despite a recent 4lb weight loss. Nausea controlled with PRN metoclopramide daily. Moving her bowels every other day, which is her baseline. She reports ongoing dyspnea on exertion, this remains stable and resolves quickly with rest. Ambulates with a cane. Intermittent difficultly sleeping, she reports she is a light sleeper. Mood is good, she maintains a positive outlook.   ROS: + weight loss of about 20lbs from her baseline, additional 4lbs past 2 weeks + appetite has been improving + fatigue + nausea - uses metoclopramide once daily + mood has been good, tries to stay positive  Denies trouble sleeping, constipation.  Uses a cane for assistance with ambulation due to feeling fatigued and imbalanced.   Patient is , has two adult sons who live with her. She drives herself, apartments in her home. Patient is independent in ADL. Patient reports he is in AA and find the support there helpful. She enjoys sculpting, reading and recently started learning how to play the guitar. She is retired from her job as an .   PCP: Dr. Suzy Bartlett  I-STOP Ref#:  [id number]

## 2023-12-29 NOTE — PHYSICAL EXAM
[General Appearance - Alert] : alert [General Appearance - In No Acute Distress] : in no acute distress [General Appearance - Well Developed] : well developed [Sclera] : the sclera and conjunctiva were normal [Normal Oral Mucosa] : normal oral mucosa [Outer Ear] : the ears and nose were normal in appearance [Hearing Threshold Finger Rub Not [place]] : hearing was normal [Examination Of The Oral Cavity] : the lips and gums were normal [Neck Appearance] : the appearance of the neck was normal [] : no respiratory distress [Respiration, Rhythm And Depth] : normal respiratory rhythm and effort [Auscultation Breath Sounds / Voice Sounds] : lungs were clear to auscultation bilaterally [Heart Rate And Rhythm] : heart rate was normal and rhythm regular [Heart Sounds] : normal S1 and S2 [Edema] : there was no peripheral edema [Bowel Sounds] : normal bowel sounds [Abdomen Soft] : soft [Abdomen Tenderness] : non-tender [Abnormal Walk] : normal gait [Skin Color & Pigmentation] : normal skin color and pigmentation [No Focal Deficits] : no focal deficits [Oriented To Time, Place, And Person] : oriented to person, place, and time [Affect] : the affect was normal [Mood] : the mood was normal

## 2024-01-02 ENCOUNTER — RESULT REVIEW (OUTPATIENT)
Age: 69
End: 2024-01-02

## 2024-01-02 ENCOUNTER — APPOINTMENT (OUTPATIENT)
Dept: INFUSION THERAPY | Facility: HOSPITAL | Age: 69
End: 2024-01-02

## 2024-01-02 ENCOUNTER — APPOINTMENT (OUTPATIENT)
Dept: GERIATRICS | Facility: CLINIC | Age: 69
End: 2024-01-02
Payer: MEDICARE

## 2024-01-02 ENCOUNTER — APPOINTMENT (OUTPATIENT)
Dept: HEMATOLOGY ONCOLOGY | Facility: CLINIC | Age: 69
End: 2024-01-02
Payer: MEDICARE

## 2024-01-02 LAB
ALBUMIN SERPL ELPH-MCNC: 3.9 G/DL — SIGNIFICANT CHANGE UP (ref 3.3–5)
ALBUMIN SERPL ELPH-MCNC: 3.9 G/DL — SIGNIFICANT CHANGE UP (ref 3.3–5)
ALP SERPL-CCNC: 74 U/L — SIGNIFICANT CHANGE UP (ref 40–120)
ALP SERPL-CCNC: 74 U/L — SIGNIFICANT CHANGE UP (ref 40–120)
ALT FLD-CCNC: 23 U/L — SIGNIFICANT CHANGE UP (ref 10–45)
ALT FLD-CCNC: 23 U/L — SIGNIFICANT CHANGE UP (ref 10–45)
ANION GAP SERPL CALC-SCNC: 12 MMOL/L — SIGNIFICANT CHANGE UP (ref 5–17)
ANION GAP SERPL CALC-SCNC: 12 MMOL/L — SIGNIFICANT CHANGE UP (ref 5–17)
AST SERPL-CCNC: 30 U/L — SIGNIFICANT CHANGE UP (ref 10–40)
AST SERPL-CCNC: 30 U/L — SIGNIFICANT CHANGE UP (ref 10–40)
BASOPHILS # BLD AUTO: 0.05 K/UL — SIGNIFICANT CHANGE UP (ref 0–0.2)
BASOPHILS # BLD AUTO: 0.05 K/UL — SIGNIFICANT CHANGE UP (ref 0–0.2)
BASOPHILS NFR BLD AUTO: 1.2 % — SIGNIFICANT CHANGE UP (ref 0–2)
BASOPHILS NFR BLD AUTO: 1.2 % — SIGNIFICANT CHANGE UP (ref 0–2)
BILIRUB SERPL-MCNC: 0.2 MG/DL — SIGNIFICANT CHANGE UP (ref 0.2–1.2)
BILIRUB SERPL-MCNC: 0.2 MG/DL — SIGNIFICANT CHANGE UP (ref 0.2–1.2)
BUN SERPL-MCNC: 20 MG/DL — SIGNIFICANT CHANGE UP (ref 7–23)
BUN SERPL-MCNC: 20 MG/DL — SIGNIFICANT CHANGE UP (ref 7–23)
CALCIUM SERPL-MCNC: 9.3 MG/DL — SIGNIFICANT CHANGE UP (ref 8.4–10.5)
CALCIUM SERPL-MCNC: 9.3 MG/DL — SIGNIFICANT CHANGE UP (ref 8.4–10.5)
CHLORIDE SERPL-SCNC: 102 MMOL/L — SIGNIFICANT CHANGE UP (ref 96–108)
CHLORIDE SERPL-SCNC: 102 MMOL/L — SIGNIFICANT CHANGE UP (ref 96–108)
CO2 SERPL-SCNC: 24 MMOL/L — SIGNIFICANT CHANGE UP (ref 22–31)
CO2 SERPL-SCNC: 24 MMOL/L — SIGNIFICANT CHANGE UP (ref 22–31)
CREAT SERPL-MCNC: 0.75 MG/DL — SIGNIFICANT CHANGE UP (ref 0.5–1.3)
CREAT SERPL-MCNC: 0.75 MG/DL — SIGNIFICANT CHANGE UP (ref 0.5–1.3)
EGFR: 87 ML/MIN/1.73M2 — SIGNIFICANT CHANGE UP
EGFR: 87 ML/MIN/1.73M2 — SIGNIFICANT CHANGE UP
EOSINOPHIL # BLD AUTO: 0.03 K/UL — SIGNIFICANT CHANGE UP (ref 0–0.5)
EOSINOPHIL # BLD AUTO: 0.03 K/UL — SIGNIFICANT CHANGE UP (ref 0–0.5)
EOSINOPHIL NFR BLD AUTO: 0.7 % — SIGNIFICANT CHANGE UP (ref 0–6)
EOSINOPHIL NFR BLD AUTO: 0.7 % — SIGNIFICANT CHANGE UP (ref 0–6)
GLUCOSE SERPL-MCNC: 154 MG/DL — HIGH (ref 70–99)
GLUCOSE SERPL-MCNC: 154 MG/DL — HIGH (ref 70–99)
HCT VFR BLD CALC: 28.1 % — LOW (ref 34.5–45)
HCT VFR BLD CALC: 28.1 % — LOW (ref 34.5–45)
HGB BLD-MCNC: 9.3 G/DL — LOW (ref 11.5–15.5)
HGB BLD-MCNC: 9.3 G/DL — LOW (ref 11.5–15.5)
IMM GRANULOCYTES NFR BLD AUTO: 2 % — HIGH (ref 0–0.9)
IMM GRANULOCYTES NFR BLD AUTO: 2 % — HIGH (ref 0–0.9)
LYMPHOCYTES # BLD AUTO: 1.19 K/UL — SIGNIFICANT CHANGE UP (ref 1–3.3)
LYMPHOCYTES # BLD AUTO: 1.19 K/UL — SIGNIFICANT CHANGE UP (ref 1–3.3)
LYMPHOCYTES # BLD AUTO: 29.3 % — SIGNIFICANT CHANGE UP (ref 13–44)
LYMPHOCYTES # BLD AUTO: 29.3 % — SIGNIFICANT CHANGE UP (ref 13–44)
MCHC RBC-ENTMCNC: 27.7 PG — SIGNIFICANT CHANGE UP (ref 27–34)
MCHC RBC-ENTMCNC: 27.7 PG — SIGNIFICANT CHANGE UP (ref 27–34)
MCHC RBC-ENTMCNC: 33.1 G/DL — SIGNIFICANT CHANGE UP (ref 32–36)
MCHC RBC-ENTMCNC: 33.1 G/DL — SIGNIFICANT CHANGE UP (ref 32–36)
MCV RBC AUTO: 83.6 FL — SIGNIFICANT CHANGE UP (ref 80–100)
MCV RBC AUTO: 83.6 FL — SIGNIFICANT CHANGE UP (ref 80–100)
MONOCYTES # BLD AUTO: 0.43 K/UL — SIGNIFICANT CHANGE UP (ref 0–0.9)
MONOCYTES # BLD AUTO: 0.43 K/UL — SIGNIFICANT CHANGE UP (ref 0–0.9)
MONOCYTES NFR BLD AUTO: 10.6 % — SIGNIFICANT CHANGE UP (ref 2–14)
MONOCYTES NFR BLD AUTO: 10.6 % — SIGNIFICANT CHANGE UP (ref 2–14)
NEUTROPHILS # BLD AUTO: 2.28 K/UL — SIGNIFICANT CHANGE UP (ref 1.8–7.4)
NEUTROPHILS # BLD AUTO: 2.28 K/UL — SIGNIFICANT CHANGE UP (ref 1.8–7.4)
NEUTROPHILS NFR BLD AUTO: 56.2 % — SIGNIFICANT CHANGE UP (ref 43–77)
NEUTROPHILS NFR BLD AUTO: 56.2 % — SIGNIFICANT CHANGE UP (ref 43–77)
NRBC # BLD: 0 /100 WBCS — SIGNIFICANT CHANGE UP (ref 0–0)
NRBC # BLD: 0 /100 WBCS — SIGNIFICANT CHANGE UP (ref 0–0)
PLATELET # BLD AUTO: 377 K/UL — SIGNIFICANT CHANGE UP (ref 150–400)
PLATELET # BLD AUTO: 377 K/UL — SIGNIFICANT CHANGE UP (ref 150–400)
POTASSIUM SERPL-MCNC: 3.8 MMOL/L — SIGNIFICANT CHANGE UP (ref 3.5–5.3)
POTASSIUM SERPL-MCNC: 3.8 MMOL/L — SIGNIFICANT CHANGE UP (ref 3.5–5.3)
POTASSIUM SERPL-SCNC: 3.8 MMOL/L — SIGNIFICANT CHANGE UP (ref 3.5–5.3)
POTASSIUM SERPL-SCNC: 3.8 MMOL/L — SIGNIFICANT CHANGE UP (ref 3.5–5.3)
PROT SERPL-MCNC: 6.4 G/DL — SIGNIFICANT CHANGE UP (ref 6–8.3)
PROT SERPL-MCNC: 6.4 G/DL — SIGNIFICANT CHANGE UP (ref 6–8.3)
RBC # BLD: 3.36 M/UL — LOW (ref 3.8–5.2)
RBC # BLD: 3.36 M/UL — LOW (ref 3.8–5.2)
RBC # FLD: 20.6 % — HIGH (ref 10.3–14.5)
RBC # FLD: 20.6 % — HIGH (ref 10.3–14.5)
SODIUM SERPL-SCNC: 138 MMOL/L — SIGNIFICANT CHANGE UP (ref 135–145)
SODIUM SERPL-SCNC: 138 MMOL/L — SIGNIFICANT CHANGE UP (ref 135–145)
WBC # BLD: 4.06 K/UL — SIGNIFICANT CHANGE UP (ref 3.8–10.5)
WBC # BLD: 4.06 K/UL — SIGNIFICANT CHANGE UP (ref 3.8–10.5)
WBC # FLD AUTO: 4.06 K/UL — SIGNIFICANT CHANGE UP (ref 3.8–10.5)
WBC # FLD AUTO: 4.06 K/UL — SIGNIFICANT CHANGE UP (ref 3.8–10.5)

## 2024-01-02 PROCEDURE — 99215 OFFICE O/P EST HI 40 MIN: CPT

## 2024-01-03 NOTE — HISTORY OF PRESENT ILLNESS
[FreeTextEntry1] : 67yoF with Pancreatic cancer presents for follow up palliative care visit, referred by oncology. PMH significant for DM2, HTN and substance abuse.   Pt developed abdominal pain 3/2023, imaging performed 04/04/2023 showed a 3cm mass in the body of the pancreas; There was a mass encasing the celiac axis. She was admitted to [facility] on 4/8/23, EUS bx on 4/14/23 showed adenocarcinoma in the body of the pancreas. There was the presence of a L renal mass not biopsied and thought to be a second primary disease. The consensus opinion at the Multidisciplinary Pancreas Cancer Conference was to defer surgery and to offer chemotherapy.  History of diabetes for ten years; she is currently not on insulin and she has recently begun treatment with metformin. THere is no history of jaundice or acholic stolol.   Main reason for which patient is referred is symptom management. Patient underwent celiac plexus nerve block on 5/16/23 which was successful, she is experiencing significantly less pain now, not using any analgesics.  Her appetite has been lower, she is eating smaller portions of food more frequently throughout the day.  Is interested in utilizing medicinal cannabis.   Interval history (1/2/24):  Patient seen for follow up visit in treatment room. Treatment remains [place]/Abraxane, tolerating it well.  She is due for a CT scan; she is planned for surgery to resect of left renal mass.  Intermittent frontal headaches which pass on their own.  Abdominal pain has resolved following celiac plexus block. She is no longer requiring analgesics.  Appetite is good. Eating well without nausea or vomiting.  Peripheral neuropathy to her toes; she reports this is tolerable and does not interfere with ambulation.   She reports ongoing dyspnea on exertion, this remains stable and resolves quickly with rest. Ambulates with a cane.  Intermittent difficultly sleeping, she reports she is a light sleeper. She uses PRN melatonin 5mg QHS which is beneficial. Mood is good, she maintains a positive outlook.   ROS: + weight loss of about 20lbs from her baseline; stable for the past few months + appetite has been improving + fatigue + nausea - PRN metoclopramide; no complaints + mood has been good, tries to stay positive  + trouble sleeping Denies constipation.  Uses a cane for assistance with ambulation due to feeling fatigued and imbalanced.   Patient is , has two adult sons who live with her. She drives herself, apartments in her home. Patient is independent in ADL. Patient reports he is in AA and find the support there helpful. She enjoys sculpting, reading and recently started learning how to play the guitar. She is retired from her job as an .   PCP: Dr. Suzy Bartlett  I-STOP Ref#:  [id number]

## 2024-01-03 NOTE — ASSESSMENT
[______] : HCP: [unfilled] [FreeTextEntry1] : # Pancreatic cancer - On neoadjuvant [place]/Abraxane. Med onc follow up.  # Left renal mass - Being evaluated for resection. Urology follow up.   # Pain 2/2 Neoplasm - much improved after celiac plexus block. Pt not requiring analgesics presently. Should pain return may use PRN Tylenol.   # Peripheral neuropathy - Discussed options of interventions to offer relief. Patient wishes to hold off on interventions at this time as symptoms are minimal.   # Sleep disturbances - C/w melatonin. Discussed the importance of good sleep hygiene. -Medical cannabis certification completed previously, discussed how it could be beneficial to assist with sleep.  # Nausea - Improved; C/w Metoclopramide PRN. Discussed how medical cannabis may be beneficial.  # Unintentional weight loss - Improved; Recommend small, frequent meals focused on high-calorie, high-protein, nutrient dense healthy foods.  # Diarrhea - Improved; C/w binding foods. May benefit from PRN Imodium following treatment.  # Encounter for palliative care- Emotional support provided Explained the role of palliative care in enhancing quality of life in the setting of serious illness. Health Care Proxy (HCP) form on file.  Follow up in 1 month, call sooner with questions or issues.

## 2024-01-03 NOTE — DATA REVIEWED
[FreeTextEntry1] : CT CAP (09/02/2023)  COMPARISON: 4/8/2023  FINDINGS: CHEST: LUNGS AND LARGE AIRWAYS: Patent central airways. Emphysema. No suspicious pulmonary nodule. PLEURA: No pleural effusion. VESSELS: Within normal limits. HEART: Heart size is normal. No pericardial effusion. MEDIASTINUM AND GUIDO: No lymphadenopathy. CHEST WALL AND LOWER NECK: Right chest wall port with catheter tip terminating in the SVC.  ABDOMEN AND PELVIS: LIVER: Within normal limits. BILE DUCTS: Normal caliber. GALLBLADDER: Within normal limits. SPLEEN: Within normal limits. PANCREAS: Interval decrease in overall size of pancreas body mass measuring 4.0 x 2.0 cm, previously 5.0 x 2.9 cm. However, there is increased soft tissue encasing the celiac artery and SMA (604, 41). 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 Upstream pancreatic ductal dilatation and parenchymal atrophy is unchanged ADRENALS: Within normal limits. KIDNEYS/URETERS: Enhancing solid cystic mass of the interpolar left kidney is mildly increased in size measuring 4.6 x 3.6 cm, previously 4.4 x 3.2 cm.  BLADDER: Within normal limits. REPRODUCTIVE ORGANS: Small uterine fibroids.  BOWEL: No bowel obstruction. Appendix is normal. PERITONEUM: No ascites. VESSELS: Within normal limits. RETROPERITONEUM/LYMPH NODES: No lymphadenopathy. ABDOMINAL WALL: Within normal limits. BONES: Degenerative changes of the spine.  IMPRESSION: 1.  Compared to 4/8/2023, interval decrease in overall size of the pancreatic body mass, however increased soft tissue encasement of the celiac artery and SMA. 2.  Mild increase in size of enhancing solid and cystic left renal mass suspicious for renal cell carcinoma. 3.  No evidence of metastatic disease.

## 2024-01-09 ENCOUNTER — RESULT REVIEW (OUTPATIENT)
Age: 69
End: 2024-01-09

## 2024-01-09 ENCOUNTER — NON-APPOINTMENT (OUTPATIENT)
Age: 69
End: 2024-01-09

## 2024-01-09 ENCOUNTER — APPOINTMENT (OUTPATIENT)
Dept: INFUSION THERAPY | Facility: HOSPITAL | Age: 69
End: 2024-01-09

## 2024-01-09 ENCOUNTER — APPOINTMENT (OUTPATIENT)
Dept: HEMATOLOGY ONCOLOGY | Facility: CLINIC | Age: 69
End: 2024-01-09

## 2024-01-09 ENCOUNTER — APPOINTMENT (OUTPATIENT)
Dept: CT IMAGING | Facility: IMAGING CENTER | Age: 69
End: 2024-01-09

## 2024-01-09 LAB
ANISOCYTOSIS BLD QL: SIGNIFICANT CHANGE UP
ANISOCYTOSIS BLD QL: SIGNIFICANT CHANGE UP
BASOPHILS # BLD AUTO: 0.02 K/UL — SIGNIFICANT CHANGE UP (ref 0–0.2)
BASOPHILS # BLD AUTO: 0.02 K/UL — SIGNIFICANT CHANGE UP (ref 0–0.2)
BASOPHILS NFR BLD AUTO: 1.1 % — SIGNIFICANT CHANGE UP (ref 0–2)
BASOPHILS NFR BLD AUTO: 1.1 % — SIGNIFICANT CHANGE UP (ref 0–2)
DACRYOCYTES BLD QL SMEAR: SLIGHT — SIGNIFICANT CHANGE UP
DACRYOCYTES BLD QL SMEAR: SLIGHT — SIGNIFICANT CHANGE UP
EOSINOPHIL # BLD AUTO: 0.02 K/UL — SIGNIFICANT CHANGE UP (ref 0–0.5)
EOSINOPHIL # BLD AUTO: 0.02 K/UL — SIGNIFICANT CHANGE UP (ref 0–0.5)
EOSINOPHIL NFR BLD AUTO: 1.1 % — SIGNIFICANT CHANGE UP (ref 0–6)
EOSINOPHIL NFR BLD AUTO: 1.1 % — SIGNIFICANT CHANGE UP (ref 0–6)
HCT VFR BLD CALC: 27.5 % — LOW (ref 34.5–45)
HCT VFR BLD CALC: 27.5 % — LOW (ref 34.5–45)
HGB BLD-MCNC: 9.1 G/DL — LOW (ref 11.5–15.5)
HGB BLD-MCNC: 9.1 G/DL — LOW (ref 11.5–15.5)
HYPOCHROMIA BLD QL: SLIGHT — SIGNIFICANT CHANGE UP
HYPOCHROMIA BLD QL: SLIGHT — SIGNIFICANT CHANGE UP
IMM GRANULOCYTES NFR BLD AUTO: 1.1 % — HIGH (ref 0–0.9)
IMM GRANULOCYTES NFR BLD AUTO: 1.1 % — HIGH (ref 0–0.9)
LYMPHOCYTES # BLD AUTO: 0.87 K/UL — LOW (ref 1–3.3)
LYMPHOCYTES # BLD AUTO: 0.87 K/UL — LOW (ref 1–3.3)
LYMPHOCYTES # BLD AUTO: 49.4 % — HIGH (ref 13–44)
LYMPHOCYTES # BLD AUTO: 49.4 % — HIGH (ref 13–44)
MCHC RBC-ENTMCNC: 28 PG — SIGNIFICANT CHANGE UP (ref 27–34)
MCHC RBC-ENTMCNC: 28 PG — SIGNIFICANT CHANGE UP (ref 27–34)
MCHC RBC-ENTMCNC: 33.1 G/DL — SIGNIFICANT CHANGE UP (ref 32–36)
MCHC RBC-ENTMCNC: 33.1 G/DL — SIGNIFICANT CHANGE UP (ref 32–36)
MCV RBC AUTO: 84.6 FL — SIGNIFICANT CHANGE UP (ref 80–100)
MCV RBC AUTO: 84.6 FL — SIGNIFICANT CHANGE UP (ref 80–100)
MONOCYTES # BLD AUTO: 0.17 K/UL — SIGNIFICANT CHANGE UP (ref 0–0.9)
MONOCYTES # BLD AUTO: 0.17 K/UL — SIGNIFICANT CHANGE UP (ref 0–0.9)
MONOCYTES NFR BLD AUTO: 9.7 % — SIGNIFICANT CHANGE UP (ref 2–14)
MONOCYTES NFR BLD AUTO: 9.7 % — SIGNIFICANT CHANGE UP (ref 2–14)
NEUTROPHILS # BLD AUTO: 0.66 K/UL — LOW (ref 1.8–7.4)
NEUTROPHILS # BLD AUTO: 0.66 K/UL — LOW (ref 1.8–7.4)
NEUTROPHILS NFR BLD AUTO: 37.6 % — LOW (ref 43–77)
NEUTROPHILS NFR BLD AUTO: 37.6 % — LOW (ref 43–77)
NRBC # BLD: 0 /100 WBCS — SIGNIFICANT CHANGE UP (ref 0–0)
NRBC # BLD: 0 /100 WBCS — SIGNIFICANT CHANGE UP (ref 0–0)
PLAT MORPH BLD: NORMAL — SIGNIFICANT CHANGE UP
PLAT MORPH BLD: NORMAL — SIGNIFICANT CHANGE UP
PLATELET # BLD AUTO: 198 K/UL — SIGNIFICANT CHANGE UP (ref 150–400)
PLATELET # BLD AUTO: 198 K/UL — SIGNIFICANT CHANGE UP (ref 150–400)
POIKILOCYTOSIS BLD QL AUTO: SLIGHT — SIGNIFICANT CHANGE UP
POIKILOCYTOSIS BLD QL AUTO: SLIGHT — SIGNIFICANT CHANGE UP
RBC # BLD: 3.25 M/UL — LOW (ref 3.8–5.2)
RBC # BLD: 3.25 M/UL — LOW (ref 3.8–5.2)
RBC # FLD: 20.8 % — HIGH (ref 10.3–14.5)
RBC # FLD: 20.8 % — HIGH (ref 10.3–14.5)
RBC BLD AUTO: ABNORMAL
RBC BLD AUTO: ABNORMAL
SCHISTOCYTES BLD QL AUTO: SLIGHT — SIGNIFICANT CHANGE UP
SCHISTOCYTES BLD QL AUTO: SLIGHT — SIGNIFICANT CHANGE UP
WBC # BLD: 1.76 K/UL — LOW (ref 3.8–10.5)
WBC # BLD: 1.76 K/UL — LOW (ref 3.8–10.5)
WBC # FLD AUTO: 1.76 K/UL — LOW (ref 3.8–10.5)
WBC # FLD AUTO: 1.76 K/UL — LOW (ref 3.8–10.5)

## 2024-01-10 ENCOUNTER — NON-APPOINTMENT (OUTPATIENT)
Age: 69
End: 2024-01-10

## 2024-01-10 ENCOUNTER — EMERGENCY (EMERGENCY)
Facility: HOSPITAL | Age: 69
LOS: 1 days | Discharge: HOME | End: 2024-01-10
Attending: STUDENT IN AN ORGANIZED HEALTH CARE EDUCATION/TRAINING PROGRAM
Payer: COMMERCIAL

## 2024-01-10 VITALS
RESPIRATION RATE: 17 BRPM | WEIGHT: 156.75 LBS | DIASTOLIC BLOOD PRESSURE: 93 MMHG | SYSTOLIC BLOOD PRESSURE: 182 MMHG | OXYGEN SATURATION: 95 % | TEMPERATURE: 98 F | HEART RATE: 96 BPM | HEIGHT: 63.98 IN

## 2024-01-10 VITALS
DIASTOLIC BLOOD PRESSURE: 89 MMHG | HEART RATE: 74 BPM | RESPIRATION RATE: 18 BRPM | TEMPERATURE: 99 F | OXYGEN SATURATION: 98 % | SYSTOLIC BLOOD PRESSURE: 150 MMHG

## 2024-01-10 DIAGNOSIS — Z96.652 PRESENCE OF LEFT ARTIFICIAL KNEE JOINT: Chronic | ICD-10-CM

## 2024-01-10 LAB
ALBUMIN SERPL ELPH-MCNC: 3.2 G/DL — LOW (ref 3.5–5)
ALBUMIN SERPL ELPH-MCNC: 3.2 G/DL — LOW (ref 3.5–5)
ALP SERPL-CCNC: 78 U/L — SIGNIFICANT CHANGE UP (ref 40–120)
ALP SERPL-CCNC: 78 U/L — SIGNIFICANT CHANGE UP (ref 40–120)
ALT FLD-CCNC: 28 U/L DA — SIGNIFICANT CHANGE UP (ref 10–60)
ALT FLD-CCNC: 28 U/L DA — SIGNIFICANT CHANGE UP (ref 10–60)
ANION GAP SERPL CALC-SCNC: 4 MMOL/L — LOW (ref 5–17)
ANION GAP SERPL CALC-SCNC: 4 MMOL/L — LOW (ref 5–17)
ANISOCYTOSIS BLD QL: SLIGHT — SIGNIFICANT CHANGE UP
ANISOCYTOSIS BLD QL: SLIGHT — SIGNIFICANT CHANGE UP
AST SERPL-CCNC: 21 U/L — SIGNIFICANT CHANGE UP (ref 10–40)
AST SERPL-CCNC: 21 U/L — SIGNIFICANT CHANGE UP (ref 10–40)
BASOPHILS # BLD AUTO: 0 K/UL — SIGNIFICANT CHANGE UP (ref 0–0.2)
BASOPHILS # BLD AUTO: 0 K/UL — SIGNIFICANT CHANGE UP (ref 0–0.2)
BASOPHILS NFR BLD AUTO: 0 % — SIGNIFICANT CHANGE UP (ref 0–2)
BASOPHILS NFR BLD AUTO: 0 % — SIGNIFICANT CHANGE UP (ref 0–2)
BILIRUB SERPL-MCNC: 0.3 MG/DL — SIGNIFICANT CHANGE UP (ref 0.2–1.2)
BILIRUB SERPL-MCNC: 0.3 MG/DL — SIGNIFICANT CHANGE UP (ref 0.2–1.2)
BUN SERPL-MCNC: 15 MG/DL — SIGNIFICANT CHANGE UP (ref 7–18)
BUN SERPL-MCNC: 15 MG/DL — SIGNIFICANT CHANGE UP (ref 7–18)
CALCIUM SERPL-MCNC: 9.4 MG/DL — SIGNIFICANT CHANGE UP (ref 8.4–10.5)
CALCIUM SERPL-MCNC: 9.4 MG/DL — SIGNIFICANT CHANGE UP (ref 8.4–10.5)
CHLORIDE SERPL-SCNC: 108 MMOL/L — SIGNIFICANT CHANGE UP (ref 96–108)
CHLORIDE SERPL-SCNC: 108 MMOL/L — SIGNIFICANT CHANGE UP (ref 96–108)
CO2 SERPL-SCNC: 29 MMOL/L — SIGNIFICANT CHANGE UP (ref 22–31)
CO2 SERPL-SCNC: 29 MMOL/L — SIGNIFICANT CHANGE UP (ref 22–31)
CREAT SERPL-MCNC: 0.83 MG/DL — SIGNIFICANT CHANGE UP (ref 0.5–1.3)
CREAT SERPL-MCNC: 0.83 MG/DL — SIGNIFICANT CHANGE UP (ref 0.5–1.3)
EGFR: 77 ML/MIN/1.73M2 — SIGNIFICANT CHANGE UP
EGFR: 77 ML/MIN/1.73M2 — SIGNIFICANT CHANGE UP
ELLIPTOCYTES BLD QL SMEAR: SLIGHT — SIGNIFICANT CHANGE UP
ELLIPTOCYTES BLD QL SMEAR: SLIGHT — SIGNIFICANT CHANGE UP
EOSINOPHIL # BLD AUTO: 0 K/UL — SIGNIFICANT CHANGE UP (ref 0–0.5)
EOSINOPHIL # BLD AUTO: 0 K/UL — SIGNIFICANT CHANGE UP (ref 0–0.5)
EOSINOPHIL NFR BLD AUTO: 0 % — SIGNIFICANT CHANGE UP (ref 0–6)
EOSINOPHIL NFR BLD AUTO: 0 % — SIGNIFICANT CHANGE UP (ref 0–6)
GLUCOSE SERPL-MCNC: 101 MG/DL — HIGH (ref 70–99)
GLUCOSE SERPL-MCNC: 101 MG/DL — HIGH (ref 70–99)
HCT VFR BLD CALC: 26.7 % — LOW (ref 34.5–45)
HCT VFR BLD CALC: 26.7 % — LOW (ref 34.5–45)
HGB BLD-MCNC: 8.7 G/DL — LOW (ref 11.5–15.5)
HGB BLD-MCNC: 8.7 G/DL — LOW (ref 11.5–15.5)
HYPOCHROMIA BLD QL: SLIGHT — SIGNIFICANT CHANGE UP
HYPOCHROMIA BLD QL: SLIGHT — SIGNIFICANT CHANGE UP
HYPOSEGMENTATION: PRESENT — SIGNIFICANT CHANGE UP
HYPOSEGMENTATION: PRESENT — SIGNIFICANT CHANGE UP
LG PLATELETS BLD QL AUTO: SLIGHT — SIGNIFICANT CHANGE UP
LG PLATELETS BLD QL AUTO: SLIGHT — SIGNIFICANT CHANGE UP
LIDOCAIN IGE QN: 9 U/L — LOW (ref 13–75)
LIDOCAIN IGE QN: 9 U/L — LOW (ref 13–75)
LYMPHOCYTES # BLD AUTO: 15 % — SIGNIFICANT CHANGE UP (ref 13–44)
LYMPHOCYTES # BLD AUTO: 15 % — SIGNIFICANT CHANGE UP (ref 13–44)
LYMPHOCYTES # BLD AUTO: 2.38 K/UL — SIGNIFICANT CHANGE UP (ref 1–3.3)
LYMPHOCYTES # BLD AUTO: 2.38 K/UL — SIGNIFICANT CHANGE UP (ref 1–3.3)
MACROCYTES BLD QL: SLIGHT — SIGNIFICANT CHANGE UP
MACROCYTES BLD QL: SLIGHT — SIGNIFICANT CHANGE UP
MAGNESIUM SERPL-MCNC: 1.3 MG/DL — LOW (ref 1.6–2.6)
MAGNESIUM SERPL-MCNC: 1.3 MG/DL — LOW (ref 1.6–2.6)
MANUAL SMEAR VERIFICATION: SIGNIFICANT CHANGE UP
MANUAL SMEAR VERIFICATION: SIGNIFICANT CHANGE UP
MCHC RBC-ENTMCNC: 28 PG — SIGNIFICANT CHANGE UP (ref 27–34)
MCHC RBC-ENTMCNC: 28 PG — SIGNIFICANT CHANGE UP (ref 27–34)
MCHC RBC-ENTMCNC: 32.6 GM/DL — SIGNIFICANT CHANGE UP (ref 32–36)
MCHC RBC-ENTMCNC: 32.6 GM/DL — SIGNIFICANT CHANGE UP (ref 32–36)
MCV RBC AUTO: 85.9 FL — SIGNIFICANT CHANGE UP (ref 80–100)
MCV RBC AUTO: 85.9 FL — SIGNIFICANT CHANGE UP (ref 80–100)
MICROCYTES BLD QL: SLIGHT — SIGNIFICANT CHANGE UP
MICROCYTES BLD QL: SLIGHT — SIGNIFICANT CHANGE UP
MONOCYTES # BLD AUTO: 0.63 K/UL — SIGNIFICANT CHANGE UP (ref 0–0.9)
MONOCYTES # BLD AUTO: 0.63 K/UL — SIGNIFICANT CHANGE UP (ref 0–0.9)
MONOCYTES NFR BLD AUTO: 4 % — SIGNIFICANT CHANGE UP (ref 2–14)
MONOCYTES NFR BLD AUTO: 4 % — SIGNIFICANT CHANGE UP (ref 2–14)
NEUTROPHILS # BLD AUTO: 12.83 K/UL — HIGH (ref 1.8–7.4)
NEUTROPHILS # BLD AUTO: 12.83 K/UL — HIGH (ref 1.8–7.4)
NEUTROPHILS NFR BLD AUTO: 78 % — HIGH (ref 43–77)
NEUTROPHILS NFR BLD AUTO: 78 % — HIGH (ref 43–77)
NEUTS BAND # BLD: 3 % — SIGNIFICANT CHANGE UP (ref 0–8)
NEUTS BAND # BLD: 3 % — SIGNIFICANT CHANGE UP (ref 0–8)
NRBC # BLD: 1 /100 WBCS — HIGH (ref 0–0)
NRBC # BLD: 1 /100 WBCS — HIGH (ref 0–0)
NT-PROBNP SERPL-SCNC: 219 PG/ML — HIGH (ref 0–125)
NT-PROBNP SERPL-SCNC: 219 PG/ML — HIGH (ref 0–125)
OVALOCYTES BLD QL SMEAR: SLIGHT — SIGNIFICANT CHANGE UP
OVALOCYTES BLD QL SMEAR: SLIGHT — SIGNIFICANT CHANGE UP
PLAT MORPH BLD: NORMAL — SIGNIFICANT CHANGE UP
PLAT MORPH BLD: NORMAL — SIGNIFICANT CHANGE UP
PLATELET # BLD AUTO: 143 K/UL — LOW (ref 150–400)
PLATELET # BLD AUTO: 143 K/UL — LOW (ref 150–400)
PLATELET COUNT - ESTIMATE: ABNORMAL
PLATELET COUNT - ESTIMATE: ABNORMAL
POIKILOCYTOSIS BLD QL AUTO: SLIGHT — SIGNIFICANT CHANGE UP
POIKILOCYTOSIS BLD QL AUTO: SLIGHT — SIGNIFICANT CHANGE UP
POTASSIUM SERPL-MCNC: 4.2 MMOL/L — SIGNIFICANT CHANGE UP (ref 3.5–5.3)
POTASSIUM SERPL-MCNC: 4.2 MMOL/L — SIGNIFICANT CHANGE UP (ref 3.5–5.3)
POTASSIUM SERPL-SCNC: 4.2 MMOL/L — SIGNIFICANT CHANGE UP (ref 3.5–5.3)
POTASSIUM SERPL-SCNC: 4.2 MMOL/L — SIGNIFICANT CHANGE UP (ref 3.5–5.3)
PROT SERPL-MCNC: 6.3 G/DL — SIGNIFICANT CHANGE UP (ref 6–8.3)
PROT SERPL-MCNC: 6.3 G/DL — SIGNIFICANT CHANGE UP (ref 6–8.3)
RBC # BLD: 3.11 M/UL — LOW (ref 3.8–5.2)
RBC # BLD: 3.11 M/UL — LOW (ref 3.8–5.2)
RBC # FLD: 21.4 % — HIGH (ref 10.3–14.5)
RBC # FLD: 21.4 % — HIGH (ref 10.3–14.5)
RBC BLD AUTO: ABNORMAL
RBC BLD AUTO: ABNORMAL
SODIUM SERPL-SCNC: 141 MMOL/L — SIGNIFICANT CHANGE UP (ref 135–145)
SODIUM SERPL-SCNC: 141 MMOL/L — SIGNIFICANT CHANGE UP (ref 135–145)
TROPONIN I, HIGH SENSITIVITY RESULT: 6 NG/L — SIGNIFICANT CHANGE UP
TROPONIN I, HIGH SENSITIVITY RESULT: 6 NG/L — SIGNIFICANT CHANGE UP
WBC # BLD: 15.84 K/UL — HIGH (ref 3.8–10.5)
WBC # BLD: 15.84 K/UL — HIGH (ref 3.8–10.5)
WBC # FLD AUTO: 15.84 K/UL — HIGH (ref 3.8–10.5)
WBC # FLD AUTO: 15.84 K/UL — HIGH (ref 3.8–10.5)

## 2024-01-10 PROCEDURE — 96374 THER/PROPH/DIAG INJ IV PUSH: CPT | Mod: XU

## 2024-01-10 PROCEDURE — 84484 ASSAY OF TROPONIN QUANT: CPT

## 2024-01-10 PROCEDURE — 83690 ASSAY OF LIPASE: CPT

## 2024-01-10 PROCEDURE — 99285 EMERGENCY DEPT VISIT HI MDM: CPT

## 2024-01-10 PROCEDURE — 71275 CT ANGIOGRAPHY CHEST: CPT | Mod: 26,MA

## 2024-01-10 PROCEDURE — 71275 CT ANGIOGRAPHY CHEST: CPT | Mod: MA

## 2024-01-10 PROCEDURE — 74177 CT ABD & PELVIS W/CONTRAST: CPT | Mod: 26,MA

## 2024-01-10 PROCEDURE — 93005 ELECTROCARDIOGRAM TRACING: CPT

## 2024-01-10 PROCEDURE — 99285 EMERGENCY DEPT VISIT HI MDM: CPT | Mod: 25

## 2024-01-10 PROCEDURE — 83735 ASSAY OF MAGNESIUM: CPT

## 2024-01-10 PROCEDURE — 83880 ASSAY OF NATRIURETIC PEPTIDE: CPT

## 2024-01-10 PROCEDURE — 74177 CT ABD & PELVIS W/CONTRAST: CPT | Mod: MA

## 2024-01-10 PROCEDURE — 80053 COMPREHEN METABOLIC PANEL: CPT

## 2024-01-10 PROCEDURE — 36415 COLL VENOUS BLD VENIPUNCTURE: CPT

## 2024-01-10 PROCEDURE — 82962 GLUCOSE BLOOD TEST: CPT

## 2024-01-10 PROCEDURE — 85025 COMPLETE CBC W/AUTO DIFF WBC: CPT

## 2024-01-10 RX ORDER — MAGNESIUM SULFATE 500 MG/ML
1 VIAL (ML) INJECTION ONCE
Refills: 0 | Status: COMPLETED | OUTPATIENT
Start: 2024-01-10 | End: 2024-01-10

## 2024-01-10 RX ORDER — ACETAMINOPHEN 500 MG
650 TABLET ORAL ONCE
Refills: 0 | Status: COMPLETED | OUTPATIENT
Start: 2024-01-10 | End: 2024-01-10

## 2024-01-10 RX ADMIN — Medication 100 GRAM(S): at 22:02

## 2024-01-10 NOTE — ED ADULT NURSE NOTE - NSFALLUNIVINTERV_ED_ALL_ED
Bed/Stretcher in lowest position, wheels locked, appropriate side rails in place/Call bell, personal items and telephone in reach/Instruct patient to call for assistance before getting out of bed/chair/stretcher/Non-slip footwear applied when patient is off stretcher/[place] to call system/Physically safe environment - no spills, clutter or unnecessary equipment/Purposeful proactive rounding/Room/bathroom lighting operational, light cord in reach Bed/Stretcher in lowest position, wheels locked, appropriate side rails in place/Call bell, personal items and telephone in reach/Instruct patient to call for assistance before getting out of bed/chair/stretcher/Non-slip footwear applied when patient is off stretcher/[place] to call system/Physically safe environment - no spills, clutter or unnecessary equipment/Purposeful proactive rounding/Room/bathroom lighting operational, light cord in reach

## 2024-01-10 NOTE — ED PROVIDER NOTE - CLINICAL SUMMARY MEDICAL DECISION MAKING FREE TEXT BOX
68-year-old female with history of pancreatic cancer on chemo treatment coming in with chest pain abdominal pain that woke her up last night.  Patient reports her blood work showed her white blood cell count was low yesterday when she received a shot of zarixo.  States this is the third time she has gotten that shot.  Patient reports she was having intermittent shortness of breath when she took a deep breath in which is now resolved.  States that chest pain is improving.  No fevers, chills, nausea, vomiting, diarrhea, black stools, dark stools.  Passing gas.  Patient is well-appearing.  No distress.  Clear to auscultation bilaterally.  Regular rate and rhythm.  Abdomen soft with mild diffuse tenderness to palpation.  No CVA tenderness to palpation.  Differential diagnoses include but not limited to PE, metastatic disease or other intra-abdominal pathology.

## 2024-01-10 NOTE — HISTORY OF PRESENT ILLNESS
[Disease: _____________________] : Disease: [unfilled] [T: ___] : T[unfilled] [N: ___] : N[unfilled] [M: ___] : M[unfilled] [AJCC Stage: ____] : AJCC Stage: [unfilled] [de-identified] : adenocarcinoma [FreeTextEntry1] : discussion of chemotherapy treatment gemcitabine and Abraxane [de-identified] : see hpi; 05/09/2023.She has been taking a combination medication of glipizide and metformin with better blood sugar control.Now on acetaminophen 300 codeine 30 mg PO TID PRN after meeting with pain management and her primary care MD. Pain has been mid line and more noted in day than in evening. No jaundice [Date: ____________] : Patient's last distress assessment performed on [unfilled]. [1 - Distress Level] : Distress Level: 1 [80: Normal activity with effort; some signs or symptoms of disease.] : 80: Normal activity with effort; some signs or symptoms of disease.  [ECOG Performance Status: 2 - Ambulatory and capable of all self care but unable to carry out any work activities] : Performance Status: 2 - Ambulatory and capable of all self care but unable to carry out any work activities. Up and about more than 50% of waking hours [de-identified] : Ms. ENRIKE MORLEY is a 67 year old female who presents for evaluation in our Pancreas [facility]. Her PMH includes DM2 (dx ), HTN, left knee replacement. She presented with abdominal pain radiating to her LUQ x 3 weeks. MRI abdomen  noted a 3 cm mass in the body of the pancreas suspicious for primary pancreatic malignancy and also Bosniak type IV cyst upper pole left kidney suspicious for primary renal neoplasm. She was admitted to [facility] on 23 and CT A/P w/ IC showing ill-defined pancreatic body mass consistent with pancreatic neoplasm; and complex enhancing left renal mass concerning for renal cell carcinoma till proven otherwise.\par  She underwent EUS on 23 with noted findings of one 3 cm pancreatic body mass, s/p FNA.  Pathology positive for adenocarcinoma. \par  \par  She reports decrease appetite and early satiety.  Abdominal pain is intermittent and severe when it comes.  She was taking Tylenol#3 after hospital d/c but currently not taking anymore. +Weight loss of 10 pounds since March. \par  \par  Colonoscopy from 7 years ago normal. \par  Smoke/Etoh: 20 pack years. Last smoke 2023. No ETOH use. \par  \par  ECO      Independent, can walk upstairs. Lives with her sons.  (ECOG 1 due to pain). \par  Family Ca hx: None \par  \par  Labs: \par  23    AST: 9   ALT: 18  ALP:  103  Tbili: 0.3 \par  23   Ca 19-9: 2428    CEA: 15.0    AST:  11    ALT: 12    ALP: 96   Tbili:  0.4     A1c: 10% \par   [Abdominal Pain] : abdominal pain [EUS] : EUS [Biopsy] : biopsy performed [TextBox_4] : 4/4/23 [TextBox_39] : [id number] [TextBox_43] : 4/14/23 [TextBox_41] : adenocarcinoma [] : This is not a second opinion [Before Surgery] : before surgery [Pancreatic ductal adenocarcinoma] : Pancreatic ductal adenocarcinoma [Not staged outside] : not staged outside [Nutrition] : Nutrition [Social Work] : Social Work [Other: ____] : [unfilled] [Restricted in physically strenuous activity but ambulatory and able to carry out work of a light or sedentary nature] : Status 1 - Restricted in physically strenuous activity but ambulatory and able to carry out work of a light or sedentary nature, e.g., light house work, office work [TextBox_5] : 5/2/23 [TextBox_40] : 4/8/23

## 2024-01-10 NOTE — ED PROVIDER NOTE - PATIENT PORTAL LINK FT
You can access the FollowMyHealth Patient Portal offered by [facility] by registering at the following website: [email or web address]. By joining [facility]’s FollowMyHealth portal, you will also be able to view your health information using other applications (apps) compatible with our system. You can access the FollowMyHealth Patient Portal offered by [facility] by registering at the following website: [email or web address]. By joining [employer]’s FollowMyHealth portal, you will also be able to view your health information using other applications (apps) compatible with our system.

## 2024-01-10 NOTE — ED ADULT NURSE NOTE - OBJECTIVE STATEMENT
Received 68yr old female patient A&Ox4 with even and unlabored breathing. Patient came in c/o and palpitations s/p taking chemo medication (Zarxio) x yesterday. Patient made comfortable to area, 24g IV placed to right hand. Labs sent, patient refused tylenol, stating no pain.

## 2024-01-10 NOTE — ED PROVIDER NOTE - NSFOLLOWUPINSTRUCTIONS_ED_ALL_ED_FT
You were seen for chest pain and abdominal pain.    Please see the attached results of your workup in the emergency department today.  Please discuss with your primary care physician and oncologist.  Your symptoms are resolved while being in the ED.  Please discuss with your oncologist whether here she thinks this was a reaction to the medication you received yesterday.    Please seek medical assistance if you start developing fevers, chills, nausea, vomiting or any other concerning symptoms.

## 2024-01-11 ENCOUNTER — APPOINTMENT (OUTPATIENT)
Dept: CT IMAGING | Facility: IMAGING CENTER | Age: 69
End: 2024-01-11

## 2024-01-12 ENCOUNTER — APPOINTMENT (OUTPATIENT)
Dept: HEMATOLOGY ONCOLOGY | Facility: CLINIC | Age: 69
End: 2024-01-12

## 2024-01-16 ENCOUNTER — NON-APPOINTMENT (OUTPATIENT)
Age: 69
End: 2024-01-16

## 2024-01-16 DIAGNOSIS — Z51.89 ENCOUNTER FOR OTHER SPECIFIED AFTERCARE: ICD-10-CM

## 2024-01-17 ENCOUNTER — RESULT REVIEW (OUTPATIENT)
Age: 69
End: 2024-01-17

## 2024-01-17 ENCOUNTER — APPOINTMENT (OUTPATIENT)
Dept: INFUSION THERAPY | Facility: HOSPITAL | Age: 69
End: 2024-01-17

## 2024-01-17 ENCOUNTER — APPOINTMENT (OUTPATIENT)
Dept: HEMATOLOGY ONCOLOGY | Facility: CLINIC | Age: 69
End: 2024-01-17

## 2024-01-17 ENCOUNTER — APPOINTMENT (OUTPATIENT)
Dept: HEMATOLOGY ONCOLOGY | Facility: CLINIC | Age: 69
End: 2024-01-17
Payer: MEDICARE

## 2024-01-17 LAB
BASOPHILS # BLD AUTO: 0.03 K/UL — SIGNIFICANT CHANGE UP (ref 0–0.2)
BASOPHILS NFR BLD AUTO: 0.4 % — SIGNIFICANT CHANGE UP (ref 0–2)
EOSINOPHIL # BLD AUTO: 0.12 K/UL — SIGNIFICANT CHANGE UP (ref 0–0.5)
EOSINOPHIL NFR BLD AUTO: 1.4 % — SIGNIFICANT CHANGE UP (ref 0–6)
HCT VFR BLD CALC: 30.8 % — LOW (ref 34.5–45)
HGB BLD-MCNC: 10.2 G/DL — LOW (ref 11.5–15.5)
IMM GRANULOCYTES NFR BLD AUTO: 1.1 % — HIGH (ref 0–0.9)
LYMPHOCYTES # BLD AUTO: 1.48 K/UL — SIGNIFICANT CHANGE UP (ref 1–3.3)
LYMPHOCYTES # BLD AUTO: 17.8 % — SIGNIFICANT CHANGE UP (ref 13–44)
MCHC RBC-ENTMCNC: 27.6 PG — SIGNIFICANT CHANGE UP (ref 27–34)
MCHC RBC-ENTMCNC: 33.1 G/DL — SIGNIFICANT CHANGE UP (ref 32–36)
MCV RBC AUTO: 83.2 FL — SIGNIFICANT CHANGE UP (ref 80–100)
MONOCYTES # BLD AUTO: 0.95 K/UL — HIGH (ref 0–0.9)
MONOCYTES NFR BLD AUTO: 11.4 % — SIGNIFICANT CHANGE UP (ref 2–14)
NEUTROPHILS # BLD AUTO: 5.63 K/UL — SIGNIFICANT CHANGE UP (ref 1.8–7.4)
NEUTROPHILS NFR BLD AUTO: 67.9 % — SIGNIFICANT CHANGE UP (ref 43–77)
NRBC # BLD: 0 /100 WBCS — SIGNIFICANT CHANGE UP (ref 0–0)
PLATELET # BLD AUTO: 188 K/UL — SIGNIFICANT CHANGE UP (ref 150–400)
RBC # BLD: 3.7 M/UL — LOW (ref 3.8–5.2)
RBC # FLD: 21.6 % — HIGH (ref 10.3–14.5)
WBC # BLD: 8.3 K/UL — SIGNIFICANT CHANGE UP (ref 3.8–10.5)
WBC # FLD AUTO: 8.3 K/UL — SIGNIFICANT CHANGE UP (ref 3.8–10.5)

## 2024-01-17 PROCEDURE — 99213 OFFICE O/P EST LOW 20 MIN: CPT

## 2024-01-18 LAB
ALBUMIN SERPL ELPH-MCNC: 4.1 G/DL — SIGNIFICANT CHANGE UP (ref 3.3–5)
ALP SERPL-CCNC: 84 U/L — SIGNIFICANT CHANGE UP (ref 40–120)
ALT FLD-CCNC: 15 U/L — SIGNIFICANT CHANGE UP (ref 10–45)
ANION GAP SERPL CALC-SCNC: 10 MMOL/L — SIGNIFICANT CHANGE UP (ref 5–17)
AST SERPL-CCNC: 14 U/L — SIGNIFICANT CHANGE UP (ref 10–40)
BILIRUB SERPL-MCNC: 0.2 MG/DL — SIGNIFICANT CHANGE UP (ref 0.2–1.2)
BUN SERPL-MCNC: 12 MG/DL — SIGNIFICANT CHANGE UP (ref 7–23)
CALCIUM SERPL-MCNC: 8.8 MG/DL — SIGNIFICANT CHANGE UP (ref 8.4–10.5)
CANCER AG19-9 SERPL-ACNC: 157 U/ML — HIGH
CEA SERPL-MCNC: 3.9 NG/ML — HIGH (ref 0–3.8)
CHLORIDE SERPL-SCNC: 100 MMOL/L — SIGNIFICANT CHANGE UP (ref 96–108)
CO2 SERPL-SCNC: 26 MMOL/L — SIGNIFICANT CHANGE UP (ref 22–31)
CREAT SERPL-MCNC: 0.81 MG/DL — SIGNIFICANT CHANGE UP (ref 0.5–1.3)
EGFR: 79 ML/MIN/1.73M2 — SIGNIFICANT CHANGE UP
GLUCOSE SERPL-MCNC: 71 MG/DL — SIGNIFICANT CHANGE UP (ref 70–99)
POTASSIUM SERPL-MCNC: 4.6 MMOL/L — SIGNIFICANT CHANGE UP (ref 3.5–5.3)
POTASSIUM SERPL-SCNC: 4.6 MMOL/L — SIGNIFICANT CHANGE UP (ref 3.5–5.3)
PROT SERPL-MCNC: 6.4 G/DL — SIGNIFICANT CHANGE UP (ref 6–8.3)
SODIUM SERPL-SCNC: 137 MMOL/L — SIGNIFICANT CHANGE UP (ref 135–145)

## 2024-01-19 NOTE — PHYSICAL EXAM
[Ambulatory and capable of all self care but unable to carry out any work activities] : Status 2- Ambulatory and capable of all self care but unable to carry out any work activities. Up and about more than 50% of waking hours [Normal] : affect appropriate [de-identified] : wore a wig,  [de-identified] : wears glasses  [de-identified] : Right Anterior chest wall Mediport site appears stable, no bleeding, edema or erythema / discharge noted, no wheezing, rales, rhonchi noted

## 2024-01-19 NOTE — REVIEW OF SYSTEMS
[Negative] : Allergic/Immunologic [Mucosal Pain] : mucosal pain [Fatigue] : no fatigue [Recent Change In Weight] : ~T no recent weight change [Shortness Of Breath] : no shortness of breath [Wheezing] : no wheezing [SOB on Exertion] : no shortness of breath during exertion [Abdominal Pain] : no abdominal pain [Vomiting] : no vomiting [Constipation] : no constipation [FreeTextEntry6] : resumed smoking since past few weeks, non productive cough, h/o Emphysema no hemoptysis,  [FreeTextEntry9] : ambulates with a cane, no falls

## 2024-01-19 NOTE — HISTORY OF PRESENT ILLNESS
[Disease: _____________________] : Disease: [unfilled] [T: ___] : T[unfilled] [N: ___] : N[unfilled] [M: ___] : M[unfilled] [AJCC Stage: ____] : AJCC Stage: [unfilled] [Date: ____________] : Patient's last distress assessment performed on [unfilled]. [0 - No Distress] : Distress Level: 0 [80: Normal activity with effort; some signs or symptoms of disease.] : 80: Normal activity with effort; some signs or symptoms of disease.  [ECOG Performance Status: 2 - Ambulatory and capable of all self care but unable to carry out any work activities] : Performance Status: 2 - Ambulatory and capable of all self care but unable to carry out any work activities. Up and about more than 50% of waking hours [Abdominal Pain] : abdominal pain [EUS] : EUS [Biopsy] : biopsy performed [Before Surgery] : before surgery [Pancreatic ductal adenocarcinoma] : Pancreatic ductal adenocarcinoma [Not staged outside] : not staged outside [Nutrition] : Nutrition [Social Work] : Social Work [Other: ____] : [unfilled] [Restricted in physically strenuous activity but ambulatory and able to carry out work of a light or sedentary nature] : Status 1 - Restricted in physically strenuous activity but ambulatory and able to carry out work of a light or sedentary nature, e.g., light house work, office work [de-identified] : adenocarcinoma [FreeTextEntry1] :  gemcitabine and Abraxane cycle 7 11/2023 [de-identified] : see hpi; 05/09/2023.She has been taking a combination medication of glipizide and metformin with better blood sugar control.Now on acetaminophen 300 codeine 30 mg PO TID PRN after meeting with pain management and her primary care MD. Pain has been mid line and more noted in day than in evening. No jaundice  May 30, 2023 - Seen in a f/u visit today scheduled for Gemcitabine & Abraxane for Pancreas cancer. Patient had called our office last week for c/o nausea, abd pain, poor appetite. She was prescribed Omeprazole by GI for Gastritis she had not taken. We advised her to take Metoclopramide for nausea, poor appetite and Omeprazole for Gastritis.  Since taking the medications for past few days she has been feeling much better. She remains on Levaquin for Neutropenia prophylaxis.  She denies bleeding, constipation, diarrhea, fever, chills, falls.   June 30, 2023 - Patient seen today accompanied with friend Ms. Jessica Betancourt.  Patient feels well denies interval change in medical status, No nausea, reports improved appetite, no GI complaints, no pain. Seen / evaluated by our Nutritionist team reports helpful tips and compliance with recommendations.  Today she was seen by Palliative / Pain management Dr. Malik no pain meds started.  She reports she was seen by her PCP last week, she ran out of her BP medications awaiting renewal by PCP.   July 17, 2023 -  Patient seen today in a follow up visit accompanied with friend Ms. Jessica Betancourt.  She reports episodes of non bloody diarrhea / loose stools post treatment lasts for day and a half does not take any anti diarrhea medications, follows dietary restriction with resolution of symptoms.  Denies N, V, fever, anorexia, weight loss, febrile illness, swollen glands, change in medical condition since last visit.   August 1, 2023 - Received a call from Keli JIMÉNEZ from treatment room patient scheduled for Gemcitabine & Abraxane and CBC shows Neutropenia Patient seen and evaluated, she feels well, asymptomatic, no cough, fever, chills, fatigue, no N, V, poor appetite, swollen glands, night sweats, bleeding, bruising. Denies any recent infection, hospitalization. Reports good appetite. No falls, presented for treatment today by herself, son drove her here.   August 15, 2023 - Received a call from Huang JIMÉNEZ from treatment room to evaluate patient with c/o Dysuria on Friday 8/11/23 lasted 1 day. Patient seen and evaluated in the treatment room.  She reports on Friday she experienced dysuria, frequency of urination, no hematuria, no urgency, fever, chills, pelvic pain, abdominal pain / discomfort. Symptoms of dysuria and frequency of urination has resolved but has foul smelling urine, no urine discoloration noted.   September 12, 2023 - Patient seen in a f/u visit today presented by herself to this appointment, Pt's son Jaiden was on the phone call during the visit.  She is s/p Cycle # 5 Day 1 Gemcitabine & Abraxane treatment. She feels well overall tolerating treatment. No residual UTI symptoms no malodorous urine completed course of antibiotics as prescribed.   October 16, 2023 - Seen in a f/u visit today, presented by herself to this appointment. Feels well reports good appetite, no nausea, vomiting, diarrhea, abd / back pain.  She feels intermittent tingling sensation in her toes describes as mild. She has a f/u visit with Podiatrist (sees every 6 months).  She has appointment with Dr. Pil[facility] tomorrow to discuss plan for possible surgery.  11/15/2023 seen at one month interval. No weight change; no pain noted. no diarrhea  December 19, 2023 - Seen in a f/u visit today ambulated with a cane, presented by herself to this appointment. Reports mild numbness in bilateral fingers and toes & discoloration of tips of fingers and around nail beds. She has artificial nails on.  Feels well denies interval change in medical status, hospitalization, febrile illness, no N, V, diarrhea, no abd / back pain, no loss of appetite.  Scheduled for Next Cycle of Gemzar / Abraxane on 12/26/2023.   January 17, 2024 - Called by RN in treatment room to evaluate patient for Cough. Patient scheduled for Cycle 8 Day 15 Gemcitabine & Abraxane. She report has restarted smoking 3-4 cigarettes every night.  She was admitted to [facility] last week for chest pain palpitations, reports CT C/A/P done showed Emphysema, no ACS was discharged home without hospitalization. Advised to take magnesium supplements daily.  She reports non productive dry cough since past several weeks since she has resumed smoking. Denies febrile illness, no hemoptysis, productive cough no fever, chills, nausea, vomiting.  [de-identified] : Ms. ENRIKE MORLEY is a 67 year old female who presents for evaluation in our Pancreas Multidisciplinary Clinic. Her PMH includes DM2 (dx ), HTN, left knee replacement. She presented with abdominal pain radiating to her LUQ x 3 weeks. MRI abdomen  noted a 3 cm mass in the body of the pancreas suspicious for primary pancreatic malignancy and also Bosniak type IV cyst upper pole left kidney suspicious for primary renal neoplasm. She was admitted to [facility] on 23 and CT A/P w/ IC showing ill-defined pancreatic body mass consistent with pancreatic neoplasm; and complex enhancing left renal mass concerning for renal cell carcinoma till proven otherwise.\par  She underwent EUS on 23 with noted findings of one 3 cm pancreatic body mass, s/p FNA.  Pathology positive for adenocarcinoma. \par  \par  She reports decrease appetite and early satiety.  Abdominal pain is intermittent and severe when it comes.  She was taking Tylenol#3 after hospital d/c but currently not taking anymore. +Weight loss of 10 pounds since March. \par  \par  Colonoscopy from 7 years ago normal. \par  Smoke/Etoh: 20 pack years. Last smoke 2023. No ETOH use. \par  \par  ECO      Independent, can walk upstairs. Lives with her sons.  (ECOG 1 due to pain). \par  Family Ca hx: None \par  \par  Labs: \par  23    AST: 9   ALT: 18  ALP:  103  Tbili: 0.3 \par  23   Ca 19-9: 2428    CEA: 15.0    AST:  11    ALT: 12    ALP: 96   Tbili:  0.4     A1c: 10% \par   [TextBox_4] : 4/4/23 [TextBox_39] : [id number] [TextBox_41] : adenocarcinoma [TextBox_43] : 4/14/23 [] : This is not a second opinion [TextBox_5] : 5/2/23 [TextBox_40] : 4/8/23

## 2024-01-19 NOTE — ASSESSMENT
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 Side effects of chemotherapy were reviewed with her She is age 67 and has significant co morbidity including diabetes poorly controlled on oral metformin at present. She is asked to see her primary care physician for consideration of an insulin based treatment. The patient may have two tumors as the left renal mass has not had a biopsy. The priority in treating the pancreas cancer was addressed in the multidisciplinary conference. CT/PET will be helpful in addressing the left renal lesion which may be a second primary tumor. If two different tumors are present she is not a candidate for a clinical study. I have recommended placement of a Mediport for chemotherapy treatment. Scheduling of Mediport performed with patient and explanation of use of Mediport. A request for Mediport placement was made and our secretarial team will obtain authorization. Blood testing requested today as noted. THe patient has seen pain management physician Dr Anne for management of analgesia The patient is scheduled for surgical follow up in future. ECG is performed by me and Nursing assistant and results are reviewed I have prescribed antinausea medication metoclopramide 10 mg PO TID PN RTC in 1 week to sign consent for chemotherapy 05/09/2023 The patient has returned to office. Physical examination is not changed. She has reviewed the chemotherapy information provided last week. I discussed the use of the gemcitabine and Abraxane as treatment on day 1, 8 and 15 of a 28 day cycle (no treatment on day 22). I assisted our  in scheduling chemotherapy and all questions were answered to her satisfaction. Mediport placement is scheduled for next week.RTC in 3 weeks She is now on combination dosing metformin and glyceride.Her primary care physician has prescribed acetaminophen 3000 and codeine 30 TID. 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 Seen in a f/u visit today 1) Pancreas Cancer - scheduled for Cycle # 2 Gemcitabine & Abraxane, labs with treatment 2) Left Renal Mass awaiting Biopsy Seen by Dr. Pilo [facility] - per Dr. Kelly's note - for now the renal mass can remain on the back burner temporarily until the pancreatic lesion is addressed. If there is a surgical plan for the pancreatic lesion to take place, I would consider combining that surgery with a laparoscopic or robotic partial nephrectomy as these lesions are relatively close to 1 another and could be operated on in the same field. 3) Surgical Onc - Dr. Greco - Seen for Surgical Intervention - Will re-eval after 2 months restaging CT 4) Abd pain, Erosive Gastritis on Omeprazole (prescribed by GI), - Tylenol w codeine renewed - Pain management consult requested & Nutritional Evaluation requested. All questions, concerns were addressed with patient during this visit to her satisfaction, she verbalized understanding. RTC in 3 weeks Case management, care plan d/w Dr. 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 Seen in a f/u visit today 1) Pancreas Cancer - s/p Cycle 2 Day 8 treatment on 6/27/23 scheduled for Cycle 2 Day 15 Gemcitabine & Abraxane on Monday July 3, 2023. Labs from 6/27/23 discussed with patient copies provided 2) Mediport Right ACW - EMLA cream prescription sent to local pharmacy. 3) Surgical Onc - Dr. Greco - Seen for Surgical Intervention - Will re-eval after 2 months restaging CT 4) Left Renal Mass awaiting Biopsy Seen by Dr. Pilo [facility] - per Dr. Kelly's note - for now the renal mass can remain on the back burner temporarily until the pancreatic lesion is addressed. If there is a surgical plan for the pancreatic lesion to take place, I would consider combining that surgery with a laparoscopic or robotic partial nephrectomy as these lesions are relatively close to 1 another and could be operated on in the same field. 5) Abd pain, Erosive Gastritis on Omeprazole (prescribed by GI), - Tylenol w codeine renewed - Pain management consult requested & Nutritional Evaluation requested. All questions, concerns were addressed with patient & friend Jessica during this visit to her satisfaction, she verbalized understanding. RTC in 3 weeks Case management, care plan d/w Dr. 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 Seen in a f/u visit today 1) Pancreas Cancer on Gemcitabine & Abraxane via Mediport - scheduled for Cycle # 3- Day 1 on 7/18/23, Day 8 on 7/25/2023, Day 15 on 8/1/2023 2) Anemia - Hgb = 8.8 on July 3, 2023 - today's repeat Hgb 9.1 will add anemia labs to tomorrow's treatment visit Recommend to add dietary iron to improve iron in blood - add red meat 2-3 x week 3) Diarrhea - Loperamide sent to local pharmacy, PO hydration, electrolyte supplementation was emphasized - pt advised how to take Loperamide with verbal understanding 4) Surgical Onc - Dr. Greco - Seen for Surgical Intervention recommended to f/u for re-eval after 2 months restaging CT - pt has not made an apptn as of yet - Patient was counseled to make a f/u visit as per recs 5) Left Renal Mass awaiting Biopsy Seen by Dr. Pilo [facility] - per Dr. Kelly's note - for now the renal mass can remain on the back burner temporarily until the pancreatic lesion is addressed. If there is a surgical plan for the pancreatic lesion to take place, I would consider combining that surgery with a laparoscopic or robotic partial nephrectomy as these lesions are relatively close to 1 another and could be operated on in the same field. 6) Abd pain, Erosive Gastritis on Omeprazole - no reported GI symptoms at this time - GI f/u as per their discretion 7) Pain management following - Patient advised to maintain a diary to keep a log of symptoms, f/u visits, recommendations, list of current medications. All questions, concerns were addressed with patient & friend Jessica during this visit to her satisfaction, she verbalized understanding. RTC in 4 weeks or prn as needed. Case management, care plan d/w Dr. Tommy Mahoney  August 1, 2023 - Patient seen in treatment room for low ANC. 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 1) Leukopenia & Neutropenia - CBC - WBC = 2.2, Hgb = 9.4, PLT = 250 000, MCV = 86.5  Neutrophil % = 26%, ANC = 0.57 Will hold treatment for today due to Leukopenia & Neutropenia - Cipro 500 mg PO qd started for ppx - Will hold off on initiating growth factors at this time and reevaluate. Patient was advised on Neutropenia precautions, wear mask, wash hands frequently and avoid sick contacts. 2) Normocytic Anemia - Hgb = 9.4 no bleeding, no indication for blood transfusion will continue to monitor Ferritin & Folate levels WNL, B12 level = 1446 (Elevated) 3) Surgical Onc - Dr. Greco - Seen for Surgical Intervention recommended to f/u for re-eval after 2 months restaging CT - pt has not made an apptn as of yet - Patient was counseled to make a f/u visit as per recs 4) Left Renal Mass awaiting Biopsy Seen by Dr. Pilo [facility] - per Dr. Kelly's note - for now the renal mass can remain on the back burner temporarily until the pancreatic lesion is addressed. If there is a surgical plan for the pancreatic lesion to take place, I would consider combining that surgery with a laparoscopic or robotic partial nephrectomy as these lesions are relatively close to 1 another and could be operated on in the same field. All questions, concerns were addressed with patient during this encounter, she verbalized understanding. RTC in 2 weeks scheduled for Will repeat CBC trend.  Addendum: Above findings, labs were discussed with Dr. 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 1) Pancreas Cancer - Patient scheduled today for Cycle # 4 Day 1 of Gemcitabine & Abraxane. & scheduled for Cycle # 4 Day 8 on August 22, 2023 & Day # 15 on August 29, 2023. Today's CBC - WBC = 9.65, ANC = 6.67, Hgb = 10.2, PLT = 338, MCV = 86.1, CMP unremarkable, 2) R/o UTI - Dysuria, frequency lasted 1 day now w foul smelling urine - UA, Urine Culture results pending. will treat empirically for presumed UTI - Cipro 500 mg PO bid x 7 days was sent to [employer] [facility] 2) Surgical Onc - f/u apptn w Dr. Greco scheduled for 8/16/23. 3) Left Renal Mass awaiting Biopsy Seen by Dr. Pilo [facility] - per Dr. Kelly's note - for now the renal mass can remain on the back burner temporarily until the pancreatic lesion is addressed. If there is a surgical plan for the pancreatic lesion to take place, I would consider combining that surgery with a laparoscopic or robotic partial nephrectomy as these lesions are relatively close to 1 another and could be operated on in the same field. All questions, concerns were addressed with patient during this encounter, she verbalized understanding. RTC in 2 weeks Case management, care plan d/w Dr. Tommy Mahoney.  August 16, 2023 -Addendum Note: - 8/15/23 UA + for Nitrites, Leukocyte Esterase, trace blood, Urine culture testing - continue Cipro 500 mg PO bid for 7 days - patient aware of the plan.  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 1) Pancreas Cancer - s/p Cycle 5 Day 1 treatment today. Scheduled for Cycle 5 - Day 8 on 9/19/23 & Day 15 on 9/26/23. CT C/A/P - September 5, 2023 - Compared to 4/8/2023 interval decrease in overall size of the pancreatic body mass, however increased soft tissue encasement of the celiac artery and SMA. Mild increase in size of enhancing solid and cystic left renal mass suspicious for renal cell carcinoma. No evidence of metastatic disease Results d/w with patient in the office and son Jaiden over the phone - copy of report provided. 2) Patient advised to f/u w Urology Dr. Kelly for increase in size of Left Renal mass suspicious for RCC. - patient and son were advised to call and expedite the visit. All questions, concerns were addressed with patient in person and son Jaiden over the phone during this visit to their satisfaction, they verbalized understanding of the plan. RTC in 2-3 weeks or prn Case management, care plan d/w Dr. Tommy Mahoney.  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  1) Pancreas Cancer - scheduled for Cycle 6 Day 1 Gemcitabine & Abraxane. Has apptn for C6 D2 on 10/23/23 &  Cycle 6 D15 on 10/30/2023.  Per [facility] recommendation to repeat CT scans in November 2023 to evaluate interval change and plan for surgical intervention - Repeat CT C/A/P ordered for November 1, 2023.  2) Left Renal Mass - f/u with Dr. Kelly Urology scheduled for 10/17/23  3) Surgery f/u w Dr. Sahni advised 4) Endocrine evaluation advised  All questions, concerns were addressed with patient during this visit to her satisfaction, she verbalized understanding of the plan. RTO in November 2nd week with Dr. Mahoney to discuss results of scan. Case management, care plan d/w Dr. Tommy Mahoney. 11/15/2023 she will begin cycle 7 gemcitabine Abraxane on third week of November. Plan to repeat CT scan of chest abdomen and pelvis on 1/08/2024. We are hoping for reduction in size of pancreas mass and if not achieved, we will consider radiation therapy post cycle 8 of treatment (cycle 8 will begin in late December 2023. Leeanna questions of the patient answered to her satisfaction. She is advised to avoid salt in her diet and to follow up with primary care MD for antihypertension medication renewal.  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 1) Pancreas Cancer - scheduled Gemcitabine & Abraxane on 12/26/2023. Labs from 12/11/23 reviewed copies provided Next CT scan ordered for January 2024 - copy of requestion provided. 2) Left Renal Mass - Seen / followed by Urology for Surgical intervention - f/u w Urology advised 3) Peripheral Neuropathy - Pt takes Mag supplement prn - advised to take bid daily will re evaluate 4) Endocrine f/u w Dr. Sanford advised All questions, concerns were addressed with patient during this visit to her satisfaction, she verbalized understanding of the plan. RTO in January 2024 with Dr. Mahoney to discuss results of scan. Case management, care plan d/w Dr. Tommy Mahoney.  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 Scheduled for Cycle 8 Day 15 today.  Ad[facility]10[facility] for CP, Palpitation - CT Chest Angio/ CT C/P -   No PE, Pancreatic body mass not significantly changed, no bowel obstruction,   Enhancing, solid and cystic exophytic left renal midpole mass, not significantly changed in size, again suspicious for a renal cell carcinoma.  Increase in size of an indeterminate right renal upper pole low-attenuation lesion for which an underlying renal neoplasm is not excluded.  Today - Received a call by [facility] treatment room pt has cough - pt seen and evaluated -  pt reports she has resumed Smoking since past few weeks and has non productive cough since past few weeks no hemoptysis, no fever, chills. PE lungs clear. Recommend to take Robitussin OTC twice a day, use of Saline nasal spray for post nasal drip 2) f/u w Surgery Dr Greco & Urology to evaluate plans for surgical intervention 3) Endocrine f/u once again advised 4) Smoking cessation was emphasized -  All questions, concerns were addressed with patient during this visit to her satisfaction she verbalized understanding. RTO with Dr. Mahoney in 2-3 weeks Case management, care plan d/w Dr. Tommy Mahoney

## 2024-01-19 NOTE — RESULTS/DATA
[FreeTextEntry1] : review of information in the E M H R rate 92 normal axis NSR ; possilbe anterior infarction  GI Ca 19-9 -> 2428 > 2328 > 1704 > 1526 (trending down)   May 30, 2023 - CBC - WBC = 7.3, Hgb = 11.4, PLT = 203, MCV = 81.4 CMP - Blood glucose = 124 (non fasting) GI Ca 19-9 - 1526 (trending down)   July 3, 2023 - CBC - WBC = 3.9, Hgb = 8.8, PLT = 255  August 1, 2023 - CBC - WBC = 2.2, Hgb = 9.4, PLT = 250 000, MCV = 86.5 Neutrophil % = 26%, ANC = 0.57   September 5, 2023 - CT C/A/P - Results d/w with patient in the office and son Jaiden over the phone - copy of report provided  Compared to 4/8/2023 interval decrease in overall size of the pancreatic body mass, however increased soft tissue encasement of the celiac artery and SMA.  Mild increase in size of enhancing solid and cystic left renal mass suspicious for renal cell carcinoma.  No evidence of metastatic disease    1/10/2024 CTPA, CT A/P - IMPRESSION: 1. No pulmonary embolism. 2. Debris in the left mainstem bronchus extending into the left lower lobar and segmental bronchi raising concern for aspiration. 3. No bowel obstruction or inflammation. 4. Pancreatic body mass, not significantly changed. 5. Enhancing, solid and cystic exophytic left renal midpole mass, not significantly changed in size, again suspicious for a renal cell carcinoma. 6. Increase in size of an indeterminate right renal upper pole low-attenuation lesion for which an underlying renal neoplasm is not excluded.

## 2024-01-22 ENCOUNTER — NON-APPOINTMENT (OUTPATIENT)
Age: 69
End: 2024-01-22

## 2024-01-23 ENCOUNTER — NON-APPOINTMENT (OUTPATIENT)
Age: 69
End: 2024-01-23

## 2024-01-24 NOTE — HISTORY OF PRESENT ILLNESS
[de-identified] : This is a non-billable note* [TextBox_4] : 4/4/23 [TextBox_39] : [id number] [TextBox_41] : adenocarcinoma [TextBox_43] : 4/14/23 [TextBox_56] : 50 [] : no [TextBox_74] : smaller appearance, POD in vessels -soft tissue around celiac.  Left renal mass- Renal cell carcinoma.  No mets.   [Stable disease] : stable disease [Chemotherapy] : chemotherapy [Curative (aimed at resection)] : curative (aimed at resection) [TextBox_5] : 01/23/2024 [TextBox_38] : 157 [FreeTextEntry3] : 3.9 [FreeTextEntry4] : 0.2 [TextBox_40] : 01/11/2024 [FreeTextEntry6] : Not resectable if GDA involved     Clarify with Dr Mahoney if room for additional chemo / if so, continue systemic therapy.  RT now vs additional chemo.   [TextBox_12] : NABGEM [TextBox_16] : 05/22/2023 [TextBox_18] : 12/26/2023 [TextBox_20] : 8

## 2024-01-30 ENCOUNTER — RESULT REVIEW (OUTPATIENT)
Age: 69
End: 2024-01-30

## 2024-01-30 ENCOUNTER — APPOINTMENT (OUTPATIENT)
Dept: INFUSION THERAPY | Facility: HOSPITAL | Age: 69
End: 2024-01-30

## 2024-01-30 ENCOUNTER — APPOINTMENT (OUTPATIENT)
Dept: HEMATOLOGY ONCOLOGY | Facility: CLINIC | Age: 69
End: 2024-01-30

## 2024-01-30 ENCOUNTER — APPOINTMENT (OUTPATIENT)
Dept: RADIATION ONCOLOGY | Facility: CLINIC | Age: 69
End: 2024-01-30
Payer: MEDICARE

## 2024-01-30 ENCOUNTER — OUTPATIENT (OUTPATIENT)
Dept: OUTPATIENT SERVICES | Facility: HOSPITAL | Age: 69
LOS: 1 days | Discharge: HOME | End: 2024-01-30
Payer: MEDICARE

## 2024-01-30 ENCOUNTER — NON-APPOINTMENT (OUTPATIENT)
Age: 69
End: 2024-01-30

## 2024-01-30 VITALS
HEIGHT: 63 IN | BODY MASS INDEX: 27.82 KG/M2 | DIASTOLIC BLOOD PRESSURE: 87 MMHG | WEIGHT: 157 LBS | RESPIRATION RATE: 17 BRPM | SYSTOLIC BLOOD PRESSURE: 148 MMHG | HEART RATE: 82 BPM | OXYGEN SATURATION: 100 % | TEMPERATURE: 97.16 F

## 2024-01-30 DIAGNOSIS — Z78.9 OTHER SPECIFIED HEALTH STATUS: ICD-10-CM

## 2024-01-30 DIAGNOSIS — Z96.652 PRESENCE OF LEFT ARTIFICIAL KNEE JOINT: Chronic | ICD-10-CM

## 2024-01-30 PROCEDURE — 99204 OFFICE O/P NEW MOD 45 MIN: CPT

## 2024-01-30 RX ORDER — LOSARTAN POTASSIUM 50 MG/1
50 TABLET, FILM COATED ORAL
Refills: 0 | Status: ACTIVE | COMMUNITY

## 2024-01-30 NOTE — VITALS
[Maximal Pain Intensity: 0/10] : 0/10 [Least Pain Intensity: 0/10] : 0/10 [80: Normal activity with effort; some signs or symptoms of disease.] : 80: Normal activity with effort; some signs or symptoms of disease.  [ECOG Performance Status: 1 - Restricted in physically strenuous activity but ambulatory and able to carry out work of a light or sedentary nature] : Performance Status: 1 - Restricted in physically strenuous activity but ambulatory and able to carry out work of a light or sedentary nature, e.g., light house work, office work [4 - Distress Level] : Distress Level: 4

## 2024-01-31 LAB
ALBUMIN SERPL ELPH-MCNC: 4.1 G/DL — SIGNIFICANT CHANGE UP (ref 3.3–5)
ALP SERPL-CCNC: 76 U/L — SIGNIFICANT CHANGE UP (ref 40–120)
ALT FLD-CCNC: 16 U/L — SIGNIFICANT CHANGE UP (ref 10–45)
ANION GAP SERPL CALC-SCNC: 13 MMOL/L — SIGNIFICANT CHANGE UP (ref 5–17)
AST SERPL-CCNC: 21 U/L — SIGNIFICANT CHANGE UP (ref 10–40)
BILIRUB SERPL-MCNC: 0.2 MG/DL — SIGNIFICANT CHANGE UP (ref 0.2–1.2)
BUN SERPL-MCNC: 19 MG/DL — SIGNIFICANT CHANGE UP (ref 7–23)
CALCIUM SERPL-MCNC: 9.5 MG/DL — SIGNIFICANT CHANGE UP (ref 8.4–10.5)
CHLORIDE SERPL-SCNC: 103 MMOL/L — SIGNIFICANT CHANGE UP (ref 96–108)
CO2 SERPL-SCNC: 24 MMOL/L — SIGNIFICANT CHANGE UP (ref 22–31)
CREAT SERPL-MCNC: 0.82 MG/DL — SIGNIFICANT CHANGE UP (ref 0.5–1.3)
EGFR: 78 ML/MIN/1.73M2 — SIGNIFICANT CHANGE UP
GLUCOSE SERPL-MCNC: 92 MG/DL — SIGNIFICANT CHANGE UP (ref 70–99)
POTASSIUM SERPL-MCNC: 4.6 MMOL/L — SIGNIFICANT CHANGE UP (ref 3.5–5.3)
POTASSIUM SERPL-SCNC: 4.6 MMOL/L — SIGNIFICANT CHANGE UP (ref 3.5–5.3)
PROT SERPL-MCNC: 6.7 G/DL — SIGNIFICANT CHANGE UP (ref 6–8.3)
SODIUM SERPL-SCNC: 140 MMOL/L — SIGNIFICANT CHANGE UP (ref 135–145)

## 2024-02-01 ENCOUNTER — OUTPATIENT (OUTPATIENT)
Dept: OUTPATIENT SERVICES | Facility: HOSPITAL | Age: 69
LOS: 1 days | Discharge: HOME | End: 2024-02-01
Payer: MEDICARE

## 2024-02-01 ENCOUNTER — TRANSCRIPTION ENCOUNTER (OUTPATIENT)
Age: 69
End: 2024-02-01

## 2024-02-01 ENCOUNTER — APPOINTMENT (OUTPATIENT)
Dept: GASTROENTEROLOGY | Facility: HOSPITAL | Age: 69
End: 2024-02-01

## 2024-02-01 VITALS
SYSTOLIC BLOOD PRESSURE: 142 MMHG | HEART RATE: 64 BPM | RESPIRATION RATE: 16 BRPM | OXYGEN SATURATION: 97 % | DIASTOLIC BLOOD PRESSURE: 79 MMHG

## 2024-02-01 VITALS
HEART RATE: 72 BPM | TEMPERATURE: 97 F | WEIGHT: 156.97 LBS | RESPIRATION RATE: 16 BRPM | SYSTOLIC BLOOD PRESSURE: 150 MMHG | HEIGHT: 64 IN | OXYGEN SATURATION: 98 % | DIASTOLIC BLOOD PRESSURE: 83 MMHG

## 2024-02-01 DIAGNOSIS — Z96.652 PRESENCE OF LEFT ARTIFICIAL KNEE JOINT: Chronic | ICD-10-CM

## 2024-02-01 DIAGNOSIS — C25.9 MALIGNANT NEOPLASM OF PANCREAS, UNSPECIFIED: ICD-10-CM

## 2024-02-01 LAB
GLUCOSE BLDC GLUCOMTR-MCNC: 136 MG/DL — HIGH (ref 70–99)
GLUCOSE BLDC GLUCOMTR-MCNC: 94 MG/DL — SIGNIFICANT CHANGE UP (ref 70–99)

## 2024-02-01 PROCEDURE — 43253 EGD US TRANSMURAL INJXN/MARK: CPT | Mod: GC

## 2024-02-01 RX ORDER — SODIUM CHLORIDE 9 MG/ML
500 INJECTION, SOLUTION INTRAVENOUS
Refills: 0 | Status: DISCONTINUED | OUTPATIENT
Start: 2024-02-01 | End: 2024-02-16

## 2024-02-01 NOTE — ASU PATIENT PROFILE, ADULT - ALCOHOL USE HISTORY SINGLE SELECT
needed for Chest pain up to max of 3 total doses. If no relief after 1 dose, call 911. 5/30/18   Radha Harrison MD   magnesium hydroxide (MILK OF MAGNESIA) 400 MG/5ML suspension Take 5 mLs by mouth daily as needed for Constipation    Historical Provider, MD       Current medications:    Current Facility-Administered Medications   Medication Dose Route Frequency Provider Last Rate Last Dose    lactated ringers infusion 1,000 mL  1,000 mL Intravenous Continuous Jim Morales MD 50 mL/hr at 04/08/19 0947 1,000 mL at 04/08/19 0947       Allergies:     Allergies   Allergen Reactions    Lisinopril Itching     Inflammation, burning, itching    Pcn [Penicillins] Hives       Problem List:    Patient Active Problem List   Diagnosis Code    Essential hypertension I10    Obstructive sleep apnea syndrome G47.33    Morbid obesity due to excess calories ([facility]) E66.01    Arthritis of knee M17.10    Cardiac murmur R01.1    Type 2 diabetes mellitus with diabetic polyneuropathy, without long-term current use of insulin ([facility]) E11.42    Status post right knee replacement Z96.651    Coronary artery disease involving native coronary artery of native heart with unstable angina pectoris ([facility]) I25.110    Meningioma ([facility]) D32.9    Chronic diastolic congestive heart failure ([facility]) I50.32    Chest pain R07.9    First degree AV block I44.0    Chronic daily headache R51    Eczema L30.9    Unstable angina ([facility]) I20.0    Chronic atrial fibrillation ([facility]) I48.2    Epistaxis, recurrent R04.0       Past Medical History:        Diagnosis Date    HOSSEIN (acute kidney injury) ([facility] [address] 3/3/2017    Arthritis     shoulders    Chronic diastolic congestive heart failure (HCC) 3/3/2017    Chronic kidney disease     Coronary artery disease involving native coronary artery of native heart without angina pectoris 10/13/2016    Epistaxis since Jan. 17 2019    GERD (gastroesophageal reflux disease)     Headache     Hyperlipidemia     Hypertension     MI (myocardial infarction) ([facility] [address] 2015    Dr. Kip Tatum Neuromuscular disorder [facility])     Osteoarthritis     Patient in clinical research study 03/06/2017    AbsorbIV    Sleep apnea     CPAP    Snores     Type II or unspecified type diabetes mellitus without mention of complication, not stated as uncontrolled     Use of cane as ambulatory aid     Wears contact lenses     LEFT EYE ONLY       Past Surgical History:        Procedure Laterality Date    CORONARY ANGIOPLASTY WITH STENT PLACEMENT  2015    2 stents/ Dr. Viktoriya Gastelum, [address] [facility] ARTHROSCOPY Left     TOTAL KNEE ARTHROPLASTY Right 07/21/2016    TOTAL KNEE ARTHROPLASTY Left 03/2016       Social History:    Social History     Tobacco Use    Smoking status: Never Smoker    Smokeless tobacco: Never Used   Substance Use Topics    Alcohol use: Never     Frequency: Never                                Counseling given: Not Answered      Vital Signs (Current):   Vitals:    04/08/19 0924   BP: (!) 140/97   Pulse: 77   Resp: 18   Temp: 96.8 °F (36 °C)   TempSrc: Temporal   SpO2: 100%   Weight: 277 lb 12.5 oz (126 kg)   Height: 5' 5\" (1.651 m)                                              BP Readings from Last 3 Encounters:   04/08/19 (!) 140/97   03/28/19 134/78   03/20/19 109/72       NPO Status: Time of last liquid consumption: 0725(sips wih heart RX)                        Time of last solid consumption: 1900                        Date of last liquid consumption: 04/08/19                        Date of last solid food consumption: 04/07/19    BMI:   Wt Readings from Last 3 Encounters:   04/08/19 277 lb 12.5 oz (126 kg)   03/28/19 277 lb (125.6 kg)   03/20/19 278 lb (126.1 kg)     Body mass index is 46.22 kg/m².     CBC:   Lab Results   Component Value Date    WBC 7.6 03/20/2019    RBC 4.20 03/20/2019    RBC 4.09 02/07/2019    HGB 12.1 03/20/2019    HCT 39.8 03/20/2019    MCV 94.8 03/20/2019    RDW 13.7 03/20/2019     03/20/2019       CMP:   Lab Results   Component Value Date     03/20/2019    K 4.5 03/20/2019     03/20/2019    CO2 23 03/20/2019    BUN 24 03/20/2019    CREATININE 1.01 03/20/2019    GFRAA >60 03/20/2019    LABGLOM 54 03/20/2019    GLUCOSE 85 03/20/2019    GLUCOSE 147 02/07/2019    PROT 7.6 02/09/2019    CALCIUM 9.6 03/20/2019    BILITOT 0.62 02/09/2019    ALKPHOS 105 03/20/2019    AST 13 02/09/2019    ALT 16 03/20/2019       POC Tests:   Recent Labs     04/08/19  0944   POCGLU 128*   POCK 4.2       Coags:   Lab Results   Component Value Date    PROTIME 14.4 02/07/2019    INR 1.12 02/07/2019    APTT 38.8 02/07/2019       HCG (If Applicable): No results found for: PREGTESTUR, PREGSERUM, HCG, HCGQUANT     ABGs: No results found for: PHART, PO2ART, TGH1LZR, ZOL3DLI, BEART, V5XNJFJE     Type & Screen (If Applicable):  No results found for: LABABO, [address]    Anesthesia Evaluation  Patient summary reviewed and Nursing notes reviewed no history of anesthetic complications:   Airway: Mallampati: III  TM distance: >3 FB   Neck ROM: full  Mouth opening: > = 3 FB Dental: normal exam         Pulmonary:   (+) sleep apnea: on CPAP,  decreased breath sounds,                             Cardiovascular:    (+) hypertension:, angina:, past MI:, CAD:, CABG/stent:, dysrhythmias: atrial fibrillation, CHF:,                   Neuro/Psych:   (+) neuromuscular disease:, headaches:,             GI/Hepatic/Renal:   (+) GERD:, morbid obesity          Endo/Other:    (+) DiabetesType II DM, , : arthritis: OA., .                 Abdominal:   (+) obese,         Vascular: negative vascular ROS. Anesthesia Plan      general     ASA 3       Induction: intravenous. MIPS: Postoperative opioids intended. Anesthetic plan and risks discussed with patient. Plan discussed with CRNA.                   Bart Shepard MD   4/8/2019 never

## 2024-02-01 NOTE — PRE PROCEDURE NOTE - PRE PROCEDURE EVALUATION
Attending Physician:            ROSY                Procedure: eus    Indication for Procedure: fiducial marker placement in HOP mass  ________________________________________________________  PAST MEDICAL & SURGICAL HISTORY:  Diabetes      HTN (hypertension)      Liver, polycystic      Renal cyst      Malignant neoplasm of pancreas      Asthma      History of knee replacement, total, left        ALLERGIES:  shellfish (Rash)  No Known Drug Allergies    HOME MEDICATIONS:  amLODIPine 10 mg oral tablet: 1 orally once a day  Cipro 500 mg oral tablet: 1 orally once a day  glipizide-metformin 2.5 mg-500 mg oral tablet: 1 orally 2 times a day  losartan 50 mg oral tablet: 1 orally once a day  Reglan 10 mg oral tablet: 1 orally  vitamin B12: 1000mcg orally once a day    AICD/PPM: [ ] yes   [x ] no    PERTINENT LAB DATA:    01-30    140  |  103  |  19  ----------------------------<  92  4.6   |  24  |  0.82    Ca    9.5      30 Jan 2024 14:11    TPro  6.7  /  Alb  4.1  /  TBili  0.2  /  DBili  x   /  AST  21  /  ALT  16  /  AlkPhos  76  01-30                PHYSICAL EXAMINATION:    Height (cm): 162.6  Weight (kg): 71.2  BMI (kg/m2): 26.9  BSA (m2): 1.76T(C): 36.1  HR: 72  BP: 150/83  RR: 16  SpO2: 98%    Constitutional: NAD  HEENT: PERRLA, EOMI,    Neck:  No JVD  Respiratory: CTAB/L  Cardiovascular: S1 and S2  Gastrointestinal: BS+, soft, NT/ND  Extremities: No peripheral edema  Neurological: A/O x 3, no focal deficits  Psychiatric: Normal mood, normal affect  Skin: No rashes    ASA Class: I [ ]  II [ ]  III [x ]  IV [ ]    COMMENTS:    The patient is a suitable candidate for the planned procedure unless box checked [ ]  No, explain:    I explained the risks (bleeding, infection perforation, pancreatitis, Cv risk, anesthesia risk)/b/a with the patient. The patient agrees to proceed.

## 2024-02-06 ENCOUNTER — APPOINTMENT (OUTPATIENT)
Dept: HEMATOLOGY ONCOLOGY | Facility: CLINIC | Age: 69
End: 2024-02-06

## 2024-02-06 ENCOUNTER — RESULT REVIEW (OUTPATIENT)
Age: 69
End: 2024-02-06

## 2024-02-06 ENCOUNTER — APPOINTMENT (OUTPATIENT)
Dept: HEMATOLOGY ONCOLOGY | Facility: CLINIC | Age: 69
End: 2024-02-06
Payer: MEDICARE

## 2024-02-06 ENCOUNTER — APPOINTMENT (OUTPATIENT)
Dept: INFUSION THERAPY | Facility: HOSPITAL | Age: 69
End: 2024-02-06

## 2024-02-06 LAB
ALBUMIN SERPL ELPH-MCNC: 3.8 G/DL — SIGNIFICANT CHANGE UP (ref 3.3–5)
ALP SERPL-CCNC: 70 U/L — SIGNIFICANT CHANGE UP (ref 40–120)
ALT FLD-CCNC: 18 U/L — SIGNIFICANT CHANGE UP (ref 10–45)
ANION GAP SERPL CALC-SCNC: 6 MMOL/L — SIGNIFICANT CHANGE UP (ref 5–17)
ANISOCYTOSIS BLD QL: SLIGHT — SIGNIFICANT CHANGE UP
AST SERPL-CCNC: 23 U/L — SIGNIFICANT CHANGE UP (ref 10–40)
BASOPHILS # BLD AUTO: 0.02 K/UL — SIGNIFICANT CHANGE UP (ref 0–0.2)
BASOPHILS NFR BLD AUTO: 1 % — SIGNIFICANT CHANGE UP (ref 0–2)
BILIRUB SERPL-MCNC: 0.2 MG/DL — SIGNIFICANT CHANGE UP (ref 0.2–1.2)
BUN SERPL-MCNC: 18 MG/DL — SIGNIFICANT CHANGE UP (ref 7–23)
CALCIUM SERPL-MCNC: 9.2 MG/DL — SIGNIFICANT CHANGE UP (ref 8.4–10.5)
CHLORIDE SERPL-SCNC: 105 MMOL/L — SIGNIFICANT CHANGE UP (ref 96–108)
CO2 SERPL-SCNC: 28 MMOL/L — SIGNIFICANT CHANGE UP (ref 22–31)
CREAT SERPL-MCNC: 0.78 MG/DL — SIGNIFICANT CHANGE UP (ref 0.5–1.3)
DACRYOCYTES BLD QL SMEAR: SLIGHT — SIGNIFICANT CHANGE UP
EGFR: 83 ML/MIN/1.73M2 — SIGNIFICANT CHANGE UP
ELLIPTOCYTES BLD QL SMEAR: SLIGHT — SIGNIFICANT CHANGE UP
EOSINOPHIL # BLD AUTO: 0.07 K/UL — SIGNIFICANT CHANGE UP (ref 0–0.5)
EOSINOPHIL NFR BLD AUTO: 3 % — SIGNIFICANT CHANGE UP (ref 0–6)
GLUCOSE SERPL-MCNC: 131 MG/DL — HIGH (ref 70–99)
HCT VFR BLD CALC: 27.8 % — LOW (ref 34.5–45)
HGB BLD-MCNC: 9 G/DL — LOW (ref 11.5–15.5)
HYPOCHROMIA BLD QL: SLIGHT — SIGNIFICANT CHANGE UP
LYMPHOCYTES # BLD AUTO: 0.84 K/UL — LOW (ref 1–3.3)
LYMPHOCYTES # BLD AUTO: 34 % — SIGNIFICANT CHANGE UP (ref 13–44)
MCHC RBC-ENTMCNC: 27.4 PG — SIGNIFICANT CHANGE UP (ref 27–34)
MCHC RBC-ENTMCNC: 32.4 G/DL — SIGNIFICANT CHANGE UP (ref 32–36)
MCV RBC AUTO: 84.8 FL — SIGNIFICANT CHANGE UP (ref 80–100)
MONOCYTES # BLD AUTO: 0.22 K/UL — SIGNIFICANT CHANGE UP (ref 0–0.9)
MONOCYTES NFR BLD AUTO: 9 % — SIGNIFICANT CHANGE UP (ref 2–14)
NEUTROPHILS # BLD AUTO: 1.31 K/UL — LOW (ref 1.8–7.4)
NEUTROPHILS NFR BLD AUTO: 53 % — SIGNIFICANT CHANGE UP (ref 43–77)
NRBC # BLD: 0 /100 WBCS — SIGNIFICANT CHANGE UP (ref 0–0)
NRBC # BLD: SIGNIFICANT CHANGE UP /100 WBCS (ref 0–0)
PLAT MORPH BLD: NORMAL — SIGNIFICANT CHANGE UP
PLATELET # BLD AUTO: 206 K/UL — SIGNIFICANT CHANGE UP (ref 150–400)
POIKILOCYTOSIS BLD QL AUTO: SLIGHT — SIGNIFICANT CHANGE UP
POTASSIUM SERPL-MCNC: 4.5 MMOL/L — SIGNIFICANT CHANGE UP (ref 3.5–5.3)
POTASSIUM SERPL-SCNC: 4.5 MMOL/L — SIGNIFICANT CHANGE UP (ref 3.5–5.3)
PROT SERPL-MCNC: 6.2 G/DL — SIGNIFICANT CHANGE UP (ref 6–8.3)
RBC # BLD: 3.28 M/UL — LOW (ref 3.8–5.2)
RBC # FLD: 20.1 % — HIGH (ref 10.3–14.5)
RBC BLD AUTO: ABNORMAL
SCHISTOCYTES BLD QL AUTO: SLIGHT — SIGNIFICANT CHANGE UP
SODIUM SERPL-SCNC: 139 MMOL/L — SIGNIFICANT CHANGE UP (ref 135–145)
TARGETS BLD QL SMEAR: SLIGHT — SIGNIFICANT CHANGE UP
WBC # BLD: 2.47 K/UL — LOW (ref 3.8–10.5)
WBC # FLD AUTO: 2.47 K/UL — LOW (ref 3.8–10.5)

## 2024-02-06 PROCEDURE — 99211 OFF/OP EST MAY X REQ PHY/QHP: CPT

## 2024-02-06 NOTE — HISTORY OF PRESENT ILLNESS
[FreeTextEntry1] : 68 year old female with cT4 pancreatic adenocarcinoma and concern for RCC. She presents for consideration of RT.  Patient initially presented to ER in April 2023 reporting 2 week history of abdominal pain.  4/4/2023 MRI abdomen:  3 x 2.4 cm mass in the body of the pancreas encasing the celiac axis. L renal mass 4 x 3.5 cm in size.  4/8/2023  CT AP with IC :  ill defined pancreatic body mass consistent with pancreatic neoplasm and also Bosniak type IV cyst upper pole left kidney suspicious for primary renal neoplasm.  4/14/2023  EUS biopsy  final path:  adenocarcinoma in the body of the pancreas  5/2023:  neoadjuvent chemotherapy   Started Gemcitabine & Abraxane  Patient underwent celiac plexus nerve block on 5/16/23 which was successful, she is experiencing significantly less pain now, not using any analgesics.  9/5/2023 - CT C/A/P - Compared to 4/8/2023 interval decrease in overall size of the pancreatic body mass, however increased soft tissue encasement of the celiac artery and SMA. Mild increase in size of enhancing solid and cystic left renal mass suspicious for renal cell carcinoma. No evidence of metastatic disease  9/20/2023: Per tumor board, recommended 2 more months of chemotherapy. Currently being treatment for concurrent renal cancer.  1/10/2024 CTPA, CT A/P - stable 3.6 x 2.2 cm pancreatic body mass with soft tissue encasement of celiac axis and SMA. Also noted is a 1.3 x 1.2 cm R renal lesion increased in size from prior studies.  IMPRESSION: 1. No pulmonary embolism. 2. Debris in the left mainstem bronchus extending into the left lower lobar and segmental bronchi raising concern for aspiration. 3. No bowel obstruction or inflammation. 4. Pancreatic body mass, not significantly changed. 5. Enhancing, solid and cystic exophytic left renal midpole mass, not significantly changed in size, again suspicious for a renal cell carcinoma. 6. Increase in size of an indeterminate right renal upper pole low-attenuation lesion for which an underlying renal neoplasm is not excluded.    1/30/24 initial radiation consult: Patient doing well overall, with little complaints at this time. Pain well controlled with celiac plexus block. Currently using medical marijuana with good effect. Able to tolerate a full diet at this time.

## 2024-02-06 NOTE — PHYSICAL EXAM
[Sclera] : the sclera and conjunctiva were normal [Outer Ear] : the ears and nose were normal in appearance [Hearing Threshold Finger Rub Not Steele] : hearing was normal [] : no respiratory distress [Exaggerated Use Of Accessory Muscles For Inspiration] : no accessory muscle use [Arterial Pulses Normal] : the arterial pulses were normal [Edema] : no peripheral edema present [Abdomen Soft] : soft [Nondistended] : nondistended [Nail Clubbing] : no clubbing  or cyanosis of the fingernails [Normal] : oriented to person, place and time, the affect was normal, the mood was normal and not anxious [de-identified] : hair loss in the setting of chemo [de-identified] : mild tenderness to palpation

## 2024-02-06 NOTE — REVIEW OF SYSTEMS
[Night Sweats] : night sweats [Wheezing] : wheezing [Cough] : cough [SOB on Exertion] : shortness of breath during exertion [Negative] : Allergic/Immunologic [Fever] : no fever [Chills] : no chills [Fatigue] : no fatigue [Recent Change In Weight] : ~T no recent weight change [Shortness Of Breath] : no shortness of breath

## 2024-02-07 ENCOUNTER — OUTPATIENT (OUTPATIENT)
Dept: OUTPATIENT SERVICES | Facility: HOSPITAL | Age: 69
LOS: 1 days | Discharge: HOME | End: 2024-02-07
Payer: MEDICARE

## 2024-02-07 ENCOUNTER — NON-APPOINTMENT (OUTPATIENT)
Age: 69
End: 2024-02-07

## 2024-02-07 VITALS
BODY MASS INDEX: 28.35 KG/M2 | RESPIRATION RATE: 17 BRPM | HEIGHT: 63 IN | HEART RATE: 73 BPM | DIASTOLIC BLOOD PRESSURE: 81 MMHG | TEMPERATURE: 98.8 F | OXYGEN SATURATION: 98 % | SYSTOLIC BLOOD PRESSURE: 169 MMHG | WEIGHT: 160 LBS

## 2024-02-07 DIAGNOSIS — C25.0 MALIGNANT NEOPLASM OF HEAD OF PANCREAS: ICD-10-CM

## 2024-02-07 DIAGNOSIS — Z96.652 PRESENCE OF LEFT ARTIFICIAL KNEE JOINT: Chronic | ICD-10-CM

## 2024-02-07 PROCEDURE — 77263 THER RADIOLOGY TX PLNG CPLX: CPT

## 2024-02-07 PROCEDURE — 77334 RADIATION TREATMENT AID(S): CPT | Mod: 26

## 2024-02-07 PROCEDURE — 77290 THER RAD SIMULAJ FIELD CPLX: CPT | Mod: 26

## 2024-02-08 ENCOUNTER — APPOINTMENT (OUTPATIENT)
Dept: HEMATOLOGY ONCOLOGY | Facility: CLINIC | Age: 69
End: 2024-02-08

## 2024-02-09 ENCOUNTER — OUTPATIENT (OUTPATIENT)
Dept: OUTPATIENT SERVICES | Facility: HOSPITAL | Age: 69
LOS: 1 days | Discharge: HOME | End: 2024-02-09

## 2024-02-09 DIAGNOSIS — Z96.652 PRESENCE OF LEFT ARTIFICIAL KNEE JOINT: Chronic | ICD-10-CM

## 2024-02-09 DIAGNOSIS — D64.9 ANEMIA, UNSPECIFIED: ICD-10-CM

## 2024-02-13 ENCOUNTER — APPOINTMENT (OUTPATIENT)
Dept: HEMATOLOGY ONCOLOGY | Facility: CLINIC | Age: 69
End: 2024-02-13

## 2024-02-13 ENCOUNTER — APPOINTMENT (OUTPATIENT)
Dept: GERIATRICS | Facility: CLINIC | Age: 69
End: 2024-02-13
Payer: MEDICARE

## 2024-02-13 ENCOUNTER — APPOINTMENT (OUTPATIENT)
Dept: GERIATRICS | Facility: CLINIC | Age: 69
End: 2024-02-13

## 2024-02-13 ENCOUNTER — APPOINTMENT (OUTPATIENT)
Dept: INFUSION THERAPY | Facility: HOSPITAL | Age: 69
End: 2024-02-13

## 2024-02-13 DIAGNOSIS — G47.9 SLEEP DISORDER, UNSPECIFIED: ICD-10-CM

## 2024-02-13 DIAGNOSIS — Z51.5 ENCOUNTER FOR PALLIATIVE CARE: ICD-10-CM

## 2024-02-13 PROCEDURE — 99214 OFFICE O/P EST MOD 30 MIN: CPT

## 2024-02-13 NOTE — PHYSICAL EXAM
[General Appearance - Alert] : alert [General Appearance - In No Acute Distress] : in no acute distress [General Appearance - Well Developed] : well developed [Oriented To Time, Place, And Person] : oriented to person, place, and time [Affect] : the affect was normal [Mood] : the mood was normal

## 2024-02-13 NOTE — REASON FOR VISIT
[Home] : at home, [unfilled] , at the time of the visit. [Medical Office: ([facility])___] : at the medical office located in  [Patient] : the patient [Follow-Up] : a follow-up visit

## 2024-02-14 ENCOUNTER — NON-APPOINTMENT (OUTPATIENT)
Age: 69
End: 2024-02-14

## 2024-02-14 PROBLEM — G47.9 SLEEP DISTURBANCES: Status: ACTIVE | Noted: 2023-07-25

## 2024-02-14 PROBLEM — Z51.5 ENCOUNTER FOR PALLIATIVE CARE: Status: ACTIVE | Noted: 2023-07-11

## 2024-02-14 NOTE — ASSESSMENT
[______] : HCP: [unfilled] [FreeTextEntry1] : # Pancreatic cancer - On neoadjuvant [place]/Abraxane. Med onc follow up.  # Left renal mass - Being evaluated for resection. Urology follow up.   # Pain 2/2 Neoplasm - Resolved after celiac plexus block, May 2023. Pt not requiring analgesics presently. Should pain return may use PRN Tylenol.   # Peripheral neuropathy - Discussed options of interventions to offer relief. Patient wishes to hold off on interventions at this time as symptoms are minimal.   # Sleep disturbances - C/w melatonin. Discussed the importance of good sleep hygiene. -Medical cannabis certification completed previously, discussed how it could be beneficial to assist with sleep.  # Nausea - C/w Metoclopramide PRN. Discussed how medical cannabis may be beneficial.  # Unintentional weight loss - Improved; Recommend small, frequent meals focused on high-calorie, high-protein, nutrient dense healthy foods.  # Diarrhea - Improved; C/w binding foods. May benefit from PRN Imodium following treatment.  # Encounter for palliative care- Emotional support provided Explained the role of palliative care in enhancing quality of life in the setting of serious illness. Health Care Proxy (HCP) form on file.  Follow up PRN, call sooner with questions or issues.

## 2024-02-14 NOTE — HISTORY OF PRESENT ILLNESS
[FreeTextEntry1] : 67yoF with Pancreatic cancer presents for follow up palliative care visit, referred by oncology. PMH significant for DM2, HTN and substance abuse.   Pt developed abdominal pain 3/2023, imaging performed 04/04/2023 showed a 3cm mass in the body of the pancreas; There was a mass encasing the celiac axis. She was admitted to [facility] on 4/8/23, EUS bx on 4/14/23 showed adenocarcinoma in the body of the pancreas. There was the presence of a L renal mass not biopsied and thought to be a second primary disease. The consensus opinion at the Multidisciplinary Pancreas Cancer Conference was to defer surgery and to offer chemotherapy.  History of diabetes for ten years; she is currently not on insulin and she has recently begun treatment with metformin. THere is no history of jaundice or acholic stolol.   Main reason for which patient is referred is symptom management. Patient underwent celiac plexus nerve block on 5/16/23 which was successful, she is experiencing significantly less pain now, not using any analgesics.  Her appetite has been lower, she is eating smaller portions of food more frequently throughout the day.  Is interested in utilizing medicinal cannabis.   Interval history (2/13/24):  Patient seen for follow up visit via telemedicine.  She remains on treatment with [place]/Abraxane, tolerating it well.  She is scheduled for 5 fractions of SBRT to her pancreas Dr. López to downstage her tumor.  No overt pain since her celiac plexus block in May 2023.  She reports ongoing dyspnea on exertion, this remains stable and resolves quickly with rest.  Appetite is good. Intermittent nausea, relieved with PRN metoclopramide.  Peripheral neuropathy to her toes; she reports this is tolerable and does not interfere with ambulation.   She feels unsteady at times, ambulates with a cane outside of the home.  Intermittent difficultly sleeping, she reports she is a light sleeper. She uses PRN melatonin 5mg QHS which is beneficial. Mood is good, she maintains a positive outlook.   Intermittently smokes medical cannabis for stress relief and to help with sleep. She has anxiety regarding treatment options after losing her  to cancer.   ROS: + weight loss of about 20lbs from her baseline; stable for the past few months + appetite has been good + fatigue + nausea - PRN metoclopramide + mood has been good, tries to stay positive  + trouble sleeping, medical cannabis is helpful Denies constipation.  Uses a cane for assistance with ambulation due to feeling fatigued and imbalanced.   Patient is , has two adult sons who live with her. She drives herself, apartments in her home. Patient is independent in ADL. Patient reports he is in AA and find the support there helpful. She enjoys sculpting, reading and recently started learning how to play the guitar. She is retired from her job as an .   PCP: Dr. Suzy Barltett  I-STOP Ref#:  [id number]

## 2024-02-21 PROCEDURE — 77295 3-D RADIOTHERAPY PLAN: CPT | Mod: 26

## 2024-02-21 PROCEDURE — 77334 RADIATION TREATMENT AID(S): CPT | Mod: 26

## 2024-02-21 PROCEDURE — 77300 RADIATION THERAPY DOSE PLAN: CPT | Mod: 26

## 2024-02-21 PROCEDURE — 77293 RESPIRATOR MOTION MGMT SIMUL: CPT | Mod: 26

## 2024-02-23 ENCOUNTER — APPOINTMENT (OUTPATIENT)
Dept: INFUSION THERAPY | Facility: HOSPITAL | Age: 69
End: 2024-02-23

## 2024-02-23 ENCOUNTER — APPOINTMENT (OUTPATIENT)
Dept: HEMATOLOGY ONCOLOGY | Facility: CLINIC | Age: 69
End: 2024-02-23

## 2024-02-27 ENCOUNTER — RESULT REVIEW (OUTPATIENT)
Age: 69
End: 2024-02-27

## 2024-02-27 ENCOUNTER — APPOINTMENT (OUTPATIENT)
Dept: INFUSION THERAPY | Facility: HOSPITAL | Age: 69
End: 2024-02-27

## 2024-02-27 ENCOUNTER — APPOINTMENT (OUTPATIENT)
Dept: HEMATOLOGY ONCOLOGY | Facility: CLINIC | Age: 69
End: 2024-02-27
Payer: MEDICARE

## 2024-02-27 ENCOUNTER — APPOINTMENT (OUTPATIENT)
Dept: HEMATOLOGY ONCOLOGY | Facility: CLINIC | Age: 69
End: 2024-02-27

## 2024-02-27 VITALS
RESPIRATION RATE: 16 BRPM | HEIGHT: 63 IN | WEIGHT: 159.99 LBS | HEART RATE: 94 BPM | DIASTOLIC BLOOD PRESSURE: 83 MMHG | SYSTOLIC BLOOD PRESSURE: 124 MMHG | TEMPERATURE: 98.1 F | OXYGEN SATURATION: 96 % | BODY MASS INDEX: 28.35 KG/M2

## 2024-02-27 LAB
ALBUMIN SERPL ELPH-MCNC: 4.1 G/DL — SIGNIFICANT CHANGE UP (ref 3.3–5)
ALP SERPL-CCNC: 83 U/L — SIGNIFICANT CHANGE UP (ref 40–120)
ALT FLD-CCNC: 12 U/L — SIGNIFICANT CHANGE UP (ref 10–45)
ANION GAP SERPL CALC-SCNC: 10 MMOL/L — SIGNIFICANT CHANGE UP (ref 5–17)
ANISOCYTOSIS BLD QL: SLIGHT — SIGNIFICANT CHANGE UP
AST SERPL-CCNC: 19 U/L — SIGNIFICANT CHANGE UP (ref 10–40)
BASOPHILS # BLD AUTO: 0.05 K/UL — SIGNIFICANT CHANGE UP (ref 0–0.2)
BASOPHILS NFR BLD AUTO: 0.6 % — SIGNIFICANT CHANGE UP (ref 0–2)
BILIRUB SERPL-MCNC: 0.2 MG/DL — SIGNIFICANT CHANGE UP (ref 0.2–1.2)
BUN SERPL-MCNC: 20 MG/DL — SIGNIFICANT CHANGE UP (ref 7–23)
CALCIUM SERPL-MCNC: 9.6 MG/DL — SIGNIFICANT CHANGE UP (ref 8.4–10.5)
CHLORIDE SERPL-SCNC: 101 MMOL/L — SIGNIFICANT CHANGE UP (ref 96–108)
CO2 SERPL-SCNC: 29 MMOL/L — SIGNIFICANT CHANGE UP (ref 22–31)
CREAT SERPL-MCNC: 0.76 MG/DL — SIGNIFICANT CHANGE UP (ref 0.5–1.3)
DACRYOCYTES BLD QL SMEAR: SLIGHT — SIGNIFICANT CHANGE UP
EGFR: 85 ML/MIN/1.73M2 — SIGNIFICANT CHANGE UP
ELLIPTOCYTES BLD QL SMEAR: SLIGHT — SIGNIFICANT CHANGE UP
EOSINOPHIL # BLD AUTO: 0.09 K/UL — SIGNIFICANT CHANGE UP (ref 0–0.5)
EOSINOPHIL NFR BLD AUTO: 1.1 % — SIGNIFICANT CHANGE UP (ref 0–6)
GLUCOSE SERPL-MCNC: 143 MG/DL — HIGH (ref 70–99)
HCT VFR BLD CALC: 33.9 % — LOW (ref 34.5–45)
HGB BLD-MCNC: 10.9 G/DL — LOW (ref 11.5–15.5)
HYPOCHROMIA BLD QL: SLIGHT — SIGNIFICANT CHANGE UP
IMM GRANULOCYTES NFR BLD AUTO: 0.7 % — SIGNIFICANT CHANGE UP (ref 0–0.9)
LYMPHOCYTES # BLD AUTO: 1.24 K/UL — SIGNIFICANT CHANGE UP (ref 1–3.3)
LYMPHOCYTES # BLD AUTO: 14.9 % — SIGNIFICANT CHANGE UP (ref 13–44)
MCHC RBC-ENTMCNC: 27 PG — SIGNIFICANT CHANGE UP (ref 27–34)
MCHC RBC-ENTMCNC: 32.2 G/DL — SIGNIFICANT CHANGE UP (ref 32–36)
MCV RBC AUTO: 83.9 FL — SIGNIFICANT CHANGE UP (ref 80–100)
MONOCYTES # BLD AUTO: 0.87 K/UL — SIGNIFICANT CHANGE UP (ref 0–0.9)
MONOCYTES NFR BLD AUTO: 10.4 % — SIGNIFICANT CHANGE UP (ref 2–14)
NEUTROPHILS # BLD AUTO: 6.04 K/UL — SIGNIFICANT CHANGE UP (ref 1.8–7.4)
NEUTROPHILS NFR BLD AUTO: 72.3 % — SIGNIFICANT CHANGE UP (ref 43–77)
NRBC # BLD: 0 /100 WBCS — SIGNIFICANT CHANGE UP (ref 0–0)
PLAT MORPH BLD: NORMAL — SIGNIFICANT CHANGE UP
PLATELET # BLD AUTO: 550 K/UL — HIGH (ref 150–400)
POIKILOCYTOSIS BLD QL AUTO: SLIGHT — SIGNIFICANT CHANGE UP
POTASSIUM SERPL-MCNC: 4.5 MMOL/L — SIGNIFICANT CHANGE UP (ref 3.5–5.3)
POTASSIUM SERPL-SCNC: 4.5 MMOL/L — SIGNIFICANT CHANGE UP (ref 3.5–5.3)
PROT SERPL-MCNC: 6.8 G/DL — SIGNIFICANT CHANGE UP (ref 6–8.3)
RBC # BLD: 4.04 M/UL — SIGNIFICANT CHANGE UP (ref 3.8–5.2)
RBC # FLD: 20.4 % — HIGH (ref 10.3–14.5)
RBC BLD AUTO: ABNORMAL
SCHISTOCYTES BLD QL AUTO: SLIGHT — SIGNIFICANT CHANGE UP
SODIUM SERPL-SCNC: 140 MMOL/L — SIGNIFICANT CHANGE UP (ref 135–145)
TARGETS BLD QL SMEAR: SLIGHT — SIGNIFICANT CHANGE UP
WBC # BLD: 8.35 K/UL — SIGNIFICANT CHANGE UP (ref 3.8–10.5)
WBC # FLD AUTO: 8.35 K/UL — SIGNIFICANT CHANGE UP (ref 3.8–10.5)

## 2024-02-27 PROCEDURE — 99214 OFFICE O/P EST MOD 30 MIN: CPT

## 2024-02-27 NOTE — HISTORY OF PRESENT ILLNESS
[de-identified] : adenocarcinoma [FreeTextEntry1] : post  gemcitabine and Abraxane cycle 10 January 2024   [de-identified] : see hpi; 05/09/2023.She has been taking a combination medication of glipizide and metformin with better blood sugar control. Now on acetaminophen 300 codeine 30 mg PO TID PRN after meeting with pain management and her primary care MD. Pain has been midline and more noted in day than in evening. No jaundice May 30, 2023 - Seen in a f/u visit today scheduled for Gemcitabine & Abraxane for Pancreas cancer. Patient had called our office last week for c/o nausea, abd pain, poor appetite. She was prescribed Omeprazole by GI for Gastritis she had not taken. We advised her to take Metoclopramide for nausea, poor appetite and Omeprazole for Gastritis.  Since taking the medications for past few days she has been feeling much better. She remains on Levaquin for Neutropenia prophylaxis.  She denies bleeding, constipation, diarrhea, fever, chills, falls.  June 30, 2023 - Patient seen today accompanied with friend Ms. Jessica Betancourt.  Patient feels well denies interval change in medical status, No nausea, reports improved appetite, no GI complaints, no pain. Seen / evaluated by our Nutritionist team reports helpful tips and compliance with recommendations.  Today she was seen by Palliative / Pain management Dr. Malik no pain meds started.  She reports she was seen by her PCP last week, she ran out of her BP medications awaiting renewal by PCP.  July 17, 2023 -  Patient seen today in a follow up visit accompanied with friend Ms. Jessica Betancourt.  She reports episodes of non bloody diarrhea / loose stools post treatment lasts for day and a half does not take any anti diarrhea medications, follows dietary restriction with resolution of symptoms.  Denies N, V, fever, anorexia, weight loss, febrile illness, swollen glands, change in medical condition since last visit.   August 1, 2023 - Received a call from Keli JIMÉNEZ from treatment room patient scheduled for Gemcitabine & Abraxane and CBC shows Neutropenia Patient seen and evaluated, she feels well, asymptomatic, no cough, fever, chills, fatigue, no N, V, poor appetite, swollen glands, night sweats, bleeding, bruising. Denies any recent infection, hospitalization. Reports good appetite. No falls, presented for treatment today by herself, son drove her here.   August 15, 2023 - Received a call from Huang JIMÉNEZ from treatment room to evaluate patient with c/o Dysuria on Friday 8/11/23 lasted 1 day. Patient seen and evaluated in the treatment room.  She reports on Friday she experienced dysuria, frequency of urination, no hematuria, no urgency, fever, chills, pelvic pain, abdominal pain / discomfort. Symptoms of dysuria and frequency of urination has resolved but has foul smelling urine, no urine discoloration noted.  September 12, 2023 - Patient seen in a f/u visit today presented by herself to this appointment, Pt's son Jaiden was on the phone call during the visit.  She is s/p Cycle # 5 Day 1 Gemcitabine & Abraxane treatment. She feels well overall tolerating treatment. No residual UTI symptoms no malodorous urine completed course of antibiotics as prescribed.  October 16, 2023 - Seen in a f/u visit today, presented by herself to this appointment. Feels well reports good appetite, no nausea, vomiting, diarrhea, abd / back pain.  She feels intermittent tingling sensation in her toes describes as mild. She has a f/u visit with Podiatrist (sees every 6 months).  She has appointment with Dr. Pilo [facility] tomorrow to discuss plan for possible surgery.  11/15/2023 seen at one month interval. No weight change: no pain noted. no diarrhea 02/27/2024 She has completed chemotherapy treatment in late January 2024; now approximately 10 months of treatment and she has missed several physician appointments scheduled for the time period January through February 2024. Seen today over 40 minutes late for scheduled appointment. She has been seen by radiation Medicine and I have also discussed follow up with surgical oncology and the urological surgery. [de-identified] : Ms. ENRIKE MORLEY is a 67 year old female who presents for evaluation in our Pancreas Multidisciplinary Clinic. Her PMH includes DM2 (dx ), HTN, left knee replacement. She presented with abdominal pain radiating to her LUQ x 3 weeks. MRI abdomen  noted a 3 cm mass in the body of the pancreas suspicious for primary pancreatic malignancy and also Bosniak type IV cyst upper pole left kidney suspicious for primary renal neoplasm. She was admitted to [facility] on 23 and CT A/P w/ IC showing ill-defined pancreatic body mass consistent with pancreatic neoplasm; and complex enhancing left renal mass concerning for renal cell carcinoma till proven otherwise.\par  She underwent EUS on 23 with noted findings of one 3 cm pancreatic body mass, s/p FNA.  Pathology positive for adenocarcinoma. \par  \par  She reports decrease appetite and early satiety.  Abdominal pain is intermittent and severe when it comes.  She was taking Tylenol#3 after hospital d/c but currently not taking anymore. +Weight loss of 10 pounds since March. \par  \par  Colonoscopy from 7 years ago normal. \par  Smoke/Etoh: 20 pack years. Last smoke 2023. No ETOH use. \par  \par  ECO      Independent, can walk upstairs. Lives with her sons.  (ECOG 1 due to pain). \par  Family Ca hx: None \par  \par  Labs: \par  23    AST: 9   ALT: 18  ALP:  103  Tbili: 0.3 \par  23   Ca 19-9: 2428    CEA: 15.0    AST:  11    ALT: 12    ALP: 96   Tbili:  0.4     A1c: 10% \par   [TextBox_4] : 4/4/23 [TextBox_39] : [id number] [TextBox_41] : adenocarcinoma [TextBox_43] : 4/14/23 [] : This is not a second opinion [TextBox_5] : 5/2/23 [TextBox_40] : 4/8/23

## 2024-02-27 NOTE — REVIEW OF SYSTEMS
[Fatigue] : no fatigue [Recent Change In Weight] : ~T no recent weight change [Abdominal Pain] : no abdominal pain [Constipation] : no constipation [Vomiting] : no vomiting

## 2024-02-27 NOTE — RESULTS/DATA
[FreeTextEntry1] : review of information in the E M H R rate 92 normal axis NSR ; possilbe anterior infarction  GI Ca 19-9 -> 2428 > 2328 > 1704 >  1526 (trending down)   May 30, 2023 - CBC - WBC = 7.3, Hgb = 11.4, PLT = 203, MCV = 81.4 CMP - Blood glucose = 124 (non fasting) GI Ca 19-9 - 1526 (trending down)   July 3, 2023 - CBC - WBC = 3.9, Hgb = 8.8, PLT = 255  August 1, 2023 - CBC - WBC = 2.2, Hgb = 9.4, PLT = 250 000, MCV = 86.5 Neutrophil % = 26%, ANC = 0.57   September 5, 2023 - CT C/A/P - Results d/w with patient in the office and son Jaiden over the phone - copy of report provided  Compared to 4/8/2023 interval decrease in overall size of the pancreatic body mass, however increased soft tissue encasement of the celiac artery and SMA.  Mild increase in size of enhancing solid and cystic left renal mass suspicious for renal cell carcinoma.  No evidence of metastatic disease

## 2024-02-27 NOTE — PHYSICAL EXAM
[de-identified] : wore a wig,  [de-identified] : wears glasses  [de-identified] : Right Anterior chest wall Mediport site appears stable, no bleeding, edema or erythema / discharge noted

## 2024-02-27 NOTE — ASSESSMENT
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 Side effects of chemotherapy were reviewed with her She is age 67 and has significant co morbidity including diabetes poorly controlled on oral metformin at present. She is asked to see her primary care physician for consideration of an insulin based treatment. The patient may have two tumors as the left renal mass has not had a biopsy. The priority in treating the pancreas cancer was addressed in the multidisciplinary conference. CT/PET will be helpful in addressing the left renal lesion which may be a second primary tumor. If two different tumors are present she is not a candidate for a clinical study. I have recommended placement of a Mediport for chemotherapy treatment. Scheduling of Mediport performed with patient and explanation of use of Mediport. A request for Mediport placement was made and our secretarial team will obtain authorization. Blood testing requested today as noted. THe patient has seen pain management physician Dr Anne for management of analgesia The patient is scheduled for surgical follow up in future. ECG is performed by me and Nursing assistant and results are reviewed I have prescribed antinausea medication metoclopramide 10 mg PO TID PN RTC in 1 week to sign consent for chemotherapy 05/09/2023 The patient has returned to office. Physical examination is not changed. She has reviewed the chemotherapy information provided last week. I discussed the use of the gemcitabine and Abraxane as treatment on day 1, 8 and 15 of a 28 day cycle (no treatment on day 22). I assisted our  in scheduling chemotherapy and all questions were answered to her satisfaction. Mediport placement is scheduled for next week.RTC in 3 weeks She is now on combination dosing metformin and glyceride.Her primary care physician has prescribed acetaminophen 3000 and codeine 30 TID. 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 Seen in a f/u visit today 1) Pancreas Cancer - scheduled for Cycle # 2 Gemcitabine & Abraxane, labs with treatment 2) Left Renal Mass awaiting Biopsy Seen by Dr. Pilo [facility] - per Dr. Kelly's note - for now the renal mass can remain on the back burner temporarily until the pancreatic lesion is addressed. If there is a surgical plan for the pancreatic lesion to take place, I would consider combining that surgery with a laparoscopic or robotic partial nephrectomy as these lesions are relatively close to 1 another and could be operated on in the same field. 3) Surgical Onc - Dr. Greco - Seen for Surgical Intervention - Will re-eval after 2 months restaging CT 4) Abd pain, Erosive Gastritis on Omeprazole (prescribed by GI), - Tylenol w codeine renewed - Pain management consult requested & Nutritional Evaluation requested. All questions, concerns were addressed with patient during this visit to her satisfaction, she verbalized understanding. RTC in 3 weeks Case management, care plan d/w Dr. 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 Seen in a f/u visit today 1) Pancreas Cancer - s/p Cycle 2 Day 8 treatment on 6/27/23 scheduled for Cycle 2 Day 15 Gemcitabine & Abraxane on Monday July 3, 2023. Labs from 6/27/23 discussed with patient copies provided 2) Mediport Right ACW - EMLA cream prescription sent to local pharmacy. 3) Surgical Onc - Dr. Greco - Seen for Surgical Intervention - Will re-eval after 2 months restaging CT 4) Left Renal Mass awaiting Biopsy Seen by Dr. Pilo [facility] - per Dr. Kelly's note - for now the renal mass can remain on the back burner temporarily until the pancreatic lesion is addressed. If there is a surgical plan for the pancreatic lesion to take place, I would consider combining that surgery with a laparoscopic or robotic partial nephrectomy as these lesions are relatively close to 1 another and could be operated on in the same field. 5) Abd pain, Erosive Gastritis on Omeprazole (prescribed by GI), - Tylenol w codeine renewed - Pain management consult requested & Nutritional Evaluation requested. All questions, concerns were addressed with patient & friend Jessica during this visit to her satisfaction, she verbalized understanding. RTC in 3 weeks Case management, care plan d/w Dr. 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 Seen in a f/u visit today 1) Pancreas Cancer on Gemcitabine & Abraxane via Mediport - scheduled for Cycle # 3- Day 1 on 7/18/23, Day 8 on 7/25/2023, Day 15 on 8/1/2023 2) Anemia - Hgb = 8.8 on July 3, 2023 - today's repeat Hgb 9.1 will add anemia labs to tomorrow's treatment visit Recommend to add dietary iron to improve iron in blood - add red meat 2-3 x week 3) Diarrhea - Loperamide sent to local pharmacy, PO hydration, electrolyte supplementation was emphasized - pt advised how to take Loperamide with verbal understanding 4) Surgical Onc - Dr. Greco - Seen for Surgical Intervention recommended to f/u for re-eval after 2 months restaging CT - pt has not made an apptn as of yet - Patient was counseled to make a f/u visit as per recs 5) Left Renal Mass awaiting Biopsy Seen by Dr. Pilo [facility] - per Dr. Kelly's note - for now the renal mass can remain on the back burner temporarily until the pancreatic lesion is addressed. If there is a surgical plan for the pancreatic lesion to take place, I would consider combining that surgery with a laparoscopic or robotic partial nephrectomy as these lesions are relatively close to 1 another and could be operated on in the same field. 6) Abd pain, Erosive Gastritis on Omeprazole - no reported GI symptoms at this time - GI f/u as per their discretion 7) Pain management following - Patient advised to maintain a diary to keep a log of symptoms, f/u visits, recommendations, list of current medications. All questions, concerns were addressed with patient & friend Jessica during this visit to her satisfaction, she verbalized understanding. RTC in 4 weeks or prn as needed. Case management, care plan d/w Dr. Tommy Mahoney  August 1, 2023 - Patient seen in treatment room for low ANC. 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 1) Leukopenia & Neutropenia - CBC - WBC = 2.2, Hgb = 9.4, PLT = 250 000, MCV = 86.5  Neutrophil % = 26%, ANC = 0.57 Will hold treatment for today due to Leukopenia & Neutropenia - Cipro 500 mg PO qd started for ppx - Will hold off on initiating growth factors at this time and reevaluate. Patient was advised on Neutropenia precautions, wear mask, wash hands frequently and avoid sick contacts. 2) Normocytic Anemia - Hgb = 9.4 no bleeding, no indication for blood transfusion will continue to monitor Ferritin & Folate levels WNL, B12 level = 1446 (Elevated) 3) Surgical Onc - Dr. Greco - Seen for Surgical Intervention recommended to f/u for re-eval after 2 months restaging CT - pt has not made an apptn as of yet - Patient was counseled to make a f/u visit as per recs 4) Left Renal Mass awaiting Biopsy Seen by Dr. Pilo [facility] - per Dr. Kelly's note - for now the renal mass can remain on the back burner temporarily until the pancreatic lesion is addressed. If there is a surgical plan for the pancreatic lesion to take place, I would consider combining that surgery with a laparoscopic or robotic partial nephrectomy as these lesions are relatively close to 1 another and could be operated on in the same field. All questions, concerns were addressed with patient during this encounter, she verbalized understanding. RTC in 2 weeks scheduled for Will repeat CBC trend.  Addendum: Above findings, labs were discussed with Dr. 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 1) Pancreas Cancer - Patient scheduled today for Cycle # 4 Day 1 of Gemcitabine & Abraxane. & scheduled for Cycle # 4 Day 8 on August 22, 2023 & Day # 15 on August 29, 2023. Today's CBC - WBC = 9.65, ANC = 6.67, Hgb = 10.2, PLT = 338, MCV = 86.1, CMP unremarkable, 2) R/o UTI - Dysuria, frequency lasted 1 day now w foul smelling urine - UA, Urine Culture results pending. will treat empirically for presumed UTI - Cipro 500 mg PO bid x 7 days was sent to [facility] 2) Surgical Onc - f/u apptn w Dr. Greco scheduled for 8/16/23. 3) Left Renal Mass awaiting Biopsy Seen by Dr. Pilo [facility] - per Dr. Kelly's note - for now the renal mass can remain on the back burner temporarily until the pancreatic lesion is addressed. If there is a surgical plan for the pancreatic lesion to take place, I would consider combining that surgery with a laparoscopic or robotic partial nephrectomy as these lesions are relatively close to 1 another and could be operated on in the same field. All questions, concerns were addressed with patient during this encounter, she verbalized understanding. RTC in 2 weeks Case management, care plan d/w Dr. Tommy Mahoney. August 16, 2023 -Addendum Note: - 8/15/23 UA + for Nitrites, Leukocyte Esterase, trace blood, Urine culture testing - continue Cipro 500 mg PO bid for 7 days - patient aware of the plan. 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 1) Pancreas Cancer - s/p Cycle 5 Day 1 treatment today. Scheduled for Cycle 5 - Day 8 on 9/19/23 & Day 15 on 9/26/23. CT C/A/P - September 5, 2023 - Compared to 4/8/2023 interval decrease in overall size of the pancreatic body mass, however increased soft tissue encasement of the celiac artery and SMA. Mild increase in size of enhancing solid and cystic left renal mass suspicious for renal cell carcinoma. No evidence of metastatic disease Results d/w with patient in the office and son Jaiden over the phone - copy of report provided. 2) Patient advised to f/u w Urology Dr. Kelly for increase in size of Left Renal mass suspicious for RCC. - patient and son were advised to call and expedite the visit. All questions, concerns were addressed with patient in person and son Jaiden over the phone during this visit to their satisfaction, they verbalized understanding of the plan. RTC in 2-3 weeks or prn Case management, care plan d/w Dr. Tommy Mahoney.  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  1) Pancreas Cancer - scheduled for Cycle 6 Day 1 Gemcitabine & Abraxane. Has apptn for C6 D2 on 10/23/23 &  Cycle 6 D15 on 10/30/2023.  Per [facility] recommendation to repeat CT scans in November 2023 to evaluate interval change and plan for surgical intervention - Repeat CT C/A/P ordered for November 1, 2023.  2) Left Renal Mass - f/u with Dr. Kelly [facility] scheduled for 10/17/23  3) Surgery f/u w Dr. Sahni advised 4) Endocrine evaluation advised  All questions, concerns were addressed with patient during this visit to her satisfaction, she verbalized understanding of the plan. RTO in November 2nd week with Dr. Mahoney to discuss results of scan. Case management, care plan d/w Dr. Tommy Mahoney. 11/15/2023 she will begin cycle 7 gemcitabine Abraxane on third week of November. Plan to repeat CT scan of chest abdomen and pelvis on 1/08/2024. We are hoping for reduction in size of pancreas mass and if not achieved, we will consider radiation therapy post cycle 8 of treatment (cycle 8 will begin in late December 2023. Leeanna questions of the patient answered to her satisfaction. She is advised to avoid salt in her diet and to follow up with primary care MD for antihypertension medication renewal. 02/27/2024 Review of pancreas clinic note Agueda Nolan; requested radiation therapy and reassessment of the possible resectability. No major tumor shrinkage with ten months of chemotherapy.  CT scanning of chest abdomen pelvis 04/01/2024 requested.  RTC one month

## 2024-02-28 DIAGNOSIS — R11.2 NAUSEA WITH VOMITING, UNSPECIFIED: ICD-10-CM

## 2024-02-28 DIAGNOSIS — Z51.11 ENCOUNTER FOR ANTINEOPLASTIC CHEMOTHERAPY: ICD-10-CM

## 2024-02-28 DIAGNOSIS — C25.9 MALIGNANT NEOPLASM OF PANCREAS, UNSPECIFIED: ICD-10-CM

## 2024-03-05 ENCOUNTER — RESULT REVIEW (OUTPATIENT)
Age: 69
End: 2024-03-05

## 2024-03-05 ENCOUNTER — APPOINTMENT (OUTPATIENT)
Dept: INFUSION THERAPY | Facility: HOSPITAL | Age: 69
End: 2024-03-05

## 2024-03-05 ENCOUNTER — NON-APPOINTMENT (OUTPATIENT)
Age: 69
End: 2024-03-05

## 2024-03-05 ENCOUNTER — APPOINTMENT (OUTPATIENT)
Dept: HEMATOLOGY ONCOLOGY | Facility: CLINIC | Age: 69
End: 2024-03-05

## 2024-03-05 LAB
ALBUMIN SERPL ELPH-MCNC: 4.1 G/DL — SIGNIFICANT CHANGE UP (ref 3.3–5)
ALP SERPL-CCNC: 85 U/L — SIGNIFICANT CHANGE UP (ref 40–120)
ALT FLD-CCNC: 10 U/L — SIGNIFICANT CHANGE UP (ref 10–45)
ANION GAP SERPL CALC-SCNC: 11 MMOL/L — SIGNIFICANT CHANGE UP (ref 5–17)
AST SERPL-CCNC: 21 U/L — SIGNIFICANT CHANGE UP (ref 10–40)
BILIRUB SERPL-MCNC: 0.3 MG/DL — SIGNIFICANT CHANGE UP (ref 0.2–1.2)
BUN SERPL-MCNC: 14 MG/DL — SIGNIFICANT CHANGE UP (ref 7–23)
CALCIUM SERPL-MCNC: 9.8 MG/DL — SIGNIFICANT CHANGE UP (ref 8.4–10.5)
CHLORIDE SERPL-SCNC: 100 MMOL/L — SIGNIFICANT CHANGE UP (ref 96–108)
CO2 SERPL-SCNC: 26 MMOL/L — SIGNIFICANT CHANGE UP (ref 22–31)
CREAT SERPL-MCNC: 0.79 MG/DL — SIGNIFICANT CHANGE UP (ref 0.5–1.3)
EGFR: 81 ML/MIN/1.73M2 — SIGNIFICANT CHANGE UP
GLUCOSE SERPL-MCNC: 134 MG/DL — HIGH (ref 70–99)
POTASSIUM SERPL-MCNC: 4.3 MMOL/L — SIGNIFICANT CHANGE UP (ref 3.5–5.3)
POTASSIUM SERPL-SCNC: 4.3 MMOL/L — SIGNIFICANT CHANGE UP (ref 3.5–5.3)
PROT SERPL-MCNC: 7 G/DL — SIGNIFICANT CHANGE UP (ref 6–8.3)
SODIUM SERPL-SCNC: 137 MMOL/L — SIGNIFICANT CHANGE UP (ref 135–145)

## 2024-03-06 ENCOUNTER — NON-APPOINTMENT (OUTPATIENT)
Age: 69
End: 2024-03-06

## 2024-03-12 ENCOUNTER — NON-APPOINTMENT (OUTPATIENT)
Age: 69
End: 2024-03-12

## 2024-03-12 ENCOUNTER — RESULT REVIEW (OUTPATIENT)
Age: 69
End: 2024-03-12

## 2024-03-12 ENCOUNTER — APPOINTMENT (OUTPATIENT)
Dept: HEMATOLOGY ONCOLOGY | Facility: CLINIC | Age: 69
End: 2024-03-12

## 2024-03-12 ENCOUNTER — APPOINTMENT (OUTPATIENT)
Dept: HEMATOLOGY ONCOLOGY | Facility: CLINIC | Age: 69
End: 2024-03-12
Payer: MEDICARE

## 2024-03-12 ENCOUNTER — APPOINTMENT (OUTPATIENT)
Dept: INFUSION THERAPY | Facility: HOSPITAL | Age: 69
End: 2024-03-12

## 2024-03-12 LAB
BASOPHILS # BLD AUTO: 0 K/UL — SIGNIFICANT CHANGE UP (ref 0–0.2)
BASOPHILS NFR BLD AUTO: 0 % — SIGNIFICANT CHANGE UP (ref 0–2)
DACRYOCYTES BLD QL SMEAR: SLIGHT — SIGNIFICANT CHANGE UP
ELLIPTOCYTES BLD QL SMEAR: SLIGHT — SIGNIFICANT CHANGE UP
EOSINOPHIL # BLD AUTO: 0 K/UL — SIGNIFICANT CHANGE UP (ref 0–0.5)
EOSINOPHIL NFR BLD AUTO: 0 % — SIGNIFICANT CHANGE UP (ref 0–6)
HCT VFR BLD CALC: 29.3 % — LOW (ref 34.5–45)
HGB BLD-MCNC: 9.7 G/DL — LOW (ref 11.5–15.5)
HYPOCHROMIA BLD QL: SLIGHT — SIGNIFICANT CHANGE UP
LYMPHOCYTES # BLD AUTO: 1.03 K/UL — SIGNIFICANT CHANGE UP (ref 1–3.3)
LYMPHOCYTES # BLD AUTO: 52 % — HIGH (ref 13–44)
MCHC RBC-ENTMCNC: 27.2 PG — SIGNIFICANT CHANGE UP (ref 27–34)
MCHC RBC-ENTMCNC: 33.1 G/DL — SIGNIFICANT CHANGE UP (ref 32–36)
MCV RBC AUTO: 82.3 FL — SIGNIFICANT CHANGE UP (ref 80–100)
MONOCYTES # BLD AUTO: 0.04 K/UL — SIGNIFICANT CHANGE UP (ref 0–0.9)
MONOCYTES NFR BLD AUTO: 2 % — SIGNIFICANT CHANGE UP (ref 2–14)
MYELOCYTES NFR BLD: 1 % — HIGH (ref 0–0)
NEUTROPHILS # BLD AUTO: 0.9 K/UL — LOW (ref 1.8–7.4)
NEUTROPHILS NFR BLD AUTO: 45 % — SIGNIFICANT CHANGE UP (ref 43–77)
NRBC # BLD: 2 /100 WBCS — HIGH (ref 0–0)
NRBC # BLD: SIGNIFICANT CHANGE UP /100 WBCS (ref 0–0)
PLAT MORPH BLD: NORMAL — SIGNIFICANT CHANGE UP
PLATELET # BLD AUTO: 107 K/UL — LOW (ref 150–400)
POIKILOCYTOSIS BLD QL AUTO: SLIGHT — SIGNIFICANT CHANGE UP
RBC # BLD: 3.56 M/UL — LOW (ref 3.8–5.2)
RBC # FLD: 18.7 % — HIGH (ref 10.3–14.5)
RBC BLD AUTO: ABNORMAL
WBC # BLD: 1.99 K/UL — LOW (ref 3.8–10.5)
WBC # FLD AUTO: 1.99 K/UL — LOW (ref 3.8–10.5)

## 2024-03-12 PROCEDURE — 99211 OFF/OP EST MAY X REQ PHY/QHP: CPT

## 2024-03-18 ENCOUNTER — RESULT REVIEW (OUTPATIENT)
Age: 69
End: 2024-03-18

## 2024-03-18 ENCOUNTER — APPOINTMENT (OUTPATIENT)
Dept: HEMATOLOGY ONCOLOGY | Facility: CLINIC | Age: 69
End: 2024-03-18
Payer: MEDICARE

## 2024-03-18 VITALS
DIASTOLIC BLOOD PRESSURE: 103 MMHG | WEIGHT: 158.73 LBS | BODY MASS INDEX: 28.12 KG/M2 | SYSTOLIC BLOOD PRESSURE: 163 MMHG | TEMPERATURE: 98.2 F | HEART RATE: 73 BPM | RESPIRATION RATE: 15 BRPM | OXYGEN SATURATION: 94 %

## 2024-03-18 VITALS — SYSTOLIC BLOOD PRESSURE: 154 MMHG | DIASTOLIC BLOOD PRESSURE: 78 MMHG

## 2024-03-18 LAB
BASOPHILS # BLD AUTO: 0.02 K/UL — SIGNIFICANT CHANGE UP (ref 0–0.2)
BASOPHILS NFR BLD AUTO: 0.3 % — SIGNIFICANT CHANGE UP (ref 0–2)
EOSINOPHIL # BLD AUTO: 0.12 K/UL — SIGNIFICANT CHANGE UP (ref 0–0.5)
EOSINOPHIL NFR BLD AUTO: 2.1 % — SIGNIFICANT CHANGE UP (ref 0–6)
HCT VFR BLD CALC: 29.7 % — LOW (ref 34.5–45)
HGB BLD-MCNC: 9.8 G/DL — LOW (ref 11.5–15.5)
IMM GRANULOCYTES NFR BLD AUTO: 0.3 % — SIGNIFICANT CHANGE UP (ref 0–0.9)
LYMPHOCYTES # BLD AUTO: 0.61 K/UL — LOW (ref 1–3.3)
LYMPHOCYTES # BLD AUTO: 10.6 % — LOW (ref 13–44)
MCHC RBC-ENTMCNC: 27.3 PG — SIGNIFICANT CHANGE UP (ref 27–34)
MCHC RBC-ENTMCNC: 33 G/DL — SIGNIFICANT CHANGE UP (ref 32–36)
MCV RBC AUTO: 82.7 FL — SIGNIFICANT CHANGE UP (ref 80–100)
MONOCYTES # BLD AUTO: 0.47 K/UL — SIGNIFICANT CHANGE UP (ref 0–0.9)
MONOCYTES NFR BLD AUTO: 8.2 % — SIGNIFICANT CHANGE UP (ref 2–14)
NEUTROPHILS # BLD AUTO: 4.52 K/UL — SIGNIFICANT CHANGE UP (ref 1.8–7.4)
NEUTROPHILS NFR BLD AUTO: 78.5 % — HIGH (ref 43–77)
NRBC # BLD: 0 /100 WBCS — SIGNIFICANT CHANGE UP (ref 0–0)
PLATELET # BLD AUTO: 173 K/UL — SIGNIFICANT CHANGE UP (ref 150–400)
RBC # BLD: 3.59 M/UL — LOW (ref 3.8–5.2)
RBC # FLD: 20.2 % — HIGH (ref 10.3–14.5)
WBC # BLD: 5.76 K/UL — SIGNIFICANT CHANGE UP (ref 3.8–10.5)
WBC # FLD AUTO: 5.76 K/UL — SIGNIFICANT CHANGE UP (ref 3.8–10.5)

## 2024-03-18 PROCEDURE — 99214 OFFICE O/P EST MOD 30 MIN: CPT

## 2024-03-18 PROCEDURE — G2211 COMPLEX E/M VISIT ADD ON: CPT

## 2024-03-18 RX ORDER — LIDOCAINE AND PRILOCAINE 25; 25 MG/G; MG/G
2.5-2.5 CREAM TOPICAL
Qty: 1 | Refills: 3 | Status: ACTIVE | COMMUNITY
Start: 2023-06-30 | End: 1900-01-01

## 2024-03-18 NOTE — ASSESSMENT
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 Side effects of chemotherapy were reviewed with her She is age 67 and has significant co morbidity including diabetes poorly controlled on oral metformin at present. She is asked to see her primary care physician for consideration of an insulin based treatment. The patient may have two tumors as the left renal mass has not had a biopsy. The priority in treating the pancreas cancer was addressed in the multidisciplinary conference. CT/PET will be helpful in addressing the left renal lesion which may be a second primary tumor. If two different tumors are present she is not a candidate for a clinical study. I have recommended placement of a Mediport for chemotherapy treatment. Scheduling of Mediport performed with patient and explanation of use of Mediport. A request for Mediport placement was made and our secretarial team will obtain authorization. Blood testing requested today as noted. THe patient has seen pain management physician Dr Anne for management of analgesia The patient is scheduled for surgical follow up in future. ECG is performed by me and Nursing assistant and results are reviewed I have prescribed antinausea medication metoclopramide 10 mg PO TID PN RTC in 1 week to sign consent for chemotherapy 05/09/2023 The patient has returned to office. Physical examination is not changed. She has reviewed the chemotherapy information provided last week. I discussed the use of the gemcitabine and Abraxane as treatment on day 1, 8 and 15 of a 28 day cycle (no treatment on day 22). I assisted our  in scheduling chemotherapy and all questions were answered to her satisfaction. Mediport placement is scheduled for next week.RTC in 3 weeks She is now on combination dosing metformin and glyceride.Her primary care physician has prescribed acetaminophen 3000 and codeine 30 TID. 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 Seen in a f/u visit today 1) Pancreas Cancer - scheduled for Cycle # 2 Gemcitabine & Abraxane, labs with treatment 2) Left Renal Mass awaiting Biopsy Seen by Dr. Pilo [facility] - per Dr. Kelly's note - for now the renal mass can remain on the back burner temporarily until the pancreatic lesion is addressed. If there is a surgical plan for the pancreatic lesion to take place, I would consider combining that surgery with a laparoscopic or robotic partial nephrectomy as these lesions are relatively close to 1 another and could be operated on in the same field. 3) Surgical Onc - Dr. Greco - Seen for Surgical Intervention - Will re-eval after 2 months restaging CT 4) Abd pain, Erosive Gastritis on Omeprazole (prescribed by GI), - Tylenol w codeine renewed - Pain management consult requested & Nutritional Evaluation requested. All questions, concerns were addressed with patient during this visit to her satisfaction, she verbalized understanding. RTC in 3 weeks Case management, care plan d/w Dr. 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 Seen in a f/u visit today 1) Pancreas Cancer - s/p Cycle 2 Day 8 treatment on 6/27/23 scheduled for Cycle 2 Day 15 Gemcitabine & Abraxane on Monday July 3, 2023. Labs from 6/27/23 discussed with patient copies provided 2) Mediport Right ACW - EMLA cream prescription sent to local pharmacy. 3) Surgical Onc - Dr. Greco - Seen for Surgical Intervention - Will re-eval after 2 months restaging CT 4) Left Renal Mass awaiting Biopsy Seen by Dr. Pilo [facility] - per Dr. Kelly's note - for now the renal mass can remain on the back burner temporarily until the pancreatic lesion is addressed. If there is a surgical plan for the pancreatic lesion to take place, I would consider combining that surgery with a laparoscopic or robotic partial nephrectomy as these lesions are relatively close to 1 another and could be operated on in the same field. 5) Abd pain, Erosive Gastritis on Omeprazole (prescribed by GI), - Tylenol w codeine renewed - Pain management consult requested & Nutritional Evaluation requested. All questions, concerns were addressed with patient & friend Jessica during this visit to her satisfaction, she verbalized understanding. RTC in 3 weeks Case management, care plan d/w Dr. 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 Seen in a f/u visit today 1) Pancreas Cancer on Gemcitabine & Abraxane via Mediport - scheduled for Cycle # 3- Day 1 on 7/18/23, Day 8 on 7/25/2023, Day 15 on 8/1/2023 2) Anemia - Hgb = 8.8 on July 3, 2023 - today's repeat Hgb 9.1 will add anemia labs to tomorrow's treatment visit Recommend to add dietary iron to improve iron in blood - add red meat 2-3 x week 3) Diarrhea - Loperamide sent to local pharmacy, PO hydration, electrolyte supplementation was emphasized - pt advised how to take Loperamide with verbal understanding 4) Surgical Onc - Dr. Greco - Seen for Surgical Intervention recommended to f/u for re-eval after 2 months restaging CT - pt has not made an apptn as of yet - Patient was counseled to make a f/u visit as per recs 5) Left Renal Mass awaiting Biopsy Seen by Dr. Pilo [facility] - per Dr. Kelly's note - for now the renal mass can remain on the back burner temporarily until the pancreatic lesion is addressed. If there is a surgical plan for the pancreatic lesion to take place, I would consider combining that surgery with a laparoscopic or robotic partial nephrectomy as these lesions are relatively close to 1 another and could be operated on in the same field. 6) Abd pain, Erosive Gastritis on Omeprazole - no reported GI symptoms at this time - GI f/u as per their discretion 7) Pain management following - Patient advised to maintain a diary to keep a log of symptoms, f/u visits, recommendations, list of current medications. All questions, concerns were addressed with patient & friend Jessica during this visit to her satisfaction, she verbalized understanding. RTC in 4 weeks or prn as needed. Case management, care plan d/w Dr. Tommy Mahoney  August 1, 2023 - Patient seen in treatment room for low ANC. 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 1) Leukopenia & Neutropenia - CBC - WBC = 2.2, Hgb = 9.4, PLT = 250 000, MCV = 86.5  Neutrophil % = 26%, ANC = 0.57 Will hold treatment for today due to Leukopenia & Neutropenia - Cipro 500 mg PO qd started for ppx - Will hold off on initiating growth factors at this time and reevaluate. Patient was advised on Neutropenia precautions, wear mask, wash hands frequently and avoid sick contacts. 2) Normocytic Anemia - Hgb = 9.4 no bleeding, no indication for blood transfusion will continue to monitor Ferritin & Folate levels WNL, B12 level = 1446 (Elevated) 3) Surgical Onc - Dr. Greco - Seen for Surgical Intervention recommended to f/u for re-eval after 2 months restaging CT - pt has not made an apptn as of yet - Patient was counseled to make a f/u visit as per recs 4) Left Renal Mass awaiting Biopsy Seen by Dr. Pilo [facility] - per Dr. Kelly's note - for now the renal mass can remain on the back burner temporarily until the pancreatic lesion is addressed. If there is a surgical plan for the pancreatic lesion to take place, I would consider combining that surgery with a laparoscopic or robotic partial nephrectomy as these lesions are relatively close to 1 another and could be operated on in the same field. All questions, concerns were addressed with patient during this encounter, she verbalized understanding. RTC in 2 weeks scheduled for Will repeat CBC trend.  Addendum: Above findings, labs were discussed with Dr. 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 1) Pancreas Cancer - Patient scheduled today for Cycle # 4 Day 1 of Gemcitabine & Abraxane. & scheduled for Cycle # 4 Day 8 on August 22, 2023 & Day # 15 on August 29, 2023. Today's CBC - WBC = 9.65, ANC = 6.67, Hgb = 10.2, PLT = 338, MCV = 86.1, CMP unremarkable, 2) R/o UTI - Dysuria, frequency lasted 1 day now w foul smelling urine - UA, Urine Culture results pending. will treat empirically for presumed UTI - Cipro 500 mg PO bid x 7 days was sent to [facility] 2) Surgical Onc - f/u apptn w Dr. Greco scheduled for 8/16/23. 3) Left Renal Mass awaiting Biopsy Seen by Dr. Pilo [facility] - per Dr. Kelly's note - for now the renal mass can remain on the back burner temporarily until the pancreatic lesion is addressed. If there is a surgical plan for the pancreatic lesion to take place, I would consider combining that surgery with a laparoscopic or robotic partial nephrectomy as these lesions are relatively close to 1 another and could be operated on in the same field. All questions, concerns were addressed with patient during this encounter, she verbalized understanding. RTC in 2 weeks Case management, care plan d/w Dr. Tommy Mahoney. August 16, 2023 -Addendum Note: - 8/15/23 UA + for Nitrites, Leukocyte Esterase, trace blood, Urine culture testing - continue Cipro 500 mg PO bid for 7 days - patient aware of the plan. 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 1) Pancreas Cancer - s/p Cycle 5 Day 1 treatment today. Scheduled for Cycle 5 - Day 8 on 9/19/23 & Day 15 on 9/26/23. CT C/A/P - September 5, 2023 - Compared to 4/8/2023 interval decrease in overall size of the pancreatic body mass, however increased soft tissue encasement of the celiac artery and SMA. Mild increase in size of enhancing solid and cystic left renal mass suspicious for renal cell carcinoma. No evidence of metastatic disease Results d/w with patient in the office and son Jaiden over the phone - copy of report provided. 2) Patient advised to f/u w Urology Dr. Kelly for increase in size of Left Renal mass suspicious for RCC. - patient and son were advised to call and expedite the visit. All questions, concerns were addressed with patient in person and son Jaiden over the phone during this visit to their satisfaction, they verbalized understanding of the plan. RTC in 2-3 weeks or prn Case management, care plan d/w Dr. Tommy Mahoney.  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  1) Pancreas Cancer - scheduled for Cycle 6 Day 1 Gemcitabine & Abraxane. Has apptn for C6 D2 on 10/23/23 &  Cycle 6 D15 on 10/30/2023.  Per [facility] recommendation to repeat CT scans in November 2023 to evaluate interval change and plan for surgical intervention - Repeat CT C/A/P ordered for November 1, 2023.  2) Left Renal Mass - f/u with Dr. Kelly Urology scheduled for 10/17/23  3) Surgery f/u w Dr. Sahni advised 4) Endocrine evaluation advised  All questions, concerns were addressed with patient during this visit to her satisfaction, she verbalized understanding of the plan. RTO in November 2nd week with Dr. Mahoney to discuss results of scan. Case management, care plan d/w Dr. Tommy Mahoney. 11/15/2023 she will begin cycle 7 gemcitabine Abraxane on third week of November. Plan to repeat CT scan of chest abdomen and pelvis on 1/08/2024. We are hoping for reduction in size of pancreas mass and if not achieved, we will consider radiation therapy post cycle 8 of treatment (cycle 8 will begin in late December 2023. Leeanna questions of the patient answered to her satisfaction. She is advised to avoid salt in her diet and to follow up with primary care MD for antihypertension medication renewal. 02/27/2024 Review of pancreas clinic note Agueda Nolan; requested radiation therapy and reassessment of the possible resectability. No major tumor shrinkage with ten months of chemotherapy.  CT scanning of chest abdomen pelvis 04/01/2024 requested.  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   1) Pancreas Cancer on Gemcitabine & Abraxane & undergoing RT - treatment scheduled today.  Today's WBC = 5.76 w Normal ANC  Repeat CT C/A/P ordered for April 1, 2024 - pt to schedule scan & a f/u visit to discuss results of scan 2) Left Renal Mass - f/u with Dr. Kelly U[facility] advised. 3) Uncontrolled HTN - asymp on Amlodipine & Losartan - low salt diet advised & maintain BP diary f/u w PCP for titration of antihypertensive meds.  4) f/u w PCP, & other providers at their discretion. Patiient requesting [facility] providers for Ophthalmology & Endocrine - [facility] Care provider access information  provided. All questions were addressed during this vsiit to her satisfaction, verbalized understanding. Pt advised to schedule next treatment w Gemcitabine / Abraxane RTO w Dr. Mahoney after CT scans to discuss results Case management, care plan d/w Dr. Tommy Mahoney

## 2024-03-18 NOTE — PHYSICAL EXAM
patient [Ambulatory and capable of all self care but unable to carry out any work activities] : Status 2- Ambulatory and capable of all self care but unable to carry out any work activities. Up and about more than 50% of waking hours [Normal] : affect appropriate [de-identified] : wore a wig, BP was elevated initially, Repeat Manual BP taken at end of visit was 154/ 78 mmHg  [de-identified] : wears glasses  [de-identified] : Right Anterior chest wall Mediport site appears stable, no bleeding, edema or erythema / discharge noted

## 2024-03-18 NOTE — REVIEW OF SYSTEMS
[Negative] : Allergic/Immunologic [Fatigue] : no fatigue [Recent Change In Weight] : ~T no recent weight change [Abdominal Pain] : no abdominal pain [Vomiting] : no vomiting [Constipation] : no constipation [FreeTextEntry2] : BP was elevated initially, Repeat Manual BP taken at end of visit was 154/ 78 mmHg

## 2024-03-18 NOTE — HISTORY OF PRESENT ILLNESS
[Disease: _____________________] : Disease: [unfilled] [N: ___] : N[unfilled] [T: ___] : T[unfilled] [M: ___] : M[unfilled] [AJCC Stage: ____] : AJCC Stage: [unfilled] [Date: ____________] : Patient's last distress assessment performed on [unfilled]. [80: Normal activity with effort; some signs or symptoms of disease.] : 80: Normal activity with effort; some signs or symptoms of disease.  [0 - No Distress] : Distress Level: 0 [ECOG Performance Status: 2 - Ambulatory and capable of all self care but unable to carry out any work activities] : Performance Status: 2 - Ambulatory and capable of all self care but unable to carry out any work activities. Up and about more than 50% of waking hours [Abdominal Pain] : abdominal pain [EUS] : EUS [Biopsy] : biopsy performed [Before Surgery] : before surgery [Pancreatic ductal adenocarcinoma] : Pancreatic ductal adenocarcinoma [Not staged outside] : not staged outside [Social Work] : Social Work [Other: ____] : [unfilled] [Nutrition] : Nutrition [Restricted in physically strenuous activity but ambulatory and able to carry out work of a light or sedentary nature] : Status 1 - Restricted in physically strenuous activity but ambulatory and able to carry out work of a light or sedentary nature, e.g., light house work, office work [de-identified] : adenocarcinoma [FreeTextEntry1] : post  gemcitabine and Abraxane cycle 10 January 2024   [de-identified] : see hpi; 05/09/2023.She has been taking a combination medication of glipizide and metformin with better blood sugar control. Now on acetaminophen 300 codeine 30 mg PO TID PRN after meeting with pain management and her primary care MD. Pain has been midline and more noted in day than in evening. No jaundice May 30, 2023 - Seen in a f/u visit today scheduled for Gemcitabine & Abraxane for Pancreas cancer. Patient had called our office last week for c/o nausea, abd pain, poor appetite. She was prescribed Omeprazole by GI for Gastritis she had not taken. We advised her to take Metoclopramide for nausea, poor appetite and Omeprazole for Gastritis.  Since taking the medications for past few days she has been feeling much better. She remains on Levaquin for Neutropenia prophylaxis.  She denies bleeding, constipation, diarrhea, fever, chills, falls.  June 30, 2023 - Patient seen today accompanied with friend Ms. Jessica Betancourt.  Patient feels well denies interval change in medical status, No nausea, reports improved appetite, no GI complaints, no pain. Seen / evaluated by our Nutritionist team reports helpful tips and compliance with recommendations.  Today she was seen by Palliative / Pain management Dr. Malik no pain meds started.  She reports she was seen by her PCP last week, she ran out of her BP medications awaiting renewal by PCP.  July 17, 2023 -  Patient seen today in a follow up visit accompanied with friend Ms. Jessica Betancourt.  She reports episodes of non bloody diarrhea / loose stools post treatment lasts for day and a half does not take any anti diarrhea medications, follows dietary restriction with resolution of symptoms.  Denies N, V, fever, anorexia, weight loss, febrile illness, swollen glands, change in medical condition since last visit.   August 1, 2023 - Received a call from Keli JIMÉNEZ from treatment room patient scheduled for Gemcitabine & Abraxane and CBC shows Neutropenia Patient seen and evaluated, she feels well, asymptomatic, no cough, fever, chills, fatigue, no N, V, poor appetite, swollen glands, night sweats, bleeding, bruising. Denies any recent infection, hospitalization. Reports good appetite. No falls, presented for treatment today by herself, son drove her here.   August 15, 2023 - Received a call from Huang JIMÉNEZ from treatment room to evaluate patient with c/o Dysuria on Friday 8/11/23 lasted 1 day. Patient seen and evaluated in the treatment room.  She reports on Friday she experienced dysuria, frequency of urination, no hematuria, no urgency, fever, chills, pelvic pain, abdominal pain / discomfort. Symptoms of dysuria and frequency of urination has resolved but has foul smelling urine, no urine discoloration noted.  September 12, 2023 - Patient seen in a f/u visit today presented by herself to this appointment, Pt's son Jaiden was on the phone call during the visit.  She is s/p Cycle # 5 Day 1 Gemcitabine & Abraxane treatment. She feels well overall tolerating treatment. No residual UTI symptoms no malodorous urine completed course of antibiotics as prescribed.  October 16, 2023 - Seen in a f/u visit today, presented by herself to this appointment. Feels well reports good appetite, no nausea, vomiting, diarrhea, abd / back pain.  She feels intermittent tingling sensation in her toes describes as mild. She has a f/u visit with Podiatrist (sees every 6 months).  She has appointment with Dr. Pilo [facility] tomorrow to discuss plan for possible surgery.  11/15/2023 seen at one month interval. No weight change: no pain noted. no diarrhea 02/27/2024 She has completed chemotherapy treatment in late January 2024; now approximately 10 months of treatment and she has missed several physician appointments scheduled for the time period January through February 2024. Seen today over 40 minutes late for scheduled appointment. She has been seen by radiation Medicine and I have also discussed follow up with surgical oncology and the urological surgery.  March 18, 2024 - Seen in a f/u visit today accompanied by friend Jessica. Scheduled for last RT treatment today.  Feels well no nausea, vomiting, fevers, chills, poor appetite, no febrile illness, hospitalization.  Last week 3/12/24 her treatment was held for Leukopenia / Neutropenia.  Today her BP was elevated asymptomatic no dizziness, headaches, visual / gait disturbances - she forgot to take her BP medications today.  Repeat Manual BP taken at end of visit was 154/ 78 mmHg   [de-identified] : Ms. ENRIKE MORLEY is a 67 year old female who presents for evaluation in our Pancreas Multidisciplinary Clinic. Her PMH includes DM2 (dx ), HTN, left knee replacement. She presented with abdominal pain radiating to her LUQ x 3 weeks. MRI abdomen  noted a 3 cm mass in the body of the pancreas suspicious for primary pancreatic malignancy and also Bosniak type IV cyst upper pole left kidney suspicious for primary renal neoplasm. She was admitted to [facility] on 23 and CT A/P w/ IC showing ill-defined pancreatic body mass consistent with pancreatic neoplasm; and complex enhancing left renal mass concerning for renal cell carcinoma till proven otherwise.\par  She underwent EUS on 23 with noted findings of one 3 cm pancreatic body mass, s/p FNA.  Pathology positive for adenocarcinoma. \par  \par  She reports decrease appetite and early satiety.  Abdominal pain is intermittent and severe when it comes.  She was taking Tylenol#3 after hospital d/c but currently not taking anymore. +Weight loss of 10 pounds since March. \par  \par  Colonoscopy from 7 years ago normal. \par  Smoke/Etoh: 20 pack years. Last smoke 2023. No ETOH use. \par  \par  ECO      Independent, can walk upstairs. Lives with her sons.  (ECOG 1 due to pain). \par  Family Ca hx: None \par  \par  Labs: \par  23    AST: 9   ALT: 18  ALP:  103  Tbili: 0.3 \par  23   Ca 19-9: 2428    CEA: 15.0    AST:  11    ALT: 12    ALP: 96   Tbili:  0.4     A1c: 10% \par   [TextBox_4] : 4/4/23 [TextBox_39] : [id number] [TextBox_41] : adenocarcinoma [TextBox_43] : 4/14/23 [] : This is not a second opinion [TextBox_5] : 5/2/23 [TextBox_40] : 4/8/23

## 2024-03-20 ENCOUNTER — NON-APPOINTMENT (OUTPATIENT)
Age: 69
End: 2024-03-20

## 2024-03-20 VITALS
WEIGHT: 157.08 LBS | HEIGHT: 63 IN | BODY MASS INDEX: 27.83 KG/M2 | TEMPERATURE: 98.5 F | SYSTOLIC BLOOD PRESSURE: 171 MMHG | HEART RATE: 65 BPM | RESPIRATION RATE: 15 BRPM | DIASTOLIC BLOOD PRESSURE: 97 MMHG

## 2024-03-20 PROCEDURE — 77435 SBRT MANAGEMENT: CPT

## 2024-03-20 NOTE — REVIEW OF SYSTEMS
[Nausea: Grade 0] : Nausea: Grade 0 [Vomiting: Grade 0] : Vomiting: Grade 0 [Fatigue: Grade 1 - Fatigue relieved by rest] : Fatigue: Grade 1 - Fatigue relieved by rest [Dyspnea: Grade 0] : Dyspnea: Grade 0 [Dermatitis Radiation: Grade 0] : Dermatitis Radiation: Grade 0

## 2024-03-20 NOTE — HISTORY OF PRESENT ILLNESS
[FreeTextEntry1] : Ms. Chen is a 68-year-old female with cT4 pancreatic adenocarcinoma and concern for RCC. Patient is currently undergoing chemotherapy with medical oncology and tolerating the treatment well. She is eating and her pain is well controlled with minimal pain medications. Most recent imaging shows stable 3.6 x 2.2 cm pancreatic body mass with soft tissue encasement of celiac axis and SMA. Also noted is a 1.3 x 1.2 cm R renal lesion increased in size from prior studies. Discussed SBRT to the pancreas with the patient, as both a means of pain control and to attempt in downstaging her tumor.  3/20/2024: Completed 5/5 fx. Tolerated RT. Appetite good. BMs ok.

## 2024-03-20 NOTE — PHYSICAL EXAM
[General Appearance - In No Acute Distress] : in no acute distress [General Appearance - Alert] : alert [Exaggerated Use Of Accessory Muscles For Inspiration] : no accessory muscle use [] : no respiratory distress [Skin Color & Pigmentation] : normal skin color and pigmentation [Oriented To Time, Place, And Person] : oriented to person, place, and time

## 2024-03-26 ENCOUNTER — RESULT REVIEW (OUTPATIENT)
Age: 69
End: 2024-03-26

## 2024-03-26 ENCOUNTER — APPOINTMENT (OUTPATIENT)
Dept: HEMATOLOGY ONCOLOGY | Facility: CLINIC | Age: 69
End: 2024-03-26

## 2024-03-26 ENCOUNTER — APPOINTMENT (OUTPATIENT)
Dept: INFUSION THERAPY | Facility: HOSPITAL | Age: 69
End: 2024-03-26

## 2024-03-26 LAB
BASOPHILS # BLD AUTO: 0.05 K/UL — SIGNIFICANT CHANGE UP (ref 0–0.2)
BASOPHILS NFR BLD AUTO: 0.7 % — SIGNIFICANT CHANGE UP (ref 0–2)
EOSINOPHIL # BLD AUTO: 0.13 K/UL — SIGNIFICANT CHANGE UP (ref 0–0.5)
EOSINOPHIL NFR BLD AUTO: 1.8 % — SIGNIFICANT CHANGE UP (ref 0–6)
HCT VFR BLD CALC: 32.4 % — LOW (ref 34.5–45)
HGB BLD-MCNC: 10.6 G/DL — LOW (ref 11.5–15.5)
IMM GRANULOCYTES NFR BLD AUTO: 0.8 % — SIGNIFICANT CHANGE UP (ref 0–0.9)
LYMPHOCYTES # BLD AUTO: 0.9 K/UL — LOW (ref 1–3.3)
LYMPHOCYTES # BLD AUTO: 12.7 % — LOW (ref 13–44)
MCHC RBC-ENTMCNC: 27.3 PG — SIGNIFICANT CHANGE UP (ref 27–34)
MCHC RBC-ENTMCNC: 32.7 G/DL — SIGNIFICANT CHANGE UP (ref 32–36)
MCV RBC AUTO: 83.5 FL — SIGNIFICANT CHANGE UP (ref 80–100)
MONOCYTES # BLD AUTO: 0.71 K/UL — SIGNIFICANT CHANGE UP (ref 0–0.9)
MONOCYTES NFR BLD AUTO: 10 % — SIGNIFICANT CHANGE UP (ref 2–14)
NEUTROPHILS # BLD AUTO: 5.26 K/UL — SIGNIFICANT CHANGE UP (ref 1.8–7.4)
NEUTROPHILS NFR BLD AUTO: 74 % — SIGNIFICANT CHANGE UP (ref 43–77)
NRBC # BLD: 0 /100 WBCS — SIGNIFICANT CHANGE UP (ref 0–0)
PLATELET # BLD AUTO: 456 K/UL — HIGH (ref 150–400)
RBC # BLD: 3.88 M/UL — SIGNIFICANT CHANGE UP (ref 3.8–5.2)
RBC # FLD: 20.6 % — HIGH (ref 10.3–14.5)
WBC # BLD: 7.11 K/UL — SIGNIFICANT CHANGE UP (ref 3.8–10.5)
WBC # FLD AUTO: 7.11 K/UL — SIGNIFICANT CHANGE UP (ref 3.8–10.5)

## 2024-03-27 LAB
ALBUMIN SERPL ELPH-MCNC: 4 G/DL — SIGNIFICANT CHANGE UP (ref 3.3–5)
ALP SERPL-CCNC: 75 U/L — SIGNIFICANT CHANGE UP (ref 40–120)
ALT FLD-CCNC: 9 U/L — LOW (ref 10–45)
ANION GAP SERPL CALC-SCNC: 14 MMOL/L — SIGNIFICANT CHANGE UP (ref 5–17)
AST SERPL-CCNC: 18 U/L — SIGNIFICANT CHANGE UP (ref 10–40)
BILIRUB SERPL-MCNC: 0.2 MG/DL — SIGNIFICANT CHANGE UP (ref 0.2–1.2)
BUN SERPL-MCNC: 16 MG/DL — SIGNIFICANT CHANGE UP (ref 7–23)
CALCIUM SERPL-MCNC: 9.5 MG/DL — SIGNIFICANT CHANGE UP (ref 8.4–10.5)
CHLORIDE SERPL-SCNC: 97 MMOL/L — SIGNIFICANT CHANGE UP (ref 96–108)
CO2 SERPL-SCNC: 25 MMOL/L — SIGNIFICANT CHANGE UP (ref 22–31)
CREAT SERPL-MCNC: 0.73 MG/DL — SIGNIFICANT CHANGE UP (ref 0.5–1.3)
EGFR: 90 ML/MIN/1.73M2 — SIGNIFICANT CHANGE UP
GLUCOSE SERPL-MCNC: 137 MG/DL — HIGH (ref 70–99)
POTASSIUM SERPL-MCNC: 5 MMOL/L — SIGNIFICANT CHANGE UP (ref 3.5–5.3)
POTASSIUM SERPL-SCNC: 5 MMOL/L — SIGNIFICANT CHANGE UP (ref 3.5–5.3)
PROT SERPL-MCNC: 6.8 G/DL — SIGNIFICANT CHANGE UP (ref 6–8.3)
SODIUM SERPL-SCNC: 137 MMOL/L — SIGNIFICANT CHANGE UP (ref 135–145)

## 2024-04-01 NOTE — ED PROVIDER NOTE - TEMPLATE
"Occupational Therapy    Visit Type: treatment  SUBJECTIVE  Patient agreed to participate in therapy this date. ""I feel pretty good, everything still takes it out of me\""  Patient / Family Goal: maximize function    Pain   Patient denies pain. OBJECTIVE      Vitals:  2L O2, mild SOB and does report mild to moderate fatigue with moderate activity       Bed Mobility  - Supine to sit: stand by assist  Transfers  Assistive devices: 2-wheeled walker, gait belt  - Sit to stand: supervision  - Stand to sit: supervision       - Second Trial: supervision  Up with close supv, assist with line management (O2 and external catheter). Cues for hand placement      Functional Ambulation  - Assistance: supervision  - Assistive device: 2-wheeled walker and gait belt  - Distance (ft):100  Amb hallways with close supervision. Assistance with lines as well as safety, managing pace. SOB, energy etc.     Activities of Daily Living (ADLs)  Grooming/Oral Hygiene:   - Grooming assist: supervision and set up       - Oral hygiene assist: supervision and set up  - Position: standing at sink  - Assist needed for: set up and increased time to complete  Lower Body Dressing:   - Assist: supervision and set up  Standing tolerance ~5 minutes at sink without rest break - pt does report fatigue after ambulation as well as 5 min standing. Extra time required for energy conservation, weakness, fatigue. Interventions    Treatment provided: activity tolerance, ADL training and transfer training  Skilled input: verbal instruction/cues and tactile instruction/cues  Verbal Consent: Writer verbally educated and received verbal consent for hand placement, positioning of patient, and techniques to be performed today from patient for clothing adjustments for techniques and therapist position for techniques as described above and how they are pertinent to the patient's plan of care.          Education:   - Present and ready to learn: patient  Education " provided during session:  - Energy conservation  - Results of above outlined education: Verbalizes understanding, Demonstrates understanding and Needs reinforcement    ASSESSMENT   Interferring components: cognitive deficits and decreased activity tolerance    Discharge needs based on today's assessment:  - Current level of function: slightly below baseline level of function  - Therapy needs at discharge: therapy 5 or more times per week  - Activities of daily living (ADLs) requiring support at discharge: bathing, bed mobility, transfers, ambulation, toileting, grooming and dressing  - Instrumental activities of daily living (IADLs) requiring support at discharge: driving, emergency responses, health/medication management, home management, meal preparation and shopping  - Impairments that require further therapy intervention: pain, ROM, strength, activity tolerance, cognition, safety awareness, balance and executive functioning  AM-PAC  - Prior Level of Function: IND/MOD I ([facility] 22-24)       Key: MOD A=moderate assistance, IND/MOD I=independent/modified independent  - Generalized Current Level of Function     - Current Self-Cares: 21       Scoring Key= >21 Modified Independent; 20-21 Supervision; 18-19 Minimal assist; 13-18 Moderate assist; 9-12 Max assist; <9 Total assist      PLAN (while hospitalized)  Suggestions for next session as indicated: standing sink side cares, BUE AROM/exercises    OT Frequency: 3-5 x per week      PT/OT Mobility Equipment for Discharge: has 2WW  PT/OT ADL Equipment for Discharge: would benefit from AE  Agreement to plan and goals: patient agrees with goals and treatment plan      GOALS  Review Date: 4/8/2024  Long Term Goals: (to be met by time of discharge from hospital)  Grooming: Patient will complete grooming tasks modified independent. Status: progressing/ongoing  Upper body dressing: Patient will complete upper body dressing modified independent.   Status: progressing/ongoing  Lower body dressing: Patient will complete lower body dressing modified independent. Status: progressing/ongoing  Toileting: Patient will complete toileting modified independent. Status: progressing/ongoing  Bathing: Patient will complete bathingmodified independent   Status: progressing/ongoingToilet transfer: Patient will complete toilet transfer with modified independent. Status: progressing/ongoing    Documented in the chart in the following areas: Assessment/Plan.     Patient at End of Session:   Location: in bed  Safety measures: call light within reach and alarm system in place/re-engaged  Handoff to: nurse      Therapy procedure time and total treatment time can be found documented on the Time Entry flowsheet Abdominal Pain, N/V/D

## 2024-04-02 ENCOUNTER — RESULT REVIEW (OUTPATIENT)
Age: 69
End: 2024-04-02

## 2024-04-02 ENCOUNTER — APPOINTMENT (OUTPATIENT)
Dept: INFUSION THERAPY | Facility: HOSPITAL | Age: 69
End: 2024-04-02

## 2024-04-02 ENCOUNTER — APPOINTMENT (OUTPATIENT)
Dept: HEMATOLOGY ONCOLOGY | Facility: CLINIC | Age: 69
End: 2024-04-02
Payer: MEDICARE

## 2024-04-02 ENCOUNTER — NON-APPOINTMENT (OUTPATIENT)
Age: 69
End: 2024-04-02

## 2024-04-02 ENCOUNTER — APPOINTMENT (OUTPATIENT)
Dept: HEMATOLOGY ONCOLOGY | Facility: CLINIC | Age: 69
End: 2024-04-02

## 2024-04-02 DIAGNOSIS — N28.89 OTHER SPECIFIED DISORDERS OF KIDNEY AND URETER: ICD-10-CM

## 2024-04-02 LAB
ALBUMIN SERPL ELPH-MCNC: 4.1 G/DL — SIGNIFICANT CHANGE UP (ref 3.3–5)
ALP SERPL-CCNC: 76 U/L — SIGNIFICANT CHANGE UP (ref 40–120)
ALT FLD-CCNC: 6 U/L — LOW (ref 10–45)
ANION GAP SERPL CALC-SCNC: 11 MMOL/L — SIGNIFICANT CHANGE UP (ref 5–17)
AST SERPL-CCNC: 18 U/L — SIGNIFICANT CHANGE UP (ref 10–40)
BASOPHILS # BLD AUTO: 0.04 K/UL — SIGNIFICANT CHANGE UP (ref 0–0.2)
BASOPHILS NFR BLD AUTO: 1.2 % — SIGNIFICANT CHANGE UP (ref 0–2)
BILIRUB SERPL-MCNC: 0.2 MG/DL — SIGNIFICANT CHANGE UP (ref 0.2–1.2)
BUN SERPL-MCNC: 20 MG/DL — SIGNIFICANT CHANGE UP (ref 7–23)
CALCIUM SERPL-MCNC: 9.4 MG/DL — SIGNIFICANT CHANGE UP (ref 8.4–10.5)
CHLORIDE SERPL-SCNC: 104 MMOL/L — SIGNIFICANT CHANGE UP (ref 96–108)
CO2 SERPL-SCNC: 25 MMOL/L — SIGNIFICANT CHANGE UP (ref 22–31)
CREAT SERPL-MCNC: 0.73 MG/DL — SIGNIFICANT CHANGE UP (ref 0.5–1.3)
EGFR: 90 ML/MIN/1.73M2 — SIGNIFICANT CHANGE UP
EOSINOPHIL # BLD AUTO: 0.01 K/UL — SIGNIFICANT CHANGE UP (ref 0–0.5)
EOSINOPHIL NFR BLD AUTO: 0.3 % — SIGNIFICANT CHANGE UP (ref 0–6)
GLUCOSE SERPL-MCNC: 150 MG/DL — HIGH (ref 70–99)
HCT VFR BLD CALC: 31.9 % — LOW (ref 34.5–45)
HGB BLD-MCNC: 10.3 G/DL — LOW (ref 11.5–15.5)
IMM GRANULOCYTES NFR BLD AUTO: 1.2 % — HIGH (ref 0–0.9)
LYMPHOCYTES # BLD AUTO: 0.74 K/UL — LOW (ref 1–3.3)
LYMPHOCYTES # BLD AUTO: 22.2 % — SIGNIFICANT CHANGE UP (ref 13–44)
MCHC RBC-ENTMCNC: 27.4 PG — SIGNIFICANT CHANGE UP (ref 27–34)
MCHC RBC-ENTMCNC: 32.3 G/DL — SIGNIFICANT CHANGE UP (ref 32–36)
MCV RBC AUTO: 84.8 FL — SIGNIFICANT CHANGE UP (ref 80–100)
MONOCYTES # BLD AUTO: 0.35 K/UL — SIGNIFICANT CHANGE UP (ref 0–0.9)
MONOCYTES NFR BLD AUTO: 10.5 % — SIGNIFICANT CHANGE UP (ref 2–14)
NEUTROPHILS # BLD AUTO: 2.15 K/UL — SIGNIFICANT CHANGE UP (ref 1.8–7.4)
NEUTROPHILS NFR BLD AUTO: 64.6 % — SIGNIFICANT CHANGE UP (ref 43–77)
NRBC # BLD: 0 /100 WBCS — SIGNIFICANT CHANGE UP (ref 0–0)
PLATELET # BLD AUTO: 285 K/UL — SIGNIFICANT CHANGE UP (ref 150–400)
POTASSIUM SERPL-MCNC: 4.1 MMOL/L — SIGNIFICANT CHANGE UP (ref 3.5–5.3)
POTASSIUM SERPL-SCNC: 4.1 MMOL/L — SIGNIFICANT CHANGE UP (ref 3.5–5.3)
PROT SERPL-MCNC: 6.9 G/DL — SIGNIFICANT CHANGE UP (ref 6–8.3)
RBC # BLD: 3.76 M/UL — LOW (ref 3.8–5.2)
RBC # FLD: 19.6 % — HIGH (ref 10.3–14.5)
SODIUM SERPL-SCNC: 140 MMOL/L — SIGNIFICANT CHANGE UP (ref 135–145)
WBC # BLD: 3.33 K/UL — LOW (ref 3.8–10.5)
WBC # FLD AUTO: 3.33 K/UL — LOW (ref 3.8–10.5)

## 2024-04-02 PROCEDURE — 99215 OFFICE O/P EST HI 40 MIN: CPT

## 2024-04-02 PROCEDURE — G2211 COMPLEX E/M VISIT ADD ON: CPT

## 2024-04-03 RX ORDER — OMEPRAZOLE 40 MG/1
40 CAPSULE, DELAYED RELEASE ORAL
Qty: 90 | Refills: 2 | Status: DISCONTINUED | COMMUNITY
Start: 2023-04-18 | End: 2024-04-03

## 2024-04-04 NOTE — ASSESSMENT
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 Side effects of chemotherapy were reviewed with her She is age 67 and has significant co morbidity including diabetes poorly controlled on oral metformin at present. She is asked to see her primary care physician for consideration of an insulin based treatment. The patient may have two tumors as the left renal mass has not had a biopsy. The priority in treating the pancreas cancer was addressed in the multidisciplinary conference. CT/PET will be helpful in addressing the left renal lesion which may be a second primary tumor. If two different tumors are present she is not a candidate for a clinical study. I have recommended placement of a Mediport for chemotherapy treatment. Scheduling of Mediport performed with patient and explanation of use of Mediport. A request for Mediport placement was made and our secretarial team will obtain authorization. Blood testing requested today as noted. THe patient has seen pain management physician Dr Anne for management of analgesia The patient is scheduled for surgical follow up in future. ECG is performed by me and Nursing assistant and results are reviewed I have prescribed antinausea medication metoclopramide 10 mg PO TID PN RTC in 1 week to sign consent for chemotherapy 05/09/2023 The patient has returned to office. Physical examination is not changed. She has reviewed the chemotherapy information provided last week. I discussed the use of the gemcitabine and Abraxane as treatment on day 1, 8 and 15 of a 28 day cycle (no treatment on day 22). I assisted our  in scheduling chemotherapy and all questions were answered to her satisfaction. Mediport placement is scheduled for next week.RTC in 3 weeks She is now on combination dosing metformin and glyceride.Her primary care physician has prescribed acetaminophen 3000 and codeine 30 TID. 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 Seen in a f/u visit today 1) Pancreas Cancer - scheduled for Cycle # 2 Gemcitabine & Abraxane, labs with treatment 2) Left Renal Mass awaiting Biopsy Seen by Dr. Pilo [facility] - per Dr. Kelly's note - for now the renal mass can remain on the back burner temporarily until the pancreatic lesion is addressed. If there is a surgical plan for the pancreatic lesion to take place, I would consider combining that surgery with a laparoscopic or robotic partial nephrectomy as these lesions are relatively close to 1 another and could be operated on in the same field. 3) Surgical Onc - Dr. Greco - Seen for Surgical Intervention - Will re-eval after 2 months restaging CT 4) Abd pain, Erosive Gastritis on Omeprazole (prescribed by GI), - Tylenol w codeine renewed - Pain management consult requested & Nutritional Evaluation requested. All questions, concerns were addressed with patient during this visit to her satisfaction, she verbalized understanding. RTC in 3 weeks Case management, care plan d/w Dr. 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 Seen in a f/u visit today 1) Pancreas Cancer - s/p Cycle 2 Day 8 treatment on 6/27/23 scheduled for Cycle 2 Day 15 Gemcitabine & Abraxane on Monday July 3, 2023. Labs from 6/27/23 discussed with patient copies provided 2) Mediport Right ACW - EMLA cream prescription sent to local pharmacy. 3) Surgical Onc - Dr. Greco - Seen for Surgical Intervention - Will re-eval after 2 months restaging CT 4) Left Renal Mass awaiting Biopsy Seen by Dr. Pilo [facility] - per Dr. Kelly's note - for now the renal mass can remain on the back burner temporarily until the pancreatic lesion is addressed. If there is a surgical plan for the pancreatic lesion to take place, I would consider combining that surgery with a laparoscopic or robotic partial nephrectomy as these lesions are relatively close to 1 another and could be operated on in the same field. 5) Abd pain, Erosive Gastritis on Omeprazole (prescribed by GI), - Tylenol w codeine renewed - Pain management consult requested & Nutritional Evaluation requested. All questions, concerns were addressed with patient & friend Jessica during this visit to her satisfaction, she verbalized understanding. RTC in 3 weeks Case management, care plan d/w Dr. 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 Seen in a f/u visit today 1) Pancreas Cancer on Gemcitabine & Abraxane via Mediport - scheduled for Cycle # 3- Day 1 on 7/18/23, Day 8 on 7/25/2023, Day 15 on 8/1/2023 2) Anemia - Hgb = 8.8 on July 3, 2023 - today's repeat Hgb 9.1 will add anemia labs to tomorrow's treatment visit Recommend to add dietary iron to improve iron in blood - add red meat 2-3 x week 3) Diarrhea - Loperamide sent to local pharmacy, PO hydration, electrolyte supplementation was emphasized - pt advised how to take Loperamide with verbal understanding 4) Surgical Onc - Dr. Greco - Seen for Surgical Intervention recommended to f/u for re-eval after 2 months restaging CT - pt has not made an apptn as of yet - Patient was counseled to make a f/u visit as per recs 5) Left Renal Mass awaiting Biopsy Seen by Dr. Pilo [facility] - per Dr. Kelly's note - for now the renal mass can remain on the back burner temporarily until the pancreatic lesion is addressed. If there is a surgical plan for the pancreatic lesion to take place, I would consider combining that surgery with a laparoscopic or robotic partial nephrectomy as these lesions are relatively close to 1 another and could be operated on in the same field. 6) Abd pain, Erosive Gastritis on Omeprazole - no reported GI symptoms at this time - GI f/u as per their discretion 7) Pain management following - Patient advised to maintain a diary to keep a log of symptoms, f/u visits, recommendations, list of current medications. All questions, concerns were addressed with patient & friend Jessica during this visit to her satisfaction, she verbalized understanding. RTC in 4 weeks or prn as needed. Case management, care plan d/w Dr. Tommy Mahoney  August 1, 2023 - Patient seen in treatment room for low ANC. 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 1) Leukopenia & Neutropenia - CBC - WBC = 2.2, Hgb = 9.4, PLT = 250 000, MCV = 86.5  Neutrophil % = 26%, ANC = 0.57 Will hold treatment for today due to Leukopenia & Neutropenia - Cipro 500 mg PO qd started for ppx - Will hold off on initiating growth factors at this time and reevaluate. Patient was advised on Neutropenia precautions, wear mask, wash hands frequently and avoid sick contacts. 2) Normocytic Anemia - Hgb = 9.4 no bleeding, no indication for blood transfusion will continue to monitor Ferritin & Folate levels WNL, B12 level = 1446 (Elevated) 3) Surgical Onc - Dr. Greco - Seen for Surgical Intervention recommended to f/u for re-eval after 2 months restaging CT - pt has not made an apptn as of yet - Patient was counseled to make a f/u visit as per recs 4) Left Renal Mass awaiting Biopsy Seen by Dr. Pilo [facility] - per Dr. Kelly's note - for now the renal mass can remain on the back burner temporarily until the pancreatic lesion is addressed. If there is a surgical plan for the pancreatic lesion to take place, I would consider combining that surgery with a laparoscopic or robotic partial nephrectomy as these lesions are relatively close to 1 another and could be operated on in the same field. All questions, concerns were addressed with patient during this encounter, she verbalized understanding. RTC in 2 weeks scheduled for Will repeat CBC trend.  Addendum: Above findings, labs were discussed with Dr. 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 1) Pancreas Cancer - Patient scheduled today for Cycle # 4 Day 1 of Gemcitabine & Abraxane. & scheduled for Cycle # 4 Day 8 on August 22, 2023 & Day # 15 on August 29, 2023. Today's CBC - WBC = 9.65, ANC = 6.67, Hgb = 10.2, PLT = 338, MCV = 86.1, CMP unremarkable, 2) R/o UTI - Dysuria, frequency lasted 1 day now w foul smelling urine - UA, Urine Culture results pending. will treat empirically for presumed UTI - Cipro 500 mg PO bid x 7 days was sent to [facility] 2) Surgical Onc - f/u apptn w Dr. Greco scheduled for 8/16/23. 3) Left Renal Mass awaiting Biopsy Seen by Dr. Pilo [facility] - per Dr. Kelly's note - for now the renal mass can remain on the back burner temporarily until the pancreatic lesion is addressed. If there is a surgical plan for the pancreatic lesion to take place, I would consider combining that surgery with a laparoscopic or robotic partial nephrectomy as these lesions are relatively close to 1 another and could be operated on in the same field. All questions, concerns were addressed with patient during this encounter, she verbalized understanding. RTC in 2 weeks Case management, care plan d/w Dr. Tommy Mahoney. August 16, 2023 -Addendum Note: - 8/15/23 UA + for Nitrites, Leukocyte Esterase, trace blood, Urine culture testing - continue Cipro 500 mg PO bid for 7 days - patient aware of the plan. 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 1) Pancreas Cancer - s/p Cycle 5 Day 1 treatment today. Scheduled for Cycle 5 - Day 8 on 9/19/23 & Day 15 on 9/26/23. CT C/A/P - September 5, 2023 - Compared to 4/8/2023 interval decrease in overall size of the pancreatic body mass, however increased soft tissue encasement of the celiac artery and SMA. Mild increase in size of enhancing solid and cystic left renal mass suspicious for renal cell carcinoma. No evidence of metastatic disease Results d/w with patient in the office and son Jaiden over the phone - copy of report provided. 2) Patient advised to f/u w Urology Dr. Kelly for increase in size of Left Renal mass suspicious for RCC. - patient and son were advised to call and expedite the visit. All questions, concerns were addressed with patient in person and son Jaiden over the phone during this visit to their satisfaction, they verbalized understanding of the plan. RTC in 2-3 weeks or prn Case management, care plan d/w Dr. Tommy Mahoney.  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  1) Pancreas Cancer - scheduled for Cycle 6 Day 1 Gemcitabine & Abraxane. Has apptn for C6 D2 on 10/23/23 &  Cycle 6 D15 on 10/30/2023.  Per [facility] recommendation to repeat CT scans in November 2023 to evaluate interval change and plan for surgical intervention - Repeat CT C/A/P ordered for November 1, 2023.  2) Left Renal Mass - f/u with Dr. Kelly [facility] scheduled for 10/17/23  3) Surgery f/u w Dr. Sahni advised 4) Endocrine evaluation advised  All questions, concerns were addressed with patient during this visit to her satisfaction, she verbalized understanding of the plan. RTO in November 2nd week with Dr. Mahoney to discuss results of scan. Case management, care plan d/w Dr. Tommy Mahoney. 11/15/2023 she will begin cycle 7 gemcitabine Abraxane on third week of November. Plan to repeat CT scan of chest abdomen and pelvis on 1/08/2024. We are hoping for reduction in size of pancreas mass and if not achieved, we will consider radiation therapy post cycle 8 of treatment (cycle 8 will begin in late December 2023. Leeanna questions of the patient answered to her satisfaction. She is advised to avoid salt in her diet and to follow up with primary care MD for antihypertension medication renewal. 02/27/2024 Review of pancreas clinic note Agueda Nolan; requested radiation therapy and reassessment of the possible surgical resection. No major tumor shrinkage with ten months of chemotherapy.  CT scanning of chest abdomen pelvis 04/01/2024 requested.  RTC one month  04/02/2024 Patient returns to office for cycle 11 of chemotherapy; she has intermittently missed physician visits and treatment visits through the past 3 months. No admission in past month for treatment of side effect of therapy. She has completed definitive radiation therapy, and she is awaiting surgical decision as to management of pancreas lesion and renal lesion. Chemotherapy treatment scheduled for today. Renew medication discussed with her including medication for stomach acidity. RTC in 4 weeks.

## 2024-04-04 NOTE — HISTORY OF PRESENT ILLNESS
[90: Able to carry normal activity; minor signs or symptoms of disease.] : 90: Able to carry normal activity; minor signs or symptoms of disease.  [ECOG Performance Status: 1 - Restricted in physically strenuous activity but ambulatory and able to carry out work of a light or sedentary nature] : Performance Status: 1 - Restricted in physically strenuous activity but ambulatory and able to carry out work of a light or sedentary nature, e.g., light house work, office work [de-identified] : adenocarcinoma [FreeTextEntry1] : post  gemcitabine and Abraxane cycle 10 January 2024   [de-identified] : see hpi; 05/09/2023.She has been taking a combination medication of glipizide and metformin with better blood sugar control. Now on acetaminophen 300 codeine 30 mg PO TID PRN after meeting with pain management and her primary care MD. Pain has been midline and more noted in day than in evening. No jaundice May 30, 2023 - Seen in a f/u visit today scheduled for Gemcitabine & Abraxane for Pancreas cancer. Patient had called our office last week for c/o nausea, abd pain, poor appetite. She was prescribed Omeprazole by GI for Gastritis she had not taken. We advised her to take Metoclopramide for nausea, poor appetite and Omeprazole for Gastritis.  Since taking the medications for past few days she has been feeling much better. She remains on Levaquin for Neutropenia prophylaxis.  She denies bleeding, constipation, diarrhea, fever, chills, falls.  June 30, 2023 - Patient seen today accompanied with friend Ms. Jessica Betancourt.  Patient feels well denies interval change in medical status, No nausea, reports improved appetite, no GI complaints, no pain. Seen / evaluated by our Nutritionist team reports helpful tips and compliance with recommendations.  Today she was seen by Palliative / Pain management Dr. Malik no pain meds started.  She reports she was seen by her PCP last week, she ran out of her BP medications awaiting renewal by PCP.  July 17, 2023 -  Patient seen today in a follow up visit accompanied with friend Ms. Jessica Betancourt.  She reports episodes of non bloody diarrhea / loose stools post treatment lasts for day and a half does not take any anti diarrhea medications, follows dietary restriction with resolution of symptoms.  Denies N, V, fever, anorexia, weight loss, febrile illness, swollen glands, change in medical condition since last visit.   August 1, 2023 - Received a call from Keli JIMÉNEZ from treatment room patient scheduled for Gemcitabine & Abraxane and CBC shows Neutropenia Patient seen and evaluated, she feels well, asymptomatic, no cough, fever, chills, fatigue, no N, V, poor appetite, swollen glands, night sweats, bleeding, bruising. Denies any recent infection, hospitalization. Reports good appetite. No falls, presented for treatment today by herself, son drove her here.   August 15, 2023 - Received a call from Huang JIMÉNEZ from treatment room to evaluate patient with c/o Dysuria on Friday 8/11/23 lasted 1 day. Patient seen and evaluated in the treatment room.  She reports on Friday she experienced dysuria, frequency of urination, no hematuria, no urgency, fever, chills, pelvic pain, abdominal pain / discomfort. Symptoms of dysuria and frequency of urination has resolved but has foul smelling urine, no urine discoloration noted.  September 12, 2023 - Patient seen in a f/u visit today presented by herself to this appointment, Pt's son Jaiden was on the phone call during the visit.  She is s/p Cycle # 5 Day 1 Gemcitabine & Abraxane treatment. She feels well overall tolerating treatment. No residual UTI symptoms no malodorous urine completed course of antibiotics as prescribed.  October 16, 2023 - Seen in a f/u visit today, presented by herself to this appointment. Feels well reports good appetite, no nausea, vomiting, diarrhea, abd / back pain.  She feels intermittent tingling sensation in her toes describes as mild. She has a f/u visit with Podiatrist (sees every 6 months).  She has appointment with Dr. Pilo Kelly Urology tomorrow to discuss plan for possible surgery.  11/15/2023 seen at one month interval. No weight change: no pain noted. no diarrhea 02/27/2024 She has completed chemotherapy treatment in late January 2024; now approximately 10 months of treatment and she has missed several physician appointments scheduled for the time period January through February 2024. Seen today over 40 minutes late for scheduled appointment. She has been seen by radiation Medicine and I have also discussed follow up with surgical oncology and the urological surgery. [de-identified] : Ms. ENRIKE MORLEY is a 67 year old female who presents for evaluation in our Pancreas Multidisciplinary Clinic. Her PMH includes DM2 (dx ), HTN, left knee replacement. She presented with abdominal pain radiating to her LUQ x 3 weeks. MRI abdomen  noted a 3 cm mass in the body of the pancreas suspicious for primary pancreatic malignancy and also Bosniak type IV cyst upper pole left kidney suspicious for primary renal neoplasm. She was admitted to [facility] on 23 and CT A/P w/ IC showing ill-defined pancreatic body mass consistent with pancreatic neoplasm; and complex enhancing left renal mass concerning for renal cell carcinoma till proven otherwise.\par  She underwent EUS on 23 with noted findings of one 3 cm pancreatic body mass, s/p FNA.  Pathology positive for adenocarcinoma. \par  \par  She reports decrease appetite and early satiety.  Abdominal pain is intermittent and severe when it comes.  She was taking Tylenol#3 after hospital d/c but currently not taking anymore. +Weight loss of 10 pounds since March. \par  \par  Colonoscopy from 7 years ago normal. \par  Smoke/Etoh: 20 pack years. Last smoke 2023. No ETOH use. \par  \par  ECO      Independent, can walk upstairs. Lives with her sons.  (ECOG 1 due to pain). \par  Family Ca hx: None \par  \par  Labs: \par  23    AST: 9   ALT: 18  ALP:  103  Tbili: 0.3 \par  23   Ca 19-9: 2428    CEA: 15.0    AST:  11    ALT: 12    ALP: 96   Tbili:  0.4     A1c: 10% \par   [TextBox_4] : 4/4/23 [TextBox_39] : [id number] [TextBox_41] : adenocarcinoma [TextBox_43] : 4/14/23 [] : This is not a second opinion [TextBox_5] : 5/2/23 [TextBox_40] : 4/8/23

## 2024-04-04 NOTE — REVIEW OF SYSTEMS
[Fatigue] : no fatigue [Recent Change In Weight] : ~T no recent weight change [Abdominal Pain] : no abdominal pain [Vomiting] : no vomiting [Constipation] : no constipation

## 2024-04-04 NOTE — PHYSICAL EXAM
[de-identified] : wore a wig,  [de-identified] : wears glasses  [de-identified] : face mask noted [de-identified] : Right Anterior chest wall Mediport site appears stable, no bleeding, edema or erythema / discharge noted

## 2024-04-09 ENCOUNTER — RESULT REVIEW (OUTPATIENT)
Age: 69
End: 2024-04-09

## 2024-04-09 ENCOUNTER — APPOINTMENT (OUTPATIENT)
Dept: HEMATOLOGY ONCOLOGY | Facility: CLINIC | Age: 69
End: 2024-04-09
Payer: MEDICARE

## 2024-04-09 ENCOUNTER — APPOINTMENT (OUTPATIENT)
Dept: INFUSION THERAPY | Facility: HOSPITAL | Age: 69
End: 2024-04-09

## 2024-04-09 ENCOUNTER — APPOINTMENT (OUTPATIENT)
Dept: HEMATOLOGY ONCOLOGY | Facility: CLINIC | Age: 69
End: 2024-04-09

## 2024-04-09 DIAGNOSIS — K86.89 OTHER SPECIFIED DISEASES OF PANCREAS: ICD-10-CM

## 2024-04-09 LAB
ALBUMIN SERPL ELPH-MCNC: 4.3 G/DL — SIGNIFICANT CHANGE UP (ref 3.3–5)
ALP SERPL-CCNC: 79 U/L — SIGNIFICANT CHANGE UP (ref 40–120)
ALT FLD-CCNC: 17 U/L — SIGNIFICANT CHANGE UP (ref 10–45)
ANION GAP SERPL CALC-SCNC: 11 MMOL/L — SIGNIFICANT CHANGE UP (ref 5–17)
ANISOCYTOSIS BLD QL: SLIGHT — SIGNIFICANT CHANGE UP
AST SERPL-CCNC: 17 U/L — SIGNIFICANT CHANGE UP (ref 10–40)
BASOPHILS # BLD AUTO: 0 K/UL — SIGNIFICANT CHANGE UP (ref 0–0.2)
BASOPHILS NFR BLD AUTO: 0 % — SIGNIFICANT CHANGE UP (ref 0–2)
BILIRUB SERPL-MCNC: 0.3 MG/DL — SIGNIFICANT CHANGE UP (ref 0.2–1.2)
BUN SERPL-MCNC: 24 MG/DL — HIGH (ref 7–23)
CALCIUM SERPL-MCNC: 9.8 MG/DL — SIGNIFICANT CHANGE UP (ref 8.4–10.5)
CHLORIDE SERPL-SCNC: 100 MMOL/L — SIGNIFICANT CHANGE UP (ref 96–108)
CO2 SERPL-SCNC: 29 MMOL/L — SIGNIFICANT CHANGE UP (ref 22–31)
CREAT SERPL-MCNC: 0.77 MG/DL — SIGNIFICANT CHANGE UP (ref 0.5–1.3)
DACRYOCYTES BLD QL SMEAR: SLIGHT — SIGNIFICANT CHANGE UP
EGFR: 84 ML/MIN/1.73M2 — SIGNIFICANT CHANGE UP
ELLIPTOCYTES BLD QL SMEAR: SLIGHT — SIGNIFICANT CHANGE UP
EOSINOPHIL # BLD AUTO: 0.03 K/UL — SIGNIFICANT CHANGE UP (ref 0–0.5)
EOSINOPHIL NFR BLD AUTO: 1 % — SIGNIFICANT CHANGE UP (ref 0–6)
GLUCOSE SERPL-MCNC: 202 MG/DL — HIGH (ref 70–99)
HCT VFR BLD CALC: 32.8 % — LOW (ref 34.5–45)
HGB BLD-MCNC: 11.1 G/DL — LOW (ref 11.5–15.5)
HYPOCHROMIA BLD QL: SLIGHT — SIGNIFICANT CHANGE UP
LYMPHOCYTES # BLD AUTO: 1.03 K/UL — SIGNIFICANT CHANGE UP (ref 1–3.3)
LYMPHOCYTES # BLD AUTO: 34 % — SIGNIFICANT CHANGE UP (ref 13–44)
MCHC RBC-ENTMCNC: 28 PG — SIGNIFICANT CHANGE UP (ref 27–34)
MCHC RBC-ENTMCNC: 33.8 G/DL — SIGNIFICANT CHANGE UP (ref 32–36)
MCV RBC AUTO: 82.6 FL — SIGNIFICANT CHANGE UP (ref 80–100)
MONOCYTES # BLD AUTO: 0.21 K/UL — SIGNIFICANT CHANGE UP (ref 0–0.9)
MONOCYTES NFR BLD AUTO: 7 % — SIGNIFICANT CHANGE UP (ref 2–14)
NEUTROPHILS # BLD AUTO: 1.75 K/UL — LOW (ref 1.8–7.4)
NEUTROPHILS NFR BLD AUTO: 58 % — SIGNIFICANT CHANGE UP (ref 43–77)
NRBC # BLD: 1 /100 WBCS — HIGH (ref 0–0)
NRBC # BLD: SIGNIFICANT CHANGE UP /100 WBCS (ref 0–0)
PLAT MORPH BLD: NORMAL — SIGNIFICANT CHANGE UP
PLATELET # BLD AUTO: 114 K/UL — LOW (ref 150–400)
POIKILOCYTOSIS BLD QL AUTO: SLIGHT — SIGNIFICANT CHANGE UP
POTASSIUM SERPL-MCNC: 4 MMOL/L — SIGNIFICANT CHANGE UP (ref 3.5–5.3)
POTASSIUM SERPL-SCNC: 4 MMOL/L — SIGNIFICANT CHANGE UP (ref 3.5–5.3)
PROT SERPL-MCNC: 7.1 G/DL — SIGNIFICANT CHANGE UP (ref 6–8.3)
RBC # BLD: 3.97 M/UL — SIGNIFICANT CHANGE UP (ref 3.8–5.2)
RBC # FLD: 19.3 % — HIGH (ref 10.3–14.5)
RBC BLD AUTO: ABNORMAL
SODIUM SERPL-SCNC: 139 MMOL/L — SIGNIFICANT CHANGE UP (ref 135–145)
TARGETS BLD QL SMEAR: SLIGHT — SIGNIFICANT CHANGE UP
WBC # BLD: 3.02 K/UL — LOW (ref 3.8–10.5)
WBC # FLD AUTO: 3.02 K/UL — LOW (ref 3.8–10.5)

## 2024-04-09 PROCEDURE — G2211 COMPLEX E/M VISIT ADD ON: CPT

## 2024-04-09 PROCEDURE — 99214 OFFICE O/P EST MOD 30 MIN: CPT

## 2024-04-09 NOTE — PHYSICAL EXAM
[Ambulatory and capable of all self care but unable to carry out any work activities] : Status 2- Ambulatory and capable of all self care but unable to carry out any work activities. Up and about more than 50% of waking hours [Normal] : affect appropriate [de-identified] : wore a cap, and mask [de-identified] : wears glasses  [de-identified] : Right Anterior chest wall Mediport site appears stable, no bleeding, edema or erythema / discharge noted

## 2024-04-09 NOTE — ASSESSMENT
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 1) Pancreas Cancer on Gemcitabine & Abraxane & undergoing RT - treatment scheduled today. Today's WBC = 5.76 w Normal ANC Repeat CT C/A/P ordered for April 1, 2024 - pt to schedule scan & a f/u visit to discuss results of scan 2) Left Renal Mass - f/u with Dr. Kelly Urology advised. 3) Uncontrolled HTN - asymp on Amlodipine & Losartan - low salt diet advised & maintain BP diary f/u w PCP for titration of antihypertensive meds. 4) f/u w PCP, & other providers at their discretion. Patiient requesting [facility] providers for Ophthalmology & Endocrine - [facility] Care provider access information provided. All questions were addressed during this vsiit to her satisfaction, verbalized understanding. Pt advised to schedule next treatment w Gemcitabine / Abraxane RTO w Dr. Mahoney after CT scans to discuss results Case management, care plan d/w Dr. Tommy Mahoney.  04/02/2024 Patient returns to office for cycle 11 of chemotherapy; she has intermittently missed physician visits and treatment visits through the past 3 months. No admission in past month for treatment of side effect of therapy. She has completed definitive radiation therapy, and she is awaiting surgical decision as to management of pancreas lesion and renal lesion. Chemotherapy treatment scheduled for today. Renew medication discussed with her including medication for stomach acidity. RTC in 4 weeks.   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 1) Pancreas Cancer s/p RT scheduled for Cycle # 11 Day 15 Gemcitabine & Abraxane today - c/o Nausea, 1 episode of non bloody non bilious vomiting - treated with Compazine IV & Pepcid IV & IVF - pt feels better no more episodes of vomiting, nausea abated. Will continue with treatment as scheduled.  Today's CBC - WBC = 3.02, Hgb 11.1,  000, ANC = 1.75  CMP - blood glucose = 202 - Elevated otherwise WNL  2) f/u w Surgical Onc & Urology - Renal mass for reevaluation for surgical intervention 3) f/u w PCP, & other providers at their discretion.  All questions were addressed during this visit to her satisfaction, verbalized understanding. RTO in 3 weeks or sooner prn

## 2024-04-09 NOTE — HISTORY OF PRESENT ILLNESS
[Disease: _____________________] : Disease: [unfilled] [T: ___] : T[unfilled] [N: ___] : N[unfilled] [M: ___] : M[unfilled] [AJCC Stage: ____] : AJCC Stage: [unfilled] [Date: ____________] : Patient's last distress assessment performed on [unfilled]. [90: Able to carry normal activity; minor signs or symptoms of disease.] : 90: Able to carry normal activity; minor signs or symptoms of disease.  [ECOG Performance Status: 1 - Restricted in physically strenuous activity but ambulatory and able to carry out work of a light or sedentary nature] : Performance Status: 1 - Restricted in physically strenuous activity but ambulatory and able to carry out work of a light or sedentary nature, e.g., light house work, office work [Abdominal Pain] : abdominal pain [EUS] : EUS [Biopsy] : biopsy performed [Before Surgery] : before surgery [Pancreatic ductal adenocarcinoma] : Pancreatic ductal adenocarcinoma [Not staged outside] : not staged outside [Nutrition] : Nutrition [Social Work] : Social Work [Other: ____] : [unfilled] [Restricted in physically strenuous activity but ambulatory and able to carry out work of a light or sedentary nature] : Status 1 - Restricted in physically strenuous activity but ambulatory and able to carry out work of a light or sedentary nature, e.g., light house work, office work [2 - Distress Level] : Distress Level: 2 [de-identified] : adenocarcinoma [FreeTextEntry1] : post  gemcitabine and Abraxane cycle 10 January 2024   [de-identified] : see hpi; 05/09/2023.She has been taking a combination medication of glipizide and metformin with better blood sugar control. Now on acetaminophen 300 codeine 30 mg PO TID PRN after meeting with pain management and her primary care MD. Pain has been midline and more noted in day than in evening. No jaundice May 30, 2023 - Seen in a f/u visit today scheduled for Gemcitabine & Abraxane for Pancreas cancer. Patient had called our office last week for c/o nausea, abd pain, poor appetite. She was prescribed Omeprazole by GI for Gastritis she had not taken. We advised her to take Metoclopramide for nausea, poor appetite and Omeprazole for Gastritis.  Since taking the medications for past few days she has been feeling much better. She remains on Levaquin for Neutropenia prophylaxis.  She denies bleeding, constipation, diarrhea, fever, chills, falls.  June 30, 2023 - Patient seen today accompanied with friend Ms. Jessica Betancourt.  Patient feels well denies interval change in medical status, No nausea, reports improved appetite, no GI complaints, no pain. Seen / evaluated by our Nutritionist team reports helpful tips and compliance with recommendations.  Today she was seen by Palliative / Pain management Dr. Malik no pain meds started.  She reports she was seen by her PCP last week, she ran out of her BP medications awaiting renewal by PCP.  July 17, 2023 -  Patient seen today in a follow up visit accompanied with friend Ms. Jessica Betancourt.  She reports episodes of non bloody diarrhea / loose stools post treatment lasts for day and a half does not take any anti diarrhea medications, follows dietary restriction with resolution of symptoms.  Denies N, V, fever, anorexia, weight loss, febrile illness, swollen glands, change in medical condition since last visit.   August 1, 2023 - Received a call from Keli JIMÉNEZ from treatment room patient scheduled for Gemcitabine & Abraxane and CBC shows Neutropenia Patient seen and evaluated, she feels well, asymptomatic, no cough, fever, chills, fatigue, no N, V, poor appetite, swollen glands, night sweats, bleeding, bruising. Denies any recent infection, hospitalization. Reports good appetite. No falls, presented for treatment today by herself, son drove her here.   August 15, 2023 - Received a call from Huang JIMÉNEZ from treatment room to evaluate patient with c/o Dysuria on Friday 8/11/23 lasted 1 day. Patient seen and evaluated in the treatment room.  She reports on Friday she experienced dysuria, frequency of urination, no hematuria, no urgency, fever, chills, pelvic pain, abdominal pain / discomfort. Symptoms of dysuria and frequency of urination has resolved but has foul smelling urine, no urine discoloration noted.  September 12, 2023 - Patient seen in a f/u visit today presented by herself to this appointment, Pt's son Jaiden was on the phone call during the visit.  She is s/p Cycle # 5 Day 1 Gemcitabine & Abraxane treatment. She feels well overall tolerating treatment. No residual UTI symptoms no malodorous urine completed course of antibiotics as prescribed.  October 16, 2023 - Seen in a f/u visit today, presented by herself to this appointment. Feels well reports good appetite, no nausea, vomiting, diarrhea, abd / back pain.  She feels intermittent tingling sensation in her toes describes as mild. She has a f/u visit with Podiatrist (sees every 6 months).  She has appointment with Dr. Pilo Kelly Urology tomorrow to discuss plan for possible surgery.  11/15/2023 seen at one month interval. No weight change: no pain noted. no diarrhea 02/27/2024 She has completed chemotherapy treatment in late January 2024; now approximately 10 months of treatment and she has missed several physician appointments scheduled for the time period January through February 2024. Seen today over 40 minutes late for scheduled appointment. She has been seen by radiation Medicine and I have also discussed follow up with surgical oncology and the urological surgery.  April 09, 2024 - Notified by Maura JIMÉNEZ from treatment room pt c/o Nausea, episode of vomiting.  Patient seen and evaluated - pt c/o nausea since yesterday afternoon did not take antiemetics to martina symptoms. She ate egg sandwich this am. Pt had an episode of vomiting non bloody non bilious vomiting after arriving to our center today.   Denies abd pain, diarrhea, constipation, fever, chills, cough, dysuria, urinary symptoms, no bleeding. Denies eating outside / restaurant food or being around sick contacts.  [de-identified] : Ms. ENRIKE MORLEY is a 67 year old female who presents for evaluation in our Pancreas Multidisciplinary Clinic. Her PMH includes DM2 (dx ), HTN, left knee replacement. She presented with abdominal pain radiating to her LUQ x 3 weeks. MRI abdomen  noted a 3 cm mass in the body of the pancreas suspicious for primary pancreatic malignancy and also Bosniak type IV cyst upper pole left kidney suspicious for primary renal neoplasm. She was admitted to [facility] on 23 and CT A/P w/ IC showing ill-defined pancreatic body mass consistent with pancreatic neoplasm; and complex enhancing left renal mass concerning for renal cell carcinoma till proven otherwise.\par  She underwent EUS on 23 with noted findings of one 3 cm pancreatic body mass, s/p FNA.  Pathology positive for adenocarcinoma. \par  \par  She reports decrease appetite and early satiety.  Abdominal pain is intermittent and severe when it comes.  She was taking Tylenol#3 after hospital d/c but currently not taking anymore. +Weight loss of 10 pounds since March. \par  \par  Colonoscopy from 7 years ago normal. \par  Smoke/Etoh: 20 pack years. Last smoke 2023. No ETOH use. \par  \par  ECO      Independent, can walk upstairs. Lives with her sons.  (ECOG 1 due to pain). \par  Family Ca hx: None \par  \par  Labs: \par  23    AST: 9   ALT: 18  ALP:  103  Tbili: 0.3 \par  23   Ca 19-9: 2428    CEA: 15.0    AST:  11    ALT: 12    ALP: 96   Tbili:  0.4     A1c: 10% \par   [TextBox_4] : 4/4/23 [TextBox_39] : [id number] [TextBox_41] : adenocarcinoma [TextBox_43] : 4/14/23 [] : This is not a second opinion [TextBox_5] : 5/2/23 [TextBox_40] : 4/8/23

## 2024-04-12 ENCOUNTER — OUTPATIENT (OUTPATIENT)
Dept: OUTPATIENT SERVICES | Facility: HOSPITAL | Age: 69
LOS: 1 days | Discharge: HOME | End: 2024-04-12

## 2024-04-12 ENCOUNTER — APPOINTMENT (OUTPATIENT)
Dept: HEMATOLOGY ONCOLOGY | Facility: CLINIC | Age: 69
End: 2024-04-12
Payer: MEDICARE

## 2024-04-12 DIAGNOSIS — Z96.652 PRESENCE OF LEFT ARTIFICIAL KNEE JOINT: Chronic | ICD-10-CM

## 2024-04-12 DIAGNOSIS — D64.9 ANEMIA, UNSPECIFIED: ICD-10-CM

## 2024-04-12 PROCEDURE — 99441: CPT

## 2024-04-12 RX ORDER — PROCHLORPERAZINE MALEATE 10 MG/1
10 TABLET ORAL EVERY 6 HOURS
Qty: 24 | Refills: 2 | Status: ACTIVE | COMMUNITY
Start: 2024-04-12 | End: 1900-01-01

## 2024-04-12 RX ORDER — METOCLOPRAMIDE 10 MG/1
10 TABLET ORAL 3 TIMES DAILY
Qty: 60 | Refills: 3 | Status: DISCONTINUED | COMMUNITY
Start: 2023-05-02 | End: 2024-04-12

## 2024-04-12 RX ORDER — FAMOTIDINE 20 MG/1
20 TABLET, FILM COATED ORAL
Qty: 30 | Refills: 2 | Status: ACTIVE | COMMUNITY
Start: 2024-04-12 | End: 1900-01-01

## 2024-04-16 ENCOUNTER — NON-APPOINTMENT (OUTPATIENT)
Age: 69
End: 2024-04-16

## 2024-04-21 ENCOUNTER — OUTPATIENT (OUTPATIENT)
Dept: OUTPATIENT SERVICES | Facility: HOSPITAL | Age: 69
LOS: 1 days | End: 2024-04-21
Payer: COMMERCIAL

## 2024-04-21 ENCOUNTER — APPOINTMENT (OUTPATIENT)
Dept: CT IMAGING | Facility: IMAGING CENTER | Age: 69
End: 2024-04-21
Payer: MEDICARE

## 2024-04-21 DIAGNOSIS — C25.9 MALIGNANT NEOPLASM OF PANCREAS, UNSPECIFIED: ICD-10-CM

## 2024-04-21 DIAGNOSIS — Z96.652 PRESENCE OF LEFT ARTIFICIAL KNEE JOINT: Chronic | ICD-10-CM

## 2024-04-21 DIAGNOSIS — Z00.8 ENCOUNTER FOR OTHER GENERAL EXAMINATION: ICD-10-CM

## 2024-04-21 PROCEDURE — 71260 CT THORAX DX C+: CPT

## 2024-04-21 PROCEDURE — 74177 CT ABD & PELVIS W/CONTRAST: CPT | Mod: 26

## 2024-04-21 PROCEDURE — 71260 CT THORAX DX C+: CPT | Mod: 26

## 2024-04-21 PROCEDURE — 74177 CT ABD & PELVIS W/CONTRAST: CPT

## 2024-04-23 ENCOUNTER — RESULT REVIEW (OUTPATIENT)
Age: 69
End: 2024-04-23

## 2024-04-23 ENCOUNTER — NON-APPOINTMENT (OUTPATIENT)
Age: 69
End: 2024-04-23

## 2024-04-23 ENCOUNTER — APPOINTMENT (OUTPATIENT)
Dept: HEMATOLOGY ONCOLOGY | Facility: CLINIC | Age: 69
End: 2024-04-23

## 2024-04-23 ENCOUNTER — APPOINTMENT (OUTPATIENT)
Dept: INFUSION THERAPY | Facility: HOSPITAL | Age: 69
End: 2024-04-23

## 2024-04-23 LAB
ALBUMIN SERPL ELPH-MCNC: 3.9 G/DL — SIGNIFICANT CHANGE UP (ref 3.3–5)
ALP SERPL-CCNC: 65 U/L — SIGNIFICANT CHANGE UP (ref 40–120)
ALT FLD-CCNC: 15 U/L — SIGNIFICANT CHANGE UP (ref 10–45)
ANION GAP SERPL CALC-SCNC: 10 MMOL/L — SIGNIFICANT CHANGE UP (ref 5–17)
AST SERPL-CCNC: 17 U/L — SIGNIFICANT CHANGE UP (ref 10–40)
BASOPHILS # BLD AUTO: 0.03 K/UL — SIGNIFICANT CHANGE UP (ref 0–0.2)
BASOPHILS NFR BLD AUTO: 0.4 % — SIGNIFICANT CHANGE UP (ref 0–2)
BILIRUB SERPL-MCNC: 0.2 MG/DL — SIGNIFICANT CHANGE UP (ref 0.2–1.2)
BUN SERPL-MCNC: 11 MG/DL — SIGNIFICANT CHANGE UP (ref 7–23)
CALCIUM SERPL-MCNC: 9.1 MG/DL — SIGNIFICANT CHANGE UP (ref 8.4–10.5)
CHLORIDE SERPL-SCNC: 101 MMOL/L — SIGNIFICANT CHANGE UP (ref 96–108)
CO2 SERPL-SCNC: 28 MMOL/L — SIGNIFICANT CHANGE UP (ref 22–31)
CREAT SERPL-MCNC: 0.68 MG/DL — SIGNIFICANT CHANGE UP (ref 0.5–1.3)
EGFR: 95 ML/MIN/1.73M2 — SIGNIFICANT CHANGE UP
EOSINOPHIL # BLD AUTO: 0.1 K/UL — SIGNIFICANT CHANGE UP (ref 0–0.5)
EOSINOPHIL NFR BLD AUTO: 1.5 % — SIGNIFICANT CHANGE UP (ref 0–6)
GLUCOSE SERPL-MCNC: 107 MG/DL — HIGH (ref 70–99)
HCT VFR BLD CALC: 28.2 % — LOW (ref 34.5–45)
HGB BLD-MCNC: 9.5 G/DL — LOW (ref 11.5–15.5)
IMM GRANULOCYTES NFR BLD AUTO: 1 % — HIGH (ref 0–0.9)
LYMPHOCYTES # BLD AUTO: 0.52 K/UL — LOW (ref 1–3.3)
LYMPHOCYTES # BLD AUTO: 7.7 % — LOW (ref 13–44)
MCHC RBC-ENTMCNC: 28 PG — SIGNIFICANT CHANGE UP (ref 27–34)
MCHC RBC-ENTMCNC: 33.7 G/DL — SIGNIFICANT CHANGE UP (ref 32–36)
MCV RBC AUTO: 83.2 FL — SIGNIFICANT CHANGE UP (ref 80–100)
MONOCYTES # BLD AUTO: 0.66 K/UL — SIGNIFICANT CHANGE UP (ref 0–0.9)
MONOCYTES NFR BLD AUTO: 9.8 % — SIGNIFICANT CHANGE UP (ref 2–14)
NEUTROPHILS # BLD AUTO: 5.34 K/UL — SIGNIFICANT CHANGE UP (ref 1.8–7.4)
NEUTROPHILS NFR BLD AUTO: 79.6 % — HIGH (ref 43–77)
NRBC # BLD: 0 /100 WBCS — SIGNIFICANT CHANGE UP (ref 0–0)
PLATELET # BLD AUTO: 586 K/UL — HIGH (ref 150–400)
POTASSIUM SERPL-MCNC: 4.1 MMOL/L — SIGNIFICANT CHANGE UP (ref 3.5–5.3)
POTASSIUM SERPL-SCNC: 4.1 MMOL/L — SIGNIFICANT CHANGE UP (ref 3.5–5.3)
PROT SERPL-MCNC: 6.4 G/DL — SIGNIFICANT CHANGE UP (ref 6–8.3)
RBC # BLD: 3.39 M/UL — LOW (ref 3.8–5.2)
RBC # FLD: 21.6 % — HIGH (ref 10.3–14.5)
SODIUM SERPL-SCNC: 140 MMOL/L — SIGNIFICANT CHANGE UP (ref 135–145)
WBC # BLD: 6.72 K/UL — SIGNIFICANT CHANGE UP (ref 3.8–10.5)
WBC # FLD AUTO: 6.72 K/UL — SIGNIFICANT CHANGE UP (ref 3.8–10.5)

## 2024-04-24 DIAGNOSIS — Z51.11 ENCOUNTER FOR ANTINEOPLASTIC CHEMOTHERAPY: ICD-10-CM

## 2024-04-24 DIAGNOSIS — R11.2 NAUSEA WITH VOMITING, UNSPECIFIED: ICD-10-CM

## 2024-04-24 DIAGNOSIS — C25.9 MALIGNANT NEOPLASM OF PANCREAS, UNSPECIFIED: ICD-10-CM

## 2024-04-30 ENCOUNTER — RESULT REVIEW (OUTPATIENT)
Age: 69
End: 2024-04-30

## 2024-04-30 ENCOUNTER — APPOINTMENT (OUTPATIENT)
Dept: INFUSION THERAPY | Facility: HOSPITAL | Age: 69
End: 2024-04-30

## 2024-04-30 ENCOUNTER — APPOINTMENT (OUTPATIENT)
Dept: HEMATOLOGY ONCOLOGY | Facility: CLINIC | Age: 69
End: 2024-04-30
Payer: MEDICARE

## 2024-04-30 ENCOUNTER — APPOINTMENT (OUTPATIENT)
Dept: HEMATOLOGY ONCOLOGY | Facility: CLINIC | Age: 69
End: 2024-04-30

## 2024-04-30 DIAGNOSIS — R11.2 NAUSEA WITH VOMITING, UNSPECIFIED: ICD-10-CM

## 2024-04-30 DIAGNOSIS — D70.9 NEUTROPENIA, UNSPECIFIED: ICD-10-CM

## 2024-04-30 DIAGNOSIS — T45.1X5A NAUSEA WITH VOMITING, UNSPECIFIED: ICD-10-CM

## 2024-04-30 DIAGNOSIS — Z51.89 ENCOUNTER FOR OTHER SPECIFIED AFTERCARE: ICD-10-CM

## 2024-04-30 LAB
ALBUMIN SERPL ELPH-MCNC: 3.9 G/DL — SIGNIFICANT CHANGE UP (ref 3.3–5)
ALP SERPL-CCNC: 70 U/L — SIGNIFICANT CHANGE UP (ref 40–120)
ALT FLD-CCNC: 27 U/L — SIGNIFICANT CHANGE UP (ref 10–45)
ANION GAP SERPL CALC-SCNC: 10 MMOL/L — SIGNIFICANT CHANGE UP (ref 5–17)
ANISOCYTOSIS BLD QL: SLIGHT — SIGNIFICANT CHANGE UP
AST SERPL-CCNC: 26 U/L — SIGNIFICANT CHANGE UP (ref 10–40)
BASOPHILS # BLD AUTO: 0 K/UL — SIGNIFICANT CHANGE UP (ref 0–0.2)
BASOPHILS NFR BLD AUTO: 0 % — SIGNIFICANT CHANGE UP (ref 0–2)
BILIRUB SERPL-MCNC: 0.3 MG/DL — SIGNIFICANT CHANGE UP (ref 0.2–1.2)
BUN SERPL-MCNC: 16 MG/DL — SIGNIFICANT CHANGE UP (ref 7–23)
CALCIUM SERPL-MCNC: 9.5 MG/DL — SIGNIFICANT CHANGE UP (ref 8.4–10.5)
CHLORIDE SERPL-SCNC: 103 MMOL/L — SIGNIFICANT CHANGE UP (ref 96–108)
CO2 SERPL-SCNC: 26 MMOL/L — SIGNIFICANT CHANGE UP (ref 22–31)
CREAT SERPL-MCNC: 0.74 MG/DL — SIGNIFICANT CHANGE UP (ref 0.5–1.3)
DACRYOCYTES BLD QL SMEAR: SLIGHT — SIGNIFICANT CHANGE UP
EGFR: 88 ML/MIN/1.73M2 — SIGNIFICANT CHANGE UP
EOSINOPHIL # BLD AUTO: 0.03 K/UL — SIGNIFICANT CHANGE UP (ref 0–0.5)
EOSINOPHIL NFR BLD AUTO: 1 % — SIGNIFICANT CHANGE UP (ref 0–6)
GLUCOSE SERPL-MCNC: 104 MG/DL — HIGH (ref 70–99)
HCT VFR BLD CALC: 28.5 % — LOW (ref 34.5–45)
HGB BLD-MCNC: 9.4 G/DL — LOW (ref 11.5–15.5)
HYPOCHROMIA BLD QL: SLIGHT — SIGNIFICANT CHANGE UP
LYMPHOCYTES # BLD AUTO: 1.04 K/UL — SIGNIFICANT CHANGE UP (ref 1–3.3)
LYMPHOCYTES # BLD AUTO: 38 % — SIGNIFICANT CHANGE UP (ref 13–44)
MCHC RBC-ENTMCNC: 27.6 PG — SIGNIFICANT CHANGE UP (ref 27–34)
MCHC RBC-ENTMCNC: 33 G/DL — SIGNIFICANT CHANGE UP (ref 32–36)
MCV RBC AUTO: 83.6 FL — SIGNIFICANT CHANGE UP (ref 80–100)
METAMYELOCYTES # FLD: 2 % — HIGH (ref 0–0)
MONOCYTES # BLD AUTO: 0.25 K/UL — SIGNIFICANT CHANGE UP (ref 0–0.9)
MONOCYTES NFR BLD AUTO: 9 % — SIGNIFICANT CHANGE UP (ref 2–14)
MYELOCYTES NFR BLD: 1 % — HIGH (ref 0–0)
NEUTROPHILS # BLD AUTO: 1.34 K/UL — LOW (ref 1.8–7.4)
NEUTROPHILS NFR BLD AUTO: 49 % — SIGNIFICANT CHANGE UP (ref 43–77)
NRBC # BLD: 3 /100 WBCS — HIGH (ref 0–0)
NRBC # BLD: SIGNIFICANT CHANGE UP /100 WBCS (ref 0–0)
PLAT MORPH BLD: NORMAL — SIGNIFICANT CHANGE UP
PLATELET # BLD AUTO: 422 K/UL — HIGH (ref 150–400)
POIKILOCYTOSIS BLD QL AUTO: SLIGHT — SIGNIFICANT CHANGE UP
POTASSIUM SERPL-MCNC: 4.2 MMOL/L — SIGNIFICANT CHANGE UP (ref 3.5–5.3)
POTASSIUM SERPL-SCNC: 4.2 MMOL/L — SIGNIFICANT CHANGE UP (ref 3.5–5.3)
PROT SERPL-MCNC: 6.3 G/DL — SIGNIFICANT CHANGE UP (ref 6–8.3)
RBC # BLD: 3.41 M/UL — LOW (ref 3.8–5.2)
RBC # FLD: 21.1 % — HIGH (ref 10.3–14.5)
RBC BLD AUTO: ABNORMAL
SODIUM SERPL-SCNC: 140 MMOL/L — SIGNIFICANT CHANGE UP (ref 135–145)
TARGETS BLD QL SMEAR: SLIGHT — SIGNIFICANT CHANGE UP
WBC # BLD: 2.74 K/UL — LOW (ref 3.8–10.5)
WBC # FLD AUTO: 2.74 K/UL — LOW (ref 3.8–10.5)

## 2024-04-30 PROCEDURE — 99214 OFFICE O/P EST MOD 30 MIN: CPT

## 2024-04-30 PROCEDURE — G2211 COMPLEX E/M VISIT ADD ON: CPT

## 2024-05-01 ENCOUNTER — APPOINTMENT (OUTPATIENT)
Dept: RADIATION ONCOLOGY | Facility: CLINIC | Age: 69
End: 2024-05-01
Payer: MEDICARE

## 2024-05-01 VITALS
HEIGHT: 63 IN | OXYGEN SATURATION: 100 % | RESPIRATION RATE: 16 BRPM | TEMPERATURE: 96.98 F | BODY MASS INDEX: 28.36 KG/M2 | DIASTOLIC BLOOD PRESSURE: 79 MMHG | SYSTOLIC BLOOD PRESSURE: 158 MMHG | HEART RATE: 89 BPM | WEIGHT: 160.05 LBS

## 2024-05-01 PROBLEM — D70.9 NEUTROPENIA: Status: ACTIVE | Noted: 2024-02-06

## 2024-05-01 PROBLEM — R11.2 CHEMOTHERAPY INDUCED NAUSEA AND VOMITING: Status: ACTIVE | Noted: 2023-05-02

## 2024-05-01 PROCEDURE — 99213 OFFICE O/P EST LOW 20 MIN: CPT

## 2024-05-01 RX ORDER — METFORMIN HYDROCHLORIDE 500 MG/1
500 TABLET, COATED ORAL TWICE DAILY
Refills: 0 | Status: DISCONTINUED | COMMUNITY
Start: 2023-12-19 | End: 2024-05-01

## 2024-05-01 RX ORDER — LOPERAMIDE HYDROCHLORIDE 2 MG/1
2 TABLET ORAL
Qty: 60 | Refills: 2 | Status: DISCONTINUED | COMMUNITY
Start: 2023-07-17 | End: 2024-05-01

## 2024-05-01 RX ORDER — LEVOFLOXACIN 500 MG/1
500 TABLET, FILM COATED ORAL DAILY
Qty: 10 | Refills: 0 | Status: DISCONTINUED | COMMUNITY
Start: 2024-02-06 | End: 2024-05-01

## 2024-05-01 NOTE — HISTORY OF PRESENT ILLNESS
[FreeTextEntry1] : Ms. Chen is a 68-year-old female with cT4 pancreatic adenocarcinoma and concern for RCC. Patient is currently undergoing chemotherapy with medical oncology and tolerating the treatment well. She is eating and her pain is well controlled with minimal pain medications. Most recent imaging shows stable 3.6 x 2.2 cm pancreatic body mass with soft tissue encasement of celiac axis and SMA. Also noted is a 1.3 x 1.2 cm R renal lesion increased in size from prior studies. Discussed SBRT to the pancreas with the patient, as both a means of pain control and to attempt in downstaging her tumor. Completed 5/5 fx to pancreas 3/20/2024.  CT CAP w/contrast: Interval decreased size of ill-defined pancreatic body mass, measuring up to 3.2 cm, previously 3.8 cm. No significant change in left renal 4.7 cm mixed cystic-solid mass, suspicious for renal cell carcinoma. No evidence of thoracic metastatic disease.  5/1/2024: [employer] returning. Intermittent abdominal pain. Appetite good with weight gain approx. 3 lbs. Denies N&V or diarrhea.  Continues on systemic therapy.

## 2024-05-01 NOTE — PHYSICAL EXAM
[General Appearance - Alert] : alert [General Appearance - In No Acute Distress] : in no acute distress [] : no respiratory distress [Exaggerated Use Of Accessory Muscles For Inspiration] : no accessory muscle use [Skin Color & Pigmentation] : normal skin color and pigmentation [Oriented To Time, Place, And Person] : oriented to person, place, and time [Abdomen Soft] : soft [Nondistended] : nondistended

## 2024-05-01 NOTE — HISTORY OF PRESENT ILLNESS
[Disease: _____________________] : Disease: [unfilled] [T: ___] : T[unfilled] [N: ___] : N[unfilled] [M: ___] : M[unfilled] [AJCC Stage: ____] : AJCC Stage: [unfilled] [Date: ____________] : Patient's last distress assessment performed on [unfilled]. [2 - Distress Level] : Distress Level: 2 [90: Able to carry normal activity; minor signs or symptoms of disease.] : 90: Able to carry normal activity; minor signs or symptoms of disease.  [ECOG Performance Status: 1 - Restricted in physically strenuous activity but ambulatory and able to carry out work of a light or sedentary nature] : Performance Status: 1 - Restricted in physically strenuous activity but ambulatory and able to carry out work of a light or sedentary nature, e.g., light house work, office work [Abdominal Pain] : abdominal pain [EUS] : EUS [Biopsy] : biopsy performed [Before Surgery] : before surgery [Pancreatic ductal adenocarcinoma] : Pancreatic ductal adenocarcinoma [Not staged outside] : not staged outside [Nutrition] : Nutrition [Social Work] : Social Work [Other: ____] : [unfilled] [Restricted in physically strenuous activity but ambulatory and able to carry out work of a light or sedentary nature] : Status 1 - Restricted in physically strenuous activity but ambulatory and able to carry out work of a light or sedentary nature, e.g., light house work, office work [de-identified] : adenocarcinoma [FreeTextEntry1] : On Gemcitabine and Abraxane  [de-identified] : see hpi; 05/09/2023.She has been taking a combination medication of glipizide and metformin with better blood sugar control. Now on acetaminophen 300 codeine 30 mg PO TID PRN after meeting with pain management and her primary care MD. Pain has been midline and more noted in day than in evening. No jaundice May 30, 2023 - Seen in a f/u visit today scheduled for Gemcitabine & Abraxane for Pancreas cancer. Patient had called our office last week for c/o nausea, abd pain, poor appetite. She was prescribed Omeprazole by GI for Gastritis she had not taken. We advised her to take Metoclopramide for nausea, poor appetite and Omeprazole for Gastritis.  Since taking the medications for past few days she has been feeling much better. She remains on Levaquin for Neutropenia prophylaxis.  She denies bleeding, constipation, diarrhea, fever, chills, falls.  June 30, 2023 - Patient seen today accompanied with friend Ms. Jessica Betancourt.  Patient feels well denies interval change in medical status, No nausea, reports improved appetite, no GI complaints, no pain. Seen / evaluated by our Nutritionist team reports helpful tips and compliance with recommendations.  Today she was seen by Palliative / Pain management Dr. Malik no pain meds started.  She reports she was seen by her PCP last week, she ran out of her BP medications awaiting renewal by PCP.  July 17, 2023 -  Patient seen today in a follow up visit accompanied with friend Ms. Jessica Betancourt.  She reports episodes of non bloody diarrhea / loose stools post treatment lasts for day and a half does not take any anti diarrhea medications, follows dietary restriction with resolution of symptoms.  Denies N, V, fever, anorexia, weight loss, febrile illness, swollen glands, change in medical condition since last visit.   August 1, 2023 - Received a call from Keli JIMÉNEZ from treatment room patient scheduled for Gemcitabine & Abraxane and CBC shows Neutropenia Patient seen and evaluated, she feels well, asymptomatic, no cough, fever, chills, fatigue, no N, V, poor appetite, swollen glands, night sweats, bleeding, bruising. Denies any recent infection, hospitalization. Reports good appetite. No falls, presented for treatment today by herself, son drove her here.   August 15, 2023 - Received a call from Huang JIMÉNEZ from treatment room to evaluate patient with c/o Dysuria on Friday 8/11/23 lasted 1 day. Patient seen and evaluated in the treatment room.  She reports on Friday she experienced dysuria, frequency of urination, no hematuria, no urgency, fever, chills, pelvic pain, abdominal pain / discomfort. Symptoms of dysuria and frequency of urination has resolved but has foul smelling urine, no urine discoloration noted.  September 12, 2023 - Patient seen in a f/u visit today presented by herself to this appointment, Pt's son Jaiden was on the phone call during the visit.  She is s/p Cycle # 5 Day 1 Gemcitabine & Abraxane treatment. She feels well overall tolerating treatment. No residual UTI symptoms no malodorous urine completed course of antibiotics as prescribed.  October 16, 2023 - Seen in a f/u visit today, presented by herself to this appointment. Feels well reports good appetite, no nausea, vomiting, diarrhea, abd / back pain.  She feels intermittent tingling sensation in her toes describes as mild. She has a f/u visit with Podiatrist (sees every 6 months).  She has appointment with Dr. Pilo Kelly Urology tomorrow to discuss plan for possible surgery.  11/15/2023 seen at one month interval. No weight change: no pain noted. no diarrhea 02/27/2024 She has completed chemotherapy treatment in late January 2024; now approximately 10 months of treatment and she has missed several physician appointments scheduled for the time period January through February 2024. Seen today over 40 minutes late for scheduled appointment. She has been seen by radiation Medicine and I have also discussed follow up with surgical oncology and the urological surgery.  April 09, 2024 - Notified by Maura JIMÉNEZ from treatment room pt c/o Nausea, episode of vomiting.  Patient seen and evaluated - pt c/o nausea since yesterday afternoon did not take antiemetics to maritna symptoms. She ate egg sandwich this am. Pt had an episode of vomiting non bloody non bilious vomiting after arriving to our center today.   Denies abd pain, diarrhea, constipation, fever, chills, cough, dysuria, urinary symptoms, no bleeding. Denies eating outside / restaurant food or being around sick contacts.   April 30, 2024 - Notified by RN in treatment room patient's ANC was low, scheduled for treatment today w Gemcitabine & Abraxane.  Patient seen and evaluated reports no symptoms no febrile illness, cough, abd pain / discomfort, diarrhea, n, v, constipation, no bleeding.  Nausea, vomiting abated with Compazine prn. Appetite remains good.  [de-identified] : Ms. ENRIKE MORLEY is a 67 year old female who presents for evaluation in our Pan[facility]. Her PMH includes DM2 (dx ), HTN, left knee replacement. She presented with abdominal pain radiating to her LUQ x 3 weeks. MRI abdomen  noted a 3 cm mass in the body of the pancreas suspicious for primary pancreatic malignancy and also Bosniak type IV cyst upper pole left kidney suspicious for primary renal neoplasm. She was admitted to [facility] on 23 and CT A/P w/ IC showing ill-defined pancreatic body mass consistent with pancreatic neoplasm; and complex enhancing left renal mass concerning for renal cell carcinoma till proven otherwise.\par  She underwent EUS on 23 with noted findings of one 3 cm pancreatic body mass, s/p FNA.  Pathology positive for adenocarcinoma. \par  \par  She reports decrease appetite and early satiety.  Abdominal pain is intermittent and severe when it comes.  She was taking Tylenol#3 after hospital d/c but currently not taking anymore. +Weight loss of 10 pounds since March. \par  \par  Colonoscopy from 7 years ago normal. \par  Smoke/Etoh: 20 pack years. Last smoke 2023. No ETOH use. \par  \par  ECO      Independent, can walk upstairs. Lives with her sons.  (ECOG 1 due to pain). \par  Family Ca hx: None \par  \par  Labs: \par  23    AST: 9   ALT: 18  ALP:  103  Tbili: 0.3 \par  23   Ca 19-9: 2428    CEA: 15.0    AST:  11    ALT: 12    ALP: 96   Tbili:  0.4     A1c: 10% \par   [TextBox_4] : 4/4/23 [TextBox_39] : [id number] [TextBox_41] : adenocarcinoma [TextBox_43] : 4/14/23 [] : This is not a second opinion [TextBox_5] : 5/2/23 [TextBox_40] : 4/8/23

## 2024-05-01 NOTE — ASSESSMENT
[Supportive] : Goals of care discussed with patient: Supportive [Palliative Care Plan] : not applicable at this time [FreeTextEntry1] :  04/02/2024 Patient returns to office for cycle 11 of chemotherapy; she has intermittently missed physician visits and treatment visits through the past 3 months. No admission in past month for treatment of side effect of therapy. She has completed definitive radiation therapy, and she is awaiting surgical decision as to management of pancreas lesion and renal lesion. Chemotherapy treatment scheduled for today. Renew medication discussed with her including medication for stomach acidity. RTC in 4 weeks.   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 1) Pancreas Cancer s/p RT scheduled for Cycle # 11 Day 15 Gemcitabine & Abraxane today - c/o Nausea, 1 episode of non bloody non bilious vomiting - treated with Compazine IV & Pepcid IV & IVF - pt feels better no more episodes of vomiting, nausea abated. Will continue with treatment as scheduled.  Today's CBC - WBC = 3.02, Hgb 11.1,  000, ANC = 1.75  CMP - blood glucose = 202 - Elevated otherwise WNL  2) f/u w Surgical Onc & Urology - Renal mass for reevaluation for surgical intervention 3) f/u w PCP, & other providers at their discretion.  All questions were addressed during this visit to her satisfaction, verbalized understanding. 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   1) Pancreas Cancer s/p SBRT (completed March 20, 2024) - scheduled for Cycle # 12 Day 8 Gemcitabine & Abraxane today -  Leukopenia, Neutropenia, Normocytic Anemia, Thrombocytosis  Today's CBC - WBC = 2.74, Hgb = 9.4, PLT = 422 000, MCV = 83.6, ANC = 1.3 (L) - results d/w pt copy given Leukopenia / Neutropenia - Will treat w Onpro / Neulasta 6 mg sq x 1 dose  Normocytic Anemia - Hgb 9.4 - stable  Thrombocytosis - PLT = 422 - trending down   CT C/A/P April 21, 2024 - results as noted below - d/w pt copy given  Interval decreased size of ill-defined pancreatic body mass, measuring up to 3.2 cm, previously 3.8 cm No significant change in left renal 4.7 cm mixed cystic-solid mass, suspicious for renal cell carcinoma. No evidence of thoracic metastatic disease.  2) Left Renal Mass - f/u with Dr. Kelly Urology advised for reevaluation for surgical intervention   3) f/u w Surgical Onc Dr Sahni advised for reevaluation for surgical intervention  4) f/u w PCP, Dr López & other providers at their discretion.   All questions were addressed during this visit to her satisfaction, verbalized understanding. RTO in 3 weeks or sooner prn Case management, care plan d/w Dr. Tommy Mahoney

## 2024-05-01 NOTE — REVIEW OF SYSTEMS
[Nausea: Grade 0] : Nausea: Grade 0 [Vomiting: Grade 0] : Vomiting: Grade 0 [Fatigue: Grade 1 - Fatigue relieved by rest] : Fatigue: Grade 1 - Fatigue relieved by rest [Dermatitis Radiation: Grade 0] : Dermatitis Radiation: Grade 0 [Diarrhea: Grade 1 - Increase of <4 stools per day over baseline; mild increase in ostomy output compared to baseline] : Diarrhea: Grade 1 - Increase of <4 stools per day over baseline; mild increase in ostomy output compared to baseline

## 2024-05-01 NOTE — PHYSICAL EXAM
[Ambulatory and capable of all self care but unable to carry out any work activities] : Status 2- Ambulatory and capable of all self care but unable to carry out any work activities. Up and about more than 50% of waking hours [Normal] : affect appropriate [de-identified] : wore a cap, and mask [de-identified] : wears glasses  [de-identified] : Right Anterior chest wall Mediport site appears stable, no bleeding, edema or erythema / discharge noted

## 2024-05-07 ENCOUNTER — NON-APPOINTMENT (OUTPATIENT)
Age: 69
End: 2024-05-07

## 2024-05-08 ENCOUNTER — RESULT REVIEW (OUTPATIENT)
Age: 69
End: 2024-05-08

## 2024-05-08 ENCOUNTER — APPOINTMENT (OUTPATIENT)
Dept: HEMATOLOGY ONCOLOGY | Facility: CLINIC | Age: 69
End: 2024-05-08
Payer: MEDICARE

## 2024-05-08 VITALS
OXYGEN SATURATION: 94 % | SYSTOLIC BLOOD PRESSURE: 169 MMHG | HEIGHT: 63 IN | BODY MASS INDEX: 27.42 KG/M2 | HEART RATE: 86 BPM | DIASTOLIC BLOOD PRESSURE: 93 MMHG | RESPIRATION RATE: 16 BRPM | WEIGHT: 154.74 LBS | TEMPERATURE: 98.7 F

## 2024-05-08 DIAGNOSIS — D70.2 OTHER DRUG-INDUCED AGRANULOCYTOSIS: ICD-10-CM

## 2024-05-08 DIAGNOSIS — M25.512 PAIN IN LEFT SHOULDER: ICD-10-CM

## 2024-05-08 LAB
ANISOCYTOSIS BLD QL: SLIGHT — SIGNIFICANT CHANGE UP
BASOPHILS # BLD AUTO: 0 K/UL — SIGNIFICANT CHANGE UP (ref 0–0.2)
BASOPHILS NFR BLD AUTO: 0 % — SIGNIFICANT CHANGE UP (ref 0–2)
DACRYOCYTES BLD QL SMEAR: SLIGHT — SIGNIFICANT CHANGE UP
ELLIPTOCYTES BLD QL SMEAR: SLIGHT — SIGNIFICANT CHANGE UP
EOSINOPHIL # BLD AUTO: 0 K/UL — SIGNIFICANT CHANGE UP (ref 0–0.5)
EOSINOPHIL NFR BLD AUTO: 0 % — SIGNIFICANT CHANGE UP (ref 0–6)
HCT VFR BLD CALC: 26.4 % — LOW (ref 34.5–45)
HGB BLD-MCNC: 8.8 G/DL — LOW (ref 11.5–15.5)
HYPOCHROMIA BLD QL: SLIGHT — SIGNIFICANT CHANGE UP
LYMPHOCYTES # BLD AUTO: 0.26 K/UL — LOW (ref 1–3.3)
LYMPHOCYTES # BLD AUTO: 1 % — LOW (ref 13–44)
MCHC RBC-ENTMCNC: 28.1 PG — SIGNIFICANT CHANGE UP (ref 27–34)
MCHC RBC-ENTMCNC: 33.3 G/DL — SIGNIFICANT CHANGE UP (ref 32–36)
MCV RBC AUTO: 84.3 FL — SIGNIFICANT CHANGE UP (ref 80–100)
METAMYELOCYTES # FLD: 1 % — HIGH (ref 0–0)
MONOCYTES # BLD AUTO: 2.3 K/UL — HIGH (ref 0–0.9)
MONOCYTES NFR BLD AUTO: 9 % — SIGNIFICANT CHANGE UP (ref 2–14)
MYELOCYTES NFR BLD: 3 % — HIGH (ref 0–0)
NEUTROPHILS # BLD AUTO: 21.97 K/UL — HIGH (ref 1.8–7.4)
NEUTROPHILS NFR BLD AUTO: 86 % — HIGH (ref 43–77)
NRBC # BLD: 0 /100 WBCS — SIGNIFICANT CHANGE UP (ref 0–0)
NRBC # BLD: SIGNIFICANT CHANGE UP /100 WBCS (ref 0–0)
PLAT MORPH BLD: NORMAL — SIGNIFICANT CHANGE UP
PLATELET # BLD AUTO: 99 K/UL — LOW (ref 150–400)
POIKILOCYTOSIS BLD QL AUTO: SLIGHT — SIGNIFICANT CHANGE UP
POLYCHROMASIA BLD QL SMEAR: SLIGHT — SIGNIFICANT CHANGE UP
RBC # BLD: 3.13 M/UL — LOW (ref 3.8–5.2)
RBC # FLD: 22.5 % — HIGH (ref 10.3–14.5)
RBC BLD AUTO: ABNORMAL
SCHISTOCYTES BLD QL AUTO: SLIGHT — SIGNIFICANT CHANGE UP
TARGETS BLD QL SMEAR: SLIGHT — SIGNIFICANT CHANGE UP
WBC # BLD: 25.55 K/UL — HIGH (ref 3.8–10.5)
WBC # FLD AUTO: 25.55 K/UL — HIGH (ref 3.8–10.5)

## 2024-05-08 PROCEDURE — G2211 COMPLEX E/M VISIT ADD ON: CPT

## 2024-05-08 PROCEDURE — 99214 OFFICE O/P EST MOD 30 MIN: CPT

## 2024-05-08 RX ORDER — OMEPRAZOLE 40 MG/1
40 CAPSULE, DELAYED RELEASE ORAL
Qty: 90 | Refills: 0 | Status: DISCONTINUED | COMMUNITY
Start: 2024-04-03 | End: 2024-05-08
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
## 2024-05-08 NOTE — RESULTS/DATA
[FreeTextEntry1] : review of information in the E M H R rate 92 normal axis NSR ; possilbe anterior infarction  GI Ca 19-9 -> 2428 > 2328 > 1704 >  1526 (trending down)   May 30, 2023 - CBC - WBC = 7.3, Hgb = 11.4, PLT = 203, MCV = 81.4 CMP - Blood glucose = 124 (non fasting) GI Ca 19-9 - 1526 (trending down)   July 3, 2023 - CBC - WBC = 3.9, Hgb = 8.8, PLT = 255  August 1, 2023 - CBC - WBC = 2.2, Hgb = 9.4, PLT = 250 000, MCV = 86.5 Neutrophil % = 26%, ANC = 0.57   September 5, 2023 - CT C/A/P - Results d/w with patient in the office and son Jaiden over the phone - copy of report provided  Compared to 4/8/2023 interval decrease in overall size of the pancreatic body mass, however increased soft tissue encasement of the celiac artery and SMA.  Mild increase in size of enhancing solid and cystic left renal mass suspicious for renal cell carcinoma.  No evidence of metastatic disease   April 21, 2024 - CT C/A/P  Interval decreased size of ill-defined pancreatic body mass, measuring up to 3.2 cm, previously 3.8 cm No significant change in left renal 4.7 cm mixed cystic-solid mass, suspicious for renal cell carcinoma. No evidence of thoracic metastatic disease.  May 08, 2024 - WBC = 25.55, Hgb = 8,8, PLT = 99 000, MCV = 84.3

## 2024-05-08 NOTE — HISTORY OF PRESENT ILLNESS
[Disease: _____________________] : Disease: [unfilled] [T: ___] : T[unfilled] [N: ___] : N[unfilled] [M: ___] : M[unfilled] [AJCC Stage: ____] : AJCC Stage: [unfilled] [Date: ____________] : Patient's last distress assessment performed on [unfilled]. [2 - Distress Level] : Distress Level: 2 [90: Able to carry normal activity; minor signs or symptoms of disease.] : 90: Able to carry normal activity; minor signs or symptoms of disease.  [ECOG Performance Status: 1 - Restricted in physically strenuous activity but ambulatory and able to carry out work of a light or sedentary nature] : Performance Status: 1 - Restricted in physically strenuous activity but ambulatory and able to carry out work of a light or sedentary nature, e.g., light house work, office work [Abdominal Pain] : abdominal pain [EUS] : EUS [Biopsy] : biopsy performed [Before Surgery] : before surgery [Pancreatic ductal adenocarcinoma] : Pancreatic ductal adenocarcinoma [Not staged outside] : not staged outside [Nutrition] : Nutrition [Social Work] : Social Work [Other: ____] : [unfilled] [Restricted in physically strenuous activity but ambulatory and able to carry out work of a light or sedentary nature] : Status 1 - Restricted in physically strenuous activity but ambulatory and able to carry out work of a light or sedentary nature, e.g., light house work, office work [de-identified] : adenocarcinoma [FreeTextEntry1] : On Gemcitabine and Abraxane  [de-identified] : see hpi; 05/09/2023.She has been taking a combination medication of glipizide and metformin with better blood sugar control. Now on acetaminophen 300 codeine 30 mg PO TID PRN after meeting with pain management and her primary care MD. Pain has been midline and more noted in day than in evening. No jaundice May 30, 2023 - Seen in a f/u visit today scheduled for Gemcitabine & Abraxane for Pancreas cancer. Patient had called our office last week for c/o nausea, abd pain, poor appetite. She was prescribed Omeprazole by GI for Gastritis she had not taken. We advised her to take Metoclopramide for nausea, poor appetite and Omeprazole for Gastritis.  Since taking the medications for past few days she has been feeling much better. She remains on Levaquin for Neutropenia prophylaxis.  She denies bleeding, constipation, diarrhea, fever, chills, falls.  June 30, 2023 - Patient seen today accompanied with friend Ms. Jessica Betancourt.  Patient feels well denies interval change in medical status, No nausea, reports improved appetite, no GI complaints, no pain. Seen / evaluated by our Nutritionist team reports helpful tips and compliance with recommendations.  Today she was seen by Palliative / Pain management Dr. Malik no pain meds started.  She reports she was seen by her PCP last week, she ran out of her BP medications awaiting renewal by PCP.  July 17, 2023 -  Patient seen today in a follow up visit accompanied with friend Ms. Jessica Betancourt.  She reports episodes of non bloody diarrhea / loose stools post treatment lasts for day and a half does not take any anti diarrhea medications, follows dietary restriction with resolution of symptoms.  Denies N, V, fever, anorexia, weight loss, febrile illness, swollen glands, change in medical condition since last visit.   August 1, 2023 - Received a call from Keli JIMÉNEZ from treatment room patient scheduled for Gemcitabine & Abraxane and CBC shows Neutropenia Patient seen and evaluated, she feels well, asymptomatic, no cough, fever, chills, fatigue, no N, V, poor appetite, swollen glands, night sweats, bleeding, bruising. Denies any recent infection, hospitalization. Reports good appetite. No falls, presented for treatment today by herself, son drove her here.   August 15, 2023 - Received a call from Huang JIMÉNEZ from treatment room to evaluate patient with c/o Dysuria on Friday 8/11/23 lasted 1 day. Patient seen and evaluated in the treatment room.  She reports on Friday she experienced dysuria, frequency of urination, no hematuria, no urgency, fever, chills, pelvic pain, abdominal pain / discomfort. Symptoms of dysuria and frequency of urination has resolved but has foul smelling urine, no urine discoloration noted.  September 12, 2023 - Patient seen in a f/u visit today presented by herself to this appointment, Pt's son Jaiden was on the phone call during the visit.  She is s/p Cycle # 5 Day 1 Gemcitabine & Abraxane treatment. She feels well overall tolerating treatment. No residual UTI symptoms no malodorous urine completed course of antibiotics as prescribed.  October 16, 2023 - Seen in a f/u visit today, presented by herself to this appointment. Feels well reports good appetite, no nausea, vomiting, diarrhea, abd / back pain.  She feels intermittent tingling sensation in her toes describes as mild. She has a f/u visit with Podiatrist (sees every 6 months).  She has appointment with Dr. Pilo [facility] tomorrow to discuss plan for possible surgery.  11/15/2023 seen at one month interval. No weight change: no pain noted. no diarrhea 02/27/2024 She has completed chemotherapy treatment in late January 2024; now approximately 10 months of treatment and she has missed several physician appointments scheduled for the time period January through February 2024. Seen today over 40 minutes late for scheduled appointment. She has been seen by radiation Medicine and I have also discussed follow up with surgical oncology and the urological surgery.  April 09, 2024 - Notified by Maura JIMÉNEZ from treatment room pt c/o Nausea, episode of vomiting.  Patient seen and evaluated - pt c/o nausea since yesterday afternoon did not take antiemetics to martina symptoms. She ate egg sandwich this am. Pt had an episode of vomiting non bloody non bilious vomiting after arriving to our center today.   Denies abd pain, diarrhea, constipation, fever, chills, cough, dysuria, urinary symptoms, no bleeding. Denies eating outside / restaurant food or being around sick contacts.   April 30, 2024 - Notified by RN in treatment room patient's ANC was low, scheduled for treatment today w Gemcitabine & Abraxane.  Patient seen and evaluated reports no symptoms no febrile illness, cough, abd pain / discomfort, diarrhea, n, v, constipation, no bleeding.  Nausea, vomiting abated with Compazine prn. Appetite remains good.   May 08, 2024 - Seen in a f/u visit today.  She is s/p Injury to her Right foot & Left shoulder as she was getting off the Exercise bike at her home yesterday.  She reports her Right foot got stuck as she was attempting to get off the exercise bike and sustained injury to her right foot and left shoulder. She was able to get up post fall and walk. She also was able to use the left arm change her clothes without significant difficulty.   No head trauma, or injury, denies dizziness, nausea, pre syncope, syncope reported.  She was scheduled for Cycle 12 Day # 15 of Gemcitabine & Abraxane treatment on 5/7/24 pt called and cancelled chemotherapy due to pain / injury to foot and shoulder.  [de-identified] : Ms. ENRIKE MORLEY is a 67 year old female who presents for evaluation in our Pancreas Multidisciplinary Clinic. Her PMH includes DM2 (dx ), HTN, left knee replacement. She presented with abdominal pain radiating to her LUQ x 3 weeks. MRI abdomen  noted a 3 cm mass in the body of the pancreas suspicious for primary pancreatic malignancy and also Bosniak type IV cyst upper pole left kidney suspicious for primary renal neoplasm. She was admitted to [facility] on 23 and CT A/P w/ IC showing ill-defined pancreatic body mass consistent with pancreatic neoplasm; and complex enhancing left renal mass concerning for renal cell carcinoma till proven otherwise.\par  She underwent EUS on 23 with noted findings of one 3 cm pancreatic body mass, s/p FNA.  Pathology positive for adenocarcinoma. \par  \par  She reports decrease appetite and early satiety.  Abdominal pain is intermittent and severe when it comes.  She was taking Tylenol#3 after hospital d/c but currently not taking anymore. +Weight loss of 10 pounds since March. \par  \par  Colonoscopy from 7 years ago normal. \par  Smoke/Etoh: 20 pack years. Last smoke 2023. No ETOH use. \par  \par  ECO      Independent, can walk upstairs. Lives with her sons.  (ECOG 1 due to pain). \par  Family Ca hx: None \par  \par  Labs: \par  23    AST: 9   ALT: 18  ALP:  103  Tbili: 0.3 \par  23   Ca 19-9: 2428    CEA: 15.0    AST:  11    ALT: 12    ALP: 96   Tbili:  0.4     A1c: 10% \par   [TextBox_4] : 4/4/23 [TextBox_41] : adenocarcinoma [TextBox_39] : [id number] [TextBox_43] : 4/14/23 [] : This is not a second opinion [TextBox_5] : 5/2/23 [TextBox_40] : 4/8/23

## 2024-05-08 NOTE — PHYSICAL EXAM
[Ambulatory and capable of all self care but unable to carry out any work activities] : Status 2- Ambulatory and capable of all self care but unable to carry out any work activities. Up and about more than 50% of waking hours [Normal] : affect appropriate [de-identified] : wears glasses  [de-identified] : wore a wig. Was able to remove her shirt and socks off for physical exam, Ambulated with a cane  [de-identified] : Right Anterior chest wall Mediport site appears stable, no bleeding, edema or erythema / discharge noted  [de-identified] : Left shoulder mild swelling & Warmth noted, mild pain on palpation, no crepitus noted, Pt able to lift left arm to shoulder and slightly above without pain / difficulty.  Rigankle swollen, good pedal pulses, pt was able to perform flexion & extension of Right foot,  [de-identified] : slow gait ambulated with a cane with favoring to left foot, No gross sensory / motor deficits noted.

## 2024-05-12 ENCOUNTER — APPOINTMENT (OUTPATIENT)
Dept: RADIOLOGY | Facility: IMAGING CENTER | Age: 69
End: 2024-05-12
Payer: MEDICARE

## 2024-05-12 ENCOUNTER — OUTPATIENT (OUTPATIENT)
Dept: OUTPATIENT SERVICES | Facility: HOSPITAL | Age: 69
LOS: 1 days | End: 2024-05-12
Payer: COMMERCIAL

## 2024-05-12 ENCOUNTER — APPOINTMENT (OUTPATIENT)
Dept: ULTRASOUND IMAGING | Facility: IMAGING CENTER | Age: 69
End: 2024-05-12
Payer: MEDICARE

## 2024-05-12 DIAGNOSIS — Z96.652 PRESENCE OF LEFT ARTIFICIAL KNEE JOINT: Chronic | ICD-10-CM

## 2024-05-12 DIAGNOSIS — M25.512 PAIN IN LEFT SHOULDER: ICD-10-CM

## 2024-05-12 DIAGNOSIS — Z00.8 ENCOUNTER FOR OTHER GENERAL EXAMINATION: ICD-10-CM

## 2024-05-12 PROCEDURE — 73030 X-RAY EXAM OF SHOULDER: CPT

## 2024-05-12 PROCEDURE — 73030 X-RAY EXAM OF SHOULDER: CPT | Mod: 26,LT

## 2024-05-12 PROCEDURE — 93970 EXTREMITY STUDY: CPT

## 2024-05-12 PROCEDURE — 93970 EXTREMITY STUDY: CPT | Mod: 26

## 2024-05-12 PROCEDURE — 73020 X-RAY EXAM OF SHOULDER: CPT

## 2024-05-12 PROCEDURE — 73020 X-RAY EXAM OF SHOULDER: CPT | Mod: 26,XE,LT

## 2024-05-12 PROCEDURE — 73630 X-RAY EXAM OF FOOT: CPT | Mod: 26,RT

## 2024-05-12 PROCEDURE — 73630 X-RAY EXAM OF FOOT: CPT

## 2024-05-14 ENCOUNTER — RESULT REVIEW (OUTPATIENT)
Age: 69
End: 2024-05-14

## 2024-05-14 ENCOUNTER — APPOINTMENT (OUTPATIENT)
Dept: INFUSION THERAPY | Facility: HOSPITAL | Age: 69
End: 2024-05-14

## 2024-05-14 ENCOUNTER — APPOINTMENT (OUTPATIENT)
Dept: HEMATOLOGY ONCOLOGY | Facility: CLINIC | Age: 69
End: 2024-05-14

## 2024-05-14 LAB
BASOPHILS # BLD AUTO: 0.07 K/UL — SIGNIFICANT CHANGE UP (ref 0–0.2)
BASOPHILS NFR BLD AUTO: 0.4 % — SIGNIFICANT CHANGE UP (ref 0–2)
EOSINOPHIL # BLD AUTO: 0.21 K/UL — SIGNIFICANT CHANGE UP (ref 0–0.5)
EOSINOPHIL NFR BLD AUTO: 1.2 % — SIGNIFICANT CHANGE UP (ref 0–6)
HCT VFR BLD CALC: 28.7 % — LOW (ref 34.5–45)
HGB BLD-MCNC: 9.5 G/DL — LOW (ref 11.5–15.5)
IMM GRANULOCYTES NFR BLD AUTO: 2.4 % — HIGH (ref 0–0.9)
LYMPHOCYTES # BLD AUTO: 0.91 K/UL — LOW (ref 1–3.3)
LYMPHOCYTES # BLD AUTO: 5.1 % — LOW (ref 13–44)
MCHC RBC-ENTMCNC: 28.1 PG — SIGNIFICANT CHANGE UP (ref 27–34)
MCHC RBC-ENTMCNC: 33.1 G/DL — SIGNIFICANT CHANGE UP (ref 32–36)
MCV RBC AUTO: 84.9 FL — SIGNIFICANT CHANGE UP (ref 80–100)
MONOCYTES # BLD AUTO: 1.11 K/UL — HIGH (ref 0–0.9)
MONOCYTES NFR BLD AUTO: 6.2 % — SIGNIFICANT CHANGE UP (ref 2–14)
NEUTROPHILS # BLD AUTO: 15.11 K/UL — HIGH (ref 1.8–7.4)
NEUTROPHILS NFR BLD AUTO: 84.7 % — HIGH (ref 43–77)
NRBC # BLD: 0 /100 WBCS — SIGNIFICANT CHANGE UP (ref 0–0)
PLATELET # BLD AUTO: 217 K/UL — SIGNIFICANT CHANGE UP (ref 150–400)
RBC # BLD: 3.38 M/UL — LOW (ref 3.8–5.2)
RBC # FLD: 24.3 % — HIGH (ref 10.3–14.5)
WBC # BLD: 17.84 K/UL — HIGH (ref 3.8–10.5)
WBC # FLD AUTO: 17.84 K/UL — HIGH (ref 3.8–10.5)

## 2024-05-15 LAB
ALBUMIN SERPL ELPH-MCNC: 4 G/DL — SIGNIFICANT CHANGE UP (ref 3.3–5)
ALP SERPL-CCNC: 118 U/L — SIGNIFICANT CHANGE UP (ref 40–120)
ALT FLD-CCNC: 19 U/L — SIGNIFICANT CHANGE UP (ref 10–45)
ANION GAP SERPL CALC-SCNC: 15 MMOL/L — SIGNIFICANT CHANGE UP (ref 5–17)
AST SERPL-CCNC: 16 U/L — SIGNIFICANT CHANGE UP (ref 10–40)
BILIRUB SERPL-MCNC: 0.2 MG/DL — SIGNIFICANT CHANGE UP (ref 0.2–1.2)
BUN SERPL-MCNC: 9 MG/DL — SIGNIFICANT CHANGE UP (ref 7–23)
CALCIUM SERPL-MCNC: 9.7 MG/DL — SIGNIFICANT CHANGE UP (ref 8.4–10.5)
CHLORIDE SERPL-SCNC: 103 MMOL/L — SIGNIFICANT CHANGE UP (ref 96–108)
CO2 SERPL-SCNC: 23 MMOL/L — SIGNIFICANT CHANGE UP (ref 22–31)
CREAT SERPL-MCNC: 0.73 MG/DL — SIGNIFICANT CHANGE UP (ref 0.5–1.3)
EGFR: 90 ML/MIN/1.73M2 — SIGNIFICANT CHANGE UP
GLUCOSE SERPL-MCNC: 93 MG/DL — SIGNIFICANT CHANGE UP (ref 70–99)
POTASSIUM SERPL-MCNC: 4.5 MMOL/L — SIGNIFICANT CHANGE UP (ref 3.5–5.3)
POTASSIUM SERPL-SCNC: 4.5 MMOL/L — SIGNIFICANT CHANGE UP (ref 3.5–5.3)
PROT SERPL-MCNC: 6.9 G/DL — SIGNIFICANT CHANGE UP (ref 6–8.3)
SODIUM SERPL-SCNC: 141 MMOL/L — SIGNIFICANT CHANGE UP (ref 135–145)

## 2024-05-29 ENCOUNTER — APPOINTMENT (OUTPATIENT)
Dept: SURGICAL ONCOLOGY | Facility: CLINIC | Age: 69
End: 2024-05-29
Payer: MEDICARE

## 2024-05-29 ENCOUNTER — APPOINTMENT (OUTPATIENT)
Dept: SURGICAL ONCOLOGY | Facility: CLINIC | Age: 69
End: 2024-05-29

## 2024-05-29 VITALS
BODY MASS INDEX: 26.4 KG/M2 | WEIGHT: 149 LBS | HEART RATE: 117 BPM | DIASTOLIC BLOOD PRESSURE: 79 MMHG | SYSTOLIC BLOOD PRESSURE: 122 MMHG | HEIGHT: 63 IN | OXYGEN SATURATION: 98 %

## 2024-05-29 PROCEDURE — 99214 OFFICE O/P EST MOD 30 MIN: CPT

## 2024-05-31 NOTE — HISTORY OF PRESENT ILLNESS
[de-identified] : Ms. ENRIKE MORLEY is a 68 year old woman presenting with pancreatic cancer here today for a follow-up visit  PSH: left knee replacement 2019 Fam HX: none Social HX: ~20 pack year history, stopped in early 2023; no ETOH, retired from job as a    [facility] with complaints of 2 weeks of abdominal pain radiating to her LUQ.  Her PMH is significant for DM, HTN, liver polyps & renal cysts.  DM diagnosed in 2022 --- reports her a1c is around 8  She reports that her abdominal pain started as vague epigastric pain then steadily worsened. She was seeing her PCP for management of these symptoms and underwent an abdominal U/S & MRI  (@[facility], will obtain images and reports).  She has lost ~10 lbs from March to April 2023; appetite is poor. ECOG 0-1  Her last colonoscopy was ~7 years ago and otherwise WNL; never had an endoscopy   MR abdomen from 4/4/2023 showed a 3 cm solid hypo enhancing mass in the pancreatic body with ductal obstruction & parenchymal atrophy in the tail and a Bosniak type IV cyst upper pole left kidney suspicious for primary renal neoplasm  CT A/P 4/8/2023 - ill defined pancreatic body mass c/w pancreas neoplasm; complex enhancing left renal mass concerning for RCC  s/p EUS biopsy on 4/14/2023, path c/w pancreatic adenocarcinoma  Case discussed at [facility] on 5/2/2023 with plan to defer surgery and offer neoadjuvant chemotherapy up front PET/CT was ordered but given her poorly controlled diabetes it has been rescheduled pending better glucose control   5/2023: Started Gemcitabine & Abraxane   8/16/2023: Patient took a break from chemotherapy due to neutropenia and restarted 3rd cycle yesterday. Scheduled to complete 4th cycle end of August. Complaining of foul urine x 5 days and was prescribed cipro yesterday. Denies dysuria, abdominal pain, nausea, vomiting, fevers/chills.   9/2/2023 CT C/A/P (NW): 1.  Compared to 4/8/2023, interval decrease in overall size of the pancreatic body mass, however increased soft tissue encasement of the celiac artery and SMA. 2.  Mild increase in size of enhancing solid and cystic left renal mass suspicious for renal cell carcinoma. 3.  No evidence of metastatic disease.  9/20/2023: Per tumor board, recommended 2 more months of chemotherapy. Currently being treatment for concurrent renal cancer.  11/15/2023: Per onc note: She has poorly controlled diabetes with a hemoglobin A 1 C of 10 and a CT/PET scan has been rescheduled pending control of her elevated blood sugar. Started cycle 7 gemcitabine Abraxane third week of November.   CT/ C/A/P Since 1/10/2024: Interval decreased size of ill-defined pancreatic body mass, measuring up to 3.2 cm, previously 3.8 cm No significant change in left renal 4.7 cm mixed cystic-solid mass, suspicious for renal cell carcinoma. No evidence of thoracic metastatic disease  PCP: Dr. Suzy Bartlett

## 2024-05-31 NOTE — ASSESSMENT
[FreeTextEntry1] : CT/ C/A/P Since 1/10/2024: Interval decreased size of ill-defined pancreatic body mass, measuring up to 3.2 cm, previously 3.8 cm No significant change in left renal 4.7 cm mixed cystic-solid mass, suspicious for renal cell carcinoma. No evidence of thoracic metastatic disease  PLAN: - Case discussed in [facility]- completed chemo and ablative dose radiation - Panc adeno ca - unresectable given celiac and SMA involvement -RTO 6 months  I have discussed the diagnosis, therapeutic plan and options with the patient at length. Patient expressed verbal understanding to proceed with proposed plan. All questions answered.

## 2024-05-31 NOTE — PHYSICAL EXAM
[Normal] : supple, no neck mass and thyroid not enlarged [Normal Neck Lymph Nodes] : normal neck lymph nodes  [Normal Supraclavicular Lymph Nodes] : normal supraclavicular lymph nodes [Normal Groin Lymph Nodes] : normal groin lymph nodes [Normal Axillary Lymph Nodes] : normal axillary lymph nodes [Normal] : oriented to person, place and time, with appropriate affect [FreeTextEntry1] : COVID-19 precautions as per [facility] policy was universally followed.  [de-identified] : Abdomen soft, non-tender, non-distended, and no palpable masses.

## 2024-05-31 NOTE — CONSULT LETTER
[Dear  ___] : Dear  [unfilled], [Courtesy Letter:] : I had the pleasure of seeing your patient, [unfilled], in my office today. [( Thank you for referring [unfilled] for consultation for _____ )] : Thank you for referring [unfilled] for consultation for [unfilled] [Please see my note below.] : Please see my note below. [Consult Closing:] : Thank you very much for allowing me to participate in the care of this patient.  If you have any questions, please do not hesitate to contact me. [Sincerely,] : Sincerely, [FreeTextEntry3] : Addison Greco MD, GRACE, FACS Director of Surgical Oncology- [facility] , Department of Surgery [facility] at [place] [id number] Ph: [phone] Cell: [phone]

## 2024-06-05 ENCOUNTER — NON-APPOINTMENT (OUTPATIENT)
Age: 69
End: 2024-06-05

## 2024-06-10 ENCOUNTER — APPOINTMENT (OUTPATIENT)
Dept: HEMATOLOGY ONCOLOGY | Facility: CLINIC | Age: 69
End: 2024-06-10

## 2024-06-11 ENCOUNTER — RESULT REVIEW (OUTPATIENT)
Age: 69
End: 2024-06-11

## 2024-06-11 ENCOUNTER — NON-APPOINTMENT (OUTPATIENT)
Age: 69
End: 2024-06-11

## 2024-06-11 ENCOUNTER — APPOINTMENT (OUTPATIENT)
Dept: INFUSION THERAPY | Facility: HOSPITAL | Age: 69
End: 2024-06-11

## 2024-06-11 ENCOUNTER — APPOINTMENT (OUTPATIENT)
Dept: HEMATOLOGY ONCOLOGY | Facility: CLINIC | Age: 69
End: 2024-06-11

## 2024-06-11 ENCOUNTER — APPOINTMENT (OUTPATIENT)
Dept: HEMATOLOGY ONCOLOGY | Facility: CLINIC | Age: 69
End: 2024-06-11
Payer: MEDICARE

## 2024-06-11 DIAGNOSIS — E11.9 TYPE 2 DIABETES MELLITUS W/OUT COMPLICATIONS: ICD-10-CM

## 2024-06-11 DIAGNOSIS — S99.921A UNSPECIFIED INJURY OF RIGHT FOOT, INITIAL ENCOUNTER: ICD-10-CM

## 2024-06-11 LAB
BASOPHILS # BLD AUTO: 0.06 K/UL — SIGNIFICANT CHANGE UP (ref 0–0.2)
BASOPHILS NFR BLD AUTO: 0.7 % — SIGNIFICANT CHANGE UP (ref 0–2)
EOSINOPHIL # BLD AUTO: 0.1 K/UL — SIGNIFICANT CHANGE UP (ref 0–0.5)
EOSINOPHIL NFR BLD AUTO: 1.1 % — SIGNIFICANT CHANGE UP (ref 0–6)
HCT VFR BLD CALC: 32.3 % — LOW (ref 34.5–45)
HGB BLD-MCNC: 10.7 G/DL — LOW (ref 11.5–15.5)
IMM GRANULOCYTES NFR BLD AUTO: 0.3 % — SIGNIFICANT CHANGE UP (ref 0–0.9)
LYMPHOCYTES # BLD AUTO: 0.97 K/UL — LOW (ref 1–3.3)
LYMPHOCYTES # BLD AUTO: 11.1 % — LOW (ref 13–44)
MCHC RBC-ENTMCNC: 27.5 PG — SIGNIFICANT CHANGE UP (ref 27–34)
MCHC RBC-ENTMCNC: 33.1 G/DL — SIGNIFICANT CHANGE UP (ref 32–36)
MCV RBC AUTO: 83 FL — SIGNIFICANT CHANGE UP (ref 80–100)
MONOCYTES # BLD AUTO: 0.41 K/UL — SIGNIFICANT CHANGE UP (ref 0–0.9)
MONOCYTES NFR BLD AUTO: 4.7 % — SIGNIFICANT CHANGE UP (ref 2–14)
NEUTROPHILS # BLD AUTO: 7.14 K/UL — SIGNIFICANT CHANGE UP (ref 1.8–7.4)
NEUTROPHILS NFR BLD AUTO: 82.1 % — HIGH (ref 43–77)
NRBC # BLD: 0 /100 WBCS — SIGNIFICANT CHANGE UP (ref 0–0)
PLATELET # BLD AUTO: 189 K/UL — SIGNIFICANT CHANGE UP (ref 150–400)
RBC # BLD: 3.89 M/UL — SIGNIFICANT CHANGE UP (ref 3.8–5.2)
RBC # FLD: 19.9 % — HIGH (ref 10.3–14.5)
WBC # BLD: 8.71 K/UL — SIGNIFICANT CHANGE UP (ref 3.8–10.5)
WBC # FLD AUTO: 8.71 K/UL — SIGNIFICANT CHANGE UP (ref 3.8–10.5)

## 2024-06-11 PROCEDURE — 99215 OFFICE O/P EST HI 40 MIN: CPT

## 2024-06-11 PROCEDURE — G2211 COMPLEX E/M VISIT ADD ON: CPT

## 2024-06-11 NOTE — PHYSICAL EXAM
[Ambulatory and capable of all self care but unable to carry out any work activities] : Status 2- Ambulatory and capable of all self care but unable to carry out any work activities. Up and about more than 50% of waking hours [Normal] : affect appropriate [de-identified] : wore a wig. Was able to remove her shirt and socks off for physical exam, Ambulated with a cane  [de-identified] : Right Anterior chest wall Mediport site appears stable, no bleeding, edema or erythema / discharge noted  [de-identified] : wears glasses  [de-identified] : Left shoulder mild swelling & Warmth noted, mild pain on palpation, no crepitus noted, Pt able to lift left arm to shoulder and slightly above without pain / difficulty.  Rigankle swollen, good pedal pulses, pt was able to perform flexion & extension of Right foot,  [de-identified] : scalp alopecia [de-identified] : slow gait ambulated with a cane with favoring to left foot, No gross sensory / motor deficits noted.

## 2024-06-11 NOTE — ASSESSMENT
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   CT C/A/P April 21, 2024 - Interval decreased size of ill-defined pancreatic body mass, measuring up to 3.2 cm, previously 3.8 cm No significant change in left renal 4.7 cm mixed cystic-solid mass, suspicious for renal cell carcinoma. No evidence of thoracic metastatic disease.  1) Pancreas Cancer s/p SBRT (completed March 20, 2024) - s/p Cycle # 12 Day 8 Gemcitabine & Abraxane on 4/30/24 - Pt cancelled Cycle # 12 Day 15 treatment on 5/7/24 due to Injury to Right foot & left shoulder on 5/7/24.   Pancytopenia - Leukopenia, Neutropenia, Normocytic Anemia, Thrombocytosis in setting of Chemotherapy & s/p RT - was given Onpro / Neulasta last week  Today's WBC = 25.55, Hgb = 8,8, PLT = 99 000, MCV = 84.3 & ANC = 21.97 -  Leukocytosis & Elevated Neutrophil count likely related to Growth factor.   2) s/p Mechanical Fall / Injury to Right foot & Left shoulder - No syncope / head trauma  X Rays of Right foot & Left Shoulder ordered to evaluate for fracture  Bilateral Lower Extremity Duplex US - ordered to evaluate for DVT  Pt advised to apply frequent ice packs / cold compress may alternate with hot packs. Take Tylenol prn for pain,  May take NSAIDs OTC for 2-3 days and avoid NSAIDs for longer than 4- days to avoid kidney injury.  3) Left Renal Mass - f/u with Dr. Kelly Urology once again advised for reevaluation for surgical intervention  4) f/u w Surgical Onc Dr Sahni was advised again for reevaluation for surgical intervention 5) f/u w PCP, Dr López & other providers at their discretion.  All questions were addressed during this visit to her satisfaction, verbalized understanding. RTO in 3 weeks or sooner prn Patient was seen & evaluated with Dr. Tommy Mahoney  06/11/2024 Dr Javed has decided that she is not a surgical candidate for resection of the pancreas mass due to encasement of blood vessels by tumor. She has no  jaundice and overall size of tumor is smaller. Left renal mass remains unchanged. Patient physical examination is stable. blood testing reviewed with her and chemotherapy to continue today. She is given contact numbers for DOUGLAS Kelly of  for consideration of partial nephrectomy. Last visit with  was in 11/2023. Patient will continue with gemcitabine docetaxel for June and July 2024; plan for chemotherapy break discussed RTC in 2 months.

## 2024-06-11 NOTE — REVIEW OF SYSTEMS
[Joint Pain] : joint pain [Muscle Pain] : muscle pain [Negative] : Allergic/Immunologic [Fatigue] : no fatigue [Recent Change In Weight] : ~T no recent weight change [Abdominal Pain] : no abdominal pain [Vomiting] : no vomiting [Constipation] : no constipation [FreeTextEntry9] : s/p Injury to Right Foot & Left shoulder while getting out of the Exercise bike on 5/7/24  [de-identified] : No Syncope / presyncope, dizziness, no trauma to head / neck  [de-identified] : scalp alopecia

## 2024-06-11 NOTE — RESULTS/DATA
[FreeTextEntry1] : review of information in the E M H R rate 92 normal axis NSR ; possilbe anterior infarction  GI Ca 19-9 -> 2428 > 2328 > 1704 >  1526 (trending down)   May 30, 2023 - CBC - WBC = 7.3, Hgb = 11.4, PLT = 203, MCV = 81.4 CMP - Blood glucose = 124 (non fasting) GI Ca 19-9 - 1526 (trending down)   July 3, 2023 - CBC - WBC = 3.9, Hgb = 8.8, PLT = 255  August 1, 2023 - CBC - WBC = 2.2, Hgb = 9.4, PLT = 250 000, MCV = 86.5 Neutrophil % = 26%, ANC = 0.57   September 5, 2023 - CT C/A/P - Results d/w with patient in the office and son Jaiden over the phone - copy of report provided  Compared to 4/8/2023 interval decrease in overall size of the pancreatic body mass, however increased soft tissue encasement of the celiac artery and SMA.  Mild increase in size of enhancing solid and cystic left renal mass suspicious for renal cell carcinoma.  No evidence of metastatic disease   April 21, 2024 - CT C/A/P  Interval decreased size of ill-defined pancreatic body mass, measuring up to 3.2 cm, previously 3.8 cm No significant change in left renal 4.7 cm mixed cystic-solid mass, suspicious for renal cell carcinoma. No evidence of thoracic metastatic disease. May 08, 2024 - WBC = 25.55, Hgb = 8,8, PLT = 99 000, MCV = 84.3  06/11/2024 CBC WBC 8.7 HGB 10.7  000

## 2024-06-11 NOTE — HISTORY OF PRESENT ILLNESS
[Disease: _____________________] : Disease: [unfilled] [T: ___] : T[unfilled] [N: ___] : N[unfilled] [M: ___] : M[unfilled] [AJCC Stage: ____] : AJCC Stage: [unfilled] [Date: ____________] : Patient's last distress assessment performed on [unfilled]. [2 - Distress Level] : Distress Level: 2 [90: Able to carry normal activity; minor signs or symptoms of disease.] : 90: Able to carry normal activity; minor signs or symptoms of disease.  [ECOG Performance Status: 1 - Restricted in physically strenuous activity but ambulatory and able to carry out work of a light or sedentary nature] : Performance Status: 1 - Restricted in physically strenuous activity but ambulatory and able to carry out work of a light or sedentary nature, e.g., light house work, office work [Abdominal Pain] : abdominal pain [EUS] : EUS [Biopsy] : biopsy performed [Before Surgery] : before surgery [Pancreatic ductal adenocarcinoma] : Pancreatic ductal adenocarcinoma [Not staged outside] : not staged outside [Nutrition] : Nutrition [Social Work] : Social Work [Other: ____] : [unfilled] [Restricted in physically strenuous activity but ambulatory and able to carry out work of a light or sedentary nature] : Status 1 - Restricted in physically strenuous activity but ambulatory and able to carry out work of a light or sedentary nature, e.g., light house work, office work [de-identified] : adenocarcinoma [FreeTextEntry1] : On Gemcitabine and Abraxane  [de-identified] : see hpi; 05/09/2023.She has been taking a combination medication of glipizide and metformin with better blood sugar control. Now on acetaminophen 300 codeine 30 mg PO TID PRN after meeting with pain management and her primary care MD. Pain has been midline and more noted in day than in evening. No jaundice May 30, 2023 - Seen in a f/u visit today scheduled for Gemcitabine & Abraxane for Pancreas cancer. Patient had called our office last week for c/o nausea, abd pain, poor appetite. She was prescribed Omeprazole by GI for Gastritis she had not taken. We advised her to take Metoclopramide for nausea, poor appetite and Omeprazole for Gastritis.  Since taking the medications for past few days she has been feeling much better. She remains on Levaquin for Neutropenia prophylaxis.  She denies bleeding, constipation, diarrhea, fever, chills, falls.  June 30, 2023 - Patient seen today accompanied with friend Ms. Jessica Betancourt.  Patient feels well denies interval change in medical status, No nausea, reports improved appetite, no GI complaints, no pain. Seen / evaluated by our Nutritionist team reports helpful tips and compliance with recommendations.  Today she was seen by Palliative / Pain management Dr. Malik no pain meds started.  She reports she was seen by her PCP last week, she ran out of her BP medications awaiting renewal by PCP.  July 17, 2023 -  Patient seen today in a follow up visit accompanied with friend Ms. Jessica Betancourt.  She reports episodes of non bloody diarrhea / loose stools post treatment lasts for day and a half does not take any anti diarrhea medications, follows dietary restriction with resolution of symptoms.  Denies N, V, fever, anorexia, weight loss, febrile illness, swollen glands, change in medical condition since last visit.   August 1, 2023 - Received a call from Keli JIMÉNEZ from treatment room patient scheduled for Gemcitabine & Abraxane and CBC shows Neutropenia Patient seen and evaluated, she feels well, asymptomatic, no cough, fever, chills, fatigue, no N, V, poor appetite, swollen glands, night sweats, bleeding, bruising. Denies any recent infection, hospitalization. Reports good appetite. No falls, presented for treatment today by herself, son drove her here.   August 15, 2023 - Received a call from Huang JIMÉNEZ from treatment room to evaluate patient with c/o Dysuria on Friday 8/11/23 lasted 1 day. Patient seen and evaluated in the treatment room.  She reports on Friday she experienced dysuria, frequency of urination, no hematuria, no urgency, fever, chills, pelvic pain, abdominal pain / discomfort. Symptoms of dysuria and frequency of urination has resolved but has foul smelling urine, no urine discoloration noted.  September 12, 2023 - Patient seen in a f/u visit today presented by herself to this appointment, Pt's son Jaiden was on the phone call during the visit.  She is s/p Cycle # 5 Day 1 Gemcitabine & Abraxane treatment. She feels well overall tolerating treatment. No residual UTI symptoms no malodorous urine completed course of antibiotics as prescribed.  October 16, 2023 - Seen in a f/u visit today, presented by herself to this appointment. Feels well reports good appetite, no nausea, vomiting, diarrhea, abd / back pain.  She feels intermittent tingling sensation in her toes describes as mild. She has a f/u visit with Podiatrist (sees every 6 months).  She has appointment with Dr. Pilo Kelly Urology tomorrow to discuss plan for possible surgery.  11/15/2023 seen at one month interval. No weight change: no pain noted. no diarrhea 02/27/2024 She has completed chemotherapy treatment in late January 2024; now approximately 10 months of treatment and she has missed several physician appointments scheduled for the time period January through February 2024. Seen today over 40 minutes late for scheduled appointment. She has been seen by radiation Medicine and I have also discussed follow up with surgical oncology and the urological surgery.  April 09, 2024 - Notified by Maura JIMÉNEZ from treatment room pt c/o Nausea, episode of vomiting.  Patient seen and evaluated - pt c/o nausea since yesterday afternoon did not take antiemetics to martina symptoms. She ate egg sandwich this am. Pt had an episode of vomiting non bloody non bilious vomiting after arriving to our center today.   Denies abd pain, diarrhea, constipation, fever, chills, cough, dysuria, urinary symptoms, no bleeding. Denies eating outside / restaurant food or being around sick contacts.   April 30, 2024 - Notified by RN in treatment room patient's ANC was low, scheduled for treatment today w Gemcitabine & Abraxane.  Patient seen and evaluated reports no symptoms no febrile illness, cough, abd pain / discomfort, diarrhea, n, v, constipation, no bleeding.  Nausea, vomiting abated with Compazine prn. Appetite remains good.  May 08, 2024 - Seen in a f/u visit today.  She is s/p Injury to her Right foot & Left shoulder as she was getting off the Exercise bike at her home yesterday.  She reports her Right foot got stuck as she was attempting to get off the exercise bike and sustained injury to her right foot and left shoulder. She was able to get up post fall and walk. She also was able to use the left arm change her clothes without significant difficulty.   No head trauma, or injury, denies dizziness, nausea, pre syncope, syncope reported.  She was scheduled for Cycle 12 Day # 15 of Gemcitabine & Abraxane treatment on 5/7/24 pt called and cancelled chemotherapy due to pain / injury to foot and shoulder.  06/11/2024 no further falls; uses a cane now . no brusing and no pain in shoulder. no hospitalzation. she has seen Dr Pretty of surgery. pancreas resection is not scheduled. [de-identified] : Ms. ENRIKE MORLEY is a 67 year old female who presents for evaluation in our Pancreas [facility]id[facility]. Her PMH includes DM2 (dx ), HTN, left knee replacement. She presented with abdominal pain radiating to her LUQ x 3 weeks. MRI abdomen  noted a 3 cm mass in the body of the pancreas suspicious for primary pancreatic malignancy and also Bosniak type IV cyst upper pole left kidney suspicious for primary renal neoplasm. She was admitted to [facility] on 23 and CT A/P w/ IC showing ill-defined pancreatic body mass consistent with pancreatic neoplasm; and complex enhancing left renal mass concerning for renal cell carcinoma till proven otherwise.\par  She underwent EUS on 23 with noted findings of one 3 cm pancreatic body mass, s/p FNA.  Pathology positive for adenocarcinoma. \par  \par  She reports decrease appetite and early satiety.  Abdominal pain is intermittent and severe when it comes.  She was taking Tylenol#3 after hospital d/c but currently not taking anymore. +Weight loss of 10 pounds since March. \par  \par  Colonoscopy from 7 years ago normal. \par  Smoke/Etoh: 20 pack years. Last smoke 2023. No ETOH use. \par  \par  ECO      Independent, can walk upstairs. Lives with her sons.  (ECOG 1 due to pain). \par  Family Ca hx: None \par  \par  Labs: \par  23    AST: 9   ALT: 18  ALP:  103  Tbili: 0.3 \par  23   Ca 19-9: 2428    CEA: 15.0    AST:  11    ALT: 12    ALP: 96   Tbili:  0.4     A1c: 10% \par   [TextBox_4] : 4/4/23 [TextBox_39] : [id number] [TextBox_41] : adenocarcinoma [TextBox_43] : 4/14/23 [] : This is not a second opinion [TextBox_5] : 5/2/23 [TextBox_40] : 4/8/23

## 2024-06-12 LAB
ALBUMIN SERPL ELPH-MCNC: 4.2 G/DL — SIGNIFICANT CHANGE UP (ref 3.3–5)
ALP SERPL-CCNC: 89 U/L — SIGNIFICANT CHANGE UP (ref 40–120)
ALT FLD-CCNC: 13 U/L — SIGNIFICANT CHANGE UP (ref 10–45)
ANION GAP SERPL CALC-SCNC: 15 MMOL/L — SIGNIFICANT CHANGE UP (ref 5–17)
AST SERPL-CCNC: 18 U/L — SIGNIFICANT CHANGE UP (ref 10–40)
BILIRUB SERPL-MCNC: 0.3 MG/DL — SIGNIFICANT CHANGE UP (ref 0.2–1.2)
BUN SERPL-MCNC: 15 MG/DL — SIGNIFICANT CHANGE UP (ref 7–23)
CALCIUM SERPL-MCNC: 9.6 MG/DL — SIGNIFICANT CHANGE UP (ref 8.4–10.5)
CHLORIDE SERPL-SCNC: 100 MMOL/L — SIGNIFICANT CHANGE UP (ref 96–108)
CO2 SERPL-SCNC: 23 MMOL/L — SIGNIFICANT CHANGE UP (ref 22–31)
CREAT SERPL-MCNC: 0.71 MG/DL — SIGNIFICANT CHANGE UP (ref 0.5–1.3)
EGFR: 93 ML/MIN/1.73M2 — SIGNIFICANT CHANGE UP
GLUCOSE SERPL-MCNC: 69 MG/DL — LOW (ref 70–99)
POTASSIUM SERPL-MCNC: 4 MMOL/L — SIGNIFICANT CHANGE UP (ref 3.5–5.3)
POTASSIUM SERPL-SCNC: 4 MMOL/L — SIGNIFICANT CHANGE UP (ref 3.5–5.3)
PROT SERPL-MCNC: 7.1 G/DL — SIGNIFICANT CHANGE UP (ref 6–8.3)
SODIUM SERPL-SCNC: 138 MMOL/L — SIGNIFICANT CHANGE UP (ref 135–145)

## 2024-06-13 ENCOUNTER — OUTPATIENT (OUTPATIENT)
Dept: OUTPATIENT SERVICES | Facility: HOSPITAL | Age: 69
LOS: 1 days | Discharge: HOME | End: 2024-06-13

## 2024-06-13 ENCOUNTER — APPOINTMENT (OUTPATIENT)
Dept: INFUSION THERAPY | Facility: HOSPITAL | Age: 69
End: 2024-06-13

## 2024-06-13 DIAGNOSIS — C25.9 MALIGNANT NEOPLASM OF PANCREAS, UNSPECIFIED: ICD-10-CM

## 2024-06-18 ENCOUNTER — RESULT REVIEW (OUTPATIENT)
Age: 69
End: 2024-06-18

## 2024-06-18 ENCOUNTER — APPOINTMENT (OUTPATIENT)
Dept: HEMATOLOGY ONCOLOGY | Facility: CLINIC | Age: 69
End: 2024-06-18

## 2024-06-18 ENCOUNTER — APPOINTMENT (OUTPATIENT)
Dept: INFUSION THERAPY | Facility: HOSPITAL | Age: 69
End: 2024-06-18

## 2024-06-18 LAB
ALBUMIN SERPL ELPH-MCNC: 3.8 G/DL — SIGNIFICANT CHANGE UP (ref 3.3–5)
ALP SERPL-CCNC: 75 U/L — SIGNIFICANT CHANGE UP (ref 40–120)
ALT FLD-CCNC: 10 U/L — SIGNIFICANT CHANGE UP (ref 10–45)
ANION GAP SERPL CALC-SCNC: 9 MMOL/L — SIGNIFICANT CHANGE UP (ref 5–17)
AST SERPL-CCNC: 19 U/L — SIGNIFICANT CHANGE UP (ref 10–40)
BASOPHILS # BLD AUTO: 0.02 K/UL — SIGNIFICANT CHANGE UP (ref 0–0.2)
BASOPHILS NFR BLD AUTO: 0.3 % — SIGNIFICANT CHANGE UP (ref 0–2)
BILIRUB SERPL-MCNC: 0.3 MG/DL — SIGNIFICANT CHANGE UP (ref 0.2–1.2)
BUN SERPL-MCNC: 24 MG/DL — HIGH (ref 7–23)
CALCIUM SERPL-MCNC: 9 MG/DL — SIGNIFICANT CHANGE UP (ref 8.4–10.5)
CHLORIDE SERPL-SCNC: 104 MMOL/L — SIGNIFICANT CHANGE UP (ref 96–108)
CO2 SERPL-SCNC: 25 MMOL/L — SIGNIFICANT CHANGE UP (ref 22–31)
CREAT SERPL-MCNC: 0.77 MG/DL — SIGNIFICANT CHANGE UP (ref 0.5–1.3)
EGFR: 84 ML/MIN/1.73M2 — SIGNIFICANT CHANGE UP
EOSINOPHIL # BLD AUTO: 0.02 K/UL — SIGNIFICANT CHANGE UP (ref 0–0.5)
EOSINOPHIL NFR BLD AUTO: 0.3 % — SIGNIFICANT CHANGE UP (ref 0–6)
GLUCOSE SERPL-MCNC: 125 MG/DL — HIGH (ref 70–99)
HCT VFR BLD CALC: 28.5 % — LOW (ref 34.5–45)
HGB BLD-MCNC: 9.4 G/DL — LOW (ref 11.5–15.5)
IMM GRANULOCYTES NFR BLD AUTO: 0.3 % — SIGNIFICANT CHANGE UP (ref 0–0.9)
LYMPHOCYTES # BLD AUTO: 1.15 K/UL — SIGNIFICANT CHANGE UP (ref 1–3.3)
LYMPHOCYTES # BLD AUTO: 18.1 % — SIGNIFICANT CHANGE UP (ref 13–44)
MCHC RBC-ENTMCNC: 27.4 PG — SIGNIFICANT CHANGE UP (ref 27–34)
MCHC RBC-ENTMCNC: 33 G/DL — SIGNIFICANT CHANGE UP (ref 32–36)
MCV RBC AUTO: 83.1 FL — SIGNIFICANT CHANGE UP (ref 80–100)
MONOCYTES # BLD AUTO: 0.3 K/UL — SIGNIFICANT CHANGE UP (ref 0–0.9)
MONOCYTES NFR BLD AUTO: 4.7 % — SIGNIFICANT CHANGE UP (ref 2–14)
NEUTROPHILS # BLD AUTO: 4.85 K/UL — SIGNIFICANT CHANGE UP (ref 1.8–7.4)
NEUTROPHILS NFR BLD AUTO: 76.3 % — SIGNIFICANT CHANGE UP (ref 43–77)
NRBC # BLD: 0 /100 WBCS — SIGNIFICANT CHANGE UP (ref 0–0)
PLATELET # BLD AUTO: 116 K/UL — LOW (ref 150–400)
POTASSIUM SERPL-MCNC: 3.9 MMOL/L — SIGNIFICANT CHANGE UP (ref 3.5–5.3)
POTASSIUM SERPL-SCNC: 3.9 MMOL/L — SIGNIFICANT CHANGE UP (ref 3.5–5.3)
PROT SERPL-MCNC: 6.4 G/DL — SIGNIFICANT CHANGE UP (ref 6–8.3)
RBC # BLD: 3.43 M/UL — LOW (ref 3.8–5.2)
RBC # FLD: 18.7 % — HIGH (ref 10.3–14.5)
SODIUM SERPL-SCNC: 139 MMOL/L — SIGNIFICANT CHANGE UP (ref 135–145)
WBC # BLD: 6.36 K/UL — SIGNIFICANT CHANGE UP (ref 3.8–10.5)
WBC # FLD AUTO: 6.36 K/UL — SIGNIFICANT CHANGE UP (ref 3.8–10.5)

## 2024-06-19 DIAGNOSIS — Z51.11 ENCOUNTER FOR ANTINEOPLASTIC CHEMOTHERAPY: ICD-10-CM

## 2024-06-19 DIAGNOSIS — R11.2 NAUSEA WITH VOMITING, UNSPECIFIED: ICD-10-CM

## 2024-06-25 ENCOUNTER — APPOINTMENT (OUTPATIENT)
Dept: HEMATOLOGY ONCOLOGY | Facility: CLINIC | Age: 69
End: 2024-06-25

## 2024-06-25 ENCOUNTER — APPOINTMENT (OUTPATIENT)
Dept: INFUSION THERAPY | Facility: HOSPITAL | Age: 69
End: 2024-06-25

## 2024-06-25 ENCOUNTER — RESULT REVIEW (OUTPATIENT)
Age: 69
End: 2024-06-25

## 2024-06-25 ENCOUNTER — APPOINTMENT (OUTPATIENT)
Dept: HEMATOLOGY ONCOLOGY | Facility: CLINIC | Age: 69
End: 2024-06-25
Payer: MEDICARE

## 2024-06-25 DIAGNOSIS — D64.9 ANEMIA, UNSPECIFIED: ICD-10-CM

## 2024-06-25 DIAGNOSIS — D70.1 AGRANULOCYTOSIS SECONDARY TO CANCER CHEMOTHERAPY: ICD-10-CM

## 2024-06-25 DIAGNOSIS — C25.9 MALIGNANT NEOPLASM OF PANCREAS, UNSPECIFIED: ICD-10-CM

## 2024-06-25 DIAGNOSIS — D69.6 THROMBOCYTOPENIA, UNSPECIFIED: ICD-10-CM

## 2024-06-25 DIAGNOSIS — T45.1X5A AGRANULOCYTOSIS SECONDARY TO CANCER CHEMOTHERAPY: ICD-10-CM

## 2024-06-25 DIAGNOSIS — F17.210 NICOTINE DEPENDENCE, CIGARETTES, UNCOMPLICATED: ICD-10-CM

## 2024-06-25 LAB
ANISOCYTOSIS BLD QL: SLIGHT — SIGNIFICANT CHANGE UP
BASOPHILS # BLD AUTO: 0.01 K/UL — SIGNIFICANT CHANGE UP (ref 0–0.2)
BASOPHILS NFR BLD AUTO: 0.7 % — SIGNIFICANT CHANGE UP (ref 0–2)
DACRYOCYTES BLD QL SMEAR: SLIGHT — SIGNIFICANT CHANGE UP
ELLIPTOCYTES BLD QL SMEAR: SLIGHT — SIGNIFICANT CHANGE UP
EOSINOPHIL # BLD AUTO: 0 K/UL — SIGNIFICANT CHANGE UP (ref 0–0.5)
EOSINOPHIL NFR BLD AUTO: 0 % — SIGNIFICANT CHANGE UP (ref 0–6)
HCT VFR BLD CALC: 28.5 % — LOW (ref 34.5–45)
HGB BLD-MCNC: 9.5 G/DL — LOW (ref 11.5–15.5)
IMM GRANULOCYTES NFR BLD AUTO: 2.9 % — HIGH (ref 0–0.9)
LYMPHOCYTES # BLD AUTO: 0.66 K/UL — LOW (ref 1–3.3)
LYMPHOCYTES # BLD AUTO: 47.8 % — HIGH (ref 13–44)
MCHC RBC-ENTMCNC: 27.2 PG — SIGNIFICANT CHANGE UP (ref 27–34)
MCHC RBC-ENTMCNC: 33.3 G/DL — SIGNIFICANT CHANGE UP (ref 32–36)
MCV RBC AUTO: 81.7 FL — SIGNIFICANT CHANGE UP (ref 80–100)
MONOCYTES # BLD AUTO: 0.07 K/UL — SIGNIFICANT CHANGE UP (ref 0–0.9)
MONOCYTES NFR BLD AUTO: 5.1 % — SIGNIFICANT CHANGE UP (ref 2–14)
NEUTROPHILS # BLD AUTO: 0.6 K/UL — LOW (ref 1.8–7.4)
NEUTROPHILS NFR BLD AUTO: 43.5 % — SIGNIFICANT CHANGE UP (ref 43–77)
NRBC # BLD: 0 /100 WBCS — SIGNIFICANT CHANGE UP (ref 0–0)
PLAT MORPH BLD: NORMAL — SIGNIFICANT CHANGE UP
PLATELET # BLD AUTO: 75 K/UL — LOW (ref 150–400)
POIKILOCYTOSIS BLD QL AUTO: SLIGHT — SIGNIFICANT CHANGE UP
RBC # BLD: 3.49 M/UL — LOW (ref 3.8–5.2)
RBC # FLD: 18.2 % — HIGH (ref 10.3–14.5)
RBC BLD AUTO: ABNORMAL
SCHISTOCYTES BLD QL AUTO: SLIGHT — SIGNIFICANT CHANGE UP
TARGETS BLD QL SMEAR: SLIGHT — SIGNIFICANT CHANGE UP
WBC # BLD: 1.38 K/UL — LOW (ref 3.8–10.5)
WBC # FLD AUTO: 1.38 K/UL — LOW (ref 3.8–10.5)

## 2024-06-25 PROCEDURE — G2211 COMPLEX E/M VISIT ADD ON: CPT

## 2024-06-25 PROCEDURE — 99213 OFFICE O/P EST LOW 20 MIN: CPT

## 2024-06-25 RX ORDER — NICOTINE POLACRILEX 2 MG/1
2 GUM, CHEWING BUCCAL
Qty: 1 | Refills: 0 | Status: ACTIVE | COMMUNITY
Start: 2024-06-25 | End: 1900-01-01

## 2024-06-26 ENCOUNTER — NON-APPOINTMENT (OUTPATIENT)
Age: 69
End: 2024-06-26

## 2024-06-26 DIAGNOSIS — Z51.89 ENCOUNTER FOR OTHER SPECIFIED AFTERCARE: ICD-10-CM

## 2024-06-28 ENCOUNTER — EMERGENCY (EMERGENCY)
Facility: HOSPITAL | Age: 69
LOS: 1 days | Discharge: HOME | End: 2024-06-28
Attending: EMERGENCY MEDICINE
Payer: COMMERCIAL

## 2024-06-28 VITALS — HEART RATE: 80 BPM | TEMPERATURE: 100 F | OXYGEN SATURATION: 97 % | RESPIRATION RATE: 18 BRPM

## 2024-06-28 VITALS
WEIGHT: 154.32 LBS | RESPIRATION RATE: 18 BRPM | OXYGEN SATURATION: 96 % | HEART RATE: 93 BPM | SYSTOLIC BLOOD PRESSURE: 188 MMHG | DIASTOLIC BLOOD PRESSURE: 85 MMHG | HEIGHT: 63 IN | TEMPERATURE: 99 F

## 2024-06-28 VITALS
HEIGHT: 63 IN | DIASTOLIC BLOOD PRESSURE: 89 MMHG | TEMPERATURE: 98 F | SYSTOLIC BLOOD PRESSURE: 177 MMHG | RESPIRATION RATE: 20 BRPM | OXYGEN SATURATION: 95 % | WEIGHT: 156.09 LBS | HEART RATE: 96 BPM

## 2024-06-28 VITALS
TEMPERATURE: 99 F | OXYGEN SATURATION: 97 % | DIASTOLIC BLOOD PRESSURE: 90 MMHG | SYSTOLIC BLOOD PRESSURE: 157 MMHG | RESPIRATION RATE: 18 BRPM | HEART RATE: 82 BPM

## 2024-06-28 DIAGNOSIS — Z96.652 PRESENCE OF LEFT ARTIFICIAL KNEE JOINT: Chronic | ICD-10-CM

## 2024-06-28 LAB
ALBUMIN SERPL ELPH-MCNC: 3 G/DL — LOW (ref 3.5–5)
ALBUMIN SERPL ELPH-MCNC: 3.3 G/DL — LOW (ref 3.5–5)
ALP SERPL-CCNC: 101 U/L — SIGNIFICANT CHANGE UP (ref 40–120)
ALP SERPL-CCNC: 102 U/L — SIGNIFICANT CHANGE UP (ref 40–120)
ALT FLD-CCNC: 31 U/L DA — SIGNIFICANT CHANGE UP (ref 10–60)
ALT FLD-CCNC: 31 U/L DA — SIGNIFICANT CHANGE UP (ref 10–60)
ANION GAP SERPL CALC-SCNC: 5 MMOL/L — SIGNIFICANT CHANGE UP (ref 5–17)
ANION GAP SERPL CALC-SCNC: 6 MMOL/L — SIGNIFICANT CHANGE UP (ref 5–17)
ANISOCYTOSIS BLD QL: SLIGHT — SIGNIFICANT CHANGE UP
AST SERPL-CCNC: 26 U/L — SIGNIFICANT CHANGE UP (ref 10–40)
AST SERPL-CCNC: 44 U/L — HIGH (ref 10–40)
BASOPHILS # BLD AUTO: 0 K/UL — SIGNIFICANT CHANGE UP (ref 0–0.2)
BASOPHILS # BLD AUTO: 0.08 K/UL — SIGNIFICANT CHANGE UP (ref 0–0.2)
BASOPHILS NFR BLD AUTO: 0 % — SIGNIFICANT CHANGE UP (ref 0–2)
BASOPHILS NFR BLD AUTO: 0.7 % — SIGNIFICANT CHANGE UP (ref 0–2)
BILIRUB SERPL-MCNC: 0.6 MG/DL — SIGNIFICANT CHANGE UP (ref 0.2–1.2)
BILIRUB SERPL-MCNC: 0.7 MG/DL — SIGNIFICANT CHANGE UP (ref 0.2–1.2)
BUN SERPL-MCNC: 12 MG/DL — SIGNIFICANT CHANGE UP (ref 7–18)
BUN SERPL-MCNC: 13 MG/DL — SIGNIFICANT CHANGE UP (ref 7–18)
CALCIUM SERPL-MCNC: 8.5 MG/DL — SIGNIFICANT CHANGE UP (ref 8.4–10.5)
CALCIUM SERPL-MCNC: 8.9 MG/DL — SIGNIFICANT CHANGE UP (ref 8.4–10.5)
CHLORIDE SERPL-SCNC: 105 MMOL/L — SIGNIFICANT CHANGE UP (ref 96–108)
CHLORIDE SERPL-SCNC: 107 MMOL/L — SIGNIFICANT CHANGE UP (ref 96–108)
CO2 SERPL-SCNC: 25 MMOL/L — SIGNIFICANT CHANGE UP (ref 22–31)
CO2 SERPL-SCNC: 27 MMOL/L — SIGNIFICANT CHANGE UP (ref 22–31)
CREAT SERPL-MCNC: 0.76 MG/DL — SIGNIFICANT CHANGE UP (ref 0.5–1.3)
CREAT SERPL-MCNC: 0.82 MG/DL — SIGNIFICANT CHANGE UP (ref 0.5–1.3)
EGFR: 78 ML/MIN/1.73M2 — SIGNIFICANT CHANGE UP
EGFR: 85 ML/MIN/1.73M2 — SIGNIFICANT CHANGE UP
ELLIPTOCYTES BLD QL SMEAR: SLIGHT — SIGNIFICANT CHANGE UP
EOSINOPHIL # BLD AUTO: 0 K/UL — SIGNIFICANT CHANGE UP (ref 0–0.5)
EOSINOPHIL # BLD AUTO: 0.1 K/UL — SIGNIFICANT CHANGE UP (ref 0–0.5)
EOSINOPHIL NFR BLD AUTO: 0 % — SIGNIFICANT CHANGE UP (ref 0–6)
EOSINOPHIL NFR BLD AUTO: 0.9 % — SIGNIFICANT CHANGE UP (ref 0–6)
FLUAV AG NPH QL: SIGNIFICANT CHANGE UP
FLUBV AG NPH QL: SIGNIFICANT CHANGE UP
GLUCOSE SERPL-MCNC: 120 MG/DL — HIGH (ref 70–99)
GLUCOSE SERPL-MCNC: 151 MG/DL — HIGH (ref 70–99)
HCT VFR BLD CALC: 27 % — LOW (ref 34.5–45)
HCT VFR BLD CALC: 27.2 % — LOW (ref 34.5–45)
HGB BLD-MCNC: 8.7 G/DL — LOW (ref 11.5–15.5)
HGB BLD-MCNC: 8.9 G/DL — LOW (ref 11.5–15.5)
HYPOCHROMIA BLD QL: SLIGHT — SIGNIFICANT CHANGE UP
IMM GRANULOCYTES NFR BLD AUTO: 17.6 % — HIGH (ref 0–0.9)
INR BLD: 1.15 RATIO — SIGNIFICANT CHANGE UP (ref 0.85–1.18)
LACTATE SERPL-SCNC: 1.8 MMOL/L — SIGNIFICANT CHANGE UP (ref 0.7–2)
LYMPHOCYTES # BLD AUTO: 0.91 K/UL — LOW (ref 1–3.3)
LYMPHOCYTES # BLD AUTO: 1.21 K/UL — SIGNIFICANT CHANGE UP (ref 1–3.3)
LYMPHOCYTES # BLD AUTO: 10 % — LOW (ref 13–44)
LYMPHOCYTES # BLD AUTO: 10.6 % — LOW (ref 13–44)
MAGNESIUM SERPL-MCNC: 1.1 MG/DL — LOW (ref 1.6–2.6)
MANUAL SMEAR VERIFICATION: SIGNIFICANT CHANGE UP
MCHC RBC-ENTMCNC: 26.9 PG — LOW (ref 27–34)
MCHC RBC-ENTMCNC: 27.7 PG — SIGNIFICANT CHANGE UP (ref 27–34)
MCHC RBC-ENTMCNC: 32 GM/DL — SIGNIFICANT CHANGE UP (ref 32–36)
MCHC RBC-ENTMCNC: 33 GM/DL — SIGNIFICANT CHANGE UP (ref 32–36)
MCV RBC AUTO: 84.1 FL — SIGNIFICANT CHANGE UP (ref 80–100)
MCV RBC AUTO: 84.2 FL — SIGNIFICANT CHANGE UP (ref 80–100)
MONOCYTES # BLD AUTO: 1 K/UL — HIGH (ref 0–0.9)
MONOCYTES # BLD AUTO: 2.41 K/UL — HIGH (ref 0–0.9)
MONOCYTES NFR BLD AUTO: 11 % — SIGNIFICANT CHANGE UP (ref 2–14)
MONOCYTES NFR BLD AUTO: 21.2 % — HIGH (ref 2–14)
NEUTROPHILS # BLD AUTO: 5.59 K/UL — SIGNIFICANT CHANGE UP (ref 1.8–7.4)
NEUTROPHILS # BLD AUTO: 7.11 K/UL — SIGNIFICANT CHANGE UP (ref 1.8–7.4)
NEUTROPHILS NFR BLD AUTO: 49 % — SIGNIFICANT CHANGE UP (ref 43–77)
NEUTROPHILS NFR BLD AUTO: 77 % — SIGNIFICANT CHANGE UP (ref 43–77)
NEUTS BAND # BLD: 1 % — SIGNIFICANT CHANGE UP (ref 0–8)
NRBC # BLD: 0 /100 WBCS — SIGNIFICANT CHANGE UP (ref 0–0)
NRBC # BLD: 2 /100 WBCS — HIGH (ref 0–0)
OVALOCYTES BLD QL SMEAR: SLIGHT — SIGNIFICANT CHANGE UP
PHOSPHATE SERPL-MCNC: 2.2 MG/DL — LOW (ref 2.5–4.5)
PLAT MORPH BLD: NORMAL — SIGNIFICANT CHANGE UP
PLATELET # BLD AUTO: 101 K/UL — LOW (ref 150–400)
PLATELET # BLD AUTO: 165 K/UL — SIGNIFICANT CHANGE UP (ref 150–400)
PLATELET COUNT - ESTIMATE: ABNORMAL
POIKILOCYTOSIS BLD QL AUTO: SLIGHT — SIGNIFICANT CHANGE UP
POTASSIUM SERPL-MCNC: 3.7 MMOL/L — SIGNIFICANT CHANGE UP (ref 3.5–5.3)
POTASSIUM SERPL-MCNC: 4.3 MMOL/L — SIGNIFICANT CHANGE UP (ref 3.5–5.3)
POTASSIUM SERPL-SCNC: 3.7 MMOL/L — SIGNIFICANT CHANGE UP (ref 3.5–5.3)
POTASSIUM SERPL-SCNC: 4.3 MMOL/L — SIGNIFICANT CHANGE UP (ref 3.5–5.3)
PROT SERPL-MCNC: 6.7 G/DL — SIGNIFICANT CHANGE UP (ref 6–8.3)
PROT SERPL-MCNC: 6.8 G/DL — SIGNIFICANT CHANGE UP (ref 6–8.3)
PROTHROM AB SERPL-ACNC: 13.1 SEC — HIGH (ref 9.5–13)
RBC # BLD: 3.21 M/UL — LOW (ref 3.8–5.2)
RBC # BLD: 3.23 M/UL — LOW (ref 3.8–5.2)
RBC # FLD: 18.9 % — HIGH (ref 10.3–14.5)
RBC # FLD: 19.9 % — HIGH (ref 10.3–14.5)
RBC BLD AUTO: ABNORMAL
SARS-COV-2 RNA SPEC QL NAA+PROBE: SIGNIFICANT CHANGE UP
SODIUM SERPL-SCNC: 136 MMOL/L — SIGNIFICANT CHANGE UP (ref 135–145)
SODIUM SERPL-SCNC: 139 MMOL/L — SIGNIFICANT CHANGE UP (ref 135–145)
TARGETS BLD QL SMEAR: SLIGHT — SIGNIFICANT CHANGE UP
TROPONIN I, HIGH SENSITIVITY RESULT: 4.6 NG/L — SIGNIFICANT CHANGE UP
TROPONIN I, HIGH SENSITIVITY RESULT: 7.2 NG/L — SIGNIFICANT CHANGE UP
TROPONIN I, HIGH SENSITIVITY RESULT: 7.4 NG/L — SIGNIFICANT CHANGE UP
TSH SERPL-MCNC: 1.15 UU/ML — SIGNIFICANT CHANGE UP (ref 0.34–4.82)
VARIANT LYMPHS # BLD: 1 % — SIGNIFICANT CHANGE UP (ref 0–6)
WBC # BLD: 11.39 K/UL — HIGH (ref 3.8–10.5)
WBC # BLD: 9.12 K/UL — SIGNIFICANT CHANGE UP (ref 3.8–10.5)
WBC # FLD AUTO: 11.39 K/UL — HIGH (ref 3.8–10.5)
WBC # FLD AUTO: 9.12 K/UL — SIGNIFICANT CHANGE UP (ref 3.8–10.5)

## 2024-06-28 PROCEDURE — 80053 COMPREHEN METABOLIC PANEL: CPT

## 2024-06-28 PROCEDURE — 71045 X-RAY EXAM CHEST 1 VIEW: CPT | Mod: 26

## 2024-06-28 PROCEDURE — 99285 EMERGENCY DEPT VISIT HI MDM: CPT

## 2024-06-28 PROCEDURE — 93005 ELECTROCARDIOGRAM TRACING: CPT

## 2024-06-28 PROCEDURE — 83735 ASSAY OF MAGNESIUM: CPT

## 2024-06-28 PROCEDURE — 71045 X-RAY EXAM CHEST 1 VIEW: CPT

## 2024-06-28 PROCEDURE — 84443 ASSAY THYROID STIM HORMONE: CPT

## 2024-06-28 PROCEDURE — 85025 COMPLETE CBC W/AUTO DIFF WBC: CPT

## 2024-06-28 PROCEDURE — 84484 ASSAY OF TROPONIN QUANT: CPT

## 2024-06-28 PROCEDURE — 96361 HYDRATE IV INFUSION ADD-ON: CPT

## 2024-06-28 PROCEDURE — 87636 SARSCOV2 & INF A&B AMP PRB: CPT

## 2024-06-28 PROCEDURE — 99285 EMERGENCY DEPT VISIT HI MDM: CPT | Mod: 25

## 2024-06-28 PROCEDURE — 82550 ASSAY OF CK (CPK): CPT

## 2024-06-28 PROCEDURE — 96375 TX/PRO/DX INJ NEW DRUG ADDON: CPT | Mod: XU

## 2024-06-28 PROCEDURE — 71275 CT ANGIOGRAPHY CHEST: CPT | Mod: 26,MC

## 2024-06-28 PROCEDURE — 84100 ASSAY OF PHOSPHORUS: CPT

## 2024-06-28 PROCEDURE — 96375 TX/PRO/DX INJ NEW DRUG ADDON: CPT

## 2024-06-28 PROCEDURE — 85379 FIBRIN DEGRADATION QUANT: CPT

## 2024-06-28 PROCEDURE — 36415 COLL VENOUS BLD VENIPUNCTURE: CPT

## 2024-06-28 PROCEDURE — 96374 THER/PROPH/DIAG INJ IV PUSH: CPT | Mod: XU

## 2024-06-28 PROCEDURE — 85610 PROTHROMBIN TIME: CPT

## 2024-06-28 PROCEDURE — 96374 THER/PROPH/DIAG INJ IV PUSH: CPT

## 2024-06-28 PROCEDURE — 83605 ASSAY OF LACTIC ACID: CPT

## 2024-06-28 PROCEDURE — 71275 CT ANGIOGRAPHY CHEST: CPT | Mod: MC

## 2024-06-28 RX ORDER — MAGNESIUM SULFATE 100 %
2 POWDER (GRAM) MISCELLANEOUS ONCE
Refills: 0 | Status: COMPLETED | OUTPATIENT
Start: 2024-06-28 | End: 2024-06-28

## 2024-06-28 RX ORDER — DIPHENHYDRAMINE HCL 12.5MG/5ML
25 ELIXIR ORAL ONCE
Refills: 0 | Status: COMPLETED | OUTPATIENT
Start: 2024-06-28 | End: 2024-06-28

## 2024-06-28 RX ORDER — DEXTROSE MONOHYDRATE AND SODIUM CHLORIDE 5; .3 G/100ML; G/100ML
1000 INJECTION, SOLUTION INTRAVENOUS ONCE
Refills: 0 | Status: COMPLETED | OUTPATIENT
Start: 2024-06-28 | End: 2024-06-28

## 2024-06-28 RX ORDER — MAGNESIUM OXIDE 400 MG/1
1 TABLET ORAL
Qty: 3 | Refills: 0 | DISCHARGE
Start: 2024-06-28 | End: 2024-06-30

## 2024-06-28 RX ORDER — SODIUM CHLORIDE 0.9 % (FLUSH) 0.9 %
1000 SYRINGE (ML) INJECTION ONCE
Refills: 0 | Status: COMPLETED | OUTPATIENT
Start: 2024-06-28 | End: 2024-06-28

## 2024-06-28 RX ORDER — SOD PHOS DI, MONO/K PHOS MONO 250 MG
1 TABLET ORAL ONCE
Refills: 0 | Status: COMPLETED | OUTPATIENT
Start: 2024-06-28 | End: 2024-06-28

## 2024-06-28 RX ORDER — ACETAMINOPHEN 325 MG
1000 TABLET ORAL ONCE
Refills: 0 | Status: COMPLETED | OUTPATIENT
Start: 2024-06-28 | End: 2024-06-28

## 2024-06-28 RX ORDER — MAGNESIUM OXIDE 400 MG/1
1 TABLET ORAL
Qty: 3 | Refills: 0
Start: 2024-06-28 | End: 2024-06-30

## 2024-06-28 RX ORDER — KETOROLAC TROMETHAMINE 30 MG/ML
15 INJECTION, SOLUTION INTRAMUSCULAR ONCE
Refills: 0 | Status: DISCONTINUED | OUTPATIENT
Start: 2024-06-28 | End: 2024-06-28

## 2024-06-28 RX ADMIN — DEXTROSE MONOHYDRATE AND SODIUM CHLORIDE 1000 MILLILITER(S): 5; .3 INJECTION, SOLUTION INTRAVENOUS at 04:36

## 2024-06-28 RX ADMIN — KETOROLAC TROMETHAMINE 15 MILLIGRAM(S): 30 INJECTION, SOLUTION INTRAMUSCULAR at 17:40

## 2024-06-28 RX ADMIN — Medication 1000 MILLIGRAM(S): at 03:51

## 2024-06-28 RX ADMIN — Medication 400 MILLIGRAM(S): at 03:36

## 2024-06-28 RX ADMIN — KETOROLAC TROMETHAMINE 15 MILLIGRAM(S): 30 INJECTION, SOLUTION INTRAMUSCULAR at 18:10

## 2024-06-28 RX ADMIN — DEXTROSE MONOHYDRATE AND SODIUM CHLORIDE 1000 MILLILITER(S): 5; .3 INJECTION, SOLUTION INTRAVENOUS at 03:36

## 2024-06-28 RX ADMIN — Medication 25 MILLIGRAM(S): at 03:37

## 2024-06-28 RX ADMIN — Medication 1 PACKET(S): at 20:17

## 2024-06-28 RX ADMIN — Medication 25 GRAM(S): at 19:03

## 2024-06-28 RX ADMIN — Medication 1000 MILLILITER(S): at 17:38

## 2024-07-05 ENCOUNTER — NON-APPOINTMENT (OUTPATIENT)
Age: 69
End: 2024-07-05

## 2024-07-09 ENCOUNTER — RESULT REVIEW (OUTPATIENT)
Age: 69
End: 2024-07-09

## 2024-07-09 ENCOUNTER — APPOINTMENT (OUTPATIENT)
Dept: HEMATOLOGY ONCOLOGY | Facility: CLINIC | Age: 69
End: 2024-07-09

## 2024-07-09 ENCOUNTER — APPOINTMENT (OUTPATIENT)
Dept: INFUSION THERAPY | Facility: HOSPITAL | Age: 69
End: 2024-07-09

## 2024-07-09 ENCOUNTER — NON-APPOINTMENT (OUTPATIENT)
Age: 69
End: 2024-07-09

## 2024-07-09 DIAGNOSIS — C25.9 MALIGNANT NEOPLASM OF PANCREAS, UNSPECIFIED: ICD-10-CM

## 2024-07-09 LAB
BASOPHILS # BLD AUTO: 0.05 K/UL — SIGNIFICANT CHANGE UP (ref 0–0.2)
BASOPHILS NFR BLD AUTO: 0.5 % — SIGNIFICANT CHANGE UP (ref 0–2)
CANCER AG19-9 SERPL-ACNC: 74 U/ML — HIGH
CEA SERPL-MCNC: 4.3 NG/ML — HIGH (ref 0–3.8)
EOSINOPHIL # BLD AUTO: 0.03 K/UL — SIGNIFICANT CHANGE UP (ref 0–0.5)
EOSINOPHIL NFR BLD AUTO: 0.3 % — SIGNIFICANT CHANGE UP (ref 0–6)
HCT VFR BLD CALC: 33.5 % — LOW (ref 34.5–45)
HGB BLD-MCNC: 10.9 G/DL — LOW (ref 11.5–15.5)
IMM GRANULOCYTES NFR BLD AUTO: 1.3 % — HIGH (ref 0–0.9)
LYMPHOCYTES # BLD AUTO: 1.67 K/UL — SIGNIFICANT CHANGE UP (ref 1–3.3)
LYMPHOCYTES # BLD AUTO: 16.2 % — SIGNIFICANT CHANGE UP (ref 13–44)
MCHC RBC-ENTMCNC: 27.1 PG — SIGNIFICANT CHANGE UP (ref 27–34)
MCHC RBC-ENTMCNC: 32.5 G/DL — SIGNIFICANT CHANGE UP (ref 32–36)
MCV RBC AUTO: 83.3 FL — SIGNIFICANT CHANGE UP (ref 80–100)
MONOCYTES # BLD AUTO: 0.82 K/UL — SIGNIFICANT CHANGE UP (ref 0–0.9)
MONOCYTES NFR BLD AUTO: 7.9 % — SIGNIFICANT CHANGE UP (ref 2–14)
NEUTROPHILS # BLD AUTO: 7.63 K/UL — HIGH (ref 1.8–7.4)
NEUTROPHILS NFR BLD AUTO: 73.8 % — SIGNIFICANT CHANGE UP (ref 43–77)
NRBC # BLD: 0 /100 WBCS — SIGNIFICANT CHANGE UP (ref 0–0)
PLATELET # BLD AUTO: 369 K/UL — SIGNIFICANT CHANGE UP (ref 150–400)
RBC # BLD: 4.02 M/UL — SIGNIFICANT CHANGE UP (ref 3.8–5.2)
RBC # FLD: 21.2 % — HIGH (ref 10.3–14.5)
WBC # BLD: 10.33 K/UL — SIGNIFICANT CHANGE UP (ref 3.8–10.5)
WBC # FLD AUTO: 10.33 K/UL — SIGNIFICANT CHANGE UP (ref 3.8–10.5)

## 2024-07-10 LAB
ALBUMIN SERPL ELPH-MCNC: 4.3 G/DL — SIGNIFICANT CHANGE UP (ref 3.3–5)
ALP SERPL-CCNC: 125 U/L — HIGH (ref 40–120)
ALT FLD-CCNC: 16 U/L — SIGNIFICANT CHANGE UP (ref 10–45)
ANION GAP SERPL CALC-SCNC: 14 MMOL/L — SIGNIFICANT CHANGE UP (ref 5–17)
AST SERPL-CCNC: 16 U/L — SIGNIFICANT CHANGE UP (ref 10–40)
BILIRUB SERPL-MCNC: 0.2 MG/DL — SIGNIFICANT CHANGE UP (ref 0.2–1.2)
BUN SERPL-MCNC: 15 MG/DL — SIGNIFICANT CHANGE UP (ref 7–23)
CALCIUM SERPL-MCNC: 9.8 MG/DL — SIGNIFICANT CHANGE UP (ref 8.4–10.5)
CHLORIDE SERPL-SCNC: 100 MMOL/L — SIGNIFICANT CHANGE UP (ref 96–108)
CO2 SERPL-SCNC: 26 MMOL/L — SIGNIFICANT CHANGE UP (ref 22–31)
CREAT SERPL-MCNC: 0.82 MG/DL — SIGNIFICANT CHANGE UP (ref 0.5–1.3)
EGFR: 78 ML/MIN/1.73M2 — SIGNIFICANT CHANGE UP
GLUCOSE SERPL-MCNC: 125 MG/DL — HIGH (ref 70–99)
POTASSIUM SERPL-MCNC: 4.5 MMOL/L — SIGNIFICANT CHANGE UP (ref 3.5–5.3)
POTASSIUM SERPL-SCNC: 4.5 MMOL/L — SIGNIFICANT CHANGE UP (ref 3.5–5.3)
PROT SERPL-MCNC: 7.1 G/DL — SIGNIFICANT CHANGE UP (ref 6–8.3)
SODIUM SERPL-SCNC: 140 MMOL/L — SIGNIFICANT CHANGE UP (ref 135–145)

## 2024-07-16 ENCOUNTER — RESULT REVIEW (OUTPATIENT)
Age: 69
End: 2024-07-16

## 2024-07-16 ENCOUNTER — APPOINTMENT (OUTPATIENT)
Dept: HEMATOLOGY ONCOLOGY | Facility: CLINIC | Age: 69
End: 2024-07-16

## 2024-07-16 ENCOUNTER — APPOINTMENT (OUTPATIENT)
Dept: INFUSION THERAPY | Facility: HOSPITAL | Age: 69
End: 2024-07-16

## 2024-07-16 LAB
BASOPHILS # BLD AUTO: 0.06 K/UL — SIGNIFICANT CHANGE UP (ref 0–0.2)
BASOPHILS NFR BLD AUTO: 2.4 % — HIGH (ref 0–2)
EOSINOPHIL # BLD AUTO: 0.01 K/UL — SIGNIFICANT CHANGE UP (ref 0–0.5)
EOSINOPHIL NFR BLD AUTO: 0.4 % — SIGNIFICANT CHANGE UP (ref 0–6)
HCT VFR BLD CALC: 29 % — LOW (ref 34.5–45)
HGB BLD-MCNC: 9.4 G/DL — LOW (ref 11.5–15.5)
IMM GRANULOCYTES NFR BLD AUTO: 4 % — HIGH (ref 0–0.9)
LYMPHOCYTES # BLD AUTO: 0.91 K/UL — LOW (ref 1–3.3)
LYMPHOCYTES # BLD AUTO: 36.1 % — SIGNIFICANT CHANGE UP (ref 13–44)
MCHC RBC-ENTMCNC: 27.2 PG — SIGNIFICANT CHANGE UP (ref 27–34)
MCHC RBC-ENTMCNC: 32.4 G/DL — SIGNIFICANT CHANGE UP (ref 32–36)
MCV RBC AUTO: 84.1 FL — SIGNIFICANT CHANGE UP (ref 80–100)
MONOCYTES # BLD AUTO: 0.19 K/UL — SIGNIFICANT CHANGE UP (ref 0–0.9)
MONOCYTES NFR BLD AUTO: 7.5 % — SIGNIFICANT CHANGE UP (ref 2–14)
NEUTROPHILS # BLD AUTO: 1.25 K/UL — LOW (ref 1.8–7.4)
NEUTROPHILS NFR BLD AUTO: 49.6 % — SIGNIFICANT CHANGE UP (ref 43–77)
NRBC # BLD: 0 /100 WBCS — SIGNIFICANT CHANGE UP (ref 0–0)
PLATELET # BLD AUTO: 188 K/UL — SIGNIFICANT CHANGE UP (ref 150–400)
RBC # BLD: 3.45 M/UL — LOW (ref 3.8–5.2)
RBC # FLD: 20.3 % — HIGH (ref 10.3–14.5)
WBC # BLD: 2.52 K/UL — LOW (ref 3.8–10.5)
WBC # FLD AUTO: 2.52 K/UL — LOW (ref 3.8–10.5)

## 2024-07-23 ENCOUNTER — RESULT REVIEW (OUTPATIENT)
Age: 69
End: 2024-07-23

## 2024-07-23 ENCOUNTER — APPOINTMENT (OUTPATIENT)
Dept: HEMATOLOGY ONCOLOGY | Facility: CLINIC | Age: 69
End: 2024-07-23

## 2024-07-23 ENCOUNTER — APPOINTMENT (OUTPATIENT)
Dept: INFUSION THERAPY | Facility: HOSPITAL | Age: 69
End: 2024-07-23

## 2024-07-23 LAB
ALBUMIN SERPL ELPH-MCNC: 3.9 G/DL — SIGNIFICANT CHANGE UP (ref 3.3–5)
ALP SERPL-CCNC: 96 U/L — SIGNIFICANT CHANGE UP (ref 40–120)
ALT FLD-CCNC: 8 U/L — LOW (ref 10–45)
ANION GAP SERPL CALC-SCNC: 11 MMOL/L — SIGNIFICANT CHANGE UP (ref 5–17)
AST SERPL-CCNC: 14 U/L — SIGNIFICANT CHANGE UP (ref 10–40)
BASOPHILS # BLD AUTO: 0.04 K/UL — SIGNIFICANT CHANGE UP (ref 0–0.2)
BASOPHILS NFR BLD AUTO: 0.6 % — SIGNIFICANT CHANGE UP (ref 0–2)
BILIRUB SERPL-MCNC: 0.2 MG/DL — SIGNIFICANT CHANGE UP (ref 0.2–1.2)
BUN SERPL-MCNC: 18 MG/DL — SIGNIFICANT CHANGE UP (ref 7–23)
CALCIUM SERPL-MCNC: 9.4 MG/DL — SIGNIFICANT CHANGE UP (ref 8.4–10.5)
CHLORIDE SERPL-SCNC: 103 MMOL/L — SIGNIFICANT CHANGE UP (ref 96–108)
CO2 SERPL-SCNC: 27 MMOL/L — SIGNIFICANT CHANGE UP (ref 22–31)
CREAT SERPL-MCNC: 0.71 MG/DL — SIGNIFICANT CHANGE UP (ref 0.5–1.3)
EGFR: 93 ML/MIN/1.73M2 — SIGNIFICANT CHANGE UP
EOSINOPHIL # BLD AUTO: 0.28 K/UL — SIGNIFICANT CHANGE UP (ref 0–0.5)
EOSINOPHIL NFR BLD AUTO: 4.2 % — SIGNIFICANT CHANGE UP (ref 0–6)
GLUCOSE SERPL-MCNC: 185 MG/DL — HIGH (ref 70–99)
HCT VFR BLD CALC: 29.6 % — LOW (ref 34.5–45)
HGB BLD-MCNC: 9.6 G/DL — LOW (ref 11.5–15.5)
IMM GRANULOCYTES NFR BLD AUTO: 0.7 % — SIGNIFICANT CHANGE UP (ref 0–0.9)
LYMPHOCYTES # BLD AUTO: 0.89 K/UL — LOW (ref 1–3.3)
LYMPHOCYTES # BLD AUTO: 13.2 % — SIGNIFICANT CHANGE UP (ref 13–44)
MCHC RBC-ENTMCNC: 27.1 PG — SIGNIFICANT CHANGE UP (ref 27–34)
MCHC RBC-ENTMCNC: 32.4 G/DL — SIGNIFICANT CHANGE UP (ref 32–36)
MCV RBC AUTO: 83.6 FL — SIGNIFICANT CHANGE UP (ref 80–100)
MONOCYTES # BLD AUTO: 0.37 K/UL — SIGNIFICANT CHANGE UP (ref 0–0.9)
MONOCYTES NFR BLD AUTO: 5.5 % — SIGNIFICANT CHANGE UP (ref 2–14)
NEUTROPHILS # BLD AUTO: 5.11 K/UL — SIGNIFICANT CHANGE UP (ref 1.8–7.4)
NEUTROPHILS NFR BLD AUTO: 75.8 % — SIGNIFICANT CHANGE UP (ref 43–77)
NRBC # BLD: 0 /100 WBCS — SIGNIFICANT CHANGE UP (ref 0–0)
PLATELET # BLD AUTO: 166 K/UL — SIGNIFICANT CHANGE UP (ref 150–400)
POTASSIUM SERPL-MCNC: 4.6 MMOL/L — SIGNIFICANT CHANGE UP (ref 3.5–5.3)
POTASSIUM SERPL-SCNC: 4.6 MMOL/L — SIGNIFICANT CHANGE UP (ref 3.5–5.3)
PROT SERPL-MCNC: 6.6 G/DL — SIGNIFICANT CHANGE UP (ref 6–8.3)
RBC # BLD: 3.54 M/UL — LOW (ref 3.8–5.2)
RBC # FLD: 20.3 % — HIGH (ref 10.3–14.5)
SODIUM SERPL-SCNC: 141 MMOL/L — SIGNIFICANT CHANGE UP (ref 135–145)
WBC # BLD: 6.74 K/UL — SIGNIFICANT CHANGE UP (ref 3.8–10.5)
WBC # FLD AUTO: 6.74 K/UL — SIGNIFICANT CHANGE UP (ref 3.8–10.5)

## 2024-07-26 ENCOUNTER — RX RENEWAL (OUTPATIENT)
Age: 69
End: 2024-07-26

## 2024-08-05 ENCOUNTER — NON-APPOINTMENT (OUTPATIENT)
Age: 69
End: 2024-08-05

## 2024-08-06 ENCOUNTER — NON-APPOINTMENT (OUTPATIENT)
Age: 69
End: 2024-08-06

## 2024-08-06 ENCOUNTER — APPOINTMENT (OUTPATIENT)
Dept: HEMATOLOGY ONCOLOGY | Facility: CLINIC | Age: 69
End: 2024-08-06

## 2024-08-06 ENCOUNTER — RESULT REVIEW (OUTPATIENT)
Age: 69
End: 2024-08-06

## 2024-08-06 LAB
BASOPHILS # BLD AUTO: 0.03 K/UL — SIGNIFICANT CHANGE UP (ref 0–0.2)
BASOPHILS NFR BLD AUTO: 0.5 % — SIGNIFICANT CHANGE UP (ref 0–2)
EOSINOPHIL # BLD AUTO: 0.19 K/UL — SIGNIFICANT CHANGE UP (ref 0–0.5)
EOSINOPHIL NFR BLD AUTO: 2.9 % — SIGNIFICANT CHANGE UP (ref 0–6)
HCT VFR BLD CALC: 28.9 % — LOW (ref 34.5–45)
HGB BLD-MCNC: 9.4 G/DL — LOW (ref 11.5–15.5)
IMM GRANULOCYTES NFR BLD AUTO: 0.5 % — SIGNIFICANT CHANGE UP (ref 0–0.9)
LYMPHOCYTES # BLD AUTO: 0.67 K/UL — LOW (ref 1–3.3)
LYMPHOCYTES # BLD AUTO: 10.3 % — LOW (ref 13–44)
MCHC RBC-ENTMCNC: 26.8 PG — LOW (ref 27–34)
MCHC RBC-ENTMCNC: 32.5 G/DL — SIGNIFICANT CHANGE UP (ref 32–36)
MCV RBC AUTO: 82.3 FL — SIGNIFICANT CHANGE UP (ref 80–100)
MONOCYTES # BLD AUTO: 0.45 K/UL — SIGNIFICANT CHANGE UP (ref 0–0.9)
MONOCYTES NFR BLD AUTO: 6.9 % — SIGNIFICANT CHANGE UP (ref 2–14)
NEUTROPHILS # BLD AUTO: 5.11 K/UL — SIGNIFICANT CHANGE UP (ref 1.8–7.4)
NEUTROPHILS NFR BLD AUTO: 78.9 % — HIGH (ref 43–77)
NRBC # BLD: 0 /100 WBCS — SIGNIFICANT CHANGE UP (ref 0–0)
PLATELET # BLD AUTO: 185 K/UL — SIGNIFICANT CHANGE UP (ref 150–400)
RBC # BLD: 3.51 M/UL — LOW (ref 3.8–5.2)
RBC # FLD: 20.9 % — HIGH (ref 10.3–14.5)
WBC # BLD: 6.48 K/UL — SIGNIFICANT CHANGE UP (ref 3.8–10.5)
WBC # FLD AUTO: 6.48 K/UL — SIGNIFICANT CHANGE UP (ref 3.8–10.5)

## 2024-08-06 PROCEDURE — 99215 OFFICE O/P EST HI 40 MIN: CPT

## 2024-08-06 PROCEDURE — G2211 COMPLEX E/M VISIT ADD ON: CPT

## 2024-08-06 PROCEDURE — 99417 PROLNG OP E/M EACH 15 MIN: CPT

## 2024-08-06 NOTE — OB HISTORY
Baby brought to [facility] for blood culture and CBC. Blood culture drawn at 0010 and CBC at 0015.    [Currently In Menopause] : currently in menopause

## 2024-08-07 PROBLEM — F11.20 OPIOID DEPENDENCE, CONTINUOUS: Status: ACTIVE | Noted: 2024-08-07

## 2024-08-07 NOTE — ASSESSMENT
[Supportive] : Goals of care discussed with patient: Supportive [Palliative Care Plan] : not applicable at this time [FreeTextEntry1] : 06/11/2024 Dr Javed has decided that she is not a surgical candidate for resection of the pancreas mass due to encasement of blood vessels by tumor. She has no  jaundice and overall size of tumor is smaller. Left renal mass remains unchanged. Patient physical examination is stable. blood testing reviewed with her and chemotherapy to continue today. She is given contact numbers for DOUGLAS Kelly of  for consideration of partial nephrectomy. Last visit with  was in 11/2023. Patient will continue with gemcitabine docetaxel for June and July 2024; plan for chemotherapy break discussed RTC in 2 months.  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   Pt not a surgical candidate for resection of Pancreas Cancer due to encasement of blood vessels by tumor.  CT C/A/P April 21, 2024 - Interval decreased size of ill-defined pancreatic body mass, measuring up to 3.2 cm, previously 3.8 cm No significant change in left renal 4.7 cm mixed cystic-solid mass, suspicious for renal cell carcinoma. No evidence of thoracic metastatic disease.  1) Pancreas Cancer s/p SBRT (completed March 20, 2024) - scheduled for Cycle # 15 Day # 15 Gemcitabine & Abraxane today, last treatment was on 06/18/204 -  Today's CBC - WBC = 1.38, Hgb = 9.5, PLT = 75 000, ANC = 0.66 Pancytopenia in setting of Chemotherapy & s/p RT - Will hold today's treatment and treat with Growth factor  Pegfilgrastrim (Udenyca) x 1 dose today.   2) Left Renal Mass - Urology f/u w Dr. Evaristo Kelly is scheduled  3) Smoking Cessation was strongly advised - will request social work f/u for resources - Nicotine gum sent to local pharmacy  4) f/u w PCP, Dr López & other providers at their discretion.  All questions were addressed during this visit to her satisfaction, verbalized understanding. RTO in 3 weeks or sooner prn Case management, care plan d/w Dr. 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   Pt not a surgical candidate for resection of Pancreas Cancer due to encasement of blood vessels by tumor. Chemotherapy currently on hold - pending Repeat CT Scans - C/A/P ordered   2) Left Renal Mass - f/u apptn w Urology Dr. Pilo Kelly pending on 9/17/24  3) MIPS Measures Questionnaire Completed - Pt reports stopped Smoking Day 17 - uses Nicotine gum -  Nicotine patch sent to local pharmacy 4) Heroin / Opioid Addiction - Patient endorsed to using Oral Prescription Narcotics transitioned to Heroin inhalation uses frequently multiple times of the day since past several years, no h/o Overdose - Family / friends unaware  Attends AA & NA & other support groups - was requesting assistance / help - Social Work intervention was made - provided pt with information & resources to seek help. Narcan prescription sent to local pharmacy - pt advised to use if needed Encouraged to seek counseling and include family / friends intervention to assist with help while attempting Recovery  5) f/u w PCP, Dr López & other providers at their discretion.  All questions were addressed during this visit to her satisfaction, verbalized understanding. RTO in 4 weeks after CT scan results to discuss w Dr. Mahoney treatment plan / surveillance  Case management, care plan d/w Dr. Tommy Mahoney

## 2024-08-07 NOTE — REVIEW OF SYSTEMS
[Joint Pain] : joint pain [Muscle Pain] : muscle pain [Negative] : Allergic/Immunologic [FreeTextEntry9] : Right shoulder discomfort with intermittent pain attributes to OA  [Fatigue] : no fatigue [Recent Change In Weight] : ~T no recent weight change [Abdominal Pain] : no abdominal pain [Vomiting] : no vomiting [Constipation] : no constipation [FreeTextEntry6] : current smoker [de-identified] : scalp alopecia, wears a wig  [de-identified] : Today 8/6/24 reported Heroin Addiction since past several years (sniffs)

## 2024-08-07 NOTE — RESULTS/DATA
[FreeTextEntry1] : review of information in the E M H R rate 92 normal axis NSR ; possilbe anterior infarction  GI Ca 19-9 -> 2428 > 2328 > 1704 >  1526 (trending down)   May 30, 2023 - CBC - WBC = 7.3, Hgb = 11.4, PLT = 203, MCV = 81.4 CMP - Blood glucose = 124 (non fasting) GI Ca 19-9 - 1526 (trending down)   July 3, 2023 - CBC - WBC = 3.9, Hgb = 8.8, PLT = 255  August 1, 2023 - CBC - WBC = 2.2, Hgb = 9.4, PLT = 250 000, MCV = 86.5 Neutrophil % = 26%, ANC = 0.57   September 5, 2023 - CT C/A/P - Results d/w with patient in the office and son Jaiden over the phone - copy of report provided  Compared to 4/8/2023 interval decrease in overall size of the pancreatic body mass, however increased soft tissue encasement of the celiac artery and SMA.  Mild increase in size of enhancing solid and cystic left renal mass suspicious for renal cell carcinoma.  No evidence of metastatic disease  _________ April 21, 2024 - CT C/A/P  Interval decreased size of ill-defined pancreatic body mass, measuring up to 3.2 cm, previously 3.8 cm No significant change in left renal 4.7 cm mixed cystic-solid mass, suspicious for renal cell carcinoma. No evidence of thoracic metastatic disease. ____________ May 08, 2024 - WBC = 25.55, Hgb = 8,8, PLT = 99 000, MCV = 84.3  _____________ 06/11/2024 CBC WBC 8.7 HGB 10.7  000

## 2024-08-07 NOTE — PHYSICAL EXAM
[Ambulatory and capable of all self care but unable to carry out any work activities] : Status 2- Ambulatory and capable of all self care but unable to carry out any work activities. Up and about more than 50% of waking hours [Normal] : affect appropriate [de-identified] : Right shoulder no pain on palpation, no edema, erythema noted was able to perform ROM  [de-identified] : wore a wig, ambulated with a cance  [de-identified] : wears glasses  [de-identified] : Right Anterior chest wall Mediport site appears stable, no bleeding, edema or erythema / discharge noted  [de-identified] : scalp alopecia

## 2024-08-07 NOTE — REVIEW OF SYSTEMS
[Joint Pain] : joint pain [Muscle Pain] : muscle pain [Negative] : Allergic/Immunologic [FreeTextEntry9] : Right shoulder discomfort with intermittent pain attributes to OA  [Fatigue] : no fatigue [Recent Change In Weight] : ~T no recent weight change [Abdominal Pain] : no abdominal pain [Vomiting] : no vomiting [Constipation] : no constipation [FreeTextEntry6] : current smoker [de-identified] : scalp alopecia, wears a wig  [de-identified] : Today 8/6/24 reported Heroin Addiction since past several years (sniffs)

## 2024-08-07 NOTE — BEGINNING OF VISIT
[0] : 2) Feeling down, depressed, or hopeless: Not at all (0) [Pain Scale: ___] : On a scale of 1-10, today the patient's pain is a(n) [unfilled]. [Future Reassessment of Pain Scale] : Future reassessment of pain scale [Medication(s)] : Medication(s) [Former] : Former [< 15 Years] : < 15 Years [Patient advised of risk of tobacco use and smoking cessation discussed.] : Patient advised of risk of tobacco use and smoking cessation discussed. [Date Discussed (MM/DD/YY): ___] : Discussed: [unfilled] [With Patient/Caregiver] : with Patient/Caregiver [Abdominal Pain] : no abdominal pain [Vomiting] : no vomiting [Constipation] : no constipation [de-identified] : smoked for > 30 years, quit 17 days ago

## 2024-08-07 NOTE — REVIEW OF SYSTEMS
[Joint Pain] : joint pain [Muscle Pain] : muscle pain [Negative] : Allergic/Immunologic [FreeTextEntry9] : Right shoulder discomfort with intermittent pain attributes to OA  [Fatigue] : no fatigue [Recent Change In Weight] : ~T no recent weight change [Abdominal Pain] : no abdominal pain [Vomiting] : no vomiting [Constipation] : no constipation [FreeTextEntry6] : current smoker [de-identified] : scalp alopecia, wears a wig  [de-identified] : Today 8/6/24 reported Heroin Addiction since past several years (sniffs)

## 2024-08-07 NOTE — BEGINNING OF VISIT
[0] : 2) Feeling down, depressed, or hopeless: Not at all (0) [Pain Scale: ___] : On a scale of 1-10, today the patient's pain is a(n) [unfilled]. [Future Reassessment of Pain Scale] : Future reassessment of pain scale [Medication(s)] : Medication(s) [Former] : Former [< 15 Years] : < 15 Years [Patient advised of risk of tobacco use and smoking cessation discussed.] : Patient advised of risk of tobacco use and smoking cessation discussed. [Date Discussed (MM/DD/YY): ___] : Discussed: [unfilled] [With Patient/Caregiver] : with Patient/Caregiver [Abdominal Pain] : no abdominal pain [Vomiting] : no vomiting [Constipation] : no constipation [de-identified] : smoked for > 30 years, quit 17 days ago

## 2024-08-07 NOTE — BEGINNING OF VISIT
[0] : 2) Feeling down, depressed, or hopeless: Not at all (0) [Pain Scale: ___] : On a scale of 1-10, today the patient's pain is a(n) [unfilled]. [Future Reassessment of Pain Scale] : Future reassessment of pain scale [Medication(s)] : Medication(s) [Former] : Former [< 15 Years] : < 15 Years [Patient advised of risk of tobacco use and smoking cessation discussed.] : Patient advised of risk of tobacco use and smoking cessation discussed. [Date Discussed (MM/DD/YY): ___] : Discussed: [unfilled] [With Patient/Caregiver] : with Patient/Caregiver [Abdominal Pain] : no abdominal pain [Vomiting] : no vomiting [Constipation] : no constipation [de-identified] : smoked for > 30 years, quit 17 days ago

## 2024-08-07 NOTE — BEGINNING OF VISIT
[0] : 2) Feeling down, depressed, or hopeless: Not at all (0) [Pain Scale: ___] : On a scale of 1-10, today the patient's pain is a(n) [unfilled]. [Future Reassessment of Pain Scale] : Future reassessment of pain scale [Medication(s)] : Medication(s) [Former] : Former [< 15 Years] : < 15 Years [Patient advised of risk of tobacco use and smoking cessation discussed.] : Patient advised of risk of tobacco use and smoking cessation discussed. [Date Discussed (MM/DD/YY): ___] : Discussed: [unfilled] [With Patient/Caregiver] : with Patient/Caregiver [Abdominal Pain] : no abdominal pain [Vomiting] : no vomiting [Constipation] : no constipation [de-identified] : smoked for > 30 years, quit 17 days ago

## 2024-08-07 NOTE — PHYSICAL EXAM
[Ambulatory and capable of all self care but unable to carry out any work activities] : Status 2- Ambulatory and capable of all self care but unable to carry out any work activities. Up and about more than 50% of waking hours [Normal] : affect appropriate [de-identified] : Right shoulder no pain on palpation, no edema, erythema noted was able to perform ROM  [de-identified] : wore a wig, ambulated with a cance  [de-identified] : wears glasses  [de-identified] : Right Anterior chest wall Mediport site appears stable, no bleeding, edema or erythema / discharge noted  [de-identified] : scalp alopecia

## 2024-08-07 NOTE — ASSESSMENT
[Supportive] : Goals of care discussed with patient: Supportive [Palliative Care Plan] : not applicable at this time [FreeTextEntry1] : 06/11/2024 Dr Javed has decided that she is not a surgical candidate for resection of the pancreas mass due to encasement of blood vessels by tumor. She has no  jaundice and overall size of tumor is smaller. Left renal mass remains unchanged. Patient physical examination is stable. blood testing reviewed with her and chemotherapy to continue today. She is given contact numbers for DOUGLAS Kelly of  for consideration of partial nephrectomy. Last visit with  was in 11/2023. Patient will continue with gemcitabine docetaxel for June and July 2024; plan for chemotherapy break discussed RTC in 2 months.  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   Pt not a surgical candidate for resection of Pancreas Cancer due to encasement of blood vessels by tumor.  CT C/A/P April 21, 2024 - Interval decreased size of ill-defined pancreatic body mass, measuring up to 3.2 cm, previously 3.8 cm No significant change in left renal 4.7 cm mixed cystic-solid mass, suspicious for renal cell carcinoma. No evidence of thoracic metastatic disease.  1) Pancreas Cancer s/p SBRT (completed March 20, 2024) - scheduled for Cycle # 15 Day # 15 Gemcitabine & Abraxane today, last treatment was on 06/18/204 -  Today's CBC - WBC = 1.38, Hgb = 9.5, PLT = 75 000, ANC = 0.66 Pancytopenia in setting of Chemotherapy & s/p RT - Will hold today's treatment and treat with Growth factor  Pegfilgrastrim (Udenyca) x 1 dose today.   2) Left Renal Mass - Urology f/u w Dr. Evaristo Kelly is scheduled  3) Smoking Cessation was strongly advised - will request social work f/u for resources - Nicotine gum sent to local pharmacy  4) f/u w PCP, Dr López & other providers at their discretion.  All questions were addressed during this visit to her satisfaction, verbalized understanding. RTO in 3 weeks or sooner prn Case management, care plan d/w Dr. 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   Pt not a surgical candidate for resection of Pancreas Cancer due to encasement of blood vessels by tumor. Chemotherapy currently on hold - pending Repeat CT Scans - C/A/P ordered   2) Left Renal Mass - f/u apptn w Urology Dr. Pilo Kelly pending on 9/17/24  3) MIPS Measures Questionnaire Completed - Pt reports stopped Smoking Day 17 - uses Nicotine gum -  Nicotine patch sent to local pharmacy 4) Heroin / Opioid Addiction - Patient endorsed to using Oral Prescription Narcotics transitioned to Heroin inhalation uses frequently multiple times of the day since past several years, no h/o Overdose - Family / friends unaware  Attends AA & NA & other support groups - was requesting assistance / help - Social Work intervention was made - provided pt with information & resources to seek help. Narcan prescription sent to local pharmacy - pt advised to use if needed Encouraged to seek counseling and include family / friends intervention to assist with help while attempting Recovery  5) f/u w PCP, Dr López & other providers at their discretion.  All questions were addressed during this visit to her satisfaction, verbalized understanding. RTO in 4 weeks after CT scan results to discuss w Dr. Mahoney treatment plan / surveillance  Case management, care plan d/w Dr. Tommy Mahoney

## 2024-08-07 NOTE — BEGINNING OF VISIT
[0] : 2) Feeling down, depressed, or hopeless: Not at all (0) [Pain Scale: ___] : On a scale of 1-10, today the patient's pain is a(n) [unfilled]. [Future Reassessment of Pain Scale] : Future reassessment of pain scale [Medication(s)] : Medication(s) [Former] : Former [< 15 Years] : < 15 Years [Patient advised of risk of tobacco use and smoking cessation discussed.] : Patient advised of risk of tobacco use and smoking cessation discussed. [Date Discussed (MM/DD/YY): ___] : Discussed: [unfilled] [With Patient/Caregiver] : with Patient/Caregiver [Abdominal Pain] : no abdominal pain [Vomiting] : no vomiting [Constipation] : no constipation [de-identified] : smoked for > 30 years, quit 17 days ago

## 2024-08-07 NOTE — ASSESSMENT
[Supportive] : Goals of care discussed with patient: Supportive [Palliative Care Plan] : not applicable at this time [FreeTextEntry1] : 06/11/2024 Dr Javed has decided that she is not a surgical candidate for resection of the pancreas mass due to encasement of blood vessels by tumor. She has no  jaundice and overall size of tumor is smaller. Left renal mass remains unchanged. Patient physical examination is stable. blood testing reviewed with her and chemotherapy to continue today. She is given contact numbers for DOUGLAS Kelyl of  for consideration of partial nephrectomy. Last visit with  was in 11/2023. Patient will continue with gemcitabine docetaxel for June and July 2024; plan for chemotherapy break discussed RTC in 2 months.  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   Pt not a surgical candidate for resection of Pancreas Cancer due to encasement of blood vessels by tumor.  CT C/A/P April 21, 2024 - Interval decreased size of ill-defined pancreatic body mass, measuring up to 3.2 cm, previously 3.8 cm No significant change in left renal 4.7 cm mixed cystic-solid mass, suspicious for renal cell carcinoma. No evidence of thoracic metastatic disease.  1) Pancreas Cancer s/p SBRT (completed March 20, 2024) - scheduled for Cycle # 15 Day # 15 Gemcitabine & Abraxane today, last treatment was on 06/18/204 -  Today's CBC - WBC = 1.38, Hgb = 9.5, PLT = 75 000, ANC = 0.66 Pancytopenia in setting of Chemotherapy & s/p RT - Will hold today's treatment and treat with Growth factor  Pegfilgrastrim (Udenyca) x 1 dose today.   2) Left Renal Mass - Urology f/u w Dr. Evaristo Kelly is scheduled  3) Smoking Cessation was strongly advised - will request social work f/u for resources - Nicotine gum sent to local pharmacy  4) f/u w PCP, Dr López & other providers at their discretion.  All questions were addressed during this visit to her satisfaction, verbalized understanding. RTO in 3 weeks or sooner prn Case management, care plan d/w Dr. 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   Pt not a surgical candidate for resection of Pancreas Cancer due to encasement of blood vessels by tumor. Chemotherapy currently on hold - pending Repeat CT Scans - C/A/P ordered   2) Left Renal Mass - f/u apptn w Urology Dr. Pilo Kelly pending on 9/17/24  3) MIPS Measures Questionnaire Completed - Pt reports stopped Smoking Day 17 - uses Nicotine gum -  Nicotine patch sent to local pharmacy 4) Heroin / Opioid Addiction - Patient endorsed to using Oral Prescription Narcotics transitioned to Heroin inhalation uses frequently multiple times of the day since past several years, no h/o Overdose - Family / friends unaware  Attends AA & NA & other support groups - was requesting assistance / help - Social Work intervention was made - provided pt with information & resources to seek help. Narcan prescription sent to local pharmacy - pt advised to use if needed Encouraged to seek counseling and include family / friends intervention to assist with help while attempting Recovery  5) f/u w PCP, Dr López & other providers at their discretion.  All questions were addressed during this visit to her satisfaction, verbalized understanding. RTO in 4 weeks after CT scan results to discuss w Dr. Mahoney treatment plan / surveillance  Case management, care plan d/w Dr. Tommy Mahoney

## 2024-08-07 NOTE — HISTORY OF PRESENT ILLNESS
[Disease: _____________________] : Disease: [unfilled] [T: ___] : T[unfilled] [N: ___] : N[unfilled] [M: ___] : M[unfilled] [AJCC Stage: ____] : AJCC Stage: [unfilled] [Date: ____________] : Patient's last distress assessment performed on [unfilled]. [1 - Distress Level] : Distress Level: 1 [90: Able to carry normal activity; minor signs or symptoms of disease.] : 90: Able to carry normal activity; minor signs or symptoms of disease.  [ECOG Performance Status: 1 - Restricted in physically strenuous activity but ambulatory and able to carry out work of a light or sedentary nature] : Performance Status: 1 - Restricted in physically strenuous activity but ambulatory and able to carry out work of a light or sedentary nature, e.g., light house work, office work [Abdominal Pain] : abdominal pain [EUS] : EUS [Biopsy] : biopsy performed [Before Surgery] : before surgery [Pancreatic ductal adenocarcinoma] : Pancreatic ductal adenocarcinoma [Not staged outside] : not staged outside [Nutrition] : Nutrition [Social Work] : Social Work [Other: ____] : [unfilled] [Restricted in physically strenuous activity but ambulatory and able to carry out work of a light or sedentary nature] : Status 1 - Restricted in physically strenuous activity but ambulatory and able to carry out work of a light or sedentary nature, e.g., light house work, office work [5 - Distress Level] : Distress Level: 5 [de-identified] : adenocarcinoma [FreeTextEntry1] : On Gemcitabine and Abraxane Last treatment on July 23, 2024  [de-identified] : see hpi; 2023.She has been taking a combination medication of glipizide and metformin with better blood sugar control. Now on acetaminophen 300 codeine 30 mg PO TID PRN after meeting with pain management and her primary care MD. Pain has been midline and more noted in day than in evening. No jaundice May 30, 2023 - Seen in a f/u visit today scheduled for Gemcitabine & Abraxane for Pancreas cancer. Patient had called our office last week for c/o nausea, abd pain, poor appetite. She was prescribed Omeprazole by GI for Gastritis she had not taken. We advised her to take Metoclopramide for nausea, poor appetite and Omeprazole for Gastritis.  Since taking the medications for past few days she has been feeling much better. She remains on Levaquin for Neutropenia prophylaxis.  She denies bleeding, constipation, diarrhea, fever, chills, falls.  __________ 2023 - Patient seen today accompanied with friend Ms. Jessica Gallokins.  Patient feels well denies interval change in medical status, No nausea, reports improved appetite, no GI complaints, no pain. Seen / evaluated by our Nutritionist team reports helpful tips and compliance with recommendations.  Today she was seen by Palliative / Pain management Dr. Malik no pain meds started.  She reports she was seen by her PCP last week, she ran out of her BP medications awaiting renewal by PCP.  _____________ 2023 -  Patient seen today in a follow up visit accompanied with friend Ms. Jessica Gallokins.  She reports episodes of non bloody diarrhea / loose stools post treatment lasts for day and a half does not take any anti diarrhea medications, follows dietary restriction with resolution of symptoms.  Denies N, V, fever, anorexia, weight loss, febrile illness, swollen glands, change in medical condition since last visit.  ____________ 2023 - Received a call from Keli JIMÉNEZ from treatment room patient scheduled for Gemcitabine & Abraxane and CBC shows Neutropenia Patient seen and evaluated, she feels well, asymptomatic, no cough, fever, chills, fatigue, no N, V, poor appetite, swollen glands, night sweats, bleeding, bruising. Denies any recent infection, hospitalization. Reports good appetite. No falls, presented for treatment today by herself, son drove her here.  __________ August 15, 2023 - Received a call from Huang JIMÉNEZ from treatment room to evaluate patient with c/o Dysuria on 23 lasted 1 day. Patient seen and evaluated in the treatment room.  She reports on Friday she experienced dysuria, frequency of urination, no hematuria, no urgency, fever, chills, pelvic pain, abdominal pain / discomfort. Symptoms of dysuria and frequency of urination has resolved but has foul smelling urine, no urine discoloration noted.  __________ 2023 - Patient seen in a f/u visit today presented by herself to this appointment, Pt's son Jaiden was on the phone call during the visit.  She is s/p Cycle # 5 Day 1 Gemcitabine & Abraxane treatment. She feels well overall tolerating treatment. No residual UTI symptoms no malodorous urine completed course of antibiotics as prescribed.  ________________ 2023 - Seen in a f/u visit today, presented by herself to this appointment. Feels well reports good appetite, no nausea, vomiting, diarrhea, abd / back pain.  She feels intermittent tingling sensation in her toes describes as mild. She has a f/u visit with Podiatrist (sees every 6 months).  She has appointment with Dr. Pilo Kelly Urology tomorrow to discuss plan for possible surgery.  11/15/2023 seen at one month interval. No weight change: no pain noted. no diarrhea _________ 2024 She has completed chemotherapy treatment in late 2024; now approximately 10 months of treatment and she has missed several physician appointments scheduled for the time period January through 2024. Seen today over 40 minutes late for scheduled appointment. She has been seen by radiation Medicine and I have also discussed follow up with surgical oncology and the urological surgery. ________________ 2024 - Notified by Maura JIMÉNEZ from treatment room pt c/o Nausea, episode of vomiting.  Patient seen and evaluated - pt c/o nausea since yesterday afternoon did not take antiemetics to martina symptoms. She ate egg sandwich this am. Pt had an episode of vomiting non bloody non bilious vomiting after arriving to our center today.   Denies abd pain, diarrhea, constipation, fever, chills, cough, dysuria, urinary symptoms, no bleeding. Denies eating outside / restaurant food or being around sick contacts.  ____________ 2024 - Notified by RN in treatment room patient's ANC was low, scheduled for treatment today w Gemcitabine & Abraxane.  Patient seen and evaluated reports no symptoms no febrile illness, cough, abd pain / discomfort, diarrhea, n, v, constipation, no bleeding.  Nausea, vomiting abated with Compazine prn. Appetite remains good.  ________________ May 08, 2024 - Seen in a f/u visit today.  She is s/p Injury to her Right foot & Left shoulder as she was getting off the Exercise bike at her home yesterday.  She reports her Right foot got stuck as she was attempting to get off the exercise bike and sustained injury to her right foot and left shoulder. She was able to get up post fall and walk. She also was able to use the left arm change her clothes without significant difficulty.   No head trauma, or injury, denies dizziness, nausea, pre syncope, syncope reported.  She was scheduled for Cycle 12 Day # 15 of Gemcitabine & Abraxane treatment on 24 pt called and cancelled chemotherapy due to pain / injury to foot and shoulder.  ___________ 2024 no further falls; uses a cane now . no brusing and no pain in shoulder. no hospitalzation. she has seen Dr Pretty of surgery. pancreas resection is not scheduled. ________ 2024 - Received a call from Sari JIMÉNEZ treatment room to evaluate pt for Leukopenia & Neutropenia. Pt seen and evaluated.  Pt scheduled for Cycle # 15 Day 15 of chemotherapy Gemcitabine & Abraxane treatment today. Feels well no complaints, no fevers, chills, nausea, vomiting, bleeding. appetite remains good.   Patient is a Current Cigarette Smoker states she is trying to quit.  _______________ 2024 - Seen in a f/u visit today. Jose had a mechanical fall last week at home tripped on box no LOC or head trauma fell on her buttocks did not seek any medical attention, able to get up herself, did not take any medications, c/o mild discomfort when changing position 0-4 /10 not persistent, has improved, in the past few days, able to carry out ADLs came by herself to today's visit.  MIPS Measures completed. She reports stopped Smoking since past 17 days using Nicotine gum.  *** At the end of her visit - pt reported she has been using Heroin sniffing few times a week 2-3 x a day, since past several years. She reports started with taking her now  's Narcotic pain medication (spouse  in 2018) and then progressed to getting Heroin from a woman she knows. She has not disclosed this information to her best Friend Jessica (Psychotherapist) & her children (live in the same 2 family house with her). Jose has seeked help to stop the addiction but has failed - attends AA & NA, attended Addiction Rehab facilities with relapse, has tried Methadone in the past did not like it but Suboxone treatment was preferred.  Family / friends are unaware of her relapse. Social Work intervention was requested.  Stated to me that she felt that I would be able to help her get the support and she is willing to stop her addiction.  [FreeTextEntry7] :  At the end of her visit - pt reported she has been using Heroin sniffing few times a week 2-3 x a day, since past several years. She reports started with taking her now  's Narcotic pain medication (spouse  in 2018) and then progressed to getting Heroin from a woman she knows. She has not disclosed this information to her best Friend Jessica (Psychotherapist) & her children (live in the same 2 family house with her). Reports has seeked help to stop the addiction but has failed - attends AA & NA, attended Addiction Rehab facilities with relapse, has tried Methadone in the past did not like it but Suboxone treatment was preferred.  Family / friends are unaware of her relapse. Social Work intervention was requested.  Stated to me that she felt that I would be able to help her get the support and she is willing to stop her addiction.  [de-identified] : Ms. ENRIKE MORLEY is a 67 year old female who presents for evaluation in our [facility]. Her PMH includes DM2 (dx ), HTN, left knee replacement. She presented with abdominal pain radiating to her LUQ x 3 weeks. MRI abdomen  noted a 3 cm mass in the body of the pancreas suspicious for primary pancreatic malignancy and also Bosniak type IV cyst upper pole left kidney suspicious for primary renal neoplasm. She was admitted to [facility] on 23 and CT A/P w/ IC showing ill-defined pancreatic body mass consistent with pancreatic neoplasm; and complex enhancing left renal mass concerning for renal cell carcinoma till proven otherwise.\par  She underwent EUS on 23 with noted findings of one 3 cm pancreatic body mass, s/p FNA.  Pathology positive for adenocarcinoma. \par  \par  She reports decrease appetite and early satiety.  Abdominal pain is intermittent and severe when it comes.  She was taking Tylenol#3 after hospital d/c but currently not taking anymore. +Weight loss of 10 pounds since March. \par  \par  Colonoscopy from 7 years ago normal. \par  Smoke/Etoh: 20 pack years. Last smoke 2023. No ETOH use. \par  \par  ECO      Independent, can walk upstairs. Lives with her sons.  (ECOG 1 due to pain). \par  Family Ca hx: None \par  \par  Labs: \par  23    AST: 9   ALT: 18  ALP:  103  Tbili: 0.3 \par  23   Ca 19-9: 2428    CEA: 15.0    AST:  11    ALT: 12    ALP: 96   Tbili:  0.4     A1c: 10% \par   [TextBox_4] : 4/4/23 [TextBox_39] : [id number] [TextBox_41] : adenocarcinoma [TextBox_43] : 4/14/23 [] : This is not a second opinion [TextBox_5] : 5/2/23 [TextBox_40] : 4/8/23

## 2024-08-07 NOTE — REVIEW OF SYSTEMS
[Joint Pain] : joint pain [Muscle Pain] : muscle pain [Negative] : Allergic/Immunologic [FreeTextEntry9] : Right shoulder discomfort with intermittent pain attributes to OA  [Fatigue] : no fatigue [Recent Change In Weight] : ~T no recent weight change [Abdominal Pain] : no abdominal pain [Vomiting] : no vomiting [Constipation] : no constipation [FreeTextEntry6] : current smoker [de-identified] : scalp alopecia, wears a wig  [de-identified] : Today 8/6/24 reported Heroin Addiction since past several years (sniffs)

## 2024-08-07 NOTE — HISTORY OF PRESENT ILLNESS
[Disease: _____________________] : Disease: [unfilled] [T: ___] : T[unfilled] [N: ___] : N[unfilled] [M: ___] : M[unfilled] [AJCC Stage: ____] : AJCC Stage: [unfilled] [Date: ____________] : Patient's last distress assessment performed on [unfilled]. [1 - Distress Level] : Distress Level: 1 [90: Able to carry normal activity; minor signs or symptoms of disease.] : 90: Able to carry normal activity; minor signs or symptoms of disease.  [ECOG Performance Status: 1 - Restricted in physically strenuous activity but ambulatory and able to carry out work of a light or sedentary nature] : Performance Status: 1 - Restricted in physically strenuous activity but ambulatory and able to carry out work of a light or sedentary nature, e.g., light house work, office work [Abdominal Pain] : abdominal pain [EUS] : EUS [Biopsy] : biopsy performed [Before Surgery] : before surgery [Pancreatic ductal adenocarcinoma] : Pancreatic ductal adenocarcinoma [Not staged outside] : not staged outside [Nutrition] : Nutrition [Social Work] : Social Work [Other: ____] : [unfilled] [Restricted in physically strenuous activity but ambulatory and able to carry out work of a light or sedentary nature] : Status 1 - Restricted in physically strenuous activity but ambulatory and able to carry out work of a light or sedentary nature, e.g., light house work, office work [5 - Distress Level] : Distress Level: 5 [de-identified] : adenocarcinoma [FreeTextEntry1] : On Gemcitabine and Abraxane Last treatment on July 23, 2024  [de-identified] : see hpi; 2023.She has been taking a combination medication of glipizide and metformin with better blood sugar control. Now on acetaminophen 300 codeine 30 mg PO TID PRN after meeting with pain management and her primary care MD. Pain has been midline and more noted in day than in evening. No jaundice May 30, 2023 - Seen in a f/u visit today scheduled for Gemcitabine & Abraxane for Pancreas cancer. Patient had called our office last week for c/o nausea, abd pain, poor appetite. She was prescribed Omeprazole by GI for Gastritis she had not taken. We advised her to take Metoclopramide for nausea, poor appetite and Omeprazole for Gastritis.  Since taking the medications for past few days she has been feeling much better. She remains on Levaquin for Neutropenia prophylaxis.  She denies bleeding, constipation, diarrhea, fever, chills, falls.  __________ 2023 - Patient seen today accompanied with friend Ms. Jessica Gallokins.  Patient feels well denies interval change in medical status, No nausea, reports improved appetite, no GI complaints, no pain. Seen / evaluated by our Nutritionist team reports helpful tips and compliance with recommendations.  Today she was seen by Palliative / Pain management Dr. Malik no pain meds started.  She reports she was seen by her PCP last week, she ran out of her BP medications awaiting renewal by PCP.  _____________ 2023 -  Patient seen today in a follow up visit accompanied with friend Ms. Jessica Gallokins.  She reports episodes of non bloody diarrhea / loose stools post treatment lasts for day and a half does not take any anti diarrhea medications, follows dietary restriction with resolution of symptoms.  Denies N, V, fever, anorexia, weight loss, febrile illness, swollen glands, change in medical condition since last visit.  ____________ 2023 - Received a call from Keli JIMÉNEZ from treatment room patient scheduled for Gemcitabine & Abraxane and CBC shows Neutropenia Patient seen and evaluated, she feels well, asymptomatic, no cough, fever, chills, fatigue, no N, V, poor appetite, swollen glands, night sweats, bleeding, bruising. Denies any recent infection, hospitalization. Reports good appetite. No falls, presented for treatment today by herself, son drove her here.  __________ August 15, 2023 - Received a call from Huang JIMÉNEZ from treatment room to evaluate patient with c/o Dysuria on 23 lasted 1 day. Patient seen and evaluated in the treatment room.  She reports on Friday she experienced dysuria, frequency of urination, no hematuria, no urgency, fever, chills, pelvic pain, abdominal pain / discomfort. Symptoms of dysuria and frequency of urination has resolved but has foul smelling urine, no urine discoloration noted.  __________ 2023 - Patient seen in a f/u visit today presented by herself to this appointment, Pt's son Jaiden was on the phone call during the visit.  She is s/p Cycle # 5 Day 1 Gemcitabine & Abraxane treatment. She feels well overall tolerating treatment. No residual UTI symptoms no malodorous urine completed course of antibiotics as prescribed.  ________________ 2023 - Seen in a f/u visit today, presented by herself to this appointment. Feels well reports good appetite, no nausea, vomiting, diarrhea, abd / back pain.  She feels intermittent tingling sensation in her toes describes as mild. She has a f/u visit with Podiatrist (sees every 6 months).  She has appointment with Dr. Pil[facility] tomorrow to discuss plan for possible surgery.  11/15/2023 seen at one month interval. No weight change: no pain noted. no diarrhea _________ 2024 She has completed chemotherapy treatment in late 2024; now approximately 10 months of treatment and she has missed several physician appointments scheduled for the time period January through 2024. Seen today over 40 minutes late for scheduled appointment. She has been seen by radiation Medicine and I have also discussed follow up with surgical oncology and the urological surgery. ________________ 2024 - Notified by Maura JIMÉNEZ from treatment room pt c/o Nausea, episode of vomiting.  Patient seen and evaluated - pt c/o nausea since yesterday afternoon did not take antiemetics to martina symptoms. She ate egg sandwich this am. Pt had an episode of vomiting non bloody non bilious vomiting after arriving to our center today.   Denies abd pain, diarrhea, constipation, fever, chills, cough, dysuria, urinary symptoms, no bleeding. Denies eating outside / restaurant food or being around sick contacts.  ____________ 2024 - Notified by RN in treatment room patient's ANC was low, scheduled for treatment today w Gemcitabine & Abraxane.  Patient seen and evaluated reports no symptoms no febrile illness, cough, abd pain / discomfort, diarrhea, n, v, constipation, no bleeding.  Nausea, vomiting abated with Compazine prn. Appetite remains good.  ________________ May 08, 2024 - Seen in a f/u visit today.  She is s/p Injury to her Right foot & Left shoulder as she was getting off the Exercise bike at her home yesterday.  She reports her Right foot got stuck as she was attempting to get off the exercise bike and sustained injury to her right foot and left shoulder. She was able to get up post fall and walk. She also was able to use the left arm change her clothes without significant difficulty.   No head trauma, or injury, denies dizziness, nausea, pre syncope, syncope reported.  She was scheduled for Cycle 12 Day # 15 of Gemcitabine & Abraxane treatment on 24 pt called and cancelled chemotherapy due to pain / injury to foot and shoulder.  ___________ 2024 no further falls; uses a cane now . no brusing and no pain in shoulder. no hospitalzation. she has seen Dr Pretty of surgery. pancreas resection is not scheduled. ________ 2024 - Received a call from Sari JIMÉNEZ treatment room to evaluate pt for Leukopenia & Neutropenia. Pt seen and evaluated.  Pt scheduled for Cycle # 15 Day 15 of chemotherapy Gemcitabine & Abraxane treatment today. Feels well no complaints, no fevers, chills, nausea, vomiting, bleeding. appetite remains good.   Patient is a Current Cigarette Smoker states she is trying to quit.  _______________ 2024 - Seen in a f/u visit today. Jose had a mechanical fall last week at home tripped on box no LOC or head trauma fell on her buttocks did not seek any medical attention, able to get up herself, did not take any medications, c/o mild discomfort when changing position 0-4 /10 not persistent, has improved, in the past few days, able to carry out ADLs came by herself to today's visit.  MIPS Measures completed. She reports stopped Smoking since past 17 days using Nicotine gum.  *** At the end of her visit - pt reported she has been using Heroin sniffing few times a week 2-3 x a day, since past several years. She reports started with taking her now  's Narcotic pain medication (spouse  in 2018) and then progressed to getting Heroin from a woman she knows. She has not disclosed this information to her best Friend Jessica (Psychotherapist) & her children (live in the same 2 family house with her). Jose has seeked help to stop the addiction but has failed - attends AA & NA, attended Addiction Rehab facilities with relapse, has tried Methadone in the past did not like it but Suboxone treatment was preferred.  Family / friends are unaware of her relapse. Social Work intervention was requested.  Stated to me that she felt that I would be able to help her get the support and she is willing to stop her addiction.  [FreeTextEntry7] :  At the end of her visit - pt reported she has been using Heroin sniffing few times a week 2-3 x a day, since past several years. She reports started with taking her now  's Narcotic pain medication (spouse  in 2018) and then progressed to getting Heroin from a woman she knows. She has not disclosed this information to her best Friend Jessica (Psychotherapist) & her children (live in the same 2 family house with her). Reports has seeked help to stop the addiction but has failed - attends AA & NA, attended Addiction Rehab facilities with relapse, has tried Methadone in the past did not like it but Suboxone treatment was preferred.  Family / friends are unaware of her relapse. Social Work intervention was requested.  Stated to me that she felt that I would be able to help her get the support and she is willing to stop her addiction.  [de-identified] : Ms. ENRIKE MORLEY is a 67 year old female who presents for evaluation in our [facility]. Her PMH includes DM2 (dx ), HTN, left knee replacement. She presented with abdominal pain radiating to her LUQ x 3 weeks. MRI abdomen  noted a 3 cm mass in the body of the pancreas suspicious for primary pancreatic malignancy and also Bosniak type IV cyst upper pole left kidney suspicious for primary renal neoplasm. She was admitted to [facility] on 23 and CT A/P w/ IC showing ill-defined pancreatic body mass consistent with pancreatic neoplasm; and complex enhancing left renal mass concerning for renal cell carcinoma till proven otherwise.\par  She underwent EUS on 23 with noted findings of one 3 cm pancreatic body mass, s/p FNA.  Pathology positive for adenocarcinoma. \par  \par  She reports decrease appetite and early satiety.  Abdominal pain is intermittent and severe when it comes.  She was taking Tylenol#3 after hospital d/c but currently not taking anymore. +Weight loss of 10 pounds since March. \par  \par  Colonoscopy from 7 years ago normal. \par  Smoke/Etoh: 20 pack years. Last smoke 2023. No ETOH use. \par  \par  ECO      Independent, can walk upstairs. Lives with her sons.  (ECOG 1 due to pain). \par  Family Ca hx: None \par  \par  Labs: \par  23    AST: 9   ALT: 18  ALP:  103  Tbili: 0.3 \par  23   Ca 19-9: 2428    CEA: 15.0    AST:  11    ALT: 12    ALP: 96   Tbili:  0.4     A1c: 10% \par   [TextBox_4] : 4/4/23 [TextBox_39] : [id number] [TextBox_41] : adenocarcinoma [TextBox_43] : 4/14/23 [] : This is not a second opinion [TextBox_5] : 5/2/23 [TextBox_40] : 4/8/23

## 2024-08-07 NOTE — PHYSICAL EXAM
[Ambulatory and capable of all self care but unable to carry out any work activities] : Status 2- Ambulatory and capable of all self care but unable to carry out any work activities. Up and about more than 50% of waking hours [Normal] : affect appropriate [de-identified] : Right shoulder no pain on palpation, no edema, erythema noted was able to perform ROM  [de-identified] : wore a wig, ambulated with a cance  [de-identified] : wears glasses  [de-identified] : Right Anterior chest wall Mediport site appears stable, no bleeding, edema or erythema / discharge noted  [de-identified] : scalp alopecia

## 2024-08-07 NOTE — HISTORY OF PRESENT ILLNESS
[Disease: _____________________] : Disease: [unfilled] [T: ___] : T[unfilled] [N: ___] : N[unfilled] [M: ___] : M[unfilled] [AJCC Stage: ____] : AJCC Stage: [unfilled] [Date: ____________] : Patient's last distress assessment performed on [unfilled]. [1 - Distress Level] : Distress Level: 1 [90: Able to carry normal activity; minor signs or symptoms of disease.] : 90: Able to carry normal activity; minor signs or symptoms of disease.  [ECOG Performance Status: 1 - Restricted in physically strenuous activity but ambulatory and able to carry out work of a light or sedentary nature] : Performance Status: 1 - Restricted in physically strenuous activity but ambulatory and able to carry out work of a light or sedentary nature, e.g., light house work, office work [Abdominal Pain] : abdominal pain [EUS] : EUS [Biopsy] : biopsy performed [Before Surgery] : before surgery [Pancreatic ductal adenocarcinoma] : Pancreatic ductal adenocarcinoma [Not staged outside] : not staged outside [Nutrition] : Nutrition [Social Work] : Social Work [Other: ____] : [unfilled] [Restricted in physically strenuous activity but ambulatory and able to carry out work of a light or sedentary nature] : Status 1 - Restricted in physically strenuous activity but ambulatory and able to carry out work of a light or sedentary nature, e.g., light house work, office work [5 - Distress Level] : Distress Level: 5 [de-identified] : adenocarcinoma [FreeTextEntry1] : On Gemcitabine and Abraxane Last treatment on July 23, 2024  [de-identified] : see hpi; 2023.She has been taking a combination medication of glipizide and metformin with better blood sugar control. Now on acetaminophen 300 codeine 30 mg PO TID PRN after meeting with pain management and her primary care MD. Pain has been midline and more noted in day than in evening. No jaundice May 30, 2023 - Seen in a f/u visit today scheduled for Gemcitabine & Abraxane for Pancreas cancer. Patient had called our office last week for c/o nausea, abd pain, poor appetite. She was prescribed Omeprazole by GI for Gastritis she had not taken. We advised her to take Metoclopramide for nausea, poor appetite and Omeprazole for Gastritis.  Since taking the medications for past few days she has been feeling much better. She remains on Levaquin for Neutropenia prophylaxis.  She denies bleeding, constipation, diarrhea, fever, chills, falls.  __________ 2023 - Patient seen today accompanied with friend Ms. Jessica Gallokins.  Patient feels well denies interval change in medical status, No nausea, reports improved appetite, no GI complaints, no pain. Seen / evaluated by our Nutritionist team reports helpful tips and compliance with recommendations.  Today she was seen by Palliative / Pain management Dr. Malik no pain meds started.  She reports she was seen by her PCP last week, she ran out of her BP medications awaiting renewal by PCP.  _____________ 2023 -  Patient seen today in a follow up visit accompanied with friend Ms. Jessica Gallokins.  She reports episodes of non bloody diarrhea / loose stools post treatment lasts for day and a half does not take any anti diarrhea medications, follows dietary restriction with resolution of symptoms.  Denies N, V, fever, anorexia, weight loss, febrile illness, swollen glands, change in medical condition since last visit.  ____________ 2023 - Received a call from Keli JIMÉNEZ from treatment room patient scheduled for Gemcitabine & Abraxane and CBC shows Neutropenia Patient seen and evaluated, she feels well, asymptomatic, no cough, fever, chills, fatigue, no N, V, poor appetite, swollen glands, night sweats, bleeding, bruising. Denies any recent infection, hospitalization. Reports good appetite. No falls, presented for treatment today by herself, son drove her here.  __________ August 15, 2023 - Received a call from Huang JIMÉNEZ from treatment room to evaluate patient with c/o Dysuria on 23 lasted 1 day. Patient seen and evaluated in the treatment room.  She reports on Friday she experienced dysuria, frequency of urination, no hematuria, no urgency, fever, chills, pelvic pain, abdominal pain / discomfort. Symptoms of dysuria and frequency of urination has resolved but has foul smelling urine, no urine discoloration noted.  __________ 2023 - Patient seen in a f/u visit today presented by herself to this appointment, Pt's son Jaiden was on the phone call during the visit.  She is s/p Cycle # 5 Day 1 Gemcitabine & Abraxane treatment. She feels well overall tolerating treatment. No residual UTI symptoms no malodorous urine completed course of antibiotics as prescribed.  ________________ 2023 - Seen in a f/u visit today, presented by herself to this appointment. Feels well reports good appetite, no nausea, vomiting, diarrhea, abd / back pain.  She feels intermittent tingling sensation in her toes describes as mild. She has a f/u visit with Podiatrist (sees every 6 months).  She has appointment with Dr. Pil[facility] tomorrow to discuss plan for possible surgery.  11/15/2023 seen at one month interval. No weight change: no pain noted. no diarrhea _________ 2024 She has completed chemotherapy treatment in late 2024; now approximately 10 months of treatment and she has missed several physician appointments scheduled for the time period January through 2024. Seen today over 40 minutes late for scheduled appointment. She has been seen by radiation Medicine and I have also discussed follow up with surgical oncology and the urological surgery. ________________ 2024 - Notified by Maura JIMÉNEZ from treatment room pt c/o Nausea, episode of vomiting.  Patient seen and evaluated - pt c/o nausea since yesterday afternoon did not take antiemetics to martina symptoms. She ate egg sandwich this am. Pt had an episode of vomiting non bloody non bilious vomiting after arriving to our center today.   Denies abd pain, diarrhea, constipation, fever, chills, cough, dysuria, urinary symptoms, no bleeding. Denies eating outside / restaurant food or being around sick contacts.  ____________ 2024 - Notified by RN in treatment room patient's ANC was low, scheduled for treatment today w Gemcitabine & Abraxane.  Patient seen and evaluated reports no symptoms no febrile illness, cough, abd pain / discomfort, diarrhea, n, v, constipation, no bleeding.  Nausea, vomiting abated with Compazine prn. Appetite remains good.  ________________ May 08, 2024 - Seen in a f/u visit today.  She is s/p Injury to her Right foot & Left shoulder as she was getting off the Exercise bike at her home yesterday.  She reports her Right foot got stuck as she was attempting to get off the exercise bike and sustained injury to her right foot and left shoulder. She was able to get up post fall and walk. She also was able to use the left arm change her clothes without significant difficulty.   No head trauma, or injury, denies dizziness, nausea, pre syncope, syncope reported.  She was scheduled for Cycle 12 Day # 15 of Gemcitabine & Abraxane treatment on 24 pt called and cancelled chemotherapy due to pain / injury to foot and shoulder.  ___________ 2024 no further falls; uses a cane now . no brusing and no pain in shoulder. no hospitalzation. she has seen Dr Pretty of surgery. pancreas resection is not scheduled. ________ 2024 - Received a call from Sari JIMÉNEZ treatment room to evaluate pt for Leukopenia & Neutropenia. Pt seen and evaluated.  Pt scheduled for Cycle # 15 Day 15 of chemotherapy Gemcitabine & Abraxane treatment today. Feels well no complaints, no fevers, chills, nausea, vomiting, bleeding. appetite remains good.   Patient is a Current Cigarette Smoker states she is trying to quit.  _______________ 2024 - Seen in a f/u visit today. Jose had a mechanical fall last week at home tripped on box no LOC or head trauma fell on her buttocks did not seek any medical attention, able to get up herself, did not take any medications, c/o mild discomfort when changing position 0-4 /10 not persistent, has improved, in the past few days, able to carry out ADLs came by herself to today's visit.  MIPS Measures completed. She reports stopped Smoking since past 17 days using Nicotine gum.  *** At the end of her visit - pt reported she has been using Heroin sniffing few times a week 2-3 x a day, since past several years. She reports started with taking her now  's Narcotic pain medication (spouse  in 2018) and then progressed to getting Heroin from a woman she knows. She has not disclosed this information to her best Friend Jessica (Psychotherapist) & her children (live in the same 2 family house with her). Jose has seeked help to stop the addiction but has failed - attends AA & NA, attended Addiction Rehab facilities with relapse, has tried Methadone in the past did not like it but Suboxone treatment was preferred.  Family / friends are unaware of her relapse. Social Work intervention was requested.  Stated to me that she felt that I would be able to help her get the support and she is willing to stop her addiction.  [FreeTextEntry7] :  At the end of her visit - pt reported she has been using Heroin sniffing few times a week 2-3 x a day, since past several years. She reports started with taking her now  's Narcotic pain medication (spouse  in 2018) and then progressed to getting Heroin from a woman she knows. She has not disclosed this information to her best Friend Jessica (Psychotherapist) & her children (live in the same 2 family house with her). Reports has seeked help to stop the addiction but has failed - attends AA & NA, attended Addiction Rehab facilities with relapse, has tried Methadone in the past did not like it but Suboxone treatment was preferred.  Family / friends are unaware of her relapse. Social Work intervention was requested.  Stated to me that she felt that I would be able to help her get the support and she is willing to stop her addiction.  [de-identified] : Ms. ENRIKE MORLEY is a 67 year old female who presents for evaluation in our Pancreas Multidisciplinary Clinic. Her PMH includes DM2 (dx ), HTN, left knee replacement. She presented with abdominal pain radiating to her LUQ x 3 weeks. MRI abdomen  noted a 3 cm mass in the body of the pancreas suspicious for primary pancreatic malignancy and also Bosniak type IV cyst upper pole left kidney suspicious for primary renal neoplasm. She was admitted to [facility] on 23 and CT A/P w/ IC showing ill-defined pancreatic body mass consistent with pancreatic neoplasm; and complex enhancing left renal mass concerning for renal cell carcinoma till proven otherwise.\par  She underwent EUS on 23 with noted findings of one 3 cm pancreatic body mass, s/p FNA.  Pathology positive for adenocarcinoma. \par  \par  She reports decrease appetite and early satiety.  Abdominal pain is intermittent and severe when it comes.  She was taking Tylenol#3 after hospital d/c but currently not taking anymore. +Weight loss of 10 pounds since March. \par  \par  Colonoscopy from 7 years ago normal. \par  Smoke/Etoh: 20 pack years. Last smoke 2023. No ETOH use. \par  \par  ECO      Independent, can walk upstairs. Lives with her sons.  (ECOG 1 due to pain). \par  Family Ca hx: None \par  \par  Labs: \par  23    AST: 9   ALT: 18  ALP:  103  Tbili: 0.3 \par  23   Ca 19-9: 2428    CEA: 15.0    AST:  11    ALT: 12    ALP: 96   Tbili:  0.4     A1c: 10% \par   [TextBox_4] : 4/4/23 [TextBox_39] : [id number] [TextBox_41] : adenocarcinoma [TextBox_43] : 4/14/23 [] : This is not a second opinion [TextBox_5] : 5/2/23 [TextBox_40] : 4/8/23

## 2024-08-07 NOTE — REVIEW OF SYSTEMS
[Joint Pain] : joint pain [Muscle Pain] : muscle pain [Negative] : Allergic/Immunologic [FreeTextEntry9] : Right shoulder discomfort with intermittent pain attributes to OA  [Fatigue] : no fatigue [Recent Change In Weight] : ~T no recent weight change [Abdominal Pain] : no abdominal pain [Vomiting] : no vomiting [Constipation] : no constipation [FreeTextEntry6] : current smoker [de-identified] : scalp alopecia, wears a wig  [de-identified] : Today 8/6/24 reported Heroin Addiction since past several years (sniffs)

## 2024-08-07 NOTE — REVIEW OF SYSTEMS
[Joint Pain] : joint pain [Muscle Pain] : muscle pain [Negative] : Allergic/Immunologic [FreeTextEntry9] : Right shoulder discomfort with intermittent pain attributes to OA  [Fatigue] : no fatigue [Recent Change In Weight] : ~T no recent weight change [Abdominal Pain] : no abdominal pain [Vomiting] : no vomiting [Constipation] : no constipation [FreeTextEntry6] : current smoker [de-identified] : scalp alopecia, wears a wig  [de-identified] : Today 8/6/24 reported Heroin Addiction since past several years (sniffs)

## 2024-08-07 NOTE — PHYSICAL EXAM
[Ambulatory and capable of all self care but unable to carry out any work activities] : Status 2- Ambulatory and capable of all self care but unable to carry out any work activities. Up and about more than 50% of waking hours [Normal] : affect appropriate [de-identified] : Right shoulder no pain on palpation, no edema, erythema noted was able to perform ROM  [de-identified] : wore a wig, ambulated with a cance  [de-identified] : wears glasses  [de-identified] : Right Anterior chest wall Mediport site appears stable, no bleeding, edema or erythema / discharge noted  [de-identified] : scalp alopecia

## 2024-08-07 NOTE — BEGINNING OF VISIT
[0] : 2) Feeling down, depressed, or hopeless: Not at all (0) [Pain Scale: ___] : On a scale of 1-10, today the patient's pain is a(n) [unfilled]. [Future Reassessment of Pain Scale] : Future reassessment of pain scale [Medication(s)] : Medication(s) [Former] : Former [< 15 Years] : < 15 Years [Patient advised of risk of tobacco use and smoking cessation discussed.] : Patient advised of risk of tobacco use and smoking cessation discussed. [Date Discussed (MM/DD/YY): ___] : Discussed: [unfilled] [With Patient/Caregiver] : with Patient/Caregiver [Abdominal Pain] : no abdominal pain [Vomiting] : no vomiting [Constipation] : no constipation [de-identified] : smoked for > 30 years, quit 17 days ago

## 2024-08-07 NOTE — END OF VISIT
[Time Spent: ___ minutes] : I have spent [unfilled] minutes of time on the encounter. preop dx other nonspecific abnormal finding of lung field, COPD no recent exacerbations, does not use inhalera preop dx other nonspecific abnormal finding of lung field, COPD no recent exacerbations, does not use inhalers

## 2024-08-07 NOTE — HISTORY OF PRESENT ILLNESS
[Disease: _____________________] : Disease: [unfilled] [T: ___] : T[unfilled] [N: ___] : N[unfilled] [M: ___] : M[unfilled] [AJCC Stage: ____] : AJCC Stage: [unfilled] [Date: ____________] : Patient's last distress assessment performed on [unfilled]. [1 - Distress Level] : Distress Level: 1 [90: Able to carry normal activity; minor signs or symptoms of disease.] : 90: Able to carry normal activity; minor signs or symptoms of disease.  [ECOG Performance Status: 1 - Restricted in physically strenuous activity but ambulatory and able to carry out work of a light or sedentary nature] : Performance Status: 1 - Restricted in physically strenuous activity but ambulatory and able to carry out work of a light or sedentary nature, e.g., light house work, office work [Abdominal Pain] : abdominal pain [EUS] : EUS [Biopsy] : biopsy performed [Before Surgery] : before surgery [Pancreatic ductal adenocarcinoma] : Pancreatic ductal adenocarcinoma [Not staged outside] : not staged outside [Nutrition] : Nutrition [Social Work] : Social Work [Other: ____] : [unfilled] [Restricted in physically strenuous activity but ambulatory and able to carry out work of a light or sedentary nature] : Status 1 - Restricted in physically strenuous activity but ambulatory and able to carry out work of a light or sedentary nature, e.g., light house work, office work [5 - Distress Level] : Distress Level: 5 [de-identified] : adenocarcinoma [FreeTextEntry1] : On Gemcitabine and Abraxane Last treatment on July 23, 2024  [de-identified] : see hpi; 2023.She has been taking a combination medication of glipizide and metformin with better blood sugar control. Now on acetaminophen 300 codeine 30 mg PO TID PRN after meeting with pain management and her primary care MD. Pain has been midline and more noted in day than in evening. No jaundice May 30, 2023 - Seen in a f/u visit today scheduled for Gemcitabine & Abraxane for Pancreas cancer. Patient had called our office last week for c/o nausea, abd pain, poor appetite. She was prescribed Omeprazole by GI for Gastritis she had not taken. We advised her to take Metoclopramide for nausea, poor appetite and Omeprazole for Gastritis.  Since taking the medications for past few days she has been feeling much better. She remains on Levaquin for Neutropenia prophylaxis.  She denies bleeding, constipation, diarrhea, fever, chills, falls.  __________ 2023 - Patient seen today accompanied with friend Ms. Jessica Gallokins.  Patient feels well denies interval change in medical status, No nausea, reports improved appetite, no GI complaints, no pain. Seen / evaluated by our Nutritionist team reports helpful tips and compliance with recommendations.  Today she was seen by Palliative / Pain management Dr. Malik no pain meds started.  She reports she was seen by her PCP last week, she ran out of her BP medications awaiting renewal by PCP.  _____________ 2023 -  Patient seen today in a follow up visit accompanied with friend Ms. Jessica Gallokins.  She reports episodes of non bloody diarrhea / loose stools post treatment lasts for day and a half does not take any anti diarrhea medications, follows dietary restriction with resolution of symptoms.  Denies N, V, fever, anorexia, weight loss, febrile illness, swollen glands, change in medical condition since last visit.  ____________ 2023 - Received a call from Keli JIMÉNEZ from treatment room patient scheduled for Gemcitabine & Abraxane and CBC shows Neutropenia Patient seen and evaluated, she feels well, asymptomatic, no cough, fever, chills, fatigue, no N, V, poor appetite, swollen glands, night sweats, bleeding, bruising. Denies any recent infection, hospitalization. Reports good appetite. No falls, presented for treatment today by herself, son drove her here.  __________ August 15, 2023 - Received a call from Huang JIMÉNEZ from treatment room to evaluate patient with c/o Dysuria on 23 lasted 1 day. Patient seen and evaluated in the treatment room.  She reports on Friday she experienced dysuria, frequency of urination, no hematuria, no urgency, fever, chills, pelvic pain, abdominal pain / discomfort. Symptoms of dysuria and frequency of urination has resolved but has foul smelling urine, no urine discoloration noted.  __________ 2023 - Patient seen in a f/u visit today presented by herself to this appointment, Pt's son Jaiden was on the phone call during the visit.  She is s/p Cycle # 5 Day 1 Gemcitabine & Abraxane treatment. She feels well overall tolerating treatment. No residual UTI symptoms no malodorous urine completed course of antibiotics as prescribed.  ________________ 2023 - Seen in a f/u visit today, presented by herself to this appointment. Feels well reports good appetite, no nausea, vomiting, diarrhea, abd / back pain.  She feels intermittent tingling sensation in her toes describes as mild. She has a f/u visit with Podiatrist (sees every 6 months).  She has appointment with Dr. Pil[facility] tomorrow to discuss plan for possible surgery.  11/15/2023 seen at one month interval. No weight change: no pain noted. no diarrhea _________ 2024 She has completed chemotherapy treatment in late 2024; now approximately 10 months of treatment and she has missed several physician appointments scheduled for the time period January through 2024. Seen today over 40 minutes late for scheduled appointment. She has been seen by radiation Medicine and I have also discussed follow up with surgical oncology and the urological surgery. ________________ 2024 - Notified by Maura JIMÉNEZ from treatment room pt c/o Nausea, episode of vomiting.  Patient seen and evaluated - pt c/o nausea since yesterday afternoon did not take antiemetics to martina symptoms. She ate egg sandwich this am. Pt had an episode of vomiting non bloody non bilious vomiting after arriving to our center today.   Denies abd pain, diarrhea, constipation, fever, chills, cough, dysuria, urinary symptoms, no bleeding. Denies eating outside / restaurant food or being around sick contacts.  ____________ 2024 - Notified by RN in treatment room patient's ANC was low, scheduled for treatment today w Gemcitabine & Abraxane.  Patient seen and evaluated reports no symptoms no febrile illness, cough, abd pain / discomfort, diarrhea, n, v, constipation, no bleeding.  Nausea, vomiting abated with Compazine prn. Appetite remains good.  ________________ May 08, 2024 - Seen in a f/u visit today.  She is s/p Injury to her Right foot & Left shoulder as she was getting off the Exercise bike at her home yesterday.  She reports her Right foot got stuck as she was attempting to get off the exercise bike and sustained injury to her right foot and left shoulder. She was able to get up post fall and walk. She also was able to use the left arm change her clothes without significant difficulty.   No head trauma, or injury, denies dizziness, nausea, pre syncope, syncope reported.  She was scheduled for Cycle 12 Day # 15 of Gemcitabine & Abraxane treatment on 24 pt called and cancelled chemotherapy due to pain / injury to foot and shoulder.  ___________ 2024 no further falls; uses a cane now . no brusing and no pain in shoulder. no hospitalzation. she has seen Dr Pretty of surgery. pancreas resection is not scheduled. ________ 2024 - Received a call from Sari JIMÉNEZ treatment room to evaluate pt for Leukopenia & Neutropenia. Pt seen and evaluated.  Pt scheduled for Cycle # 15 Day 15 of chemotherapy Gemcitabine & Abraxane treatment today. Feels well no complaints, no fevers, chills, nausea, vomiting, bleeding. appetite remains good.   Patient is a Current Cigarette Smoker states she is trying to quit.  _______________ 2024 - Seen in a f/u visit today. Jose had a mechanical fall last week at home tripped on box no LOC or head trauma fell on her buttocks did not seek any medical attention, able to get up herself, did not take any medications, c/o mild discomfort when changing position 0-4 /10 not persistent, has improved, in the past few days, able to carry out ADLs came by herself to today's visit.  MIPS Measures completed. She reports stopped Smoking since past 17 days using Nicotine gum.  *** At the end of her visit - pt reported she has been using Heroin sniffing few times a week 2-3 x a day, since past several years. She reports started with taking her now  's Narcotic pain medication (spouse  in 2018) and then progressed to getting Heroin from a woman she knows. She has not disclosed this information to her best Friend Jessica (Psychotherapist) & her children (live in the same 2 family house with her). Jose has seeked help to stop the addiction but has failed - attends AA & NA, attended Addiction Rehab facilities with relapse, has tried Methadone in the past did not like it but Suboxone treatment was preferred.  Family / friends are unaware of her relapse. Social Work intervention was requested.  Stated to me that she felt that I would be able to help her get the support and she is willing to stop her addiction.  [FreeTextEntry7] :  At the end of her visit - pt reported she has been using Heroin sniffing few times a week 2-3 x a day, since past several years. She reports started with taking her now  's Narcotic pain medication (spouse  in 2018) and then progressed to getting Heroin from a woman she knows. She has not disclosed this information to her best Friend Jessica (Psychotherapist) & her children (live in the same 2 family house with her). Reports has seeked help to stop the addiction but has failed - attends AA & NA, attended Addiction Rehab facilities with relapse, has tried Methadone in the past did not like it but Suboxone treatment was preferred.  Family / friends are unaware of her relapse. Social Work intervention was requested.  Stated to me that she felt that I would be able to help her get the support and she is willing to stop her addiction.  [de-identified] : Ms. ENRIKE MORLEY is a 67 year old female who presents for evaluation in our [facility]. Her PMH includes DM2 (dx ), HTN, left knee replacement. She presented with abdominal pain radiating to her LUQ x 3 weeks. MRI abdomen  noted a 3 cm mass in the body of the pancreas suspicious for primary pancreatic malignancy and also Bosniak type IV cyst upper pole left kidney suspicious for primary renal neoplasm. She was admitted to [facility] on 23 and CT A/P w/ IC showing ill-defined pancreatic body mass consistent with pancreatic neoplasm; and complex enhancing left renal mass concerning for renal cell carcinoma till proven otherwise.\par  She underwent EUS on 23 with noted findings of one 3 cm pancreatic body mass, s/p FNA.  Pathology positive for adenocarcinoma. \par  \par  She reports decrease appetite and early satiety.  Abdominal pain is intermittent and severe when it comes.  She was taking Tylenol#3 after hospital d/c but currently not taking anymore. +Weight loss of 10 pounds since March. \par  \par  Colonoscopy from 7 years ago normal. \par  Smoke/Etoh: 20 pack years. Last smoke 2023. No ETOH use. \par  \par  ECO      Independent, can walk upstairs. Lives with her sons.  (ECOG 1 due to pain). \par  Family Ca hx: None \par  \par  Labs: \par  23    AST: 9   ALT: 18  ALP:  103  Tbili: 0.3 \par  23   Ca 19-9: 2428    CEA: 15.0    AST:  11    ALT: 12    ALP: 96   Tbili:  0.4     A1c: 10% \par   [TextBox_4] : 4/4/23 [TextBox_39] : [id number] [TextBox_41] : adenocarcinoma [TextBox_43] : 4/14/23 [] : This is not a second opinion [TextBox_5] : 5/2/23 [TextBox_40] : 4/8/23

## 2024-08-07 NOTE — HISTORY OF PRESENT ILLNESS
[Disease: _____________________] : Disease: [unfilled] [T: ___] : T[unfilled] [N: ___] : N[unfilled] [M: ___] : M[unfilled] [AJCC Stage: ____] : AJCC Stage: [unfilled] [Date: ____________] : Patient's last distress assessment performed on [unfilled]. [1 - Distress Level] : Distress Level: 1 [90: Able to carry normal activity; minor signs or symptoms of disease.] : 90: Able to carry normal activity; minor signs or symptoms of disease.  [ECOG Performance Status: 1 - Restricted in physically strenuous activity but ambulatory and able to carry out work of a light or sedentary nature] : Performance Status: 1 - Restricted in physically strenuous activity but ambulatory and able to carry out work of a light or sedentary nature, e.g., light house work, office work [Abdominal Pain] : abdominal pain [EUS] : EUS [Biopsy] : biopsy performed [Before Surgery] : before surgery [Pancreatic ductal adenocarcinoma] : Pancreatic ductal adenocarcinoma [Not staged outside] : not staged outside [Nutrition] : Nutrition [Social Work] : Social Work [Other: ____] : [unfilled] [Restricted in physically strenuous activity but ambulatory and able to carry out work of a light or sedentary nature] : Status 1 - Restricted in physically strenuous activity but ambulatory and able to carry out work of a light or sedentary nature, e.g., light house work, office work [5 - Distress Level] : Distress Level: 5 [de-identified] : adenocarcinoma [FreeTextEntry1] : On Gemcitabine and Abraxane Last treatment on July 23, 2024  [de-identified] : see hpi; 2023.She has been taking a combination medication of glipizide and metformin with better blood sugar control. Now on acetaminophen 300 codeine 30 mg PO TID PRN after meeting with pain management and her primary care MD. Pain has been midline and more noted in day than in evening. No jaundice May 30, 2023 - Seen in a f/u visit today scheduled for Gemcitabine & Abraxane for Pancreas cancer. Patient had called our office last week for c/o nausea, abd pain, poor appetite. She was prescribed Omeprazole by GI for Gastritis she had not taken. We advised her to take Metoclopramide for nausea, poor appetite and Omeprazole for Gastritis.  Since taking the medications for past few days she has been feeling much better. She remains on Levaquin for Neutropenia prophylaxis.  She denies bleeding, constipation, diarrhea, fever, chills, falls.  __________ 2023 - Patient seen today accompanied with friend Ms. Jessica Gallokins.  Patient feels well denies interval change in medical status, No nausea, reports improved appetite, no GI complaints, no pain. Seen / evaluated by our Nutritionist team reports helpful tips and compliance with recommendations.  Today she was seen by Palliative / Pain management Dr. Malik no pain meds started.  She reports she was seen by her PCP last week, she ran out of her BP medications awaiting renewal by PCP.  _____________ 2023 -  Patient seen today in a follow up visit accompanied with friend Ms. Jessica Gallokins.  She reports episodes of non bloody diarrhea / loose stools post treatment lasts for day and a half does not take any anti diarrhea medications, follows dietary restriction with resolution of symptoms.  Denies N, V, fever, anorexia, weight loss, febrile illness, swollen glands, change in medical condition since last visit.  ____________ 2023 - Received a call from Keli JIMÉNEZ from treatment room patient scheduled for Gemcitabine & Abraxane and CBC shows Neutropenia Patient seen and evaluated, she feels well, asymptomatic, no cough, fever, chills, fatigue, no N, V, poor appetite, swollen glands, night sweats, bleeding, bruising. Denies any recent infection, hospitalization. Reports good appetite. No falls, presented for treatment today by herself, son drove her here.  __________ August 15, 2023 - Received a call from uHang JIMÉNEZ from treatment room to evaluate patient with c/o Dysuria on 23 lasted 1 day. Patient seen and evaluated in the treatment room.  She reports on Friday she experienced dysuria, frequency of urination, no hematuria, no urgency, fever, chills, pelvic pain, abdominal pain / discomfort. Symptoms of dysuria and frequency of urination has resolved but has foul smelling urine, no urine discoloration noted.  __________ 2023 - Patient seen in a f/u visit today presented by herself to this appointment, Pt's son Jaiden was on the phone call during the visit.  She is s/p Cycle # 5 Day 1 Gemcitabine & Abraxane treatment. She feels well overall tolerating treatment. No residual UTI symptoms no malodorous urine completed course of antibiotics as prescribed.  ________________ 2023 - Seen in a f/u visit today, presented by herself to this appointment. Feels well reports good appetite, no nausea, vomiting, diarrhea, abd / back pain.  She feels intermittent tingling sensation in her toes describes as mild. She has a f/u visit with Podiatrist (sees every 6 months).  She has appointment with Dr. [facility] tomorrow to discuss plan for possible surgery.  11/15/2023 seen at one month interval. No weight change: no pain noted. no diarrhea _________ 2024 She has completed chemotherapy treatment in late 2024; now approximately 10 months of treatment and she has missed several physician appointments scheduled for the time period January through 2024. Seen today over 40 minutes late for scheduled appointment. She has been seen by radiation Medicine and I have also discussed follow up with surgical oncology and the urological surgery. ________________ 2024 - Notified by Maura JIMÉNEZ from treatment room pt c/o Nausea, episode of vomiting.  Patient seen and evaluated - pt c/o nausea since yesterday afternoon did not take antiemetics to martina symptoms. She ate egg sandwich this am. Pt had an episode of vomiting non bloody non bilious vomiting after arriving to our center today.   Denies abd pain, diarrhea, constipation, fever, chills, cough, dysuria, urinary symptoms, no bleeding. Denies eating outside / restaurant food or being around sick contacts.  ____________ 2024 - Notified by RN in treatment room patient's ANC was low, scheduled for treatment today w Gemcitabine & Abraxane.  Patient seen and evaluated reports no symptoms no febrile illness, cough, abd pain / discomfort, diarrhea, n, v, constipation, no bleeding.  Nausea, vomiting abated with Compazine prn. Appetite remains good.  ________________ May 08, 2024 - Seen in a f/u visit today.  She is s/p Injury to her Right foot & Left shoulder as she was getting off the Exercise bike at her home yesterday.  She reports her Right foot got stuck as she was attempting to get off the exercise bike and sustained injury to her right foot and left shoulder. She was able to get up post fall and walk. She also was able to use the left arm change her clothes without significant difficulty.   No head trauma, or injury, denies dizziness, nausea, pre syncope, syncope reported.  She was scheduled for Cycle 12 Day # 15 of Gemcitabine & Abraxane treatment on 24 pt called and cancelled chemotherapy due to pain / injury to foot and shoulder.  ___________ 2024 no further falls; uses a cane now . no brusing and no pain in shoulder. no hospitalzation. she has seen Dr Pretty of surgery. pancreas resection is not scheduled. ________ 2024 - Received a call from Sari JIMÉNEZ treatment room to evaluate pt for Leukopenia & Neutropenia. Pt seen and evaluated.  Pt scheduled for Cycle # 15 Day 15 of chemotherapy Gemcitabine & Abraxane treatment today. Feels well no complaints, no fevers, chills, nausea, vomiting, bleeding. appetite remains good.   Patient is a Current Cigarette Smoker states she is trying to quit.  _______________ 2024 - Seen in a f/u visit today. Jose had a mechanical fall last week at home tripped on box no LOC or head trauma fell on her buttocks did not seek any medical attention, able to get up herself, did not take any medications, c/o mild discomfort when changing position 0-4 /10 not persistent, has improved, in the past few days, able to carry out ADLs came by herself to today's visit.  MIPS Measures completed. She reports stopped Smoking since past 17 days using Nicotine gum.  *** At the end of her visit - pt reported she has been using Heroin sniffing few times a week 2-3 x a day, since past several years. She reports started with taking her now  's Narcotic pain medication (spouse  in 2018) and then progressed to getting Heroin from a woman she knows. She has not disclosed this information to her best Friend Jessica (Psychotherapist) & her children (live in the same 2 family house with her). Jose has seeked help to stop the addiction but has failed - attends AA & NA, attended Addiction Rehab facilities with relapse, has tried Methadone in the past did not like it but Suboxone treatment was preferred.  Family / friends are unaware of her relapse. Social Work intervention was requested.  Stated to me that she felt that I would be able to help her get the support and she is willing to stop her addiction.  [FreeTextEntry7] :  At the end of her visit - pt reported she has been using Heroin sniffing few times a week 2-3 x a day, since past several years. She reports started with taking her now  's Narcotic pain medication (spouse  in 2018) and then progressed to getting Heroin from a woman she knows. She has not disclosed this information to her best Friend Jessica (Psychotherapist) & her children (live in the same 2 family house with her). Reports has seeked help to stop the addiction but has failed - attends AA & NA, attended Addiction Rehab facilities with relapse, has tried Methadone in the past did not like it but Suboxone treatment was preferred.  Family / friends are unaware of her relapse. Social Work intervention was requested.  Stated to me that she felt that I would be able to help her get the support and she is willing to stop her addiction.  [de-identified] : Ms. ENRIKE MORLEY is a 67 year old female who presents for evaluation in our Pancreas Multidisciplinary Clinic. Her PMH includes DM2 (dx ), HTN, left knee replacement. She presented with abdominal pain radiating to her LUQ x 3 weeks. MRI abdomen  noted a 3 cm mass in the body of the pancreas suspicious for primary pancreatic malignancy and also Bosniak type IV cyst upper pole left kidney suspicious for primary renal neoplasm. She was admitted to [facility] on 23 and CT A/P w/ IC showing ill-defined pancreatic body mass consistent with pancreatic neoplasm; and complex enhancing left renal mass concerning for renal cell carcinoma till proven otherwise.\par  She underwent EUS on 23 with noted findings of one 3 cm pancreatic body mass, s/p FNA.  Pathology positive for adenocarcinoma. \par  \par  She reports decrease appetite and early satiety.  Abdominal pain is intermittent and severe when it comes.  She was taking Tylenol#3 after hospital d/c but currently not taking anymore. +Weight loss of 10 pounds since March. \par  \par  Colonoscopy from 7 years ago normal. \par  Smoke/Etoh: 20 pack years. Last smoke 2023. No ETOH use. \par  \par  ECO      Independent, can walk upstairs. Lives with her sons.  (ECOG 1 due to pain). \par  Family Ca hx: None \par  \par  Labs: \par  23    AST: 9   ALT: 18  ALP:  103  Tbili: 0.3 \par  23   Ca 19-9: 2428    CEA: 15.0    AST:  11    ALT: 12    ALP: 96   Tbili:  0.4     A1c: 10% \par   [TextBox_4] : 4/4/23 [TextBox_39] : [id number] [TextBox_41] : adenocarcinoma [TextBox_43] : 4/14/23 [] : This is not a second opinion [TextBox_5] : 5/2/23 [TextBox_40] : 4/8/23

## 2024-08-07 NOTE — PHYSICAL EXAM
[Ambulatory and capable of all self care but unable to carry out any work activities] : Status 2- Ambulatory and capable of all self care but unable to carry out any work activities. Up and about more than 50% of waking hours [Normal] : affect appropriate [de-identified] : Right shoulder no pain on palpation, no edema, erythema noted was able to perform ROM  [de-identified] : wore a wig, ambulated with a cance  [de-identified] : wears glasses  [de-identified] : Right Anterior chest wall Mediport site appears stable, no bleeding, edema or erythema / discharge noted  [de-identified] : scalp alopecia

## 2024-08-07 NOTE — HISTORY OF PRESENT ILLNESS
[Disease: _____________________] : Disease: [unfilled] [T: ___] : T[unfilled] [N: ___] : N[unfilled] [M: ___] : M[unfilled] [AJCC Stage: ____] : AJCC Stage: [unfilled] [Date: ____________] : Patient's last distress assessment performed on [unfilled]. [1 - Distress Level] : Distress Level: 1 [90: Able to carry normal activity; minor signs or symptoms of disease.] : 90: Able to carry normal activity; minor signs or symptoms of disease.  [ECOG Performance Status: 1 - Restricted in physically strenuous activity but ambulatory and able to carry out work of a light or sedentary nature] : Performance Status: 1 - Restricted in physically strenuous activity but ambulatory and able to carry out work of a light or sedentary nature, e.g., light house work, office work [Abdominal Pain] : abdominal pain [EUS] : EUS [Biopsy] : biopsy performed [Before Surgery] : before surgery [Pancreatic ductal adenocarcinoma] : Pancreatic ductal adenocarcinoma [Not staged outside] : not staged outside [Nutrition] : Nutrition [Social Work] : Social Work [Other: ____] : [unfilled] [Restricted in physically strenuous activity but ambulatory and able to carry out work of a light or sedentary nature] : Status 1 - Restricted in physically strenuous activity but ambulatory and able to carry out work of a light or sedentary nature, e.g., light house work, office work [5 - Distress Level] : Distress Level: 5 [de-identified] : adenocarcinoma [FreeTextEntry1] : On Gemcitabine and Abraxane Last treatment on July 23, 2024  [de-identified] : see hpi; 2023.She has been taking a combination medication of glipizide and metformin with better blood sugar control. Now on acetaminophen 300 codeine 30 mg PO TID PRN after meeting with pain management and her primary care MD. Pain has been midline and more noted in day than in evening. No jaundice May 30, 2023 - Seen in a f/u visit today scheduled for Gemcitabine & Abraxane for Pancreas cancer. Patient had called our office last week for c/o nausea, abd pain, poor appetite. She was prescribed Omeprazole by GI for Gastritis she had not taken. We advised her to take Metoclopramide for nausea, poor appetite and Omeprazole for Gastritis.  Since taking the medications for past few days she has been feeling much better. She remains on Levaquin for Neutropenia prophylaxis.  She denies bleeding, constipation, diarrhea, fever, chills, falls.  __________ 2023 - Patient seen today accompanied with friend Ms. Jessica Gallokins.  Patient feels well denies interval change in medical status, No nausea, reports improved appetite, no GI complaints, no pain. Seen / evaluated by our Nutritionist team reports helpful tips and compliance with recommendations.  Today she was seen by Palliative / Pain management Dr. Malik no pain meds started.  She reports she was seen by her PCP last week, she ran out of her BP medications awaiting renewal by PCP.  _____________ 2023 -  Patient seen today in a follow up visit accompanied with friend Ms. Jessica Gallokins.  She reports episodes of non bloody diarrhea / loose stools post treatment lasts for day and a half does not take any anti diarrhea medications, follows dietary restriction with resolution of symptoms.  Denies N, V, fever, anorexia, weight loss, febrile illness, swollen glands, change in medical condition since last visit.  ____________ 2023 - Received a call from Keli JIMÉNEZ from treatment room patient scheduled for Gemcitabine & Abraxane and CBC shows Neutropenia Patient seen and evaluated, she feels well, asymptomatic, no cough, fever, chills, fatigue, no N, V, poor appetite, swollen glands, night sweats, bleeding, bruising. Denies any recent infection, hospitalization. Reports good appetite. No falls, presented for treatment today by herself, son drove her here.  __________ August 15, 2023 - Received a call from Huang JIMÉNEZ from treatment room to evaluate patient with c/o Dysuria on 23 lasted 1 day. Patient seen and evaluated in the treatment room.  She reports on Friday she experienced dysuria, frequency of urination, no hematuria, no urgency, fever, chills, pelvic pain, abdominal pain / discomfort. Symptoms of dysuria and frequency of urination has resolved but has foul smelling urine, no urine discoloration noted.  __________ 2023 - Patient seen in a f/u visit today presented by herself to this appointment, Pt's son Jaiden was on the phone call during the visit.  She is s/p Cycle # 5 Day 1 Gemcitabine & Abraxane treatment. She feels well overall tolerating treatment. No residual UTI symptoms no malodorous urine completed course of antibiotics as prescribed.  ________________ 2023 - Seen in a f/u visit today, presented by herself to this appointment. Feels well reports good appetite, no nausea, vomiting, diarrhea, abd / back pain.  She feels intermittent tingling sensation in her toes describes as mild. She has a f/u visit with Podiatrist (sees every 6 months).  She has appointment with Dr. Pilo [facility] tomorrow to discuss plan for possible surgery.  11/15/2023 seen at one month interval. No weight change: no pain noted. no diarrhea _________ 2024 She has completed chemotherapy treatment in late 2024; now approximately 10 months of treatment and she has missed several physician appointments scheduled for the time period January through 2024. Seen today over 40 minutes late for scheduled appointment. She has been seen by radiation Medicine and I have also discussed follow up with surgical oncology and the urological surgery. ________________ 2024 - Notified by Maura JIMÉNEZ from treatment room pt c/o Nausea, episode of vomiting.  Patient seen and evaluated - pt c/o nausea since yesterday afternoon did not take antiemetics to martina symptoms. She ate egg sandwich this am. Pt had an episode of vomiting non bloody non bilious vomiting after arriving to our center today.   Denies abd pain, diarrhea, constipation, fever, chills, cough, dysuria, urinary symptoms, no bleeding. Denies eating outside / restaurant food or being around sick contacts.  ____________ 2024 - Notified by RN in treatment room patient's ANC was low, scheduled for treatment today w Gemcitabine & Abraxane.  Patient seen and evaluated reports no symptoms no febrile illness, cough, abd pain / discomfort, diarrhea, n, v, constipation, no bleeding.  Nausea, vomiting abated with Compazine prn. Appetite remains good.  ________________ May 08, 2024 - Seen in a f/u visit today.  She is s/p Injury to her Right foot & Left shoulder as she was getting off the Exercise bike at her home yesterday.  She reports her Right foot got stuck as she was attempting to get off the exercise bike and sustained injury to her right foot and left shoulder. She was able to get up post fall and walk. She also was able to use the left arm change her clothes without significant difficulty.   No head trauma, or injury, denies dizziness, nausea, pre syncope, syncope reported.  She was scheduled for Cycle 12 Day # 15 of Gemcitabine & Abraxane treatment on 24 pt called and cancelled chemotherapy due to pain / injury to foot and shoulder.  ___________ 2024 no further falls; uses a cane now . no brusing and no pain in shoulder. no hospitalzation. she has seen Dr Pretty of surgery. pancreas resection is not scheduled. ________ 2024 - Received a call from Sari JIMÉNEZ treatment room to evaluate pt for Leukopenia & Neutropenia. Pt seen and evaluated.  Pt scheduled for Cycle # 15 Day 15 of chemotherapy Gemcitabine & Abraxane treatment today. Feels well no complaints, no fevers, chills, nausea, vomiting, bleeding. appetite remains good.   Patient is a Current Cigarette Smoker states she is trying to quit.  _______________ 2024 - Seen in a f/u visit today. Jose had a mechanical fall last week at home tripped on box no LOC or head trauma fell on her buttocks did not seek any medical attention, able to get up herself, did not take any medications, c/o mild discomfort when changing position 0-4 /10 not persistent, has improved, in the past few days, able to carry out ADLs came by herself to today's visit.  MIPS Measures completed. She reports stopped Smoking since past 17 days using Nicotine gum.  *** At the end of her visit - pt reported she has been using Heroin sniffing few times a week 2-3 x a day, since past several years. She reports started with taking her now  's Narcotic pain medication (spouse  in 2018) and then progressed to getting Heroin from a woman she knows. She has not disclosed this information to her best Friend Jessica (Psychotherapist) & her children (live in the same 2 family house with her). Jose has seeked help to stop the addiction but has failed - attends AA & NA, attended Addiction Rehab facilities with relapse, has tried Methadone in the past did not like it but Suboxone treatment was preferred.  Family / friends are unaware of her relapse. Social Work intervention was requested.  Stated to me that she felt that I would be able to help her get the support and she is willing to stop her addiction.  [FreeTextEntry7] :  At the end of her visit - pt reported she has been using Heroin sniffing few times a week 2-3 x a day, since past several years. She reports started with taking her now  's Narcotic pain medication (spouse  in 2018) and then progressed to getting Heroin from a woman she knows. She has not disclosed this information to her best Friend Jessica (Psychotherapist) & her children (live in the same 2 family house with her). Reports has seeked help to stop the addiction but has failed - attends AA & NA, attended Addiction Rehab facilities with relapse, has tried Methadone in the past did not like it but Suboxone treatment was preferred.  Family / friends are unaware of her relapse. Social Work intervention was requested.  Stated to me that she felt that I would be able to help her get the support and she is willing to stop her addiction.  [de-identified] : Ms. ENRIKE MORLEY is a 67 year old female who presents for evaluation in our Pancreas Multidisciplinary Clinic. Her PMH includes DM2 (dx ), HTN, left knee replacement. She presented with abdominal pain radiating to her LUQ x 3 weeks. MRI abdomen  noted a 3 cm mass in the body of the pancreas suspicious for primary pancreatic malignancy and also Bosniak type IV cyst upper pole left kidney suspicious for primary renal neoplasm. She was [facility] to [facility] on 23 and CT A/P w/ IC showing ill-defined pancreatic body mass consistent with pancreatic neoplasm; and complex enhancing left renal mass concerning for renal cell carcinoma till proven otherwise.\par  She underwent EUS on 23 with noted findings of one 3 cm pancreatic body mass, s/p FNA.  Pathology positive for adenocarcinoma. \par  \par  She reports decrease appetite and early satiety.  Abdominal pain is intermittent and severe when it comes.  She was taking Tylenol#3 after hospital d/c but currently not taking anymore. +Weight loss of 10 pounds since March. \par  \par  Colonoscopy from 7 years ago normal. \par  Smoke/Etoh: 20 pack years. Last smoke 2023. No ETOH use. \par  \par  ECO      Independent, can walk upstairs. Lives with her sons.  (ECOG 1 due to pain). \par  Family Ca hx: None \par  \par  Labs: \par  23    AST: 9   ALT: 18  ALP:  103  Tbili: 0.3 \par  23   Ca 19-9: 2428    CEA: 15.0    AST:  11    ALT: 12    ALP: 96   Tbili:  0.4     A1c: 10% \par   [TextBox_4] : 4/4/23 [TextBox_39] : [id number] [TextBox_41] : adenocarcinoma [TextBox_43] : 4/14/23 [] : This is not a second opinion [TextBox_5] : 5/2/23 [TextBox_40] : 4/8/23

## 2024-08-07 NOTE — PHYSICAL EXAM
[Ambulatory and capable of all self care but unable to carry out any work activities] : Status 2- Ambulatory and capable of all self care but unable to carry out any work activities. Up and about more than 50% of waking hours [Normal] : affect appropriate [de-identified] : Right shoulder no pain on palpation, no edema, erythema noted was able to perform ROM  [de-identified] : wore a wig, ambulated with a cance  [de-identified] : wears glasses  [de-identified] : Right Anterior chest wall Mediport site appears stable, no bleeding, edema or erythema / discharge noted  [de-identified] : scalp alopecia

## 2024-08-22 ENCOUNTER — OUTPATIENT (OUTPATIENT)
Dept: OUTPATIENT SERVICES | Facility: HOSPITAL | Age: 69
LOS: 1 days | Discharge: HOME | End: 2024-08-22

## 2024-08-22 DIAGNOSIS — Z96.652 PRESENCE OF LEFT ARTIFICIAL KNEE JOINT: Chronic | ICD-10-CM

## 2024-08-22 DIAGNOSIS — C25.9 MALIGNANT NEOPLASM OF PANCREAS, UNSPECIFIED: ICD-10-CM

## 2024-09-04 ENCOUNTER — RESULT REVIEW (OUTPATIENT)
Age: 69
End: 2024-09-04

## 2024-09-04 ENCOUNTER — APPOINTMENT (OUTPATIENT)
Dept: HEMATOLOGY ONCOLOGY | Facility: CLINIC | Age: 69
End: 2024-09-04
Payer: MEDICARE

## 2024-09-04 VITALS
WEIGHT: 149.03 LBS | SYSTOLIC BLOOD PRESSURE: 138 MMHG | HEIGHT: 63.03 IN | BODY MASS INDEX: 26.41 KG/M2 | OXYGEN SATURATION: 98 % | TEMPERATURE: 98 F | RESPIRATION RATE: 16 BRPM | HEART RATE: 88 BPM | DIASTOLIC BLOOD PRESSURE: 80 MMHG

## 2024-09-04 DIAGNOSIS — F11.29: ICD-10-CM

## 2024-09-04 DIAGNOSIS — D69.6 THROMBOCYTOPENIA, UNSPECIFIED: ICD-10-CM

## 2024-09-04 DIAGNOSIS — G89.3 NEOPLASM RELATED PAIN (ACUTE) (CHRONIC): ICD-10-CM

## 2024-09-04 DIAGNOSIS — E11.9 TYPE 2 DIABETES MELLITUS W/OUT COMPLICATIONS: ICD-10-CM

## 2024-09-04 LAB
ANISOCYTOSIS BLD QL: SIGNIFICANT CHANGE UP
BASOPHILS # BLD AUTO: 0.04 K/UL — SIGNIFICANT CHANGE UP (ref 0–0.2)
BASOPHILS NFR BLD AUTO: 0.5 % — SIGNIFICANT CHANGE UP (ref 0–2)
DACRYOCYTES BLD QL SMEAR: SLIGHT — SIGNIFICANT CHANGE UP
ELLIPTOCYTES BLD QL SMEAR: SLIGHT — SIGNIFICANT CHANGE UP
EOSINOPHIL # BLD AUTO: 0.1 K/UL — SIGNIFICANT CHANGE UP (ref 0–0.5)
EOSINOPHIL NFR BLD AUTO: 1.2 % — SIGNIFICANT CHANGE UP (ref 0–6)
HCT VFR BLD CALC: 37.8 % — SIGNIFICANT CHANGE UP (ref 34.5–45)
HGB BLD-MCNC: 12.8 G/DL — SIGNIFICANT CHANGE UP (ref 11.5–15.5)
IMM GRANULOCYTES NFR BLD AUTO: 0.5 % — SIGNIFICANT CHANGE UP (ref 0–0.9)
LYMPHOCYTES # BLD AUTO: 1.17 K/UL — SIGNIFICANT CHANGE UP (ref 1–3.3)
LYMPHOCYTES # BLD AUTO: 14 % — SIGNIFICANT CHANGE UP (ref 13–44)
MCHC RBC-ENTMCNC: 26.1 PG — LOW (ref 27–34)
MCHC RBC-ENTMCNC: 33.1 G/DL — SIGNIFICANT CHANGE UP (ref 32–36)
MCV RBC AUTO: 78.9 FL — LOW (ref 80–100)
MICROCYTES BLD QL: SLIGHT — SIGNIFICANT CHANGE UP
MONOCYTES # BLD AUTO: 0.27 K/UL — SIGNIFICANT CHANGE UP (ref 0–0.9)
MONOCYTES NFR BLD AUTO: 3.2 % — SIGNIFICANT CHANGE UP (ref 2–14)
NEUTROPHILS # BLD AUTO: 6.75 K/UL — SIGNIFICANT CHANGE UP (ref 1.8–7.4)
NEUTROPHILS NFR BLD AUTO: 80.6 % — HIGH (ref 43–77)
NRBC # BLD: 0 /100 WBCS — SIGNIFICANT CHANGE UP (ref 0–0)
PLAT MORPH BLD: NORMAL — SIGNIFICANT CHANGE UP
PLATELET # BLD AUTO: 133 K/UL — LOW (ref 150–400)
POIKILOCYTOSIS BLD QL AUTO: SLIGHT — SIGNIFICANT CHANGE UP
RBC # BLD: 4.79 M/UL — SIGNIFICANT CHANGE UP (ref 3.8–5.2)
RBC # FLD: 19.6 % — HIGH (ref 10.3–14.5)
RBC BLD AUTO: ABNORMAL
SCHISTOCYTES BLD QL AUTO: SLIGHT — SIGNIFICANT CHANGE UP
WBC # BLD: 8.37 K/UL — SIGNIFICANT CHANGE UP (ref 3.8–10.5)
WBC # FLD AUTO: 8.37 K/UL — SIGNIFICANT CHANGE UP (ref 3.8–10.5)

## 2024-09-04 PROCEDURE — G2211 COMPLEX E/M VISIT ADD ON: CPT

## 2024-09-04 PROCEDURE — 99215 OFFICE O/P EST HI 40 MIN: CPT

## 2024-09-05 ENCOUNTER — APPOINTMENT (OUTPATIENT)
Dept: UROLOGY | Facility: CLINIC | Age: 69
End: 2024-09-05
Payer: MEDICARE

## 2024-09-05 VITALS
OXYGEN SATURATION: 98 % | DIASTOLIC BLOOD PRESSURE: 95 MMHG | HEART RATE: 78 BPM | SYSTOLIC BLOOD PRESSURE: 158 MMHG | TEMPERATURE: 97.9 F

## 2024-09-05 DIAGNOSIS — C25.9 MALIGNANT NEOPLASM OF PANCREAS, UNSPECIFIED: ICD-10-CM

## 2024-09-05 DIAGNOSIS — N28.89 OTHER SPECIFIED DISORDERS OF KIDNEY AND URETER: ICD-10-CM

## 2024-09-05 PROCEDURE — G2211 COMPLEX E/M VISIT ADD ON: CPT

## 2024-09-05 PROCEDURE — 99214 OFFICE O/P EST MOD 30 MIN: CPT

## 2024-09-07 NOTE — ASSESSMENT
[FreeTextEntry1] : The patient last had imaging done in April of this year.  Her pancreatic tumor had seen to decrease in size based on that imaging.  The renal mass was measuring 4.7 cm at that time with mixed cystic and solid components.  The imaging features of the renal mass are concerning for renal cell carcinoma.  Previously, when we had discussed potential surgery for the kidney tumor, we had discussed a potential coordinated surgery with surgery to address the pancreatic tumor as well.  At this point, it sounds as though there may not necessarily be a plan for that surgery but I will follow-up with Dr. Greco to confirm this as he is following her from that standpoint.  I will also follow-up with Dr. Mahoney and medical oncology to determine best timing and coordination for potential partial nephrectomy surgery around her chemotherapy schedule.  I do think that addressing the kidney tumor at this point is reasonable.  Partial nephrectomy was discussed with the patient today.  I have explained that I would likely approach this with robotic or laparoscopic surgery to minimally invasively address the kidney.  We discussed that partial nephrectomy involves removal of the tumor leaving the normal portion of the kidney behind and that she would have follow-up periodically with imaging to reassess for any evidence of recurrent kidney cancer after undergoing surgery.  She will have that imaging regardless also related to her pancreatic cancer.  We also discussed the potential need for removal of the entire kidney in the setting of kidney cancer if there is bleeding encountered that could not be handled in any other method other than removing the kidney or if there was an oncologic reason to remove the entire kidney.  I would like her to have a new imaging study to reassess the anatomy before undergoing the operation.  When I mention this to her, she tells me she is scheduled to undergo a new CT scan early next month.  She is ready to move forward with the kidney surgery.  We will get the updated information and I will coordinate with her medical oncologist and surgical oncologist to confirm.

## 2024-09-07 NOTE — LETTER BODY
[Dear  ___] : Dear  [unfilled], [Courtesy Letter:] : I had the pleasure of seeing your patient, [unfilled], in my office today. [Please see my note below.] : Please see my note below. [FreeTextEntry2] : Suzy Bartlett  [address] [place] [id number]      [FreeTextEntry3] : Sincerely,      Pilo Kelly MD, FACS Director of Urology Services, [facility] Chief of Urology, [facility]  of Urology   [facility], [place] [id number] P: [phone] F: [phone] [facility]

## 2024-09-07 NOTE — LETTER BODY
[Dear  ___] : Dear  [unfilled], [Courtesy Letter:] : I had the pleasure of seeing your patient, [unfilled], in my office today. [Please see my note below.] : Please see my note below. [FreeTextEntry2] : Suzy Bartlett  [address] [place] [id number]      [FreeTextEntry3] : Sincerely,      Pilo Kelly MD, FACS Director of Urology Services, [facility] Chief of Urology, [facility]  of Urology   [facility], [place] [id number] P: [phone] F: [phone] [facility]

## 2024-09-07 NOTE — HISTORY OF PRESENT ILLNESS
[FreeTextEntry1] : ENRIKE MILLI returns to the office today. She is a 68 year-old woman who I met in May 2023 after undergoing a  CT scan in April 2023 for abdominal pain which revealed a 4cm cystic/sold left renal mass and a separate pancreatic mass. She has been undergoing chemotherapy for the pancreatic cancer.  Her last treatment was the end of July, the mass decreased and the chemotherapy is currently on pause for period of time she tells me is about 5 weeks.  She is feeling fairly well.  She has tried to increase her nutritional intake with the supplementation of Glucerna.  Her weight is approximately 149 pounds at this time.  Her energy level has been down but has not been improving since being on the break from the chemotherapy.  She is ambulating but walking with the assistance of a cane at this time.  She does not have any difficulty with any abdominal pain or discomfort at this point.  No lower urinary tract symptoms such as hematuria or dysuria.  No issues of urgency or frequency.

## 2024-09-07 NOTE — PHYSICAL EXAM
[General Appearance - Well Developed] : well developed [General Appearance - Well Nourished] : well nourished [Not Anxious] : not anxious [Normal Appearance] : normal appearance [Well Groomed] : well groomed [General Appearance - In No Acute Distress] : no acute distress [Edema] : no peripheral edema [Respiration, Rhythm And Depth] : normal respiratory rhythm and effort [Abdomen Soft] : soft [Exaggerated Use Of Accessory Muscles For Inspiration] : no accessory muscle use [Abdomen Tenderness] : non-tender [Costovertebral Angle Tenderness] : no ~M costovertebral angle tenderness [Urinary Bladder Findings] : the bladder was normal on palpation [Normal Station and Gait] : the gait and station were normal for the patient's age [] : no rash [No Focal Deficits] : no focal deficits [Oriented To Time, Place, And Person] : oriented to person, place, and time [Affect] : the affect was normal [Mood] : the mood was normal [No Palpable Adenopathy] : no palpable adenopathy

## 2024-09-07 NOTE — PHYSICAL EXAM
[General Appearance - Well Developed] : well developed [General Appearance - Well Nourished] : well nourished [Not Anxious] : not anxious [Normal Appearance] : normal appearance [Well Groomed] : well groomed [General Appearance - In No Acute Distress] : no acute distress [Edema] : no peripheral edema [Respiration, Rhythm And Depth] : normal respiratory rhythm and effort [Exaggerated Use Of Accessory Muscles For Inspiration] : no accessory muscle use [Abdomen Soft] : soft [Abdomen Tenderness] : non-tender [Costovertebral Angle Tenderness] : no ~M costovertebral angle tenderness [Urinary Bladder Findings] : the bladder was normal on palpation [Normal Station and Gait] : the gait and station were normal for the patient's age [] : no rash [No Focal Deficits] : no focal deficits [Oriented To Time, Place, And Person] : oriented to person, place, and time [Affect] : the affect was normal [Mood] : the mood was normal [No Palpable Adenopathy] : no palpable adenopathy

## 2024-09-13 NOTE — ED PROVIDER NOTE - DISCHARGE DATE
-H/o alcohol use. P/w coffee ground emesis with associated chest discomfort. Trops neg. Appears GI in nature.   -Would c/w IV PPI BID, trend CBC, consult GI. -Advance diet as tolerated.   -CIWA monitoring for alc w/d. Vitamin supplementation. -SW consult. Cessation counseling.  -Chest wall abscess. Would consult for I+D. Can send for culture and MRSA swab. -Can c/w empiric skin gavin abx coverage for now. Can see if any prior culture data available. [id number]

## 2024-10-02 ENCOUNTER — APPOINTMENT (OUTPATIENT)
Dept: CT IMAGING | Facility: IMAGING CENTER | Age: 69
End: 2024-10-02
Payer: MEDICARE

## 2024-10-02 ENCOUNTER — OUTPATIENT (OUTPATIENT)
Dept: OUTPATIENT SERVICES | Facility: HOSPITAL | Age: 69
LOS: 1 days | End: 2024-10-02
Payer: COMMERCIAL

## 2024-10-02 DIAGNOSIS — Z96.652 PRESENCE OF LEFT ARTIFICIAL KNEE JOINT: Chronic | ICD-10-CM

## 2024-10-02 DIAGNOSIS — K86.89 OTHER SPECIFIED DISEASES OF PANCREAS: ICD-10-CM

## 2024-10-02 PROCEDURE — 74177 CT ABD & PELVIS W/CONTRAST: CPT

## 2024-10-02 PROCEDURE — 71260 CT THORAX DX C+: CPT

## 2024-10-02 PROCEDURE — 74177 CT ABD & PELVIS W/CONTRAST: CPT | Mod: 26

## 2024-10-02 PROCEDURE — 71260 CT THORAX DX C+: CPT | Mod: 26

## 2024-10-02 PROCEDURE — 82565 ASSAY OF CREATININE: CPT

## 2024-10-02 PROCEDURE — 74178 CT ABD&PLV WO CNTR FLWD CNTR: CPT

## 2024-11-03 ENCOUNTER — OUTPATIENT (OUTPATIENT)
Dept: OUTPATIENT SERVICES | Facility: HOSPITAL | Age: 69
LOS: 1 days | Discharge: HOME | End: 2024-11-03

## 2024-11-03 DIAGNOSIS — Z96.652 PRESENCE OF LEFT ARTIFICIAL KNEE JOINT: Chronic | ICD-10-CM

## 2024-11-03 DIAGNOSIS — C25.9 MALIGNANT NEOPLASM OF PANCREAS, UNSPECIFIED: ICD-10-CM

## 2024-11-04 ENCOUNTER — OUTPATIENT (OUTPATIENT)
Dept: OUTPATIENT SERVICES | Facility: HOSPITAL | Age: 69
LOS: 1 days | End: 2024-11-04

## 2024-11-04 VITALS
DIASTOLIC BLOOD PRESSURE: 70 MMHG | OXYGEN SATURATION: 99 % | HEART RATE: 90 BPM | WEIGHT: 151.9 LBS | HEIGHT: 64 IN | RESPIRATION RATE: 16 BRPM | SYSTOLIC BLOOD PRESSURE: 110 MMHG | TEMPERATURE: 98 F

## 2024-11-04 DIAGNOSIS — N28.89 OTHER SPECIFIED DISORDERS OF KIDNEY AND URETER: ICD-10-CM

## 2024-11-04 DIAGNOSIS — F19.10 OTHER PSYCHOACTIVE SUBSTANCE ABUSE, UNCOMPLICATED: ICD-10-CM

## 2024-11-04 DIAGNOSIS — F11.20 OPIOID DEPENDENCE, UNCOMPLICATED: ICD-10-CM

## 2024-11-04 DIAGNOSIS — Z96.652 PRESENCE OF LEFT ARTIFICIAL KNEE JOINT: Chronic | ICD-10-CM

## 2024-11-04 DIAGNOSIS — E11.9 TYPE 2 DIABETES MELLITUS WITHOUT COMPLICATIONS: ICD-10-CM

## 2024-11-04 DIAGNOSIS — I10 ESSENTIAL (PRIMARY) HYPERTENSION: ICD-10-CM

## 2024-11-04 LAB
A1C WITH ESTIMATED AVERAGE GLUCOSE RESULT: 7.6 % — HIGH (ref 4–5.6)
ALBUMIN SERPL ELPH-MCNC: 4 G/DL — SIGNIFICANT CHANGE UP (ref 3.3–5)
ALP SERPL-CCNC: 116 U/L — SIGNIFICANT CHANGE UP (ref 40–120)
ALT FLD-CCNC: 15 U/L — SIGNIFICANT CHANGE UP (ref 4–33)
ANION GAP SERPL CALC-SCNC: 14 MMOL/L — SIGNIFICANT CHANGE UP (ref 7–14)
AST SERPL-CCNC: 20 U/L — SIGNIFICANT CHANGE UP (ref 4–32)
BASOPHILS # BLD AUTO: 0.03 K/UL — SIGNIFICANT CHANGE UP (ref 0–0.2)
BASOPHILS NFR BLD AUTO: 0.3 % — SIGNIFICANT CHANGE UP (ref 0–2)
BILIRUB SERPL-MCNC: 0.2 MG/DL — SIGNIFICANT CHANGE UP (ref 0.2–1.2)
BLD GP AB SCN SERPL QL: NEGATIVE — SIGNIFICANT CHANGE UP
BUN SERPL-MCNC: 28 MG/DL — HIGH (ref 7–23)
CALCIUM SERPL-MCNC: 9.4 MG/DL — SIGNIFICANT CHANGE UP (ref 8.4–10.5)
CHLORIDE SERPL-SCNC: 100 MMOL/L — SIGNIFICANT CHANGE UP (ref 98–107)
CO2 SERPL-SCNC: 25 MMOL/L — SIGNIFICANT CHANGE UP (ref 22–31)
CREAT SERPL-MCNC: 0.96 MG/DL — SIGNIFICANT CHANGE UP (ref 0.5–1.3)
EGFR: 64 ML/MIN/1.73M2 — SIGNIFICANT CHANGE UP
EOSINOPHIL # BLD AUTO: 0.34 K/UL — SIGNIFICANT CHANGE UP (ref 0–0.5)
EOSINOPHIL NFR BLD AUTO: 3.6 % — SIGNIFICANT CHANGE UP (ref 0–6)
ESTIMATED AVERAGE GLUCOSE: 171 — SIGNIFICANT CHANGE UP
GLUCOSE SERPL-MCNC: 176 MG/DL — HIGH (ref 70–99)
HCT VFR BLD CALC: 36 % — SIGNIFICANT CHANGE UP (ref 34.5–45)
HGB BLD-MCNC: 11.7 G/DL — SIGNIFICANT CHANGE UP (ref 11.5–15.5)
IMM GRANULOCYTES NFR BLD AUTO: 0.4 % — SIGNIFICANT CHANGE UP (ref 0–0.9)
LYMPHOCYTES # BLD AUTO: 1.31 K/UL — SIGNIFICANT CHANGE UP (ref 1–3.3)
LYMPHOCYTES # BLD AUTO: 13.9 % — SIGNIFICANT CHANGE UP (ref 13–44)
MCHC RBC-ENTMCNC: 26.9 PG — LOW (ref 27–34)
MCHC RBC-ENTMCNC: 32.5 G/DL — SIGNIFICANT CHANGE UP (ref 32–36)
MCV RBC AUTO: 82.8 FL — SIGNIFICANT CHANGE UP (ref 80–100)
MONOCYTES # BLD AUTO: 0.52 K/UL — SIGNIFICANT CHANGE UP (ref 0–0.9)
MONOCYTES NFR BLD AUTO: 5.5 % — SIGNIFICANT CHANGE UP (ref 2–14)
NEUTROPHILS # BLD AUTO: 7.16 K/UL — SIGNIFICANT CHANGE UP (ref 1.8–7.4)
NEUTROPHILS NFR BLD AUTO: 76.3 % — SIGNIFICANT CHANGE UP (ref 43–77)
PLATELET # BLD AUTO: 170 K/UL — SIGNIFICANT CHANGE UP (ref 150–400)
POTASSIUM SERPL-MCNC: 4.3 MMOL/L — SIGNIFICANT CHANGE UP (ref 3.5–5.3)
POTASSIUM SERPL-SCNC: 4.3 MMOL/L — SIGNIFICANT CHANGE UP (ref 3.5–5.3)
PROT SERPL-MCNC: 7.4 G/DL — SIGNIFICANT CHANGE UP (ref 6–8.3)
RBC # BLD: 4.35 M/UL — SIGNIFICANT CHANGE UP (ref 3.8–5.2)
RBC # FLD: 18.6 % — HIGH (ref 10.3–14.5)
RH IG SCN BLD-IMP: POSITIVE — SIGNIFICANT CHANGE UP
RH IG SCN BLD-IMP: POSITIVE — SIGNIFICANT CHANGE UP
SODIUM SERPL-SCNC: 139 MMOL/L — SIGNIFICANT CHANGE UP (ref 135–145)
WBC # BLD: 9.4 K/UL — SIGNIFICANT CHANGE UP (ref 3.8–10.5)
WBC # FLD AUTO: 9.4 K/UL — SIGNIFICANT CHANGE UP (ref 3.8–10.5)

## 2024-11-04 NOTE — H&P PST ADULT - HISTORY OF PRESENT ILLNESS
68 year old  female , current smoker,  with hx of  malignant neoplasm of pancreas presents to pre surgical testing.   Pt started to have abdominal pain a few months ago, worsening over time.  Pt s/p workup revealing pancreatic mass, s/p EUS biopsy revealing cancer.  Pt has severe abdominal pain, h/o opioid use.  Pt is scheduled for celiac plexus block with alcohol for neurolysis.     Left renal mass is scheduled for robotic left partial nephrectomy intra op ultrasound 68 year old  female , current smoker,  with hx of  malignant neoplasm of pancreas undergoing chemotherapy - completed by August 1, 2024 ( pt is currently on treatment break from chemotherapy for locally advanced pancreas carcinoma for 5 weeks as per oncology/pt) .    CT scan in April 2023 for abdominal pain which revealed a 4cm cystic/sold left renal mass and a separate pancreatic mass. She has been undergoing chemotherapy for the pancreatic cancer. Her last treatment was the end of July, the mass decreased and the chemotherapy is currently on pause for period of time she tells me is about 5 weeks.  presents to pre surgical testing.   Pt started to have aThe patient last had imaging done in April of this year. Her pancreatic tumor had seen to decrease in size based on that imaging. The renal mass was measuring 4.7 cm at that time with mixed cystic and solid components. The imaging features of the renal mass are concerning for renal cell carcinoma.  ?  Left renal mass is scheduled for robotic left partial nephrectomy intra op ultrasound 68 year old  female , current smoker , reports pt  had CT scan in 03/ 2023 for abdominal pain which revealed a 4cm cystic/sold left renal mass and a separate pancreatic mass of which pt is undergoing chemotherapy - completed by August 1, 2024 ( pt is currently on treatment break from chemotherapy for locally advanced pancreas carcinoma for 5 weeks as per oncology/pt). Imaging showed 04/24 pancreatic tumor had seen to decrease in size and renal mass was measuring 4.7 cm at that time with mixed cystic and solid components , concerning for renal cell carcinoma. Pt presents to PST with pre op dx of left renal mass is scheduled for robotic left partial nephrectomy intra op ultrasound.

## 2024-11-04 NOTE — H&P PST ADULT - PROBLEM SELECTOR PLAN 1
Patient tentatively scheduled for   on  Pre-op instructions provided. Pt given verbal and written instructions with teach back on chlorhexidine wash  and omeprazole. Pt verbalized understanding with return demonstration. Patient tentatively scheduled for robotic left partial nephrectomy intra op ultrasound on 11/11/2024.  Pre-op instructions provided. Pt given verbal and written instructions with teach back on chlorhexidine wash  and omeprazole. Pt verbalized understanding with return demonstration.  Labs done.

## 2024-11-04 NOTE — H&P PST ADULT - GASTROINTESTINAL COMMENTS
hx of pancreatic cancer - follows oncologist hx of pancreatic cancer ,c/o  abdominal pain 03/2023- workup revealing pancreatic mass, s/p EUS biopsy revealing cancer.  Pt has severe abdominal pain, h/o opioid use s/p  celiac plexus block with alcohol for neurolysis 2023. - follows oncologist hx of pancreatic cancer ,c/o  abdominal pain 03/2023- workup revealing pancreatic mass, s/p EUS biopsy revealing cancer.  Pt has severe abdominal pain, h/o opioid use s/p  celiac plexus block with alcohol for neurolysis 2023. - follows oncologist   . Last chemo 08/24  , is on chemo brek for 5 weeks as per  pt. pt  is not a surgical candidate  for resection  due to encasement of blood vessels by tumor.

## 2024-11-04 NOTE — H&P PST ADULT - NSICDXPASTMEDICALHX_GEN_ALL_CORE_FT
PAST MEDICAL HISTORY:  Asthma     Diabetes     HTN (hypertension)     Left renal mass     Liver, polycystic     Malignant neoplasm of pancreas     Renal cyst

## 2024-11-04 NOTE — H&P PST ADULT - PROBLEM SELECTOR PLAN 4
As per oncologist last notes - patient endorsed to using Oral Prescription Narcotics transitioned to Heroin inhalation uses frequently multiple times of the day since past several years, no h/o Overdose - Family / friends unaware  Attends AA & NA & other support groups - was requesting assistance / help - Social Work intervention was made - provided pt with information & resources to seek help. Narcan prescription sent to local pharmacy - pt advised to use if needed    Today, at PST , pt denies using of heroin since 04/2022.  DT precautions As per oncologist last notes 09/04/24 - patient endorsed to using Oral Prescription Narcotics transitioned to Heroin inhalation uses frequently multiple times of the day since past several years, no h/o Overdose - Family / friends unaware . Attends AA & NA & other support groups - was requesting assistance / help - Social Work intervention was made - provided pt with information & resources to seek help. Narcan prescription sent to local pharmacy - pt advised to use if needed .   copy of last notes of oncologist in chart.    Today, at [facility] , pt denies using of heroin since 04/2022.  Discussed case with Anesthesiologist Dr.Leo Vogel . Recommended for post op pain management.  Informed pain management for post op pain management evaluation .

## 2024-11-04 NOTE — H&P PST ADULT - GENITOURINARY COMMENTS
Pre op dx- Left renal mass 03/ 2023 for abdominal pain which revealed a 4cm cystic/sold left renal mass - followed surgeon

## 2024-11-11 ENCOUNTER — INPATIENT (INPATIENT)
Facility: HOSPITAL | Age: 69
LOS: 1 days | Discharge: HOME | End: 2024-11-13
Attending: UROLOGY | Admitting: UROLOGY
Payer: MEDICARE

## 2024-11-11 ENCOUNTER — APPOINTMENT (OUTPATIENT)
Dept: UROLOGY | Facility: HOSPITAL | Age: 69
End: 2024-11-11

## 2024-11-11 ENCOUNTER — TRANSCRIPTION ENCOUNTER (OUTPATIENT)
Age: 69
End: 2024-11-11

## 2024-11-11 ENCOUNTER — RESULT REVIEW (OUTPATIENT)
Age: 69
End: 2024-11-11

## 2024-11-11 VITALS
RESPIRATION RATE: 16 BRPM | TEMPERATURE: 99 F | HEIGHT: 64 IN | OXYGEN SATURATION: 100 % | DIASTOLIC BLOOD PRESSURE: 87 MMHG | HEART RATE: 85 BPM | WEIGHT: 151.9 LBS | SYSTOLIC BLOOD PRESSURE: 116 MMHG

## 2024-11-11 DIAGNOSIS — Z96.652 PRESENCE OF LEFT ARTIFICIAL KNEE JOINT: Chronic | ICD-10-CM

## 2024-11-11 DIAGNOSIS — N28.89 OTHER SPECIFIED DISORDERS OF KIDNEY AND URETER: ICD-10-CM

## 2024-11-11 LAB
GLUCOSE BLDC GLUCOMTR-MCNC: 162 MG/DL — HIGH (ref 70–99)
GLUCOSE BLDC GLUCOMTR-MCNC: 199 MG/DL — HIGH (ref 70–99)
GLUCOSE BLDC GLUCOMTR-MCNC: 246 MG/DL — HIGH (ref 70–99)
GLUCOSE BLDC GLUCOMTR-MCNC: 251 MG/DL — HIGH (ref 70–99)

## 2024-11-11 PROCEDURE — 88342 IMHCHEM/IMCYTCHM 1ST ANTB: CPT | Mod: 26

## 2024-11-11 PROCEDURE — 76998 US GUIDE INTRAOP: CPT | Mod: 26

## 2024-11-11 PROCEDURE — 88307 TISSUE EXAM BY PATHOLOGIST: CPT | Mod: 26

## 2024-11-11 PROCEDURE — 50543 LAPARO PARTIAL NEPHRECTOMY: CPT | Mod: AS,LT

## 2024-11-11 PROCEDURE — 50543 LAPARO PARTIAL NEPHRECTOMY: CPT | Mod: LT

## 2024-11-11 PROCEDURE — 88341 IMHCHEM/IMCYTCHM EA ADD ANTB: CPT | Mod: 26,59

## 2024-11-11 DEVICE — LIGATING CLIPS WECK HEMOLOK POLYMER LARGE (PURPLE) 6: Type: IMPLANTABLE DEVICE | Status: FUNCTIONAL

## 2024-11-11 RX ORDER — METFORMIN HYDROCHLORIDE 500 MG/1
1 TABLET, EXTENDED RELEASE ORAL
Refills: 0 | DISCHARGE

## 2024-11-11 RX ORDER — SENNA 187 MG
2 TABLET ORAL AT BEDTIME
Refills: 0 | Status: DISCONTINUED | OUTPATIENT
Start: 2024-11-11 | End: 2024-11-13

## 2024-11-11 RX ORDER — ACETAMINOPHEN 500 MG
975 TABLET ORAL EVERY 6 HOURS
Refills: 0 | Status: DISCONTINUED | OUTPATIENT
Start: 2024-11-11 | End: 2024-11-12

## 2024-11-11 RX ORDER — INSULIN LISPRO 100/ML
VIAL (ML) SUBCUTANEOUS AT BEDTIME
Refills: 0 | Status: DISCONTINUED | OUTPATIENT
Start: 2024-11-11 | End: 2024-11-13

## 2024-11-11 RX ORDER — KETOROLAC TROMETHAMINE 30 MG/ML
15 INJECTION INTRAMUSCULAR; INTRAVENOUS EVERY 6 HOURS
Refills: 0 | Status: DISCONTINUED | OUTPATIENT
Start: 2024-11-11 | End: 2024-11-13

## 2024-11-11 RX ORDER — MAGNESIUM OXIDE 400 MG/1
400 TABLET ORAL DAILY
Refills: 0 | Status: DISCONTINUED | OUTPATIENT
Start: 2024-11-11 | End: 2024-11-13

## 2024-11-11 RX ORDER — AMLODIPINE BESYLATE 10 MG
10 TABLET ORAL AT BEDTIME
Refills: 0 | Status: DISCONTINUED | OUTPATIENT
Start: 2024-11-11 | End: 2024-11-13

## 2024-11-11 RX ORDER — ALPRAZOLAM 0.25 MG
0.25 TABLET ORAL ONCE
Refills: 0 | Status: DISCONTINUED | OUTPATIENT
Start: 2024-11-11 | End: 2024-11-13

## 2024-11-11 RX ORDER — PANTOPRAZOLE SODIUM 40 MG/1
40 TABLET, DELAYED RELEASE ORAL
Refills: 0 | Status: DISCONTINUED | OUTPATIENT
Start: 2024-11-11 | End: 2024-11-13

## 2024-11-11 RX ORDER — INSULIN LISPRO 100/ML
VIAL (ML) SUBCUTANEOUS
Refills: 0 | Status: DISCONTINUED | OUTPATIENT
Start: 2024-11-11 | End: 2024-11-13

## 2024-11-11 RX ORDER — HYDRALAZINE HYDROCHLORIDE 50 MG/1
10 TABLET, FILM COATED ORAL ONCE
Refills: 0 | Status: COMPLETED | OUTPATIENT
Start: 2024-11-11 | End: 2024-11-11

## 2024-11-11 RX ORDER — GLUCAGON INJECTION, SOLUTION 1 MG/.2ML
1 INJECTION, SOLUTION SUBCUTANEOUS ONCE
Refills: 0 | Status: DISCONTINUED | OUTPATIENT
Start: 2024-11-11 | End: 2024-11-13

## 2024-11-11 RX ORDER — OMEPRAZOLE 10 MG
1 CAPSULE,DELAYED RELEASE (ENTERIC COATED) ORAL
Refills: 0 | DISCHARGE

## 2024-11-11 RX ORDER — ONDANSETRON HYDROCHLORIDE 2 MG/ML
4 INJECTION, SOLUTION INTRAMUSCULAR; INTRAVENOUS EVERY 8 HOURS
Refills: 0 | Status: DISCONTINUED | OUTPATIENT
Start: 2024-11-11 | End: 2024-11-13

## 2024-11-11 RX ORDER — AMLODIPINE BESYLATE 10 MG
1 TABLET ORAL
Refills: 0 | DISCHARGE

## 2024-11-11 RX ORDER — ONDANSETRON HYDROCHLORIDE 2 MG/ML
4 INJECTION, SOLUTION INTRAMUSCULAR; INTRAVENOUS ONCE
Refills: 0 | Status: COMPLETED | OUTPATIENT
Start: 2024-11-11 | End: 2024-11-11

## 2024-11-11 RX ORDER — LOSARTAN POTASSIUM 25 MG/1
50 TABLET ORAL DAILY
Refills: 0 | Status: DISCONTINUED | OUTPATIENT
Start: 2024-11-11 | End: 2024-11-13

## 2024-11-11 RX ORDER — HEPARIN SODIUM 10000 [USP'U]/ML
5000 INJECTION INTRAVENOUS; SUBCUTANEOUS EVERY 8 HOURS
Refills: 0 | Status: DISCONTINUED | OUTPATIENT
Start: 2024-11-11 | End: 2024-11-13

## 2024-11-11 RX ADMIN — HYDRALAZINE HYDROCHLORIDE 10 MILLIGRAM(S): 50 TABLET, FILM COATED ORAL at 18:14

## 2024-11-11 RX ADMIN — Medication 10 MILLIGRAM(S): at 22:31

## 2024-11-11 RX ADMIN — Medication 975 MILLIGRAM(S): at 22:32

## 2024-11-11 RX ADMIN — Medication 125 MILLILITER(S): at 19:00

## 2024-11-11 RX ADMIN — KETOROLAC TROMETHAMINE 15 MILLIGRAM(S): 30 INJECTION INTRAMUSCULAR; INTRAVENOUS at 22:49

## 2024-11-11 RX ADMIN — Medication 1: at 22:57

## 2024-11-11 RX ADMIN — KETOROLAC TROMETHAMINE 15 MILLIGRAM(S): 30 INJECTION INTRAMUSCULAR; INTRAVENOUS at 22:31

## 2024-11-11 RX ADMIN — Medication 975 MILLIGRAM(S): at 22:49

## 2024-11-11 RX ADMIN — HEPARIN SODIUM 5000 UNIT(S): 10000 INJECTION INTRAVENOUS; SUBCUTANEOUS at 22:31

## 2024-11-11 RX ADMIN — ONDANSETRON HYDROCHLORIDE 4 MILLIGRAM(S): 2 INJECTION, SOLUTION INTRAMUSCULAR; INTRAVENOUS at 18:26

## 2024-11-11 NOTE — ASU PREOP CHECKLIST - IV STARTED
Patient seen in follow up for ASUNCION; no distress.    MEDICATIONS  (STANDING):  aspirin 325 milliGRAM(s) Oral daily  atorvastatin 40 milliGRAM(s) Oral at bedtime  calcium carbonate 500 mG (Tums) Chewable 1 Tablet(s) Chew three times a day  chlorhexidine 4% Liquid 1 Application(s) Topical daily  clopidogrel Tablet 75 milliGRAM(s) Oral daily  dextrose 5%. 1000 milliLiter(s) (50 mL/Hr) IV Continuous <Continuous>  dextrose 50% Injectable 12.5 Gram(s) IV Push once  dextrose 50% Injectable 25 Gram(s) IV Push once  dextrose 50% Injectable 25 Gram(s) IV Push once  docusate sodium 100 milliGRAM(s) Oral three times a day  influenza   Vaccine 0.5 milliLiter(s) IntraMuscular once  insulin glargine Injectable (LANTUS) 10 Unit(s) SubCutaneous at bedtime  insulin lispro (HumaLOG) corrective regimen sliding scale   SubCutaneous three times a day before meals  lactobacillus acidophilus 1 Tablet(s) Oral two times a day with meals  pregabalin 100 milliGRAM(s) Oral two times a day  sodium chloride 0.9%. 1000 milliLiter(s) (80 mL/Hr) IV Continuous <Continuous>  sucralfate suspension 1 Gram(s) Oral four times a day    MEDICATIONS  (PRN):  aluminum hydroxide/magnesium hydroxide/simethicone Suspension 30 milliLiter(s) Oral every 6 hours PRN Dyspepsia  dextrose Gel 1 Dose(s) Oral once PRN Blood Glucose LESS THAN 70 milliGRAM(s)/deciliter  glucagon  Injectable 1 milliGRAM(s) IntraMuscular once PRN Glucose LESS THAN 70 milligrams/deciliter  oxyCODONE    5 mG/acetaminophen 325 mG 1 Tablet(s) Oral every 8 hours PRN Moderate Pain (4 - 6)    PHYSICAL EXAM:      T(C): 36.4 (10-06-17 @ 11:11), Max: 36.6 (10-05-17 @ 17:00)  HR: 84 (10-06-17 @ 11:11) (70 - 84)  BP: 130/71 (10-06-17 @ 11:11) (130/71 - 151/70)  RR: 17 (10-06-17 @ 11:11) (17 - 17)  SpO2: 97% (10-06-17 @ 11:11) (97% - 98%)  Wt(kg): --  Respiratory: clear anteriorly, decreased BS at bases  Cardiovascular: S1 S2  Gastrointestinal: soft NT ND +BS  Extremities:    tr edema  BKA                                    7.7    5.3   )-----------( 112      ( 06 Oct 2017 08:24 )             23.6     10-06    137  |  105  |  122<H>  ----------------------------<  116<H>  5.3   |  21<L>  |  6.13<H>    Ca    9.5      06 Oct 2017 08:24  Phos  2.2     10-06  Mg     2.1     10-06    TPro  5.3<L>  /  Alb  2.2<L>  /  TBili  0.3  /  DBili  x   /  AST  13<L>  /  ALT  9<L>  /  AlkPhos  38<L>  10-05      LIVER FUNCTIONS - ( 05 Oct 2017 08:15 )  Alb: 2.2 g/dL / Pro: 5.3 gm/dL / ALK PHOS: 38 U/L / ALT: 9 U/L / AST: 13 U/L / GGT: x             Assessment and Plan:  ASUNCION suspected AIN vs ATN; non oliguric.  Continue supportive care; no indication for HD. yes

## 2024-11-11 NOTE — PATIENT PROFILE ADULT - FALL HARM RISK - HARM RISK INTERVENTIONS

## 2024-11-11 NOTE — PROGRESS NOTE ADULT - SUBJECTIVE AND OBJECTIVE BOX
Post op Check    Pt seen and examined complaining of nausea. Pain is controlled. Denies SOB/CP    Vital Signs Last 24 Hrs  T(C): 36.4 (11 Nov 2024 16:40), Max: 37 (11 Nov 2024 11:11)  T(F): 97.5 (11 Nov 2024 16:40), Max: 98.6 (11 Nov 2024 11:11)  HR: 78 (11 Nov 2024 18:30) (69 - 85)  BP: 156/81 (11 Nov 2024 18:15) (116/87 - 172/91)  BP(mean): 104 (11 Nov 2024 18:15) (98 - 112)  RR: 26 (11 Nov 2024 18:30) (16 - 26)  SpO2: 100% (11 Nov 2024 18:30) (98% - 100%)        I&O's Summary    11 Nov 2024 07:01  -  11 Nov 2024 19:17  --------------------------------------------------------  IN: 375 mL / OUT: 275 mL / NET: 100 mL        Physical Exam  Gen: NAD  Pulm: No respiratory distress  CV: RRR  Abd: Soft, mildly distended, small dressings c/d/i  : + davison, light pink urine

## 2024-11-12 LAB
ANION GAP SERPL CALC-SCNC: 15 MMOL/L — HIGH (ref 7–14)
BUN SERPL-MCNC: 22 MG/DL — SIGNIFICANT CHANGE UP (ref 7–23)
CALCIUM SERPL-MCNC: 9.5 MG/DL — SIGNIFICANT CHANGE UP (ref 8.4–10.5)
CHLORIDE SERPL-SCNC: 97 MMOL/L — LOW (ref 98–107)
CO2 SERPL-SCNC: 24 MMOL/L — SIGNIFICANT CHANGE UP (ref 22–31)
CREAT SERPL-MCNC: 0.95 MG/DL — SIGNIFICANT CHANGE UP (ref 0.5–1.3)
EGFR: 65 ML/MIN/1.73M2 — SIGNIFICANT CHANGE UP
GLUCOSE BLDC GLUCOMTR-MCNC: 173 MG/DL — HIGH (ref 70–99)
GLUCOSE BLDC GLUCOMTR-MCNC: 174 MG/DL — HIGH (ref 70–99)
GLUCOSE BLDC GLUCOMTR-MCNC: 192 MG/DL — HIGH (ref 70–99)
GLUCOSE BLDC GLUCOMTR-MCNC: 216 MG/DL — HIGH (ref 70–99)
GLUCOSE SERPL-MCNC: 216 MG/DL — HIGH (ref 70–99)
HCT VFR BLD CALC: 35.7 % — SIGNIFICANT CHANGE UP (ref 34.5–45)
HGB BLD-MCNC: 11.8 G/DL — SIGNIFICANT CHANGE UP (ref 11.5–15.5)
MCHC RBC-ENTMCNC: 26.6 PG — LOW (ref 27–34)
MCHC RBC-ENTMCNC: 33.1 G/DL — SIGNIFICANT CHANGE UP (ref 32–36)
MCV RBC AUTO: 80.6 FL — SIGNIFICANT CHANGE UP (ref 80–100)
NRBC # BLD: 0 /100 WBCS — SIGNIFICANT CHANGE UP (ref 0–0)
NRBC # FLD: 0 K/UL — SIGNIFICANT CHANGE UP (ref 0–0)
PLATELET # BLD AUTO: 184 K/UL — SIGNIFICANT CHANGE UP (ref 150–400)
POTASSIUM SERPL-MCNC: 3.8 MMOL/L — SIGNIFICANT CHANGE UP (ref 3.5–5.3)
POTASSIUM SERPL-SCNC: 3.8 MMOL/L — SIGNIFICANT CHANGE UP (ref 3.5–5.3)
RBC # BLD: 4.43 M/UL — SIGNIFICANT CHANGE UP (ref 3.8–5.2)
RBC # FLD: 18 % — HIGH (ref 10.3–14.5)
SODIUM SERPL-SCNC: 136 MMOL/L — SIGNIFICANT CHANGE UP (ref 135–145)
WBC # BLD: 13.66 K/UL — HIGH (ref 3.8–10.5)
WBC # FLD AUTO: 13.66 K/UL — HIGH (ref 3.8–10.5)

## 2024-11-12 PROCEDURE — 99223 1ST HOSP IP/OBS HIGH 75: CPT

## 2024-11-12 RX ORDER — BUPRENORPHINE HYDROCHLORIDE, NALOXONE HYDROCHLORIDE 12; 3 MG/1; MG/1
2 FILM, SOLUBLE BUCCAL; SUBLINGUAL ONCE
Refills: 0 | Status: DISCONTINUED | OUTPATIENT
Start: 2024-11-12 | End: 2024-11-12

## 2024-11-12 RX ORDER — BUPRENORPHINE HYDROCHLORIDE, NALOXONE HYDROCHLORIDE 12; 3 MG/1; MG/1
2 FILM, SOLUBLE BUCCAL; SUBLINGUAL THREE TIMES A DAY
Refills: 0 | Status: DISCONTINUED | OUTPATIENT
Start: 2024-11-12 | End: 2024-11-13

## 2024-11-12 RX ORDER — ACETAMINOPHEN 500 MG
1000 TABLET ORAL ONCE
Refills: 0 | Status: COMPLETED | OUTPATIENT
Start: 2024-11-12 | End: 2024-11-12

## 2024-11-12 RX ORDER — OXYCODONE HYDROCHLORIDE 30 MG/1
5 TABLET ORAL EVERY 4 HOURS
Refills: 0 | Status: DISCONTINUED | OUTPATIENT
Start: 2024-11-12 | End: 2024-11-12

## 2024-11-12 RX ORDER — ACETAMINOPHEN 500 MG
975 TABLET ORAL EVERY 6 HOURS
Refills: 0 | Status: DISCONTINUED | OUTPATIENT
Start: 2024-11-12 | End: 2024-11-13

## 2024-11-12 RX ADMIN — Medication 975 MILLIGRAM(S): at 13:13

## 2024-11-12 RX ADMIN — MAGNESIUM OXIDE 400 MILLIGRAM(S): 400 TABLET ORAL at 13:13

## 2024-11-12 RX ADMIN — KETOROLAC TROMETHAMINE 15 MILLIGRAM(S): 30 INJECTION INTRAMUSCULAR; INTRAVENOUS at 05:35

## 2024-11-12 RX ADMIN — Medication 1: at 16:36

## 2024-11-12 RX ADMIN — KETOROLAC TROMETHAMINE 15 MILLIGRAM(S): 30 INJECTION INTRAMUSCULAR; INTRAVENOUS at 10:10

## 2024-11-12 RX ADMIN — KETOROLAC TROMETHAMINE 15 MILLIGRAM(S): 30 INJECTION INTRAMUSCULAR; INTRAVENOUS at 04:35

## 2024-11-12 RX ADMIN — Medication 10 MILLIGRAM(S): at 22:34

## 2024-11-12 RX ADMIN — ONDANSETRON HYDROCHLORIDE 4 MILLIGRAM(S): 2 INJECTION, SOLUTION INTRAMUSCULAR; INTRAVENOUS at 05:49

## 2024-11-12 RX ADMIN — Medication 1000 MILLIGRAM(S): at 03:46

## 2024-11-12 RX ADMIN — KETOROLAC TROMETHAMINE 15 MILLIGRAM(S): 30 INJECTION INTRAMUSCULAR; INTRAVENOUS at 16:37

## 2024-11-12 RX ADMIN — LOSARTAN POTASSIUM 50 MILLIGRAM(S): 25 TABLET ORAL at 05:36

## 2024-11-12 RX ADMIN — OXYCODONE HYDROCHLORIDE 5 MILLIGRAM(S): 30 TABLET ORAL at 06:14

## 2024-11-12 RX ADMIN — BUPRENORPHINE HYDROCHLORIDE, NALOXONE HYDROCHLORIDE 2 TABLET(S): 12; 3 FILM, SOLUBLE BUCCAL; SUBLINGUAL at 12:07

## 2024-11-12 RX ADMIN — HEPARIN SODIUM 5000 UNIT(S): 10000 INJECTION INTRAVENOUS; SUBCUTANEOUS at 13:14

## 2024-11-12 RX ADMIN — Medication 1: at 11:33

## 2024-11-12 RX ADMIN — Medication 10 MILLIGRAM(S): at 11:34

## 2024-11-12 RX ADMIN — Medication 2: at 07:46

## 2024-11-12 RX ADMIN — PANTOPRAZOLE SODIUM 40 MILLIGRAM(S): 40 TABLET, DELAYED RELEASE ORAL at 05:37

## 2024-11-12 RX ADMIN — BUPRENORPHINE HYDROCHLORIDE, NALOXONE HYDROCHLORIDE 2 TABLET(S): 12; 3 FILM, SOLUBLE BUCCAL; SUBLINGUAL at 22:34

## 2024-11-12 RX ADMIN — KETOROLAC TROMETHAMINE 15 MILLIGRAM(S): 30 INJECTION INTRAMUSCULAR; INTRAVENOUS at 09:30

## 2024-11-12 RX ADMIN — Medication 975 MILLIGRAM(S): at 14:13

## 2024-11-12 RX ADMIN — HEPARIN SODIUM 5000 UNIT(S): 10000 INJECTION INTRAVENOUS; SUBCUTANEOUS at 05:37

## 2024-11-12 RX ADMIN — OXYCODONE HYDROCHLORIDE 5 MILLIGRAM(S): 30 TABLET ORAL at 07:09

## 2024-11-12 RX ADMIN — HEPARIN SODIUM 5000 UNIT(S): 10000 INJECTION INTRAVENOUS; SUBCUTANEOUS at 22:05

## 2024-11-12 RX ADMIN — Medication 400 MILLIGRAM(S): at 02:46

## 2024-11-12 RX ADMIN — KETOROLAC TROMETHAMINE 15 MILLIGRAM(S): 30 INJECTION INTRAMUSCULAR; INTRAVENOUS at 17:30

## 2024-11-12 RX ADMIN — BUPRENORPHINE HYDROCHLORIDE, NALOXONE HYDROCHLORIDE 2 TABLET(S): 12; 3 FILM, SOLUBLE BUCCAL; SUBLINGUAL at 16:53

## 2024-11-12 NOTE — BH CONSULTATION LIAISON ASSESSMENT NOTE - NSUNABLEASSESSCOMMENT_PSY_ALL_CORE
Patient appears uncomfortable and lethargic. Generally uncooperative with examination beyond assessing for withdrawal symptoms.

## 2024-11-12 NOTE — BH CONSULTATION LIAISON ASSESSMENT NOTE - DIFFERENTIAL
Most likely acute opiate withdrawal secondary to opiate use disorder. Other must-not-misses include post-operative delirium or serotonin syndrome.

## 2024-11-12 NOTE — BRIEF OPERATIVE NOTE - FIRST ASSIST PARTICIPATION DETAILS - (COMPONENTS OF THE PROCEDURE THAT ASSISTANT PARTICIPATED IN)
I, Jose Watson PA-C, served as the first assistant in this operation. I assisted in placing ports, docking, and targeting the da Riaz robot, first assisted at the surgical field while the surgeon was performing the operation at the robotic console by providing instrument exchanges, tissue retraction, suction and irrigation, specimen retrieval, passing and removing sutures and sponges, undocking the robotic platform, and closed surgical wounds.

## 2024-11-12 NOTE — PROGRESS NOTE ADULT - SUBJECTIVE AND OBJECTIVE BOX
POD #1  101.8 this AM; 156/73 80 100%RA  Pt admits to heroin and oxy use daily; feels the fever is from withdrawal; also nauseous  Abd- soft NT ND; no flatus      wounds C&D  [place] 850

## 2024-11-12 NOTE — CONSULT NOTE ADULT - SUBJECTIVE AND OBJECTIVE BOX
Patient is a 69y old  Female who presents with a chief complaint of " I am having left partial kidney surgery" (04 Nov 2024 07:57)      HPI:  68 year old  female , current smoker , reports pt  had CT scan in 03/ 2023 for abdominal pain which revealed a 4cm cystic/sold left renal mass and a separate pancreatic mass of which pt is undergoing chemotherapy - completed by August 1, 2024 ( pt is currently on treatment break from chemotherapy for locally advanced pancreas carcinoma for 5 weeks as per oncology/pt). Imaging showed 04/24 pancreatic tumor had seen to decrease in size and renal mass was measuring 4.7 cm at that time with mixed cystic and solid components , concerning for renal cell carcinoma. Pt presents to PST with pre op dx of left renal mass is scheduled for robotic left partial nephrectomy intra op ultrasound.  (04 Nov 2024 07:57)        Allergies    shellfish (Rash)  No Known Drug Allergies    Intolerances        HOME MEDICATIONS: Reviewed    MEDICATIONS  (STANDING):  amLODIPine   Tablet 10 milliGRAM(s) Oral at bedtime  bisacodyl Suppository 10 milliGRAM(s) Rectal once  dextrose 5%. 1000 milliLiter(s) (50 mL/Hr) IV Continuous <Continuous>  dextrose 5%. 1000 milliLiter(s) (100 mL/Hr) IV Continuous <Continuous>  dextrose 50% Injectable 25 Gram(s) IV Push once  dextrose 50% Injectable 25 Gram(s) IV Push once  dextrose 50% Injectable 12.5 Gram(s) IV Push once  glucagon  Injectable 1 milliGRAM(s) IntraMuscular once  heparin   Injectable 5000 Unit(s) SubCutaneous every 8 hours  insulin lispro (ADMELOG) corrective regimen sliding scale   SubCutaneous at bedtime  insulin lispro (ADMELOG) corrective regimen sliding scale   SubCutaneous three times a day before meals  ketorolac   Injectable 15 milliGRAM(s) IV Push every 6 hours  losartan 50 milliGRAM(s) Oral daily  magnesium oxide 400 milliGRAM(s) Oral daily  pantoprazole    Tablet 40 milliGRAM(s) Oral before breakfast    MEDICATIONS  (PRN):  acetaminophen     Tablet .. 975 milliGRAM(s) Oral every 6 hours PRN Temp greater or equal to 38C (100.4F)  ALPRAZolam 0.25 milliGRAM(s) Oral once PRN anxiety/tension  dextrose Oral Gel 15 Gram(s) Oral once PRN Blood Glucose LESS THAN 70 milliGRAM(s)/deciliter  ondansetron Injectable 4 milliGRAM(s) IV Push every 8 hours PRN Nausea and/or Vomiting  oxyCODONE    IR 5 milliGRAM(s) Oral every 4 hours PRN Severe Pain (7 - 10), Moderate pain (4-6)  senna 2 Tablet(s) Oral at bedtime PRN Constipation      PAST MEDICAL & SURGICAL HISTORY:  Diabetes      HTN (hypertension)      Liver, polycystic      Renal cyst      Malignant neoplasm of pancreas      Asthma      Left renal mass      History of knee replacement, total, left          SOCIAL HISTORY:      FAMILY HISTORY:  FH: diabetes mellitus (Father)        REVIEW OF SYSTEMS:      [] Unable to obtain due to poor mental status    Vital Signs Last 24 Hrs  T(C): 37.8 (12 Nov 2024 09:54), Max: 38.6 (12 Nov 2024 05:30)  T(F): 100 (12 Nov 2024 09:54), Max: 101.4 (12 Nov 2024 05:30)  HR: 87 (12 Nov 2024 09:54) (69 - 101)  BP: 164/78 (12 Nov 2024 09:54) (116/87 - 172/91)  BP(mean): 94 (11 Nov 2024 22:00) (89 - 112)  RR: 19 (12 Nov 2024 09:54) (14 - 26)  SpO2: 99% (12 Nov 2024 09:54) (96% - 100%)    Parameters below as of 12 Nov 2024 09:54  Patient On (Oxygen Delivery Method): room air      CAPILLARY BLOOD GLUCOSE      POCT Blood Glucose.: 216 mg/dL (12 Nov 2024 07:17)  POCT Blood Glucose.: 251 mg/dL (11 Nov 2024 22:51)  POCT Blood Glucose.: 246 mg/dL (11 Nov 2024 21:49)  POCT Blood Glucose.: 199 mg/dL (11 Nov 2024 17:11)  POCT Blood Glucose.: 162 mg/dL (11 Nov 2024 11:11)      PHYSICAL EXAM:      LABS:                        11.8   13.66 )-----------( 184      ( 12 Nov 2024 06:11 )             35.7     11-12    136  |  97[L]  |  22  ----------------------------<  216[H]  3.8   |  24  |  0.95    Ca    9.5      12 Nov 2024 06:11        Urinalysis Basic - ( 12 Nov 2024 06:11 )    Color: x / Appearance: x / SG: x / pH: x  Gluc: 216 mg/dL / Ketone: x  / Bili: x / Urobili: x   Blood: x / Protein: x / Nitrite: x   Leuk Esterase: x / RBC: x / WBC x   Sq Epi: x / Non Sq Epi: x / Bacteria: x      CAPILLARY BLOOD GLUCOSE      POCT Blood Glucose.: 216 mg/dL (12 Nov 2024 07:17)      RADIOLOGY & ADDITIONAL STUDIES:    Imaging:   Personally Reviewed:  [ ] YES               EKG:   Personally Reviewed:  [ ] YES       Consultant(s) notes reviewed:    Care Discussed with Consultant(s)/Other Providers:  Care Discussed with Primary Team.      [] Increased delirium risk  [] Delirium and other risks can be reduced by:          -early ambulation          -minimizing "tethers" - IV, oxygen, catheters, etc          -avoiding hypnotics and sedatives          -maintaining hydration/nutrition          -avoid anticholinergics - diphenhydramine, etc          -pain control          -supportive environment   Patient is a 69y old  Female who presents with a chief complaint of " I am having left partial kidney surgery" (04 Nov 2024 07:57)      HPI:  68 yo F w/ HTN, DM2, pancreatic cancer (d/x April 2023, s/p EUS, FNA w/ path showing adenocarcinoma) s/p chemotherapy, R renal mass concerning for renal cell carcinoma, substance abuse (narcotics, hx smoking), admitted for urological procedure, s/p Robotic assisted L partial nephrectomy on 11/11, now POD#1, course c/b fever and complaints of nausea.     In terms of fever, Tmax 38.6C this AM. Denies sore throat, cough, SOB, diarrhea, dysuria, rash, no sick contacts. Discussed differential for post-operative fever, encouraged use of incentive spirometry.     Patient reports no current pain, but had abdominal discomfort earlier, in addition to continued nausea. Has IV zofran prn. Expressed concern for opioid withdrawal. Per review of outpatient records, patient with use of opioids and heroin. Discussed plan for pain/addiction medicine followup. Patient in agreement, wishing to end interview early given symptoms.         Allergies  shellfish (Rash)  No Known Drug Allergies    Intolerances    HOME MEDICATIONS: Reviewed    MEDICATIONS  (STANDING):  amLODIPine   Tablet 10 milliGRAM(s) Oral at bedtime  bisacodyl Suppository 10 milliGRAM(s) Rectal once  dextrose 5%. 1000 milliLiter(s) (50 mL/Hr) IV Continuous <Continuous>  dextrose 5%. 1000 milliLiter(s) (100 mL/Hr) IV Continuous <Continuous>  dextrose 50% Injectable 25 Gram(s) IV Push once  dextrose 50% Injectable 25 Gram(s) IV Push once  dextrose 50% Injectable 12.5 Gram(s) IV Push once  glucagon  Injectable 1 milliGRAM(s) IntraMuscular once  heparin   Injectable 5000 Unit(s) SubCutaneous every 8 hours  insulin lispro (ADMELOG) corrective regimen sliding scale   SubCutaneous at bedtime  insulin lispro (ADMELOG) corrective regimen sliding scale   SubCutaneous three times a day before meals  ketorolac   Injectable 15 milliGRAM(s) IV Push every 6 hours  losartan 50 milliGRAM(s) Oral daily  magnesium oxide 400 milliGRAM(s) Oral daily  pantoprazole    Tablet 40 milliGRAM(s) Oral before breakfast    MEDICATIONS  (PRN):  acetaminophen     Tablet .. 975 milliGRAM(s) Oral every 6 hours PRN Temp greater or equal to 38C (100.4F)  ALPRAZolam 0.25 milliGRAM(s) Oral once PRN anxiety/tension  dextrose Oral Gel 15 Gram(s) Oral once PRN Blood Glucose LESS THAN 70 milliGRAM(s)/deciliter  ondansetron Injectable 4 milliGRAM(s) IV Push every 8 hours PRN Nausea and/or Vomiting  oxyCODONE    IR 5 milliGRAM(s) Oral every 4 hours PRN Severe Pain (7 - 10), Moderate pain (4-6)  senna 2 Tablet(s) Oral at bedtime PRN Constipation      PAST MEDICAL & SURGICAL HISTORY:  Diabetes  HTN (hypertension)  Liver, polycystic  Left renal mass  Malignant neoplasm of pancreas  Asthma  History of knee replacement, total, left    SOCIAL HISTORY:  , retired   Has children  Hx smoker, opioid dependence    FAMILY HISTORY:  FH: diabetes mellitus (Father)    REVIEW OF SYSTEMS:  As per HPI  [] Unable to obtain due to poor mental status    Vital Signs Last 24 Hrs  T(C): 37.8 (12 Nov 2024 09:54), Max: 38.6 (12 Nov 2024 05:30)  T(F): 100 (12 Nov 2024 09:54), Max: 101.4 (12 Nov 2024 05:30)  HR: 87 (12 Nov 2024 09:54) (69 - 101)  BP: 164/78 (12 Nov 2024 09:54) (116/87 - 172/91)  BP(mean): 94 (11 Nov 2024 22:00) (89 - 112)  RR: 19 (12 Nov 2024 09:54) (14 - 26)  SpO2: 99% (12 Nov 2024 09:54) (96% - 100%)    Parameters below as of 12 Nov 2024 09:54  Patient On (Oxygen Delivery Method): room air    CAPILLARY BLOOD GLUCOSE  POCT Blood Glucose.: 216 mg/dL (12 Nov 2024 07:17)  POCT Blood Glucose.: 251 mg/dL (11 Nov 2024 22:51)  POCT Blood Glucose.: 246 mg/dL (11 Nov 2024 21:49)  POCT Blood Glucose.: 199 mg/dL (11 Nov 2024 17:11)  POCT Blood Glucose.: 162 mg/dL (11 Nov 2024 11:11)      PHYSICAL EXAM: patient wishing to defer certain aspects of physical exam  GEN: mildly uncomfortable appearing, lying on side in bed  HEENT: NC/AT, EOMI, clear sclera/conjunctiva, MMM, hearing intact to voice  RESPIRATORY: comfortable on RA, no respiratory distress noted, speaking in sentences  ABDOMEN: deferred  NEURO: moving extremities, non-focal  EXTREMITIES: no c/c/e  : s/p davison removal    LABS:                        11.8   13.66 )-----------( 184      ( 12 Nov 2024 06:11 )             35.7     11-12    136  |  97[L]  |  22  ----------------------------<  216[H]  3.8   |  24  |  0.95    Ca    9.5      12 Nov 2024 06:11    Urinalysis Basic - ( 12 Nov 2024 06:11 )    Color: x / Appearance: x / SG: x / pH: x  Gluc: 216 mg/dL / Ketone: x  / Bili: x / Urobili: x   Blood: x / Protein: x / Nitrite: x   Leuk Esterase: x / RBC: x / WBC x   Sq Epi: x / Non Sq Epi: x / Bacteria: x      CAPILLARY BLOOD GLUCOSE  POCT Blood Glucose.: 216 mg/dL (12 Nov 2024 07:17)      RADIOLOGY & ADDITIONAL STUDIES:    Imaging:   Personally Reviewed:  [ ] YES   < from: CT Abdomen and Pelvis w/ IV Cont (10.02.24 @ 11:50) >  FINDINGS:  CHEST:  LUNGS AND LARGE AIRWAYS: Secretions within the trachea. Central airways   are otherwise patent. Emphysema. No pulmonary nodules.  PLEURA: No pleural effusion.  VESSELS: Aortic calcifications. Coronary artery calcifications.  HEART: Heart size is normal. No pericardial effusion.  MEDIASTINUM AND GUIDO: No lymphadenopathy.  CHEST WALL AND LOWER NECK: Sebaceous cyst in the medial left chest wall.    ABDOMEN AND PELVIS:  LIVER: Within normal limits.  BILE DUCTS: Normal caliber.  GALLBLADDER: Within normal limits.  SPLEEN: Within normal limits.  PANCREAS: Distal pancreatic body mass (series 3:78) 3.6 x 2.0 cm   previously 3.6 x 2.1 cm remeasured on prior study 4/21/2024. Distal   pancreatic atrophy and ductal dilatation. Tumor abuts the hepatic artery   as on prior examination. Fat planes surrounding the SMA remain hazy as on   prior imaging (3:80). SMV and portal veins are patent.  ADRENALS: Within normal limits.  KIDNEYS/URETERS: A 4.4 x 3.3 cm solid enhancing left posterior interpolar   renal mass, renal cell carcinoma until proven otherwise without   significant change.    BLADDER: Within normal limits.  REPRODUCTIVE ORGANS: Fibroid uterus.    BOWEL: No bowel obstruction. Appendix is normal.  PERITONEUM/RETROPERITONEUM: Within normal limits.  VESSELS: Atherosclerotic changes. Vascular involvement by tumor as above.  LYMPH NODES: No lymphadenopathy.  ABDOMINAL WALL: Within normal limits.  BONES: Degenerative changes.    IMPRESSION:  Pancreatic tumor with vascular involvement unchanged from prior imaging   4/21/2024    Solid enhancing left renal mass, renal cell carcinoma until proven   otherwise without significant change.    < end of copied text >                EKG:   Personally Reviewed:  [ ] YES   EKG 7/1/24 sinus 87 bpm, qtc 445 ms      Consultant(s) notes reviewed:  [facility] including heme/onc, palliative, surg-onc, urology, rad/onc, recent PST, current urology notes  Care Discussed with Consultant(s)/Other Providers: Dr. Erwin () re: patient with moderate opioid withdrawal on their assessment, to give suboxone 4mg/1mg, they will continue to reassess, Urology Amarilys re: fever, given timeline, continuing to monitor, holding off on infectious workup -> post-op fever   Care Discussed with Primary Team.      [] Increased delirium risk  [] Delirium and other risks can be reduced by:          -early ambulation          -minimizing "tethers" - IV, oxygen, catheters, etc          -avoiding hypnotics and sedatives          -maintaining hydration/nutrition          -avoid anticholinergics - diphenhydramine, etc          -pain control          -supportive environment   Patient is a 69y old  Female who presents with a chief complaint of " I am having left partial kidney surgery" (04 Nov 2024 07:57)      HPI:  70 yo F w/ HTN, DM2, pancreatic cancer (d/x April 2023, s/p EUS, FNA w/ path showing adenocarcinoma) s/p chemotherapy, R renal mass concerning for renal cell carcinoma, substance abuse (narcotics, hx smoking), admitted for urological procedure, s/p Robotic assisted L partial nephrectomy on 11/11, now POD#1, course c/b fever and complaints of nausea.     In terms of fever, Tmax 38.6C this AM. Denies sore throat, cough, SOB, diarrhea, dysuria, rash, no sick contacts. Discussed differential for post-operative fever, encouraged use of incentive spirometry.     Patient reports no current pain, but had abdominal discomfort earlier, in addition to continued nausea. Has IV zofran prn. Expressed concern for opioid withdrawal. Per review of outpatient records, patient with use of opioids and heroin. Discussed plan for pain/addiction medicine followup. Patient in agreement, wishing to end interview early given symptoms.     ISTOP Reference #: [id number] -> no prescriptions found    Allergies  shellfish (Rash)  No Known Drug Allergies    Intolerances    HOME MEDICATIONS: Reviewed    MEDICATIONS  (STANDING):  amLODIPine   Tablet 10 milliGRAM(s) Oral at bedtime  bisacodyl Suppository 10 milliGRAM(s) Rectal once  dextrose 5%. 1000 milliLiter(s) (50 mL/Hr) IV Continuous <Continuous>  dextrose 5%. 1000 milliLiter(s) (100 mL/Hr) IV Continuous <Continuous>  dextrose 50% Injectable 25 Gram(s) IV Push once  dextrose 50% Injectable 25 Gram(s) IV Push once  dextrose 50% Injectable 12.5 Gram(s) IV Push once  glucagon  Injectable 1 milliGRAM(s) IntraMuscular once  heparin   Injectable 5000 Unit(s) SubCutaneous every 8 hours  insulin lispro (ADMELOG) corrective regimen sliding scale   SubCutaneous at bedtime  insulin lispro (ADMELOG) corrective regimen sliding scale   SubCutaneous three times a day before meals  ketorolac   Injectable 15 milliGRAM(s) IV Push every 6 hours  losartan 50 milliGRAM(s) Oral daily  magnesium oxide 400 milliGRAM(s) Oral daily  pantoprazole    Tablet 40 milliGRAM(s) Oral before breakfast    MEDICATIONS  (PRN):  acetaminophen     Tablet .. 975 milliGRAM(s) Oral every 6 hours PRN Temp greater or equal to 38C (100.4F)  ALPRAZolam 0.25 milliGRAM(s) Oral once PRN anxiety/tension  dextrose Oral Gel 15 Gram(s) Oral once PRN Blood Glucose LESS THAN 70 milliGRAM(s)/deciliter  ondansetron Injectable 4 milliGRAM(s) IV Push every 8 hours PRN Nausea and/or Vomiting  oxyCODONE    IR 5 milliGRAM(s) Oral every 4 hours PRN Severe Pain (7 - 10), Moderate pain (4-6)  senna 2 Tablet(s) Oral at bedtime PRN Constipation      PAST MEDICAL & SURGICAL HISTORY:  Diabetes  HTN (hypertension)  Liver, polycystic  Left renal mass  Malignant neoplasm of pancreas  Asthma  History of knee replacement, total, left    SOCIAL HISTORY:  , retired   Has children  Hx smoker, opioid dependence    FAMILY HISTORY:  FH: diabetes mellitus (Father)    REVIEW OF SYSTEMS:  As per HPI  [] Unable to obtain due to poor mental status    Vital Signs Last 24 Hrs  T(C): 37.8 (12 Nov 2024 09:54), Max: 38.6 (12 Nov 2024 05:30)  T(F): 100 (12 Nov 2024 09:54), Max: 101.4 (12 Nov 2024 05:30)  HR: 87 (12 Nov 2024 09:54) (69 - 101)  BP: 164/78 (12 Nov 2024 09:54) (116/87 - 172/91)  BP(mean): 94 (11 Nov 2024 22:00) (89 - 112)  RR: 19 (12 Nov 2024 09:54) (14 - 26)  SpO2: 99% (12 Nov 2024 09:54) (96% - 100%)    Parameters below as of 12 Nov 2024 09:54  Patient On (Oxygen Delivery Method): room air    CAPILLARY BLOOD GLUCOSE  POCT Blood Glucose.: 216 mg/dL (12 Nov 2024 07:17)  POCT Blood Glucose.: 251 mg/dL (11 Nov 2024 22:51)  POCT Blood Glucose.: 246 mg/dL (11 Nov 2024 21:49)  POCT Blood Glucose.: 199 mg/dL (11 Nov 2024 17:11)  POCT Blood Glucose.: 162 mg/dL (11 Nov 2024 11:11)      PHYSICAL EXAM: patient wishing to defer certain aspects of physical exam  GEN: mildly uncomfortable appearing, lying on side in bed  HEENT: NC/AT, EOMI, clear sclera/conjunctiva, MMM, hearing intact to voice  RESPIRATORY: comfortable on RA, no respiratory distress noted, speaking in sentences  ABDOMEN: deferred  NEURO: moving extremities, non-focal  EXTREMITIES: no c/c/e  : s/p davison removal    LABS:                        11.8   13.66 )-----------( 184      ( 12 Nov 2024 06:11 )             35.7     11-12    136  |  97[L]  |  22  ----------------------------<  216[H]  3.8   |  24  |  0.95    Ca    9.5      12 Nov 2024 06:11    Urinalysis Basic - ( 12 Nov 2024 06:11 )    Color: x / Appearance: x / SG: x / pH: x  Gluc: 216 mg/dL / Ketone: x  / Bili: x / Urobili: x   Blood: x / Protein: x / Nitrite: x   Leuk Esterase: x / RBC: x / WBC x   Sq Epi: x / Non Sq Epi: x / Bacteria: x      CAPILLARY BLOOD GLUCOSE  POCT Blood Glucose.: 216 mg/dL (12 Nov 2024 07:17)      RADIOLOGY & ADDITIONAL STUDIES:    Imaging:   Personally Reviewed:  [ ] YES   < from: CT Abdomen and Pelvis w/ IV Cont (10.02.24 @ 11:50) >  FINDINGS:  CHEST:  LUNGS AND LARGE AIRWAYS: Secretions within the trachea. Central airways   are otherwise patent. Emphysema. No pulmonary nodules.  PLEURA: No pleural effusion.  VESSELS: Aortic calcifications. Coronary artery calcifications.  HEART: Heart size is normal. No pericardial effusion.  MEDIASTINUM AND GUIDO: No lymphadenopathy.  CHEST WALL AND LOWER NECK: Sebaceous cyst in the medial left chest wall.    ABDOMEN AND PELVIS:  LIVER: Within normal limits.  BILE DUCTS: Normal caliber.  GALLBLADDER: Within normal limits.  SPLEEN: Within normal limits.  PANCREAS: Distal pancreatic body mass (series 3:78) 3.6 x 2.0 cm   previously 3.6 x 2.1 cm remeasured on prior study 4/21/2024. Distal   pancreatic atrophy and ductal dilatation. Tumor abuts the hepatic artery   as on prior examination. Fat planes surrounding the SMA remain hazy as on   prior imaging (3:80). SMV and portal veins are patent.  ADRENALS: Within normal limits.  KIDNEYS/URETERS: A 4.4 x 3.3 cm solid enhancing left posterior interpolar   renal mass, renal cell carcinoma until proven otherwise without   significant change.    BLADDER: Within normal limits.  REPRODUCTIVE ORGANS: Fibroid uterus.    BOWEL: No bowel obstruction. Appendix is normal.  PERITONEUM/RETROPERITONEUM: Within normal limits.  VESSELS: Atherosclerotic changes. Vascular involvement by tumor as above.  LYMPH NODES: No lymphadenopathy.  ABDOMINAL WALL: Within normal limits.  BONES: Degenerative changes.    IMPRESSION:  Pancreatic tumor with vascular involvement unchanged from prior imaging   4/21/2024    Solid enhancing left renal mass, renal cell carcinoma until proven   otherwise without significant change.    < end of copied text >                EKG:   Personally Reviewed:  [ ] YES   EKG 7/1/24 sinus 87 bpm, qtc 445 ms      Consultant(s) notes reviewed:  Otpt records via [facility] including heme/onc, palliative, surg-onc, urology, rad/onc, recent PST, current urology notes  Care Discussed with Consultant(s)/Other Providers: Dr. Erwin () re: patient with moderate opioid withdrawal on their assessment, to give suboxone 4mg/1mg, they will continue to reassess, Urology Amarilys re: fever, given timeline, continuing to monitor, holding off on infectious workup -> post-op fever   Care Discussed with Primary Team.      [] Increased delirium risk  [] Delirium and other risks can be reduced by:          -early ambulation          -minimizing "tethers" - IV, oxygen, catheters, etc          -avoiding hypnotics and sedatives          -maintaining hydration/nutrition          -avoid anticholinergics - diphenhydramine, etc          -pain control          -supportive environment   Patient is a 69y old  Female who presents with a chief complaint of " I am having left partial kidney surgery" (04 Nov 2024 07:57)      HPI:  70 yo F w/ HTN, DM2, pancreatic cancer (d/x April 2023, s/p EUS, FNA w/ path showing adenocarcinoma) s/p chemotherapy, L renal mass concerning for renal cell carcinoma, substance abuse (narcotics, hx smoking), admitted for urological procedure, s/p Robotic assisted L partial nephrectomy on 11/11, now POD#1, course c/b fever and complaints of nausea.     In terms of fever, Tmax 38.6C this AM. Denies sore throat, cough, SOB, diarrhea, dysuria, rash, no sick contacts. Discussed differential for post-operative fever, encouraged use of incentive spirometry.     Patient reports no current pain, but had abdominal discomfort earlier, in addition to continued nausea. Has IV zofran prn. Expressed concern for opioid withdrawal. Per review of outpatient records, patient with use of opioids and heroin. Discussed plan for pain/addiction medicine followup. Patient in agreement, wishing to end interview early given symptoms.     ISTOP Reference #: [id number] -> no prescriptions found    Allergies  shellfish (Rash)  No Known Drug Allergies    Intolerances    HOME MEDICATIONS: Reviewed    MEDICATIONS  (STANDING):  amLODIPine   Tablet 10 milliGRAM(s) Oral at bedtime  bisacodyl Suppository 10 milliGRAM(s) Rectal once  dextrose 5%. 1000 milliLiter(s) (50 mL/Hr) IV Continuous <Continuous>  dextrose 5%. 1000 milliLiter(s) (100 mL/Hr) IV Continuous <Continuous>  dextrose 50% Injectable 25 Gram(s) IV Push once  dextrose 50% Injectable 25 Gram(s) IV Push once  dextrose 50% Injectable 12.5 Gram(s) IV Push once  glucagon  Injectable 1 milliGRAM(s) IntraMuscular once  heparin   Injectable 5000 Unit(s) SubCutaneous every 8 hours  insulin lispro (ADMELOG) corrective regimen sliding scale   SubCutaneous at bedtime  insulin lispro (ADMELOG) corrective regimen sliding scale   SubCutaneous three times a day before meals  ketorolac   Injectable 15 milliGRAM(s) IV Push every 6 hours  losartan 50 milliGRAM(s) Oral daily  magnesium oxide 400 milliGRAM(s) Oral daily  pantoprazole    Tablet 40 milliGRAM(s) Oral before breakfast    MEDICATIONS  (PRN):  acetaminophen     Tablet .. 975 milliGRAM(s) Oral every 6 hours PRN Temp greater or equal to 38C (100.4F)  ALPRAZolam 0.25 milliGRAM(s) Oral once PRN anxiety/tension  dextrose Oral Gel 15 Gram(s) Oral once PRN Blood Glucose LESS THAN 70 milliGRAM(s)/deciliter  ondansetron Injectable 4 milliGRAM(s) IV Push every 8 hours PRN Nausea and/or Vomiting  oxyCODONE    IR 5 milliGRAM(s) Oral every 4 hours PRN Severe Pain (7 - 10), Moderate pain (4-6)  senna 2 Tablet(s) Oral at bedtime PRN Constipation      PAST MEDICAL & SURGICAL HISTORY:  Diabetes  HTN (hypertension)  Liver, polycystic  Left renal mass  Malignant neoplasm of pancreas  Asthma  History of knee replacement, total, left    SOCIAL HISTORY:  , retired   Has children  Hx smoker, opioid dependence    FAMILY HISTORY:  FH: diabetes mellitus (Father)    REVIEW OF SYSTEMS:  As per HPI  [] Unable to obtain due to poor mental status    Vital Signs Last 24 Hrs  T(C): 37.8 (12 Nov 2024 09:54), Max: 38.6 (12 Nov 2024 05:30)  T(F): 100 (12 Nov 2024 09:54), Max: 101.4 (12 Nov 2024 05:30)  HR: 87 (12 Nov 2024 09:54) (69 - 101)  BP: 164/78 (12 Nov 2024 09:54) (116/87 - 172/91)  BP(mean): 94 (11 Nov 2024 22:00) (89 - 112)  RR: 19 (12 Nov 2024 09:54) (14 - 26)  SpO2: 99% (12 Nov 2024 09:54) (96% - 100%)    Parameters below as of 12 Nov 2024 09:54  Patient On (Oxygen Delivery Method): room air    CAPILLARY BLOOD GLUCOSE  POCT Blood Glucose.: 216 mg/dL (12 Nov 2024 07:17)  POCT Blood Glucose.: 251 mg/dL (11 Nov 2024 22:51)  POCT Blood Glucose.: 246 mg/dL (11 Nov 2024 21:49)  POCT Blood Glucose.: 199 mg/dL (11 Nov 2024 17:11)  POCT Blood Glucose.: 162 mg/dL (11 Nov 2024 11:11)      PHYSICAL EXAM: patient wishing to defer certain aspects of physical exam  GEN: mildly uncomfortable appearing, lying on side in bed  HEENT: NC/AT, EOMI, clear sclera/conjunctiva, MMM, hearing intact to voice  RESPIRATORY: comfortable on RA, no respiratory distress noted, speaking in sentences  ABDOMEN: deferred  NEURO: moving extremities, non-focal  EXTREMITIES: no c/c/e  : s/p davison removal    LABS:                        11.8   13.66 )-----------( 184      ( 12 Nov 2024 06:11 )             35.7     11-12    136  |  97[L]  |  22  ----------------------------<  216[H]  3.8   |  24  |  0.95    Ca    9.5      12 Nov 2024 06:11    Urinalysis Basic - ( 12 Nov 2024 06:11 )    Color: x / Appearance: x / SG: x / pH: x  Gluc: 216 mg/dL / Ketone: x  / Bili: x / Urobili: x   Blood: x / Protein: x / Nitrite: x   Leuk Esterase: x / RBC: x / WBC x   Sq Epi: x / Non Sq Epi: x / Bacteria: x      CAPILLARY BLOOD GLUCOSE  POCT Blood Glucose.: 216 mg/dL (12 Nov 2024 07:17)      RADIOLOGY & ADDITIONAL STUDIES:    Imaging:   Personally Reviewed:  [ ] YES   < from: CT Abdomen and Pelvis w/ IV Cont (10.02.24 @ 11:50) >  FINDINGS:  CHEST:  LUNGS AND LARGE AIRWAYS: Secretions within the trachea. Central airways   are otherwise patent. Emphysema. No pulmonary nodules.  PLEURA: No pleural effusion.  VESSELS: Aortic calcifications. Coronary artery calcifications.  HEART: Heart size is normal. No pericardial effusion.  MEDIASTINUM AND GUIDO: No lymphadenopathy.  CHEST WALL AND LOWER NECK: Sebaceous cyst in the medial left chest wall.    ABDOMEN AND PELVIS:  LIVER: Within normal limits.  BILE DUCTS: Normal caliber.  GALLBLADDER: Within normal limits.  SPLEEN: Within normal limits.  PANCREAS: Distal pancreatic body mass (series 3:78) 3.6 x 2.0 cm   previously 3.6 x 2.1 cm remeasured on prior study 4/21/2024. Distal   pancreatic atrophy and ductal dilatation. Tumor abuts the hepatic artery   as on prior examination. Fat planes surrounding the SMA remain hazy as on   prior imaging (3:80). SMV and portal veins are patent.  ADRENALS: Within normal limits.  KIDNEYS/URETERS: A 4.4 x 3.3 cm solid enhancing left posterior interpolar   renal mass, renal cell carcinoma until proven otherwise without   significant change.    BLADDER: Within normal limits.  REPRODUCTIVE ORGANS: Fibroid uterus.    BOWEL: No bowel obstruction. Appendix is normal.  PERITONEUM/RETROPERITONEUM: Within normal limits.  VESSELS: Atherosclerotic changes. Vascular involvement by tumor as above.  LYMPH NODES: No lymphadenopathy.  ABDOMINAL WALL: Within normal limits.  BONES: Degenerative changes.    IMPRESSION:  Pancreatic tumor with vascular involvement unchanged from prior imaging   4/21/2024    Solid enhancing left renal mass, renal cell carcinoma until proven   otherwise without significant change.    < end of copied text >                EKG:   Personally Reviewed:  [ ] YES   EKG 7/1/24 sinus 87 bpm, qtc 445 ms      Consultant(s) notes reviewed:  Otpt records via [facility] including heme/onc, palliative, surg-onc, urology, rad/onc, recent PST, current urology notes  Care Discussed with Consultant(s)/Other Providers: Dr. Erwin () re: patient with moderate opioid withdrawal on their assessment, to give suboxone 4mg/1mg, they will continue to reassess, Urology Amarilys re: fever, given timeline, continuing to monitor, holding off on infectious workup -> post-op fever   Care Discussed with Primary Team.      [] Increased delirium risk  [] Delirium and other risks can be reduced by:          -early ambulation          -minimizing "tethers" - IV, oxygen, catheters, etc          -avoiding hypnotics and sedatives          -maintaining hydration/nutrition          -avoid anticholinergics - diphenhydramine, etc          -pain control          -supportive environment

## 2024-11-12 NOTE — BH CONSULTATION LIAISON ASSESSMENT NOTE - CURRENT MEDICATION
MEDICATIONS  (STANDING):  amLODIPine   Tablet 10 milliGRAM(s) Oral at bedtime  buprenorphine 2 mG/naloxone 0.5 mG SL  Tablet 2 Tablet(s) SubLingual three times a day  dextrose 5%. 1000 milliLiter(s) (100 mL/Hr) IV Continuous <Continuous>  dextrose 5%. 1000 milliLiter(s) (50 mL/Hr) IV Continuous <Continuous>  dextrose 50% Injectable 25 Gram(s) IV Push once  dextrose 50% Injectable 12.5 Gram(s) IV Push once  dextrose 50% Injectable 25 Gram(s) IV Push once  glucagon  Injectable 1 milliGRAM(s) IntraMuscular once  heparin   Injectable 5000 Unit(s) SubCutaneous every 8 hours  insulin lispro (ADMELOG) corrective regimen sliding scale   SubCutaneous at bedtime  insulin lispro (ADMELOG) corrective regimen sliding scale   SubCutaneous three times a day before meals  losartan 50 milliGRAM(s) Oral daily  magnesium oxide 400 milliGRAM(s) Oral daily  pantoprazole    Tablet 40 milliGRAM(s) Oral before breakfast    MEDICATIONS  (PRN):  acetaminophen     Tablet .. 975 milliGRAM(s) Oral every 6 hours PRN Temp greater or equal to 38C (100.4F)  ALPRAZolam 0.25 milliGRAM(s) Oral once PRN anxiety/tension  dextrose Oral Gel 15 Gram(s) Oral once PRN Blood Glucose LESS THAN 70 milliGRAM(s)/deciliter  ondansetron Injectable 4 milliGRAM(s) IV Push every 8 hours PRN Nausea and/or Vomiting  senna 2 Tablet(s) Oral at bedtime PRN Constipation

## 2024-11-12 NOTE — CONSULT NOTE ADULT - TIME BILLING
Reviewing labs/vitals/imaging/otpt records via [facility]/consultant recs, discussing with urology and BH/addiction medicine, interviewing/examining patient, discussing plan, placing order, coordinating care, documentation

## 2024-11-12 NOTE — BH CONSULTATION LIAISON ASSESSMENT NOTE - NSBHCHARTREVIEWVS_PSY_A_CORE FT
Vital Signs Last 24 Hrs  T(C): 37.6 (13 Nov 2024 13:28), Max: 37.7 (13 Nov 2024 01:19)  T(F): 99.7 (13 Nov 2024 13:28), Max: 99.9 (13 Nov 2024 09:57)  HR: 98 (13 Nov 2024 13:28) (89 - 100)  BP: 147/79 (13 Nov 2024 13:28) (143/85 - 161/81)  BP(mean): --  RR: 18 (13 Nov 2024 13:28) (16 - 18)  SpO2: 100% (13 Nov 2024 13:28) (97% - 100%)    Parameters below as of 13 Nov 2024 13:28  Patient On (Oxygen Delivery Method): room air

## 2024-11-12 NOTE — BH CONSULTATION LIAISON ASSESSMENT NOTE - RISK ASSESSMENT
COWS Assessment: Total +9    Resting Pulse Rate: Measured manually with radial pulse as 84 bpm at 2:30pm (+1)   Sweating: Observable moistness of face but no distinct sweat beads or dripping (+2)   Restlessness: Generally sits still with no obvious restlessness or excessive activity (0)   Pupil Size: Deferred due to darkness of iris (0)  Bone or joint aches: Denies any new joint, bone, or muscle aches (0)  Rhinorrhea/lacrimation: Endorses having some subjective rhinorrhea but no objective rhinorrhea or lacrimation (+1)  Gastrointestinal upset: Endorses currently feeling nauseous with one episode of vomiting this morning (+2)  Tremor: Denies any new tremor (0)  Yawning: Observed 1-2 instances of yawning during interview (+1)   Anxiety: Reports feeling anxious and objectively appears anxious/irritable (+2)   Skin: No observable piloerection (0)    A COWS score of 9 indicates that she is experiencing mild-moderate acute opiate withdrawal and treatment is indicated. This is consistent with her history of narcotic/heroin substance use and pertinent positives including nausea, vomiting, subjective rhinorrhea, borderline tachycardia, anxiety/irritability, and yawning.     Plan:   - Continue suboxone 4/1 TID standing   - Routine observation   - Psych consult-liaison will continue to follow-up

## 2024-11-12 NOTE — BH CONSULTATION LIAISON ASSESSMENT NOTE - SUMMARY
This is a 69 year old woman with a PMH of hypertension, Type II Diabetes Mellitus, pancreatic cancer diagnosed in 2023 (adenocarcinoma, s/p chemotherapy), with recent admission on 11/11 for robotic-assisted partial nephrectomy to treat left renal mass (possible renal cell carcinoma) with a history of substance use (narcotics, heroin, cigarettes) who is now on POD#1 experiencing lethargy, nausea, one episode of vomiting, subjective and objective anxiety, concerning for possible opiate use withdrawal.

## 2024-11-12 NOTE — BH CONSULTATION LIAISON ASSESSMENT NOTE - NSBHCONSULTPRIMARYDISCUSSYES_PSY_A_CORE FT
Discussed with primary care team regarding suboxone dosing for management of acute opioid withdrawal.

## 2024-11-12 NOTE — BH CONSULTATION LIAISON ASSESSMENT NOTE - HPI (INCLUDE ILLNESS QUALITY, SEVERITY, DURATION, TIMING, CONTEXT, MODIFYING FACTORS, ASSOCIATED SIGNS AND SYMPTOMS)
70 yo F w/ HTN, DM2, pancreatic cancer (d/x April 2023, s/p EUS, FNA w/ path showing adenocarcinoma) s/p chemotherapy, L renal mass concerning for renal cell carcinoma, substance abuse (narcotics, hx smoking), admitted for urological procedure, s/p Robotic assisted L partial nephrectomy on 11/11, now POD#1, course c/b fever and complaints of nausea.     Per chart review, there was mention of recent substance use as well as attempts to refer to outpt substance use programs but with no great success. According to urology note today, she admitted to feeling acute withdrawal. On exam twice, she was seen to be visibly uncomfortable, citing severe nausea, asking to not be interviewed extensively. She denied over safety concerns, and was open to treatment for her withdrawal, including with Suboxone. Otherwise, exam was rather limited.   Case discussed with primary team.

## 2024-11-12 NOTE — CONSULT NOTE ADULT - ASSESSMENT
68 yo F w/ HTN, DM2, pancreatic cancer (d/x April 2023, s/p EUS, FNA w/ path showing adenocarcinoma) s/p chemotherapy, L renal mass concerning for renal cell carcinoma, substance abuse (narcotics, hx smoking), admitted for urological procedure, s/p Robotic assisted L partial nephrectomy on 11/11, now POD#1, course c/b fever and complaints of nausea.       # L renal mass concerning for renal cell carcinoma s/p L partial nephrectomy  # post-operative fever  - POD#1 s/p L partial nephrectomy  - renal function appears stable compared to prior, continue to monitor  - post-op care as per urology  - monitor UOP s/p davison removal  - pain management as per urology, given history of opioid dependence, trying to minimize use of opioids if possible  - c/w symptomatic management of nausea prn, monitor bowel function  - c/w diet as tolerated  - bowel regimen to prevent constipation  - fever not unexpected in post-operative period, monitoring off antibiotics as per urology, encourage incentive spirometry, reassess if fever persists or clinically decompensating    # Pancreatic cancer, neoplasm related pain  - s/p celiac plexus nerve block on 5/16/23 per palliative note with improvement in pain  - s/p 5 fractions SBRT to her pancreas by rad/onc  - not a candidate for surgical resection due to encasement of vasculature as per surg-onc  - on break from chemo as per heme/onc, to f/u otpt for further oncologic care    # Opioid dependence, hx smoking  - hx heroin and narcotic use  - BH/addiction med eval appreciated, patient with moderate opioid withdrawal on their assessment, giving suboxone 4 mg/1mg per discussion, will f/u re-eval and further recs  - encourage heroin cessation, SBIRT assistance appreciated  - encourage continued smoking cessation    # Essential HTN  - on amlodipine and losartan  - most recent SBP 140s, acceptable, continue to monitor and adjust regimen as needed, BP readings may be confounded by nausea/pain, avoid hypotension    # DM2  - recent A1C 7.6  - hold home metformin while inpatient  - c/w sliding scale coverage, monitor FS and adjust regimen as needed  - goal FS <180  - consistent carb diet    # Need for prophylactic measure  - VTE ppx; HSQ, at higher risk due to malignancy

## 2024-11-12 NOTE — BH CONSULTATION LIAISON ASSESSMENT NOTE - NSBHATTESTCOMMENTATTENDFT_PSY_A_CORE
Chart reviewed. Seen with trainee. Presents as dysphoric, somatically-preoccupied with acute opioid withdrawal/nausea. Open to help/treatment with Suboxone as above. Agree with above assessment/recs. Will continue to follow.

## 2024-11-13 ENCOUNTER — TRANSCRIPTION ENCOUNTER (OUTPATIENT)
Age: 69
End: 2024-11-13

## 2024-11-13 VITALS
SYSTOLIC BLOOD PRESSURE: 147 MMHG | HEART RATE: 98 BPM | DIASTOLIC BLOOD PRESSURE: 79 MMHG | OXYGEN SATURATION: 100 % | TEMPERATURE: 100 F | RESPIRATION RATE: 18 BRPM

## 2024-11-13 LAB
GLUCOSE BLDC GLUCOMTR-MCNC: 198 MG/DL — HIGH (ref 70–99)
GLUCOSE BLDC GLUCOMTR-MCNC: 224 MG/DL — HIGH (ref 70–99)

## 2024-11-13 PROCEDURE — 99232 SBSQ HOSP IP/OBS MODERATE 35: CPT

## 2024-11-13 RX ORDER — BUPRENORPHINE HYDROCHLORIDE, NALOXONE HYDROCHLORIDE 12; 3 MG/1; MG/1
2 FILM, SOLUBLE BUCCAL; SUBLINGUAL
Qty: 18 | Refills: 0
Start: 2024-11-13 | End: 2024-11-15

## 2024-11-13 RX ORDER — BUPRENORPHINE HYDROCHLORIDE, NALOXONE HYDROCHLORIDE 12; 3 MG/1; MG/1
2 FILM, SOLUBLE BUCCAL; SUBLINGUAL
Qty: 42 | Refills: 0
Start: 2024-11-13 | End: 2024-11-19

## 2024-11-13 RX ORDER — ACETAMINOPHEN 500 MG
3 TABLET ORAL
Qty: 0 | Refills: 0 | DISCHARGE
Start: 2024-11-13

## 2024-11-13 RX ADMIN — BUPRENORPHINE HYDROCHLORIDE, NALOXONE HYDROCHLORIDE 2 TABLET(S): 12; 3 FILM, SOLUBLE BUCCAL; SUBLINGUAL at 06:34

## 2024-11-13 RX ADMIN — HEPARIN SODIUM 5000 UNIT(S): 10000 INJECTION INTRAVENOUS; SUBCUTANEOUS at 13:15

## 2024-11-13 RX ADMIN — PANTOPRAZOLE SODIUM 40 MILLIGRAM(S): 40 TABLET, DELAYED RELEASE ORAL at 06:01

## 2024-11-13 RX ADMIN — LOSARTAN POTASSIUM 50 MILLIGRAM(S): 25 TABLET ORAL at 06:00

## 2024-11-13 RX ADMIN — BUPRENORPHINE HYDROCHLORIDE, NALOXONE HYDROCHLORIDE 2 TABLET(S): 12; 3 FILM, SOLUBLE BUCCAL; SUBLINGUAL at 13:14

## 2024-11-13 RX ADMIN — MAGNESIUM OXIDE 400 MILLIGRAM(S): 400 TABLET ORAL at 12:01

## 2024-11-13 RX ADMIN — Medication 1: at 12:02

## 2024-11-13 RX ADMIN — Medication 2: at 07:34

## 2024-11-13 RX ADMIN — HEPARIN SODIUM 5000 UNIT(S): 10000 INJECTION INTRAVENOUS; SUBCUTANEOUS at 06:01

## 2024-11-13 NOTE — DISCHARGE NOTE PROVIDER - ATTENDING ATTESTATION STATEMENT
[facility] - THORACIC SURGERY      Patient:  Randell Harris  YOB: 1942  Date of Visit: 10/26/19    Dear Dr Jay,    Thank you for requesting a consultation on Randell Harris. The patient was seen by me on 10/7/2019 in follow up after surgical resection of his lung cancer. He has no evidence of recurrent disease. Attached you will find a copy of my note from the patient's visit.    If you have specific questions, please do not hesitate to call me.      Sincerely,    Juan Miguel Ervin MD, FACS    Chief of Thoracic Surgery  [facility] Thoracic Surgery  [facility]    O:[phone]  F:[phone]  C:[phone]    [email or web address]                          [email or web address]  Twitter: [email or web address]  FB: [email or web address]                  Consultation Note:    Juan Miguel Ervin MD  10/26/2019  9:42 AM  Sign when Signing Visit  [facility] - Thoracic Surgery   Consult Note      Date: 10/7/2019     9:19 AM      Surgical Procedure:  11/16/2017  PROCEDURE:   1. Bronchoscopy.  2. Right thoracoscopy and wedge resection.  3. Completion posterior right upper lobe posterior segmentectomy.  4. Lymph node dissection.       Surgical Procedure: 12/23/2015  s/p left VATS converted to posterolateral thoracotomy, LLL lobectomy with en bloc DENISE wedge (Poorly   differentiated adenocarcinoma (Grade 3) measuring 5.5 cm with extension to visceral pleura. Parietal pleura free of tumor. Bronchial margin, vascular   margin and parenchymal margins free of tumor. No lymphovascular invasion identified. Seven lymph nodes negative for carcinoma. Stage: [id number]. See note)          Chief Complaint:    Chief Complaint   Patient presents with   • Follow-up     former [facility] pt/CT        HISTORY OF PRESENT ILLNESS:   Patient is a 75 year old male with risk factors  including of HTN, HLD, MI s/p CABG x 4, MVR, MI, and Stage IIA LLL adenocarcinoma who is almost 3 years s/p LLL lobectomy and almost 1 year s/p RUL posterior segmentectomy (benign) presenting today for routine cancer surveillance. Patient reports feeling well, denies hemoptysis, SOB or dyspnea, fatigue, change in energy level, new onset of bone pain or headaches.         Patient Care Team:  Fredi Jay MD as PCP - General (Internal Medicine)  Óscar Alexandra MD as Referring Provider (Cardiovascular Disease)   Francisco Linda MD- Oncology        Review Of Systems:   Review of Systems   Constitutional: Negative.    HENT: Negative.    Respiratory: Negative.    Cardiovascular: Negative.    Gastrointestinal: Negative.    Genitourinary: Negative.    Musculoskeletal: Negative.    Skin: Negative.    Neurological: Negative.    Hematological: Negative.    Psychiatric/Behavioral: Negative.            Past Medical History:  Stage IIA adenocarcinoma of the LLL of the lung      Allergies:  ALLERGIES:  No Known Allergies     Past Social History:  Social History     Tobacco Use   • Smoking status: Former Smoker     Packs/day: 0.00     Last attempt to quit:      Years since quittin.7   • Smokeless tobacco: Never Used   Substance Use Topics   • Alcohol use: Yes     Comment: occ   • Drug use: Not on file        Family History:  Family History   Problem Relation Age of Onset   • Diabetes Father         Medications:  Current Outpatient Medications   Medication Sig Dispense Refill   • LISINOPRIL PO Take 5 mg by mouth.     • spironolactone (ALDACTONE) 25 MG tablet Take 25 mg by mouth daily.     • metoPROLOL succinate (TOPROL-XL) 50 MG 24 hr tablet Take 50 mg by mouth daily.     • atorvastatin (LIPITOR) 80 MG tablet Take 80 mg by mouth daily.     • Nutritional Supplements (LITHATE PO)        No current facility-administered medications for this visit.         Vital Signs:  Blood pressure 122/70, pulse 89, resp. rate 20, height 5'  9\" (1.753 m), weight 91.6 kg (202 lb), SpO2 98 %.       Physical Exam:  Physical Exam   Constitutional: He is oriented to person, place, and time. He appears well-developed and well-nourished.   HENT:   Head: Normocephalic and atraumatic.   Neck: Normal range of motion. Neck supple.   Cardiovascular: Normal rate, regular rhythm and normal heart sounds.   Pulmonary/Chest: Effort normal and breath sounds normal.   Abdominal: Soft. Bowel sounds are normal. Musculoskeletal: Normal range of motion.     Neurological: He is alert and oriented to person, place, and time.   Skin:   Well healed chest incisions   Psychiatric: He has a normal mood and affect. His behavior is normal. Judgment and thought content normal.                Radiology:   EXAM: CT CHEST WO CON 10/7/2019     CLINICAL INDICATION: Lung cancer status post surgery.     TECHNIQUE:  Non-contrast helical thoracic CT was performed. One or more of the following   radiation dose reduction techniques were used for this examination: Exposure control, adjustment   of the mA and/or kV according to patient size, or use of iterative reconstruction technique.     COMPARISON: No images available.     FINDINGS:     Neck Base: The imaged thyroid gland is unremarkable.     Mediastinum: The heart is normal in size.  There is coronary atherosclerosis status post   revascularization.  There is no pericardial effusion.  The aorta and main pulmonary artery   are normal in diameter.  There is mild to moderate aortic and branch vessel atherosclerotic   calcification.  The central airways are patent.  The esophagus is unremarkable.     Lymph Nodes: There is no visualized lymph node enlargement.  There is a prominent subcentimeter   right paratracheal lymph node.     Lungs: There is at least minimal bilateral centrilobular emphysema.  There are few tiny left   upper lobe nodules (2/20 and 2/24).  There is a lingular calcification.  There is no discrete   pulmonary mass.  There has  been partial left lung resection.  There is dependent left lung   opacity with associated suture material.  There is dependent opacity in the posterior right   upper lobe along the fissure with associated suture material extending to the hilum, presumably   scarring.  Evaluation is partially limited without contrast.  There is minor right middle lobe   atelectasis and/or scarring and mild right lower lobe atelectasis and/or scarring dependently.     Pleural Spaces: There is partially-loculated trace right pleural fluid.  There is questionable   trace left pleural fluid.  There is possible mild bilateral pleural thickening posteriorly.   There is mild asymmetric elevation of the left hemidiaphragm.     Upper Abdomen: Evaluation is limited without contrast.  There is probable mild mesenteric   panniculitis, typically a self-limited condition.     Body wall: There is right glenohumeral osteoarthritis.  There are intact median sternotomy wires.    There is minimal scoliosis.  There is minimal to mild chronic-appearing height loss of a lower   cervical and upper thoracic vertebral bodies, likely chronic.  There is mild bilateral (left      greater than right) gynecomastia.     Key: (S/I) = series number / image number     IMPRESSION:  1.  Postoperative findings in both lungs.  No definite evidence of malignancy.  2.  Evaluation is limited by lack of comparison scans and intravenous contrast.  Attention on   follow-up is recommended.  3.  Trace pleural fluid.                    IMPRESSION & PLAN    Patient is a 75 year old male with risk factors including of HTN, HLD, MI s/p CABG x 4, MVR, MI, and Stage IIA LLL adenocarcinoma who is almost 3 years s/p LLL lobectomy and almost 1 year s/p RUL posterior segmentectomy (benign) presenting today for routine cancer surveillance, doing well clinically, no symptoms to suggest recurrent disease, on recent CT chest there is likely scarring and/or atelectasis in the RML and RLL of  the lung, we will continue to follow with CT surveillance scans.       Return to clinic in May 2020 with a CT chest, patient will call us to set up appointment once he returns from [place] next year.      Juan Miguel Ervin MD, FACS  [facility]                          Jace Powell PA-C  Thoracic Surgery  On 10/7/2019, I, Jace Powell PA-C scribed the services personally performed by Juan Miguel Ervin MD                  I have personally seen and examined the patient. I have collaborated with and supervised the

## 2024-11-13 NOTE — DISCHARGE NOTE PROVIDER - HOSPITAL COURSE
A/P: 68y Female s/p Robotic left partial nephrectomy  POD#1 - fever 101.8 this AM; admits to oxy and heroin use daily; says fever is from withdrawal; addiction psych called and pt placed on suboxone  POD#2 - feels better; passed gas  Plan:  - reg diet  - poss home  - psych f/u

## 2024-11-13 NOTE — BH CONSULTATION LIAISON PROGRESS NOTE - NSBHFUPINTERVALHXFT_PSY_A_CORE
She feels much better today on POD#2 compared to yesterday. No acute events overnight or need for PRN medications. Reports still having some mild nausea but improved compared to yesterday. Denies any occurrence of vomiting, diarrhea, tremor, palpitations, muscle pain, runny nose, joint pain, excessive yawning, or skin changes. Reports good appetite. Still endorses feeling tired and has some mild chills.

## 2024-11-13 NOTE — BH CONSULTATION LIAISON PROGRESS NOTE - NSBHATTESTCOMMENTATTENDFT_PSY_A_CORE
Chart reviewed. Pt seen with student. Appears much improved on 4mg TID of suboxone, with much reduced nausea and no vomiting. She was able to eat today. Affect/mood much improved. Denies safety concerns or SI. Open to seeing outpt dual dx clinic and open to continuing Suboxone. Agree with above assessment/recs. Will continue to follow.

## 2024-11-13 NOTE — DISCHARGE NOTE PROVIDER - NSDCQMERRANDS_GEN_ALL_CORE
Chief Complaint   Patient presents with    Complete Physical     fasting labs done 11-5-21    Cholesterol Problem    Pre-diabetes       HISTORY OF PRESENT ILLNESS   HPI  Annual CPE  Fasting labs done 11-5  History of prediabetes and mixed hyperlipidemia  A1c slowly going up, last week's reading 5.8  No related/rx meds  Diet: modified Keto Diet but admits her sweet tooth is still bad  Caffeine: 2 mugs of coffee a day, 1 large mug unsweet ice tea 2-3 x a week, occ diet soda  Exercise: walks puppy 2-3 x a week x 20 minutes  Weight: fluctuates between the 210's-230's over the last several years, currently on the higher end     REVIEW OF SYMPTOMS   Review of Systems   Constitutional: Negative. HENT: Negative. Eyes: Negative. Respiratory: Negative. Cardiovascular: Negative. Gastrointestinal: Negative. Genitourinary: Negative. Neurological: Negative. Endo/Heme/Allergies: Negative. Psychiatric/Behavioral: Negative. PROBLEM LIST/MEDICAL HISTORY     Problem List  Date Reviewed: 11/11/2021          Codes Class Noted    Postmenopause ICD-10-CM: Z78.0  ICD-9-CM: V49.81  11/11/2020    Overview Addendum 11/11/2021  4:42 PM by Charlotte Talavera MD     LMP 2-1026, age 53 yo, hormones tested by Vimal Contreras  c/w  as well             Severe obesity (BMI 35.0-39. 9) with comorbidity ([facility] [address] ICD-10-CM: E66.01  ICD-9-CM: 278.01  5/9/2018        Prediabetes ICD-10-CM: R73.03  ICD-9-CM: 790.29  4/30/2018    Overview Addendum 4/30/2018  2:19 PM by Charlotte Talavera MD                  Mixed hyperlipidemia ICD-10-CM: E78.2  ICD-9-CM: 272.2  4/30/2018        Fatty liver ICD-10-CM: K76.0  ICD-9-CM: 571.8  4/30/2018    Overview Signed 4/30/2018  2:23 PM by Charlotte Talavera MD     Hepatology: Dr Jamie Arzate 2-2018             H/O arthroscopic knee surgery ICD-10-CM: Z98.890  ICD-9-CM: V45.89  2/9/2018        Chronic cough ICD-10-CM: R05.3  ICD-9-CM: 786.2  11/3/2017    Overview No Addendum 11/3/2017 12:00 PM by Darwin Tsang MD     Evaluation ; Dr Sandra Moore (same group), Pulmonary evaluation summer 2017: CXR and PFT's reportedly normal: dx heartburn and allergies; referred to Allergist (Dr Cipriano Alfaro)              Vitamin D deficiency ICD-10-CM: E55.9  ICD-9-CM: 268.9  11/3/2017    Overview Signed 11/3/2017 12:18 PM by Darwin Tsang MD                  Plantar fasciitis, bilateral ICD-10-CM: M72.2  ICD-9-CM: 728.71  1/3/2014        Obesity ICD-10-CM: E66.9  ICD-9-CM: 278.00  Unknown        Hypercholesterolemia ICD-10-CM: E78.00  ICD-9-CM: 272.0  2011    Overview Signed 2011 10:20 AM by Darwin Tsang MD                  History of gestational diabetes mellitus, diet controlled ICD-10-CM: Z86.32  ICD-9-CM: V12.21  2011    Overview Addendum 2011  5:16 PM by Darwin Tsang MD     2003, 2007             AR (allergic rhinitis) ICD-10-CM: J30.9  ICD-9-CM: 477.9  2011    Overview Signed 11/3/2017 11:48 AM by Darwin Tsang MD     Blood testing in the : mold: Re-evaluation Dr Cipriano Alfaro at [place] [id number] 10-11-17: skin testing: + trees, oaks, ragweed, grass, weeds, dust mites, mold             GERD (gastroesophageal reflux disease) ICD-10-CM: K21.9  ICD-9-CM: 530.81  2011        LBP (low back pain) ICD-10-CM: M54.50  ICD-9-CM: 724.2  2011                  PAST SURGICAL HISTORY     Past Surgical History:   Procedure Laterality Date    ELECTROCARDIOGRAM REPORT      normal    HX ACL RECONSTRUCTION  1997    right    HX BREAST BIOPSY  2002    benign, left breast    HX  SECTION  2003, 2007    HX COLONOSCOPY  2020    Diverticulosis, Colon Polyp, Repeat 5 yrs, Dr. Florez Raw HX CYST REMOVAL  1993    fallopian tube    HX LIPOMA RESECTION  2005    left arm    HX TONSIL AND ADENOIDECTOMY  9yo    HX TYMPANOSTOMY      multiple in her childhood up until T&A age 9 yo    [place] BIOPSY OF BREAST, NEEDLE CORE  10/15/2015    Yolanda         MEDICATIONS     Current Outpatient Medications   Medication Sig    elderberry fruit (ELDERBERRY PO) Take 2 Caplet by mouth. Includes zinc and vitamin c    ascorbic acid, vitamin C, (Vitamin C) 250 mg tablet Take 250 mg by mouth as needed.  multivit,min/folic AGSZ/CYR611 (MENOPAUSE SUPPLEMENT PO) Take  by mouth daily.  triamcinolone acetonide (KENALOG) 0.1 % topical cream Apply  to affected area two (2) times daily as needed.  cholecalciferol, VITAMIN D3, (VITAMIN D3) 5,000 unit tab tablet Take  by mouth daily.  fluticasone (FLONASE) 50 mcg/actuation nasal spray 2 Sprays by Both Nostrils route daily. (Patient taking differently: 2 Sprays by Both Nostrils route daily. Since 10-11-17 per Allergist)    multivitamin with iron-mineral 0.8 mg cmpk Take  by mouth. Multivitamin-Mineral Oil pack, Lemon [employer] concentrate oil     No current facility-administered medications for this visit.           ALLERGIES   No Known Allergies       SOCIAL HISTORY     Social History     Tobacco Use    Smoking status: Never Smoker    Smokeless tobacco: Never Used   Substance Use Topics    Alcohol use: Yes     Comment: Rare, socially, cocktail or wine     Social History     Social History Narrative    [place] Descent    , 1 son, 1 daughter     for [address]: modified Keto Diet but admits her sweet tooth is still bad    Caffeine: 2 mugs of coffee a day, 1 large mug unsweet ice tea 2-3 x a week, occ diet soda    Exercise: walks puppy 2-3 x a week x 20 minutes    Weight: fluctuates between the 210's-230's over the last several years, currently on the higher end         Social History     Substance and Sexual Activity   Sexual Activity Yes    Partners: Male    Birth control/protection: Condom    Comment: Menopause, LMP 4-2019 age 53 yo       IMMUNIZATIONS     Immunization History Administered Date(s) Administered    Tdap 11/10/2015         FAMILY HISTORY     Family History   Problem Relation Age of Onset    High Cholesterol Mother         on medication    High Cholesterol Father         living    Heart Disease Father [address]     Breast Cancer Maternal Grandmother 61    Arthritis-osteo Maternal Grandmother     Diabetes Maternal Grandmother     Osteoporosis Maternal Grandmother     Cancer Maternal Grandmother 82        Liver    Diabetes Maternal Grandfather     Heart Disease Maternal Grandfather     High Cholesterol Maternal Grandfather     Stroke Maternal Grandfather 79    Diabetes Paternal Grandfather     Glaucoma Paternal Grandfather     Diabetes Paternal Grandmother     Obesity Brother     Allergic Rhinitis Son         skin testing, multiple allergies    Breast Cancer Maternal Aunt          VITALS     Visit Vitals  /74 (BP 1 Location: Left upper arm, BP Patient Position: Sitting, BP Cuff Size: Large adult)   Pulse 88   Temp 97.6 °F (36.4 °C) (Oral)   Resp 16   Ht 5' 3.5\" (1.613 m)   Wt 235 lb (106.6 kg)   LMP 04/01/2019   SpO2 98%   BMI 40.98 kg/m²          PHYSICAL EXAMINATION   Physical Exam  Vitals reviewed. Constitutional:       General: She is not in acute distress. HENT:      Right Ear: Tympanic membrane normal.      Left Ear: Tympanic membrane normal.   Eyes:      General: No scleral icterus. Conjunctiva/sclera: Conjunctivae normal.   Neck:      Thyroid: No thyroid mass, thyromegaly or thyroid tenderness. Vascular: No carotid bruit. Cardiovascular:      Rate and Rhythm: Normal rate and regular rhythm. Heart sounds: Normal heart sounds. Pulmonary:      Effort: Pulmonary effort is normal.      Breath sounds: Normal breath sounds. Abdominal:      Palpations: Abdomen is soft. Tenderness: There is no abdominal tenderness. Musculoskeletal:         General: No swelling or tenderness. Normal range of motion.       Right lower leg: No edema. Left lower leg: No edema. Lymphadenopathy:      Cervical: No cervical adenopathy. Skin:     General: Skin is warm and dry. Neurological:      General: No focal deficit present. Mental Status: She is alert. Mental status is at baseline. Psychiatric:         Mood and Affect: Mood normal.                LABORATORY DATA/ANCILLARY/IMAGING     Results for orders placed or performed in visit on 11/05/21   CBC W/O DIFF   Result Value Ref Range    WBC 6.1 3.4 - 10.8 x10E3/uL    RBC 5.03 3.77 - 5.28 x10E6/uL    HGB 13.9 11.1 - 15.9 g/dL    HCT 42.7 34.0 - 46.6 %    MCV 85 79 - 97 fL    MCH 27.6 26.6 - 33.0 pg    MCHC 32.6 31.5 - 35.7 g/dL    RDW 14.0 11.7 - 15.4 %    PLATELET 609 258 - 610 L30S5/WV   METABOLIC PANEL, COMPREHENSIVE   Result Value Ref Range    Glucose 113 (H) 65 - 99 mg/dL    BUN 23 6 - 24 mg/dL    Creatinine 0.72 0.57 - 1.00 mg/dL    GFR est non-AA 99 >59 mL/min/1.73    GFR est  >59 mL/min/1.73    BUN/Creatinine ratio 32 (H) 9 - 23    Sodium 141 134 - 144 mmol/L    Potassium 4.1 3.5 - 5.2 mmol/L    Chloride 103 96 - 106 mmol/L    CO2 25 20 - 29 mmol/L    Calcium 10.1 8.7 - 10.2 mg/dL    Protein, total 7.5 6.0 - 8.5 g/dL    Albumin 4.9 (H) 3.8 - 4.8 g/dL    GLOBULIN, TOTAL 2.6 1.5 - 4.5 g/dL    A-G Ratio 1.9 1.2 - 2.2    Bilirubin, total 0.3 0.0 - 1.2 mg/dL    Alk.  phosphatase 51 44 - 121 IU/L    AST (SGOT) 11 0 - 40 IU/L    ALT (SGPT) 16 0 - 32 IU/L   LIPID PANEL   Result Value Ref Range    Cholesterol, total 240 (H) 100 - 199 mg/dL    Triglyceride 146 0 - 149 mg/dL    HDL Cholesterol 58 >39 mg/dL    VLDL, calculated 26 5 - 40 mg/dL    LDL, calculated 156 (H) 0 - 99 mg/dL   HEMOGLOBIN A1C WITH EAG   Result Value Ref Range    Hemoglobin A1c 5.8 (H) 4.8 - 5.6 %    Estimated average glucose 120 mg/dL   TSH 3RD GENERATION   Result Value Ref Range    TSH 2.640 0.450 - 4.500 uIU/mL   CVD REPORT   Result Value Ref Range    INTERPRETATION Note              ASSESSMENT & PLAN Diagnoses and all orders for this visit:    1. Routine general medical examination at a health care facility    2. Mixed hyperlipidemia    3. Prediabetes  -     metFORMIN ER (GLUCOPHAGE XR) 500 mg tablet; Take 1 Tablet by mouth two (2) times daily (with meals). 4. Obesity, Class III, BMI 40-49.9 (morbid obesity) (HCC)  -     metFORMIN ER (GLUCOPHAGE XR) 500 mg tablet; Take 1 Tablet by mouth two (2) times daily (with meals). Fasting lab results from 11-5, specific lipid/LDL/HgbA1c goals reviewed  Reviewed diet, nutrition, exercise, weight management, BMI/goals. Age/risk based screening recommendations, health maintenance & prevention counseling. Cancer screening USPTFS guidelines reviewed w/ pt today. Discussed benefits/positive/negative outcomes of screening based on age/risk stratification. Informed consent for/against screening based on pt's personal hx/risk factors. Updated in history above and health maintenance.    Sees gyn Dr. Larissa Michel annually for well woman visits/gyn exams, paps per guidelines, and annual mammogram screens  Pap reportedly negative ~, report requested  Mammogram screen also reportedly negative ~  606[address]ney Oliveira, report requested  Colonoscopy screening , benign polyp, repeat 5 yrs per Dr. Rick Noble trial of Metformin  mg initially 1 tab qpm w/ dinner x 1 week then bid w/ BF and dinner  Reviewed medication, effects and possible side effects   Follow up 6-8 weeks, sooner prn

## 2024-11-13 NOTE — DISCHARGE NOTE PROVIDER - NSDCMRMEDTOKEN_GEN_ALL_CORE_FT
amLODIPine 10 mg oral tablet: 1 tab(s) orally once a day (at bedtime)  losartan 50 mg oral tablet: 1 tablet orally once a day AM  magnesium oxide 400 mg oral capsule: 1 cap(s) orally once a day AM  MetFORMIN (Eqv-Fortamet) 500 mg oral tablet, extended release: 1 tab(s) orally once a day AM  omeprazole 40 mg oral delayed release capsule: 1 cap(s) orally once a day AM   acetaminophen 325 mg oral tablet: 3 tab(s) orally every 6 hours As needed Temp greater or equal to 38C (100.4F)  amLODIPine 10 mg oral tablet: 1 tab(s) orally once a day (at bedtime)  losartan 50 mg oral tablet: 1 tablet orally once a day AM  magnesium oxide 400 mg oral capsule: 1 cap(s) orally once a day AM  MetFORMIN (Eqv-Fortamet) 500 mg oral tablet, extended release: 1 tab(s) orally once a day AM  omeprazole 40 mg oral delayed release capsule: 1 cap(s) orally once a day AM   acetaminophen 325 mg oral tablet: 3 tab(s) orally every 6 hours As needed Temp greater or equal to 38C (100.4F)  amLODIPine 10 mg oral tablet: 1 tab(s) orally once a day (at bedtime)  buprenorphine-naloxone 2 mg-0.5 mg sublingual tablet: 2 tab(s) sublingually 3 times a day MDD: 6  losartan 50 mg oral tablet: 1 tablet orally once a day AM  magnesium oxide 400 mg oral capsule: 1 cap(s) orally once a day AM  MetFORMIN (Eqv-Fortamet) 500 mg oral tablet, extended release: 1 tab(s) orally once a day AM  omeprazole 40 mg oral delayed release capsule: 1 cap(s) orally once a day AM

## 2024-11-13 NOTE — DISCHARGE NOTE PROVIDER - CARE PROVIDERS DIRECT ADDRESSES
,[email or web address]

## 2024-11-13 NOTE — DISCHARGE NOTE PROVIDER - NSRESEARCHGRANT_PROPHYLAXISRECOMFT_GEN_A_CORE
Detail Level: Zone Note Text (......Xxx Chief Complaint.): This diagnosis correlates with the This is a surgical and/or non-medical patient. Other (Free Text): Patient on birth control pill.\\nDiscussed accutane but decided against it at today’s visit Detail Level: Simple

## 2024-11-13 NOTE — DISCHARGE NOTE PROVIDER - CARE PROVIDER_API CALL
Pilo Kelly)  Urology  [address] [place] [address], [address] [address]  Phone: [phone]  Fax: [phone]  Follow Up Time:    Pilo Kelly)  Urology  [address] [place] [address], [address]  [place] [address]  Phone: [phone]  Fax: [phone]  Follow Up Time:     Treatment,   The [address] [address]  Friday Nov 15    930 AM  Phone: (   )    -  Fax: (   )    -  Scheduled Appointment: 11/15/2024 12:00 AM

## 2024-11-13 NOTE — SBIRT NOTE ADULT - NSSBIRTDRGBRIEFINTDET_GEN_A_CORE
SW met with patient, pt reports she uses daily and at this time she doesn't want to go to inpatient rehab facility.  Pt reports she thinks her family are aware of her addiction but they don't discuss  it.  Pt will follow up at [facility].  She didn't want any other community resources or referrals.

## 2024-11-13 NOTE — DISCHARGE NOTE NURSING/CASE MANAGEMENT/SOCIAL WORK - NSDCPEFALRISK_GEN_ALL_CORE
For information on Fall & Injury Prevention, visit: [email or web address] OR  [email or web address] OR  https://www.cdc.gov/steadi/patient.html

## 2024-11-13 NOTE — BH CONSULTATION LIAISON PROGRESS NOTE - NSBHCONSULTRECOMMENDOTHER_PSY_A_CORE FT
Plan:   - Patient is being discharged today and is scheduled for a follow-up appointment at a Suboxone clinic this Friday (11/15) at [facility]   - She will be prescribed a bridge Rx of Suboxone for the next two days until her follow-up appointment at clinic Plan:   - Patient is being discharged today and is scheduled for a follow-up appointment at a Suboxone clinic this Friday (11/15) at [facility]   - She will be prescribed a bridge Rx of Suboxone for the next two days until her follow-up appointment at clinic  no psychiatric contraindications to discharge when med cleared.

## 2024-11-13 NOTE — PROGRESS NOTE ADULT - SUBJECTIVE AND OBJECTIVE BOX
POD #2  Afeb 99.7 (Tmax 100.4 yesterday) 144/84 92 99%RA    Pt has no c/o; feels better; no nausea  Abd- soft NT ND; + flatus     wounds C&D  V -400

## 2024-11-13 NOTE — DISCHARGE NOTE NURSING/CASE MANAGEMENT/SOCIAL WORK - NSDCPEEMAIL_GEN_ALL_CORE
[facility] for Tobacco Control email [email or web address]

## 2024-11-13 NOTE — DISCHARGE NOTE PROVIDER - PROVIDER TOKENS
PROVIDER:[TOKEN:[[id number]]] PROVIDER:[TOKEN:[[id number]]],FREE:[LAST:[Treatment],PHONE:[(   )    -],FAX:[(   )    -],ADDRESS:[[place] [address]  Friday Nov 15    930 AM],SCHEDULEDAPPT:[11/15/2024],SCHEDULEDAPPTTIME:[12:00 AM]]

## 2024-11-13 NOTE — DISCHARGE NOTE NURSING/CASE MANAGEMENT/SOCIAL WORK - FINANCIAL ASSISTANCE
[facility] provides services at a reduced cost to those who are determined to be eligible through [facility]’s financial assistance program. Information regarding [facility]’s financial assistance program can be found by going to [email or web address] or by calling [phone].

## 2024-11-13 NOTE — DISCHARGE NOTE NURSING/CASE MANAGEMENT/SOCIAL WORK - PATIENT PORTAL LINK FT
You can access the FollowMyHealth Patient Portal offered by [facility] by registering at the following website: [email or web address]. By joining [employer]’s FollowMyHealth portal, you will also be able to view your health information using other applications (apps) compatible with our system. Xolair Pregnancy And Lactation Text: This medication is Pregnancy Category B and is considered safe during pregnancy. This medication is excreted in breast milk.

## 2024-11-13 NOTE — BH CONSULTATION LIAISON PROGRESS NOTE - NSBHASSESSMENTFT_PSY_ALL_CORE
This is a 69 year old woman with a PMH of hypertension, Type II Diabetes Mellitus, pancreatic cancer diagnosed in 2023 (adenocarcinoma, s/p chemotherapy), with recent admission on 11/11 for robotic-assisted partial nephrectomy to treat left renal mass (possible renal cell carcinoma) with a history of substance use (narcotics, heroin, cigarettes) who is now on POD#2 experiencing improvements in symptoms due to acute opioid withdrawal (minimal nausea, no vomiting, no tremors, palpitations, subjective anxiety) and is currently receiving suboxone 4:1 TID.

## 2024-11-13 NOTE — PROGRESS NOTE ADULT - ASSESSMENT
A/P: 68y Female s/p Robotic left partial nephrectomy  DVT prophylaxis/OOB  Incentive spirometry  Strict I&O's  Analgesia and antiemetics as needed  Diet  AM labs  
A/P: 68y Female s/p Robotic left partial nephrectomy  POD#1 - fever 101.8 this AM; admits to oxy and heroin use daily; says fever is from withdrawal  Plan:  - pain consult  - oxycodone  - zofran  - OOB  - d/c davison  - labs  
A/P: 68y Female s/p Robotic left partial nephrectomy  POD#1 - fever 101.8 this AM; admits to oxy and heroin use daily; says fever is from withdrawal; addiction psych called and pt placed on suboxone  POD#2 - feels better; passed gas  Plan:  - reg diet  - poss home  - psych f/u  
68 yo F w/ HTN, DM2, pancreatic cancer (d/x April 2023, s/p EUS, FNA w/ path showing adenocarcinoma) s/p chemotherapy, L renal mass concerning for renal cell carcinoma, substance abuse (narcotics, hx smoking), admitted for urological procedure, s/p Robotic assisted L partial nephrectomy on 11/11, now POD#2, course c/b fever and complaints of nausea. Currently clinically improved, afebrile and able to tolerate diet.      # L renal mass concerning for renal cell carcinoma s/p L partial nephrectomy  # post-operative fever  - POD#2 s/p L partial nephrectomy  - renal function appears stable compared to prior, continue to monitor  - post-op care as per urology  - monitor UOP s/p davison removal (patient has been urinating)  - pain management as per urology, given history of opioid dependence, trying to minimize use of opioids if possible  - c/w symptomatic management of nausea prn, monitor bowel function (patient reports +flatus and BM, reports nausea improved)  - c/w diet as tolerated  - bowel regimen to prevent constipation  - fever not unexpected in post-operative period, monitoring off antibiotics as per urology, encourage incentive spirometry, reassess if fever persists or clinically decompensating (patient currently afebrile, no additional fevers noted)    # Pancreatic cancer, neoplasm related pain  - s/p celiac plexus nerve block on 5/16/23 per palliative note with improvement in pain  - s/p 5 fractions SBRT to her pancreas by rad/onc  - not a candidate for surgical resection due to encasement of vasculature as per surg-onc  - on break from chemo as per heme/onc, to f/u otpt for further oncologic care    # Opioid dependence, hx smoking  - hx heroin and narcotic use  - BH/addiction med eval appreciated, patient with moderate opioid withdrawal on their assessment, started on suboxone 4/1 mg tid as per recs (d/w Dr. Erwin on 11/12), f/u further recs re: course and plans for otpt followup  - encourage heroin cessation, SBIRT assistance appreciated  - encourage continued smoking cessation    # Essential HTN  - on amlodipine and losartan  - overall acceptable, continue to monitor and adjust regimen as needed, BP readings may be confounded by nausea/pain, avoid hypotension, no need to intensify regimen as this time    # DM2  - recent A1C 7.6  - hold home metformin while inpatient (can resume upon discharge)  - c/w sliding scale coverage, monitor FS and adjust regimen as needed  - goal FS <180  - consistent carb diet    # Need for prophylactic measure  - VTE ppx; HSQ, at higher risk due to malignancy

## 2024-11-13 NOTE — DISCHARGE NOTE NURSING/CASE MANAGEMENT/SOCIAL WORK - NSPROEXTENSIONSOFSELF_GEN_A_NUR
Adarsh Jackson attended: Exercise Workshop from 10:00-11:00am    The topic was: Biometrics     Patient received handouts regarding the specific exercise information    
eyeglasses

## 2024-11-13 NOTE — PROGRESS NOTE ADULT - SUBJECTIVE AND OBJECTIVE BOX
[facility] Medicine  Pager [phone]    Patient is a 69y old  Female who presents with a chief complaint of " I am having left partial kidney surgery" (04 Nov 2024 07:57)      INTERVAL HPI/OVERNIGHT EVENTS:    MEDICATIONS  (STANDING):  amLODIPine   Tablet 10 milliGRAM(s) Oral at bedtime  buprenorphine 2 mG/naloxone 0.5 mG SL  Tablet 2 Tablet(s) SubLingual three times a day  dextrose 5%. 1000 milliLiter(s) (100 mL/Hr) IV Continuous <Continuous>  dextrose 5%. 1000 milliLiter(s) (50 mL/Hr) IV Continuous <Continuous>  dextrose 50% Injectable 25 Gram(s) IV Push once  dextrose 50% Injectable 12.5 Gram(s) IV Push once  dextrose 50% Injectable 25 Gram(s) IV Push once  glucagon  Injectable 1 milliGRAM(s) IntraMuscular once  heparin   Injectable 5000 Unit(s) SubCutaneous every 8 hours  insulin lispro (ADMELOG) corrective regimen sliding scale   SubCutaneous three times a day before meals  insulin lispro (ADMELOG) corrective regimen sliding scale   SubCutaneous at bedtime  losartan 50 milliGRAM(s) Oral daily  magnesium oxide 400 milliGRAM(s) Oral daily  pantoprazole    Tablet 40 milliGRAM(s) Oral before breakfast    MEDICATIONS  (PRN):  acetaminophen     Tablet .. 975 milliGRAM(s) Oral every 6 hours PRN Temp greater or equal to 38C (100.4F)  ALPRAZolam 0.25 milliGRAM(s) Oral once PRN anxiety/tension  dextrose Oral Gel 15 Gram(s) Oral once PRN Blood Glucose LESS THAN 70 milliGRAM(s)/deciliter  ondansetron Injectable 4 milliGRAM(s) IV Push every 8 hours PRN Nausea and/or Vomiting  senna 2 Tablet(s) Oral at bedtime PRN Constipation      Allergies    shellfish (Rash)  No Known Drug Allergies    Intolerances        REVIEW OF SYSTEMS:  Please see interval HPI:    Vital Signs Last 24 Hrs  T(C): 37.6 (13 Nov 2024 05:55), Max: 38 (12 Nov 2024 13:18)  T(F): 99.7 (13 Nov 2024 05:55), Max: 100.4 (12 Nov 2024 13:18)  HR: 92 (13 Nov 2024 05:55) (87 - 100)  BP: 144/84 (13 Nov 2024 05:55) (143/85 - 164/78)  BP(mean): --  RR: 16 (13 Nov 2024 05:55) (16 - 19)  SpO2: 99% (13 Nov 2024 05:55) (97% - 100%)    Parameters below as of 13 Nov 2024 05:55  Patient On (Oxygen Delivery Method): room air      I&O's Detail    12 Nov 2024 07:01  -  13 Nov 2024 07:00  --------------------------------------------------------  IN:  Total IN: 0 mL    OUT:    Voided (mL): 1000 mL  Total OUT: 1000 mL    Total NET: -1000 mL            PHYSICAL EXAM:  GENERAL:   HEAD:    EYES:   ENMT:   NECK:   NERVOUS SYSTEM:    CHEST/LUNG:   HEART:   ABDOMEN:   EXTREMITIES:    LYMPH:   SKIN:     LABS:      Ca    9.5        12 Nov 2024 06:11        CAPILLARY BLOOD GLUCOSE      POCT Blood Glucose.: 224 mg/dL (13 Nov 2024 07:16)  POCT Blood Glucose.: 173 mg/dL (12 Nov 2024 21:19)  POCT Blood Glucose.: 174 mg/dL (12 Nov 2024 16:30)  POCT Blood Glucose.: 192 mg/dL (12 Nov 2024 11:15)    BLOOD CULTURE    RADIOLOGY & ADDITIONAL TESTS:    Imaging Personally Reviewed:  [ ] YES     Consultant(s) Notes Reviewed:      Care Discussed with Consultants/Other Providers: [facility] Medicine  Pager [phone]    Patient is a 69y old  Female who presents with a chief complaint of " I am having left partial kidney surgery" (04 Nov 2024 07:57)      INTERVAL HPI/OVERNIGHT EVENTS:  Patient reports feeling much better today, nausea improved, tolerating diet (ate breakfast without issue), no further fevers noted. Reports flatus and BM. Discussed plan to followup with addiction medicine as otpt re: suboxone and resources for cessation/management of opioid dependence. No other questions/concerns at this time.     MEDICATIONS  (STANDING):  amLODIPine   Tablet 10 milliGRAM(s) Oral at bedtime  buprenorphine 2 mG/naloxone 0.5 mG SL  Tablet 2 Tablet(s) SubLingual three times a day  dextrose 5%. 1000 milliLiter(s) (100 mL/Hr) IV Continuous <Continuous>  dextrose 5%. 1000 milliLiter(s) (50 mL/Hr) IV Continuous <Continuous>  dextrose 50% Injectable 25 Gram(s) IV Push once  dextrose 50% Injectable 12.5 Gram(s) IV Push once  dextrose 50% Injectable 25 Gram(s) IV Push once  glucagon  Injectable 1 milliGRAM(s) IntraMuscular once  heparin   Injectable 5000 Unit(s) SubCutaneous every 8 hours  insulin lispro (ADMELOG) corrective regimen sliding scale   SubCutaneous three times a day before meals  insulin lispro (ADMELOG) corrective regimen sliding scale   SubCutaneous at bedtime  losartan 50 milliGRAM(s) Oral daily  magnesium oxide 400 milliGRAM(s) Oral daily  pantoprazole    Tablet 40 milliGRAM(s) Oral before breakfast    MEDICATIONS  (PRN):  acetaminophen     Tablet .. 975 milliGRAM(s) Oral every 6 hours PRN Temp greater or equal to 38C (100.4F)  ALPRAZolam 0.25 milliGRAM(s) Oral once PRN anxiety/tension  dextrose Oral Gel 15 Gram(s) Oral once PRN Blood Glucose LESS THAN 70 milliGRAM(s)/deciliter  ondansetron Injectable 4 milliGRAM(s) IV Push every 8 hours PRN Nausea and/or Vomiting  senna 2 Tablet(s) Oral at bedtime PRN Constipation      Allergies    shellfish (Rash)  No Known Drug Allergies    Intolerances        REVIEW OF SYSTEMS:  Please see interval HPI:    Vital Signs Last 24 Hrs  T(C): 37.6 (13 Nov 2024 05:55), Max: 38 (12 Nov 2024 13:18)  T(F): 99.7 (13 Nov 2024 05:55), Max: 100.4 (12 Nov 2024 13:18)  HR: 92 (13 Nov 2024 05:55) (87 - 100)  BP: 144/84 (13 Nov 2024 05:55) (143/85 - 164/78)  BP(mean): --  RR: 16 (13 Nov 2024 05:55) (16 - 19)  SpO2: 99% (13 Nov 2024 05:55) (97% - 100%)    Parameters below as of 13 Nov 2024 05:55  Patient On (Oxygen Delivery Method): room air      I&O's Detail    12 Nov 2024 07:01  -  13 Nov 2024 07:00  --------------------------------------------------------  IN:  Total IN: 0 mL    OUT:    Voided (mL): 1000 mL  Total OUT: 1000 mL    Total NET: -1000 mL      PHYSICAL EXAM:  GENERAL: NAD, much more comfortable appearing today, gives smile   HEAD:  NC/AT  EYES: EOMI, clear sclera/conjunctiva  ENMT: MMM, hearing intact to voice  NECK: supple  NERVOUS SYSTEM: moving extremities, non-focal    CHEST/LUNG: comfortable on RA, speaking in full sentences  ABDOMEN: soft, non-tender  EXTREMITIES:  no c/c/e      LABS:        CAPILLARY BLOOD GLUCOSE  POCT Blood Glucose.: 224 mg/dL (13 Nov 2024 07:16)  POCT Blood Glucose.: 173 mg/dL (12 Nov 2024 21:19)  POCT Blood Glucose.: 174 mg/dL (12 Nov 2024 16:30)  POCT Blood Glucose.: 192 mg/dL (12 Nov 2024 11:15)    BLOOD CULTURE    RADIOLOGY & ADDITIONAL TESTS:    Imaging Personally Reviewed:  [ ] YES     Consultant(s) Notes Reviewed:  Urology    Care Discussed with Consultants/Other Providers: Cindi Panda re: f/u  recs re: suboxone course and otpt f/u for opioid dependence

## 2024-11-13 NOTE — DISCHARGE NOTE NURSING/CASE MANAGEMENT/SOCIAL WORK - NSDCFUADDAPPT_GEN_ALL_CORE_FT
You have an appointment with:   [facility]   for MENTAL HEALTH & OPIOID TREATMENT SERVICES  [address] [place], [place]   P: [phone]  Date/Time: FRIDAY (11/15) @ 9:30 AM  IMPORTANT: YOU MUST BRING YOUR ID AND INSURANCE CARD TO INTAKE APPOINTMENT

## 2024-11-13 NOTE — DISCHARGE NOTE PROVIDER - NSDCFUADDAPPT_GEN_ALL_CORE_FT
You have an appointment with:   [facility]   for MENTAL HEALTH & OPIOID TREATMENT SERVICES  [address] [place], [place]   P: [phone]  Date/Time: FRIDAY (11/15) @ 9:30 AM  IMPORTANT: YOU MUST BRING YOUR ID AND INSURANCE CARD TO INTAKE APPOINTMENT

## 2024-11-13 NOTE — DISCHARGE NOTE PROVIDER - NSDCCPCAREPLAN_GEN_ALL_CORE_FT
PRINCIPAL DISCHARGE DIAGNOSIS  Diagnosis: Left renal mass  Assessment and Plan of Treatment: Drink plenty of fluids.  No heavy lifting (greater than 10 pounds) or straining for 4 to 6 weeks.  You may shower, just pat white strips dry, they will fall off in a few weeks.    's office will call to schedule a follow up appointment.  Call the office if you have fever greater than 101, difficulty urinating, pain not relieved with pain medication, nausea/vomiting.

## 2024-11-13 NOTE — DISCHARGE NOTE NURSING/CASE MANAGEMENT/SOCIAL WORK - NSDCPEWEB_GEN_ALL_CORE
[facility] for Tobacco Control website --- [email or web address] website --- [email or web address]

## 2024-11-13 NOTE — DISCHARGE NOTE NURSING/CASE MANAGEMENT/SOCIAL WORK - NSDCPNINST_GEN_ALL_CORE
Notify Dr Kelly if you experience any increase in pain not relieved with medication, any redness, drainage or swelling around incision, any persistent nausea or vomiting or any fever >10.5.  Drink plenty of fluids.  No heavy lifting or straining.  Use over the counter stool softeners to assist with constipation.   Notify Dr Kelly if you experience any increase in pain not relieved with medication, any redness, drainage or swelling around incision, any persistent nausea or vomiting or any fever >100.5.  Drink plenty of fluids.  No heavy lifting or straining.  Use over the counter stool softeners to assist with constipation.  Continue to follow a consistent carbohydrate diet and follow up with your PMD for continued management of your diabetes.

## 2024-11-19 ENCOUNTER — RESULT REVIEW (OUTPATIENT)
Age: 69
End: 2024-11-19

## 2024-11-19 ENCOUNTER — APPOINTMENT (OUTPATIENT)
Dept: HEMATOLOGY ONCOLOGY | Facility: CLINIC | Age: 69
End: 2024-11-19
Payer: MEDICARE

## 2024-11-19 VITALS
WEIGHT: 152.1 LBS | OXYGEN SATURATION: 97 % | RESPIRATION RATE: 16 BRPM | TEMPERATURE: 98.4 F | SYSTOLIC BLOOD PRESSURE: 126 MMHG | DIASTOLIC BLOOD PRESSURE: 81 MMHG | BODY MASS INDEX: 26.92 KG/M2 | HEART RATE: 72 BPM

## 2024-11-19 DIAGNOSIS — D70.1 AGRANULOCYTOSIS SECONDARY TO CANCER CHEMOTHERAPY: ICD-10-CM

## 2024-11-19 DIAGNOSIS — T45.1X5A AGRANULOCYTOSIS SECONDARY TO CANCER CHEMOTHERAPY: ICD-10-CM

## 2024-11-19 DIAGNOSIS — C25.9 MALIGNANT NEOPLASM OF PANCREAS, UNSPECIFIED: ICD-10-CM

## 2024-11-19 DIAGNOSIS — D64.9 ANEMIA, UNSPECIFIED: ICD-10-CM

## 2024-11-19 DIAGNOSIS — E11.9 TYPE 2 DIABETES MELLITUS W/OUT COMPLICATIONS: ICD-10-CM

## 2024-11-19 PROBLEM — N28.89 OTHER SPECIFIED DISORDERS OF KIDNEY AND URETER: Chronic | Status: ACTIVE | Noted: 2024-11-04

## 2024-11-19 LAB
BASOPHILS # BLD AUTO: 0.04 K/UL — SIGNIFICANT CHANGE UP (ref 0–0.2)
BASOPHILS NFR BLD AUTO: 0.5 % — SIGNIFICANT CHANGE UP (ref 0–2)
EOSINOPHIL # BLD AUTO: 0.4 K/UL — SIGNIFICANT CHANGE UP (ref 0–0.5)
EOSINOPHIL NFR BLD AUTO: 4.7 % — SIGNIFICANT CHANGE UP (ref 0–6)
HCT VFR BLD CALC: 31.8 % — LOW (ref 34.5–45)
HGB BLD-MCNC: 10.6 G/DL — LOW (ref 11.5–15.5)
IMM GRANULOCYTES NFR BLD AUTO: 1.2 % — HIGH (ref 0–0.9)
LYMPHOCYTES # BLD AUTO: 1.14 K/UL — SIGNIFICANT CHANGE UP (ref 1–3.3)
LYMPHOCYTES # BLD AUTO: 13.3 % — SIGNIFICANT CHANGE UP (ref 13–44)
MCHC RBC-ENTMCNC: 27.5 PG — SIGNIFICANT CHANGE UP (ref 27–34)
MCHC RBC-ENTMCNC: 33.3 G/DL — SIGNIFICANT CHANGE UP (ref 32–36)
MCV RBC AUTO: 82.6 FL — SIGNIFICANT CHANGE UP (ref 80–100)
MONOCYTES # BLD AUTO: 0.46 K/UL — SIGNIFICANT CHANGE UP (ref 0–0.9)
MONOCYTES NFR BLD AUTO: 5.3 % — SIGNIFICANT CHANGE UP (ref 2–14)
NEUTROPHILS # BLD AUTO: 6.46 K/UL — SIGNIFICANT CHANGE UP (ref 1.8–7.4)
NEUTROPHILS NFR BLD AUTO: 75 % — SIGNIFICANT CHANGE UP (ref 43–77)
NRBC # BLD: 0 /100 WBCS — SIGNIFICANT CHANGE UP (ref 0–0)
PLATELET # BLD AUTO: 202 K/UL — SIGNIFICANT CHANGE UP (ref 150–400)
RBC # BLD: 3.85 M/UL — SIGNIFICANT CHANGE UP (ref 3.8–5.2)
RBC # FLD: 18.2 % — HIGH (ref 10.3–14.5)
WBC # BLD: 8.6 K/UL — SIGNIFICANT CHANGE UP (ref 3.8–10.5)
WBC # FLD AUTO: 8.6 K/UL — SIGNIFICANT CHANGE UP (ref 3.8–10.5)

## 2024-11-19 PROCEDURE — 99214 OFFICE O/P EST MOD 30 MIN: CPT

## 2024-11-21 LAB — SURGICAL PATHOLOGY STUDY: SIGNIFICANT CHANGE UP

## 2024-12-12 ENCOUNTER — APPOINTMENT (OUTPATIENT)
Dept: UROLOGY | Facility: CLINIC | Age: 69
End: 2024-12-12
Payer: MEDICARE

## 2024-12-12 VITALS
HEART RATE: 93 BPM | TEMPERATURE: 98.4 F | HEIGHT: 63.03 IN | BODY MASS INDEX: 26.93 KG/M2 | SYSTOLIC BLOOD PRESSURE: 122 MMHG | DIASTOLIC BLOOD PRESSURE: 78 MMHG | RESPIRATION RATE: 17 BRPM | WEIGHT: 152 LBS

## 2024-12-12 DIAGNOSIS — N28.89 OTHER SPECIFIED DISORDERS OF KIDNEY AND URETER: ICD-10-CM

## 2024-12-12 DIAGNOSIS — C64.9 MALIGNANT NEOPLASM OF UNSPECIFIED KIDNEY, EXCEPT RENAL PELVIS: ICD-10-CM

## 2024-12-12 PROCEDURE — 99024 POSTOP FOLLOW-UP VISIT: CPT

## 2024-12-20 ENCOUNTER — RESULT REVIEW (OUTPATIENT)
Age: 69
End: 2024-12-20

## 2024-12-20 ENCOUNTER — APPOINTMENT (OUTPATIENT)
Dept: INFUSION THERAPY | Facility: HOSPITAL | Age: 69
End: 2024-12-20

## 2024-12-20 ENCOUNTER — APPOINTMENT (OUTPATIENT)
Dept: HEMATOLOGY ONCOLOGY | Facility: CLINIC | Age: 69
End: 2024-12-20
Payer: MEDICARE

## 2024-12-20 VITALS
HEART RATE: 91 BPM | DIASTOLIC BLOOD PRESSURE: 71 MMHG | BODY MASS INDEX: 27.93 KG/M2 | OXYGEN SATURATION: 98 % | RESPIRATION RATE: 18 BRPM | TEMPERATURE: 97.5 F | SYSTOLIC BLOOD PRESSURE: 102 MMHG | HEIGHT: 63.03 IN | WEIGHT: 157.61 LBS

## 2024-12-20 DIAGNOSIS — F11.29: ICD-10-CM

## 2024-12-20 DIAGNOSIS — K86.89 OTHER SPECIFIED DISEASES OF PANCREAS: ICD-10-CM

## 2024-12-20 DIAGNOSIS — D64.9 ANEMIA, UNSPECIFIED: ICD-10-CM

## 2024-12-20 DIAGNOSIS — C25.9 MALIGNANT NEOPLASM OF PANCREAS, UNSPECIFIED: ICD-10-CM

## 2024-12-20 DIAGNOSIS — D69.6 THROMBOCYTOPENIA, UNSPECIFIED: ICD-10-CM

## 2024-12-20 LAB
ALBUMIN SERPL ELPH-MCNC: 3.8 G/DL — SIGNIFICANT CHANGE UP (ref 3.3–5)
ALP SERPL-CCNC: 115 U/L — SIGNIFICANT CHANGE UP (ref 40–120)
ALT FLD-CCNC: 9 U/L — LOW (ref 10–45)
ANION GAP SERPL CALC-SCNC: 12 MMOL/L — SIGNIFICANT CHANGE UP (ref 5–17)
AST SERPL-CCNC: 17 U/L — SIGNIFICANT CHANGE UP (ref 10–40)
BASOPHILS # BLD AUTO: 0.03 K/UL — SIGNIFICANT CHANGE UP (ref 0–0.2)
BASOPHILS NFR BLD AUTO: 0.4 % — SIGNIFICANT CHANGE UP (ref 0–2)
BILIRUB SERPL-MCNC: 0.3 MG/DL — SIGNIFICANT CHANGE UP (ref 0.2–1.2)
BUN SERPL-MCNC: 21 MG/DL — SIGNIFICANT CHANGE UP (ref 7–23)
CALCIUM SERPL-MCNC: 9.5 MG/DL — SIGNIFICANT CHANGE UP (ref 8.4–10.5)
CHLORIDE SERPL-SCNC: 99 MMOL/L — SIGNIFICANT CHANGE UP (ref 96–108)
CO2 SERPL-SCNC: 25 MMOL/L — SIGNIFICANT CHANGE UP (ref 22–31)
CREAT SERPL-MCNC: 0.86 MG/DL — SIGNIFICANT CHANGE UP (ref 0.5–1.3)
EGFR: 73 ML/MIN/1.73M2 — SIGNIFICANT CHANGE UP
EOSINOPHIL # BLD AUTO: 0.12 K/UL — SIGNIFICANT CHANGE UP (ref 0–0.5)
EOSINOPHIL NFR BLD AUTO: 1.6 % — SIGNIFICANT CHANGE UP (ref 0–6)
GLUCOSE SERPL-MCNC: 278 MG/DL — HIGH (ref 70–99)
HCT VFR BLD CALC: 32.3 % — LOW (ref 34.5–45)
HGB BLD-MCNC: 10.5 G/DL — LOW (ref 11.5–15.5)
IMM GRANULOCYTES NFR BLD AUTO: 0.5 % — SIGNIFICANT CHANGE UP (ref 0–0.9)
LYMPHOCYTES # BLD AUTO: 1.32 K/UL — SIGNIFICANT CHANGE UP (ref 1–3.3)
LYMPHOCYTES # BLD AUTO: 17.4 % — SIGNIFICANT CHANGE UP (ref 13–44)
MCHC RBC-ENTMCNC: 27.3 PG — SIGNIFICANT CHANGE UP (ref 27–34)
MCHC RBC-ENTMCNC: 32.5 G/DL — SIGNIFICANT CHANGE UP (ref 32–36)
MCV RBC AUTO: 84.1 FL — SIGNIFICANT CHANGE UP (ref 80–100)
MONOCYTES # BLD AUTO: 0.46 K/UL — SIGNIFICANT CHANGE UP (ref 0–0.9)
MONOCYTES NFR BLD AUTO: 6.1 % — SIGNIFICANT CHANGE UP (ref 2–14)
NEUTROPHILS # BLD AUTO: 5.63 K/UL — SIGNIFICANT CHANGE UP (ref 1.8–7.4)
NEUTROPHILS NFR BLD AUTO: 74 % — SIGNIFICANT CHANGE UP (ref 43–77)
NRBC # BLD: 0 /100 WBCS — SIGNIFICANT CHANGE UP (ref 0–0)
PLATELET # BLD AUTO: 166 K/UL — SIGNIFICANT CHANGE UP (ref 150–400)
POTASSIUM SERPL-MCNC: 4.4 MMOL/L — SIGNIFICANT CHANGE UP (ref 3.5–5.3)
POTASSIUM SERPL-SCNC: 4.4 MMOL/L — SIGNIFICANT CHANGE UP (ref 3.5–5.3)
PROT SERPL-MCNC: 6.7 G/DL — SIGNIFICANT CHANGE UP (ref 6–8.3)
RBC # BLD: 3.84 M/UL — SIGNIFICANT CHANGE UP (ref 3.8–5.2)
RBC # FLD: 17.4 % — HIGH (ref 10.3–14.5)
SODIUM SERPL-SCNC: 136 MMOL/L — SIGNIFICANT CHANGE UP (ref 135–145)
WBC # BLD: 7.6 K/UL — SIGNIFICANT CHANGE UP (ref 3.8–10.5)
WBC # FLD AUTO: 7.6 K/UL — SIGNIFICANT CHANGE UP (ref 3.8–10.5)

## 2024-12-20 PROCEDURE — 99214 OFFICE O/P EST MOD 30 MIN: CPT

## 2024-12-20 PROCEDURE — G2211 COMPLEX E/M VISIT ADD ON: CPT

## 2024-12-21 LAB
CANCER AG19-9 SERPL-ACNC: 851 U/ML — HIGH
CEA SERPL-MCNC: 6.7 NG/ML — HIGH (ref 0–3.8)

## 2025-01-06 ENCOUNTER — NON-APPOINTMENT (OUTPATIENT)
Age: 70
End: 2025-01-06

## 2025-01-16 ENCOUNTER — NON-APPOINTMENT (OUTPATIENT)
Age: 70
End: 2025-01-16

## 2025-01-16 DIAGNOSIS — C25.9 MALIGNANT NEOPLASM OF PANCREAS, UNSPECIFIED: ICD-10-CM

## 2025-01-22 ENCOUNTER — OUTPATIENT (OUTPATIENT)
Dept: OUTPATIENT SERVICES | Facility: HOSPITAL | Age: 70
LOS: 1 days | Discharge: HOME | End: 2025-01-22

## 2025-01-22 DIAGNOSIS — C25.9 MALIGNANT NEOPLASM OF PANCREAS, UNSPECIFIED: ICD-10-CM

## 2025-01-22 DIAGNOSIS — Z96.652 PRESENCE OF LEFT ARTIFICIAL KNEE JOINT: Chronic | ICD-10-CM

## 2025-02-04 ENCOUNTER — EMERGENCY (EMERGENCY)
Facility: HOSPITAL | Age: 70
LOS: 0 days | Discharge: LEFT BEFORE DISCHARGE | End: 2025-02-04
Attending: STUDENT IN AN ORGANIZED HEALTH CARE EDUCATION/TRAINING PROGRAM
Payer: MEDICARE

## 2025-02-04 VITALS
RESPIRATION RATE: 20 BRPM | HEIGHT: 63.03 IN | TEMPERATURE: 99 F | SYSTOLIC BLOOD PRESSURE: 142 MMHG | WEIGHT: 158.95 LBS | HEART RATE: 94 BPM | DIASTOLIC BLOOD PRESSURE: 95 MMHG | OXYGEN SATURATION: 100 %

## 2025-02-04 DIAGNOSIS — Z96.652 PRESENCE OF LEFT ARTIFICIAL KNEE JOINT: Chronic | ICD-10-CM

## 2025-02-04 PROCEDURE — L9992: CPT

## 2025-02-04 PROCEDURE — 93010 ELECTROCARDIOGRAM REPORT: CPT

## 2025-02-04 NOTE — ED ADULT NURSE NOTE - CAS ED LWBS REASON YN
Pt left after being triaged and placed in HW37, PA Deny made aware/no Pt left after being triaged and placed in HW37, MD Johnson made aware/no

## 2025-02-04 NOTE — ED ADULT TRIAGE NOTE - CHIEF COMPLAINT QUOTE
Came in for generalized body weakness started yesterday. No fever. No sick contacts. Speaks in full sentences.

## 2025-02-04 NOTE — ED ADULT NURSE NOTE - OBJECTIVE STATEMENT
Pt left after being triaged and placed in HW37, primary RN did not see patient, not seen by MD/PA, KELSY Barclay made aware Pt left after being triaged and placed in HW37, primary RN did not see patient, not seen by MD/PA, KELSY Barclay and MD Johnson made aware

## 2025-02-05 DIAGNOSIS — R53.1 WEAKNESS: ICD-10-CM

## 2025-02-05 DIAGNOSIS — Z53.21 PROCEDURE AND TREATMENT NOT CARRIED OUT DUE TO PATIENT LEAVING PRIOR TO BEING SEEN BY HEALTH CARE PROVIDER: ICD-10-CM

## 2025-02-10 NOTE — ASU PREOP CHECKLIST - DNR CLARIFICATION FORM COMPLETED
Patient called in to find out what the appointment is for 2/28/2025. I informed him it was status post SPT removal, patient states he has not had any issues or complications. He would like to reschedule to a later date. I offered to reschedule but the patient said he would call back to schedule.   n/a

## 2025-02-20 ENCOUNTER — APPOINTMENT (OUTPATIENT)
Dept: CT IMAGING | Facility: IMAGING CENTER | Age: 70
End: 2025-02-20

## 2025-02-20 ENCOUNTER — OUTPATIENT (OUTPATIENT)
Dept: OUTPATIENT SERVICES | Facility: HOSPITAL | Age: 70
LOS: 1 days | End: 2025-02-20
Payer: COMMERCIAL

## 2025-02-20 DIAGNOSIS — Z96.652 PRESENCE OF LEFT ARTIFICIAL KNEE JOINT: Chronic | ICD-10-CM

## 2025-02-20 DIAGNOSIS — C25.9 MALIGNANT NEOPLASM OF PANCREAS, UNSPECIFIED: ICD-10-CM

## 2025-02-20 PROCEDURE — 74177 CT ABD & PELVIS W/CONTRAST: CPT | Mod: 26

## 2025-02-20 PROCEDURE — 71260 CT THORAX DX C+: CPT | Mod: 26

## 2025-02-20 PROCEDURE — 74177 CT ABD & PELVIS W/CONTRAST: CPT

## 2025-02-20 PROCEDURE — 71260 CT THORAX DX C+: CPT

## 2025-02-21 ENCOUNTER — APPOINTMENT (OUTPATIENT)
Dept: HEMATOLOGY ONCOLOGY | Facility: CLINIC | Age: 70
End: 2025-02-21

## 2025-02-26 ENCOUNTER — NON-APPOINTMENT (OUTPATIENT)
Age: 70
End: 2025-02-26

## 2025-02-28 ENCOUNTER — RESULT REVIEW (OUTPATIENT)
Age: 70
End: 2025-02-28

## 2025-02-28 ENCOUNTER — NON-APPOINTMENT (OUTPATIENT)
Age: 70
End: 2025-02-28

## 2025-02-28 ENCOUNTER — APPOINTMENT (OUTPATIENT)
Dept: INFUSION THERAPY | Facility: HOSPITAL | Age: 70
End: 2025-02-28

## 2025-02-28 ENCOUNTER — APPOINTMENT (OUTPATIENT)
Dept: HEMATOLOGY ONCOLOGY | Facility: CLINIC | Age: 70
End: 2025-02-28
Payer: MEDICARE

## 2025-02-28 VITALS
BODY MASS INDEX: 26.02 KG/M2 | OXYGEN SATURATION: 99 % | HEIGHT: 63.66 IN | RESPIRATION RATE: 18 BRPM | HEART RATE: 93 BPM | DIASTOLIC BLOOD PRESSURE: 79 MMHG | WEIGHT: 150.55 LBS | SYSTOLIC BLOOD PRESSURE: 114 MMHG | TEMPERATURE: 97.2 F

## 2025-02-28 DIAGNOSIS — D70.9 NEUTROPENIA, UNSPECIFIED: ICD-10-CM

## 2025-02-28 DIAGNOSIS — F11.20 OPIOID DEPENDENCE, UNCOMPLICATED: ICD-10-CM

## 2025-02-28 DIAGNOSIS — Z51.5 ENCOUNTER FOR PALLIATIVE CARE: ICD-10-CM

## 2025-02-28 DIAGNOSIS — C64.9 MALIGNANT NEOPLASM OF UNSPECIFIED KIDNEY, EXCEPT RENAL PELVIS: ICD-10-CM

## 2025-02-28 DIAGNOSIS — C25.9 MALIGNANT NEOPLASM OF PANCREAS, UNSPECIFIED: ICD-10-CM

## 2025-02-28 LAB
ALBUMIN SERPL ELPH-MCNC: 4 G/DL — SIGNIFICANT CHANGE UP (ref 3.3–5)
ALP SERPL-CCNC: 91 U/L — SIGNIFICANT CHANGE UP (ref 40–120)
ALT FLD-CCNC: 23 U/L — SIGNIFICANT CHANGE UP (ref 10–45)
ANION GAP SERPL CALC-SCNC: 16 MMOL/L — SIGNIFICANT CHANGE UP (ref 5–17)
AST SERPL-CCNC: 18 U/L — SIGNIFICANT CHANGE UP (ref 10–40)
BASOPHILS # BLD AUTO: 0.03 K/UL — SIGNIFICANT CHANGE UP (ref 0–0.2)
BASOPHILS NFR BLD AUTO: 0.3 % — SIGNIFICANT CHANGE UP (ref 0–2)
BILIRUB SERPL-MCNC: 0.2 MG/DL — SIGNIFICANT CHANGE UP (ref 0.2–1.2)
BUN SERPL-MCNC: 26 MG/DL — HIGH (ref 7–23)
CALCIUM SERPL-MCNC: 10 MG/DL — SIGNIFICANT CHANGE UP (ref 8.4–10.5)
CHLORIDE SERPL-SCNC: 97 MMOL/L — SIGNIFICANT CHANGE UP (ref 96–108)
CO2 SERPL-SCNC: 23 MMOL/L — SIGNIFICANT CHANGE UP (ref 22–31)
CREAT SERPL-MCNC: 0.91 MG/DL — SIGNIFICANT CHANGE UP (ref 0.5–1.3)
EGFR: 68 ML/MIN/1.73M2 — SIGNIFICANT CHANGE UP
EGFR: 68 ML/MIN/1.73M2 — SIGNIFICANT CHANGE UP
EOSINOPHIL # BLD AUTO: 0.1 K/UL — SIGNIFICANT CHANGE UP (ref 0–0.5)
EOSINOPHIL NFR BLD AUTO: 1.1 % — SIGNIFICANT CHANGE UP (ref 0–6)
GLUCOSE SERPL-MCNC: 214 MG/DL — HIGH (ref 70–99)
HCT VFR BLD CALC: 33.3 % — LOW (ref 34.5–45)
HGB BLD-MCNC: 11 G/DL — LOW (ref 11.5–15.5)
IMM GRANULOCYTES NFR BLD AUTO: 0.4 % — SIGNIFICANT CHANGE UP (ref 0–0.9)
LYMPHOCYTES # BLD AUTO: 1.44 K/UL — SIGNIFICANT CHANGE UP (ref 1–3.3)
LYMPHOCYTES # BLD AUTO: 15.8 % — SIGNIFICANT CHANGE UP (ref 13–44)
MCHC RBC-ENTMCNC: 27.7 PG — SIGNIFICANT CHANGE UP (ref 27–34)
MCHC RBC-ENTMCNC: 33 G/DL — SIGNIFICANT CHANGE UP (ref 32–36)
MCV RBC AUTO: 83.9 FL — SIGNIFICANT CHANGE UP (ref 80–100)
MONOCYTES # BLD AUTO: 0.54 K/UL — SIGNIFICANT CHANGE UP (ref 0–0.9)
MONOCYTES NFR BLD AUTO: 5.9 % — SIGNIFICANT CHANGE UP (ref 2–14)
NEUTROPHILS # BLD AUTO: 6.96 K/UL — SIGNIFICANT CHANGE UP (ref 1.8–7.4)
NEUTROPHILS NFR BLD AUTO: 76.5 % — SIGNIFICANT CHANGE UP (ref 43–77)
NRBC BLD AUTO-RTO: 0 /100 WBCS — SIGNIFICANT CHANGE UP (ref 0–0)
PLATELET # BLD AUTO: 167 K/UL — SIGNIFICANT CHANGE UP (ref 150–400)
POTASSIUM SERPL-MCNC: 4.4 MMOL/L — SIGNIFICANT CHANGE UP (ref 3.5–5.3)
POTASSIUM SERPL-SCNC: 4.4 MMOL/L — SIGNIFICANT CHANGE UP (ref 3.5–5.3)
PROT SERPL-MCNC: 6.8 G/DL — SIGNIFICANT CHANGE UP (ref 6–8.3)
RBC # BLD: 3.97 M/UL — SIGNIFICANT CHANGE UP (ref 3.8–5.2)
RBC # FLD: 17.4 % — HIGH (ref 10.3–14.5)
SODIUM SERPL-SCNC: 136 MMOL/L — SIGNIFICANT CHANGE UP (ref 135–145)
WBC # BLD: 9.11 K/UL — SIGNIFICANT CHANGE UP (ref 3.8–10.5)
WBC # FLD AUTO: 9.11 K/UL — SIGNIFICANT CHANGE UP (ref 3.8–10.5)

## 2025-02-28 PROCEDURE — 99214 OFFICE O/P EST MOD 30 MIN: CPT

## 2025-03-01 LAB
CANCER AG19-9 SERPL-ACNC: 2029 U/ML — HIGH
CEA SERPL-MCNC: 9.9 NG/ML — HIGH (ref 0–3.8)

## 2025-03-04 ENCOUNTER — APPOINTMENT (OUTPATIENT)
Dept: HEMATOLOGY ONCOLOGY | Facility: CLINIC | Age: 70
End: 2025-03-04
Payer: MEDICARE

## 2025-03-04 DIAGNOSIS — D69.6 THROMBOCYTOPENIA, UNSPECIFIED: ICD-10-CM

## 2025-03-04 PROCEDURE — 99214 OFFICE O/P EST MOD 30 MIN: CPT

## 2025-05-05 NOTE — ED PROVIDER NOTE - BIRTH SEX
Lab Results   Component Value Date    TSH 1.331 07/20/2020     Continue home levothyroxine  - if still too altered to receive tomorrow, consider switch to IV levothyroxine      Female

## 2025-05-20 ENCOUNTER — OUTPATIENT (OUTPATIENT)
Dept: OUTPATIENT SERVICES | Facility: HOSPITAL | Age: 70
LOS: 1 days | Discharge: HOME | End: 2025-05-20

## 2025-05-20 DIAGNOSIS — C25.9 MALIGNANT NEOPLASM OF PANCREAS, UNSPECIFIED: ICD-10-CM

## 2025-05-20 DIAGNOSIS — Z96.652 PRESENCE OF LEFT ARTIFICIAL KNEE JOINT: Chronic | ICD-10-CM

## 2025-05-23 ENCOUNTER — APPOINTMENT (OUTPATIENT)
Dept: HEMATOLOGY ONCOLOGY | Facility: CLINIC | Age: 70
End: 2025-05-23

## 2025-05-23 ENCOUNTER — APPOINTMENT (OUTPATIENT)
Dept: INFUSION THERAPY | Facility: HOSPITAL | Age: 70
End: 2025-05-23

## 2025-06-03 RX ORDER — LOSARTAN POTASSIUM 100 MG/1
0 TABLET, FILM COATED ORAL
Qty: 0 | Refills: 0 | DISCHARGE
Start: 2025-06-03

## 2025-06-09 ENCOUNTER — APPOINTMENT (OUTPATIENT)
Dept: HEMATOLOGY ONCOLOGY | Facility: CLINIC | Age: 70
End: 2025-06-09

## 2025-06-23 ENCOUNTER — APPOINTMENT (OUTPATIENT)
Dept: HEMATOLOGY ONCOLOGY | Facility: CLINIC | Age: 70
End: 2025-06-23

## 2025-06-23 ENCOUNTER — RESULT REVIEW (OUTPATIENT)
Age: 70
End: 2025-06-23

## 2025-06-23 ENCOUNTER — APPOINTMENT (OUTPATIENT)
Dept: INFUSION THERAPY | Facility: HOSPITAL | Age: 70
End: 2025-06-23

## 2025-06-23 VITALS
BODY MASS INDEX: 26.05 KG/M2 | OXYGEN SATURATION: 99 % | SYSTOLIC BLOOD PRESSURE: 121 MMHG | TEMPERATURE: 97.1 F | WEIGHT: 150.13 LBS | DIASTOLIC BLOOD PRESSURE: 66 MMHG | RESPIRATION RATE: 16 BRPM | HEART RATE: 60 BPM

## 2025-06-23 LAB
ALBUMIN SERPL ELPH-MCNC: 3.8 G/DL
ALP BLD-CCNC: 84 U/L
ALT SERPL-CCNC: 16 U/L
ANION GAP SERPL CALC-SCNC: 12 MMOL/L
AST SERPL-CCNC: 21 U/L
BASOPHILS # BLD AUTO: 0.02 K/UL — SIGNIFICANT CHANGE UP (ref 0–0.2)
BASOPHILS NFR BLD AUTO: 0.2 % — SIGNIFICANT CHANGE UP (ref 0–2)
BILIRUB SERPL-MCNC: <0.2 MG/DL
BUN SERPL-MCNC: 26 MG/DL
CALCIUM SERPL-MCNC: 9 MG/DL
CHLORIDE SERPL-SCNC: 103 MMOL/L
CO2 SERPL-SCNC: 22 MMOL/L
CREAT SERPL-MCNC: 1.14 MG/DL
EGFRCR SERPLBLD CKD-EPI 2021: 52 ML/MIN/1.73M2
EOSINOPHIL # BLD AUTO: 0.08 K/UL — SIGNIFICANT CHANGE UP (ref 0–0.5)
EOSINOPHIL NFR BLD AUTO: 0.9 % — SIGNIFICANT CHANGE UP (ref 0–6)
GLUCOSE SERPL-MCNC: 214 MG/DL
HCT VFR BLD CALC: 33.8 % — LOW (ref 34.5–45)
HGB BLD-MCNC: 10.9 G/DL — LOW (ref 11.5–15.5)
IMM GRANULOCYTES NFR BLD AUTO: 0.5 % — SIGNIFICANT CHANGE UP (ref 0–0.9)
LYMPHOCYTES # BLD AUTO: 1.41 K/UL — SIGNIFICANT CHANGE UP (ref 1–3.3)
LYMPHOCYTES # BLD AUTO: 16.1 % — SIGNIFICANT CHANGE UP (ref 13–44)
MCHC RBC-ENTMCNC: 27.6 PG — SIGNIFICANT CHANGE UP (ref 27–34)
MCHC RBC-ENTMCNC: 32.2 G/DL — SIGNIFICANT CHANGE UP (ref 32–36)
MCV RBC AUTO: 85.6 FL — SIGNIFICANT CHANGE UP (ref 80–100)
MONOCYTES # BLD AUTO: 0.53 K/UL — SIGNIFICANT CHANGE UP (ref 0–0.9)
MONOCYTES NFR BLD AUTO: 6.1 % — SIGNIFICANT CHANGE UP (ref 2–14)
NEUTROPHILS # BLD AUTO: 6.66 K/UL — SIGNIFICANT CHANGE UP (ref 1.8–7.4)
NEUTROPHILS NFR BLD AUTO: 76.2 % — SIGNIFICANT CHANGE UP (ref 43–77)
NRBC BLD AUTO-RTO: 0 /100 WBCS — SIGNIFICANT CHANGE UP (ref 0–0)
PLATELET # BLD AUTO: 187 K/UL — SIGNIFICANT CHANGE UP (ref 150–400)
POTASSIUM SERPL-SCNC: 4 MMOL/L
PROT SERPL-MCNC: 5.9 G/DL
RBC # BLD: 3.95 M/UL — SIGNIFICANT CHANGE UP (ref 3.8–5.2)
RBC # FLD: 14.6 % — HIGH (ref 10.3–14.5)
SODIUM SERPL-SCNC: 136 MMOL/L
WBC # BLD: 8.74 K/UL — SIGNIFICANT CHANGE UP (ref 3.8–10.5)
WBC # FLD AUTO: 8.74 K/UL — SIGNIFICANT CHANGE UP (ref 3.8–10.5)

## 2025-06-23 PROCEDURE — ZZZZZ: CPT

## 2025-06-24 RX ORDER — AZITHROMYCIN 250 MG/1
250 TABLET, FILM COATED ORAL
Qty: 6 | Refills: 0 | Status: COMPLETED | COMMUNITY
Start: 2025-01-15

## 2025-06-24 RX ORDER — LOSARTAN POTASSIUM 25 MG/1
25 TABLET, FILM COATED ORAL
Qty: 90 | Refills: 0 | Status: ACTIVE | COMMUNITY
Start: 2025-06-03

## 2025-06-24 RX ORDER — PANTOPRAZOLE 40 MG/1
40 TABLET, DELAYED RELEASE ORAL
Qty: 30 | Refills: 0 | Status: ACTIVE | COMMUNITY
Start: 2025-01-15

## 2025-06-24 RX ORDER — FLUCONAZOLE 150 MG/1
150 TABLET ORAL
Qty: 1 | Refills: 0 | Status: ACTIVE | COMMUNITY
Start: 2025-01-18

## 2025-06-24 RX ORDER — GLIPIZIDE 5 MG/1
5 TABLET, FILM COATED, EXTENDED RELEASE ORAL
Qty: 90 | Refills: 0 | Status: ACTIVE | COMMUNITY
Start: 2025-06-03

## 2025-06-24 RX ORDER — TERCONAZOLE 4 MG/G
0.4 CREAM VAGINAL
Qty: 45 | Refills: 0 | Status: ACTIVE | COMMUNITY
Start: 2025-01-18

## 2025-06-24 RX ORDER — METFORMIN ER 500 MG 500 MG/1
500 TABLET ORAL
Qty: 180 | Refills: 0 | Status: ACTIVE | COMMUNITY
Start: 2025-06-03

## 2025-06-24 RX ORDER — SIMVASTATIN 10 MG/1
10 TABLET, FILM COATED ORAL
Qty: 90 | Refills: 0 | Status: ACTIVE | COMMUNITY
Start: 2025-06-03

## 2025-06-24 RX ORDER — AMOXICILLIN AND CLAVULANATE POTASSIUM 875; 125 MG/1; MG/1
875-125 TABLET, COATED ORAL
Qty: 10 | Refills: 0 | Status: COMPLETED | COMMUNITY
Start: 2025-01-15

## 2025-06-24 RX ORDER — NITROFURANTOIN (MONOHYDRATE/MACROCRYSTALS) 25; 75 MG/1; MG/1
100 CAPSULE ORAL
Qty: 10 | Refills: 0 | Status: ACTIVE | COMMUNITY
Start: 2025-01-18

## 2025-06-26 ENCOUNTER — RESULT REVIEW (OUTPATIENT)
Age: 70
End: 2025-06-26

## 2025-06-26 ENCOUNTER — APPOINTMENT (OUTPATIENT)
Dept: INFUSION THERAPY | Facility: HOSPITAL | Age: 70
End: 2025-06-26

## 2025-06-26 LAB
ALBUMIN SERPL ELPH-MCNC: 3.4 G/DL — SIGNIFICANT CHANGE UP (ref 3.3–5)
ALP SERPL-CCNC: 79 U/L — SIGNIFICANT CHANGE UP (ref 40–120)
ALT FLD-CCNC: 19 U/L — SIGNIFICANT CHANGE UP (ref 10–45)
ANION GAP SERPL CALC-SCNC: 10 MMOL/L — SIGNIFICANT CHANGE UP (ref 5–17)
AST SERPL-CCNC: 16 U/L — SIGNIFICANT CHANGE UP (ref 10–40)
BASOPHILS # BLD AUTO: 0.03 K/UL — SIGNIFICANT CHANGE UP (ref 0–0.2)
BASOPHILS NFR BLD AUTO: 0.3 % — SIGNIFICANT CHANGE UP (ref 0–2)
BILIRUB SERPL-MCNC: 0.2 MG/DL — SIGNIFICANT CHANGE UP (ref 0.2–1.2)
BUN SERPL-MCNC: 16 MG/DL — SIGNIFICANT CHANGE UP (ref 7–23)
CALCIUM SERPL-MCNC: 8.4 MG/DL — SIGNIFICANT CHANGE UP (ref 8.4–10.5)
CHLORIDE SERPL-SCNC: 102 MMOL/L — SIGNIFICANT CHANGE UP (ref 96–108)
CO2 SERPL-SCNC: 23 MMOL/L — SIGNIFICANT CHANGE UP (ref 22–31)
CREAT SERPL-MCNC: 0.86 MG/DL — SIGNIFICANT CHANGE UP (ref 0.5–1.3)
EGFR: 73 ML/MIN/1.73M2 — SIGNIFICANT CHANGE UP
EGFR: 73 ML/MIN/1.73M2 — SIGNIFICANT CHANGE UP
EOSINOPHIL # BLD AUTO: 0.06 K/UL — SIGNIFICANT CHANGE UP (ref 0–0.5)
EOSINOPHIL NFR BLD AUTO: 0.7 % — SIGNIFICANT CHANGE UP (ref 0–6)
GLUCOSE SERPL-MCNC: 319 MG/DL — HIGH (ref 70–99)
HCT VFR BLD CALC: 31.1 % — LOW (ref 34.5–45)
HGB BLD-MCNC: 10.1 G/DL — LOW (ref 11.5–15.5)
IMM GRANULOCYTES NFR BLD AUTO: 0.6 % — SIGNIFICANT CHANGE UP (ref 0–0.9)
LYMPHOCYTES # BLD AUTO: 1.37 K/UL — SIGNIFICANT CHANGE UP (ref 1–3.3)
LYMPHOCYTES # BLD AUTO: 15.7 % — SIGNIFICANT CHANGE UP (ref 13–44)
MCHC RBC-ENTMCNC: 27.7 PG — SIGNIFICANT CHANGE UP (ref 27–34)
MCHC RBC-ENTMCNC: 32.5 G/DL — SIGNIFICANT CHANGE UP (ref 32–36)
MCV RBC AUTO: 85.4 FL — SIGNIFICANT CHANGE UP (ref 80–100)
MONOCYTES # BLD AUTO: 0.38 K/UL — SIGNIFICANT CHANGE UP (ref 0–0.9)
MONOCYTES NFR BLD AUTO: 4.4 % — SIGNIFICANT CHANGE UP (ref 2–14)
NEUTROPHILS # BLD AUTO: 6.83 K/UL — SIGNIFICANT CHANGE UP (ref 1.8–7.4)
NEUTROPHILS NFR BLD AUTO: 78.3 % — HIGH (ref 43–77)
NRBC BLD AUTO-RTO: 0 /100 WBCS — SIGNIFICANT CHANGE UP (ref 0–0)
PLATELET # BLD AUTO: 153 K/UL — SIGNIFICANT CHANGE UP (ref 150–400)
POTASSIUM SERPL-MCNC: 4.5 MMOL/L — SIGNIFICANT CHANGE UP (ref 3.5–5.3)
POTASSIUM SERPL-SCNC: 4.5 MMOL/L — SIGNIFICANT CHANGE UP (ref 3.5–5.3)
PROT SERPL-MCNC: 5.8 G/DL — LOW (ref 6–8.3)
RBC # BLD: 3.64 M/UL — LOW (ref 3.8–5.2)
RBC # FLD: 15.1 % — HIGH (ref 10.3–14.5)
SODIUM SERPL-SCNC: 135 MMOL/L — SIGNIFICANT CHANGE UP (ref 135–145)
WBC # BLD: 8.72 K/UL — SIGNIFICANT CHANGE UP (ref 3.8–10.5)
WBC # FLD AUTO: 8.72 K/UL — SIGNIFICANT CHANGE UP (ref 3.8–10.5)

## 2025-06-27 LAB
CANCER AG19-9 SERPL-ACNC: 3570 U/ML — HIGH
CEA SERPL-MCNC: 13.7 NG/ML — HIGH (ref 0–3.8)

## 2025-07-05 ENCOUNTER — EMERGENCY (EMERGENCY)
Facility: HOSPITAL | Age: 70
LOS: 1 days | End: 2025-07-05
Attending: EMERGENCY MEDICINE
Payer: COMMERCIAL

## 2025-07-05 VITALS
RESPIRATION RATE: 18 BRPM | DIASTOLIC BLOOD PRESSURE: 116 MMHG | SYSTOLIC BLOOD PRESSURE: 173 MMHG | TEMPERATURE: 99 F | WEIGHT: 145.51 LBS | OXYGEN SATURATION: 98 % | HEART RATE: 77 BPM | HEIGHT: 64 IN

## 2025-07-05 DIAGNOSIS — Z96.652 PRESENCE OF LEFT ARTIFICIAL KNEE JOINT: Chronic | ICD-10-CM

## 2025-07-05 PROCEDURE — 99285 EMERGENCY DEPT VISIT HI MDM: CPT

## 2025-07-05 NOTE — ED ADULT TRIAGE NOTE - GLASGOW COMA SCALE: EYE OPENING, MLM
Problem: Goal Outcome Summary  Goal: Goal Outcome Summary  Outcome: No Change  A/ox4. VSS. Tmax 99.5. C/o L rib pain and mid back pain, PRN Oxy given x2 for breakthrough pain. Currently on 1L NC O2, tried to wean this afternoon was high 80's on RA. Walked to bathroom x1. On an overnight oximetry must hourly round and fill sheet out on front of pts door. Discharge pending clinical improvement. Continue to monitor.        (E4) spontaneous

## 2025-07-06 ENCOUNTER — INPATIENT (INPATIENT)
Facility: HOSPITAL | Age: 70
LOS: 1 days | Discharge: HOME | End: 2025-07-08
Attending: STUDENT IN AN ORGANIZED HEALTH CARE EDUCATION/TRAINING PROGRAM | Admitting: STUDENT IN AN ORGANIZED HEALTH CARE EDUCATION/TRAINING PROGRAM
Payer: MEDICARE

## 2025-07-06 VITALS
SYSTOLIC BLOOD PRESSURE: 171 MMHG | HEIGHT: 64 IN | DIASTOLIC BLOOD PRESSURE: 82 MMHG | OXYGEN SATURATION: 97 % | RESPIRATION RATE: 18 BRPM | TEMPERATURE: 99 F | HEART RATE: 107 BPM

## 2025-07-06 DIAGNOSIS — C25.9 MALIGNANT NEOPLASM OF PANCREAS, UNSPECIFIED: ICD-10-CM

## 2025-07-06 DIAGNOSIS — R13.10 DYSPHAGIA, UNSPECIFIED: ICD-10-CM

## 2025-07-06 DIAGNOSIS — I10 ESSENTIAL (PRIMARY) HYPERTENSION: ICD-10-CM

## 2025-07-06 DIAGNOSIS — K21.9 GASTRO-ESOPHAGEAL REFLUX DISEASE WITHOUT ESOPHAGITIS: ICD-10-CM

## 2025-07-06 DIAGNOSIS — E87.8 OTHER DISORDERS OF ELECTROLYTE AND FLUID BALANCE, NOT ELSEWHERE CLASSIFIED: ICD-10-CM

## 2025-07-06 DIAGNOSIS — K22.89 OTHER SPECIFIED DISEASE OF ESOPHAGUS: ICD-10-CM

## 2025-07-06 DIAGNOSIS — R10.9 UNSPECIFIED ABDOMINAL PAIN: ICD-10-CM

## 2025-07-06 DIAGNOSIS — E11.9 TYPE 2 DIABETES MELLITUS WITHOUT COMPLICATIONS: ICD-10-CM

## 2025-07-06 DIAGNOSIS — Z96.652 PRESENCE OF LEFT ARTIFICIAL KNEE JOINT: Chronic | ICD-10-CM

## 2025-07-06 DIAGNOSIS — E78.5 HYPERLIPIDEMIA, UNSPECIFIED: ICD-10-CM

## 2025-07-06 LAB
ALBUMIN SERPL ELPH-MCNC: 3.7 G/DL — SIGNIFICANT CHANGE UP (ref 3.3–5)
ALBUMIN SERPL ELPH-MCNC: 3.8 G/DL — SIGNIFICANT CHANGE UP (ref 3.5–5)
ALP SERPL-CCNC: 102 U/L — SIGNIFICANT CHANGE UP (ref 40–120)
ALP SERPL-CCNC: 120 U/L — SIGNIFICANT CHANGE UP (ref 40–120)
ALT FLD-CCNC: 12 U/L — SIGNIFICANT CHANGE UP (ref 4–33)
ALT FLD-CCNC: 19 U/L DA — SIGNIFICANT CHANGE UP (ref 10–60)
ANION GAP SERPL CALC-SCNC: 17 MMOL/L — HIGH (ref 7–14)
ANION GAP SERPL CALC-SCNC: 20 MMOL/L — HIGH (ref 7–14)
ANION GAP SERPL CALC-SCNC: 8 MMOL/L — SIGNIFICANT CHANGE UP (ref 5–17)
APTT BLD: 25.2 SEC — LOW (ref 26.1–36.8)
AST SERPL-CCNC: 16 U/L — SIGNIFICANT CHANGE UP (ref 10–40)
AST SERPL-CCNC: 17 U/L — SIGNIFICANT CHANGE UP (ref 4–32)
B-OH-BUTYR SERPL-SCNC: 0.4 MMOL/L — SIGNIFICANT CHANGE UP (ref 0–0.4)
BASOPHILS # BLD AUTO: 0.03 K/UL — SIGNIFICANT CHANGE UP (ref 0–0.2)
BASOPHILS NFR BLD AUTO: 0.3 % — SIGNIFICANT CHANGE UP (ref 0–2)
BILIRUB SERPL-MCNC: 0.4 MG/DL — SIGNIFICANT CHANGE UP (ref 0.2–1.2)
BILIRUB SERPL-MCNC: 0.5 MG/DL — SIGNIFICANT CHANGE UP (ref 0.2–1.2)
BLD GP AB SCN SERPL QL: NEGATIVE — SIGNIFICANT CHANGE UP
BUN SERPL-MCNC: 10 MG/DL — SIGNIFICANT CHANGE UP (ref 7–23)
BUN SERPL-MCNC: 13 MG/DL — SIGNIFICANT CHANGE UP (ref 7–23)
BUN SERPL-MCNC: 15 MG/DL — SIGNIFICANT CHANGE UP (ref 7–18)
CALCIUM SERPL-MCNC: 9.1 MG/DL — SIGNIFICANT CHANGE UP (ref 8.4–10.5)
CALCIUM SERPL-MCNC: 9.8 MG/DL — SIGNIFICANT CHANGE UP (ref 8.4–10.5)
CALCIUM SERPL-MCNC: 9.9 MG/DL — SIGNIFICANT CHANGE UP (ref 8.4–10.5)
CHLORIDE SERPL-SCNC: 103 MMOL/L — SIGNIFICANT CHANGE UP (ref 96–108)
CHLORIDE SERPL-SCNC: 98 MMOL/L — SIGNIFICANT CHANGE UP (ref 98–107)
CHLORIDE SERPL-SCNC: 99 MMOL/L — SIGNIFICANT CHANGE UP (ref 98–107)
CO2 SERPL-SCNC: 19 MMOL/L — LOW (ref 22–31)
CO2 SERPL-SCNC: 19 MMOL/L — LOW (ref 22–31)
CO2 SERPL-SCNC: 27 MMOL/L — SIGNIFICANT CHANGE UP (ref 22–31)
CREAT SERPL-MCNC: 0.8 MG/DL — SIGNIFICANT CHANGE UP (ref 0.5–1.3)
CREAT SERPL-MCNC: 0.85 MG/DL — SIGNIFICANT CHANGE UP (ref 0.5–1.3)
CREAT SERPL-MCNC: 1.14 MG/DL — SIGNIFICANT CHANGE UP (ref 0.5–1.3)
EGFR: 52 ML/MIN/1.73M2 — LOW
EGFR: 52 ML/MIN/1.73M2 — LOW
EGFR: 74 ML/MIN/1.73M2 — SIGNIFICANT CHANGE UP
EGFR: 74 ML/MIN/1.73M2 — SIGNIFICANT CHANGE UP
EGFR: 80 ML/MIN/1.73M2 — SIGNIFICANT CHANGE UP
EGFR: 80 ML/MIN/1.73M2 — SIGNIFICANT CHANGE UP
EOSINOPHIL # BLD AUTO: 0 K/UL — SIGNIFICANT CHANGE UP (ref 0–0.5)
EOSINOPHIL NFR BLD AUTO: 0 % — SIGNIFICANT CHANGE UP (ref 0–6)
GLUCOSE BLDC GLUCOMTR-MCNC: 130 MG/DL — HIGH (ref 70–99)
GLUCOSE BLDC GLUCOMTR-MCNC: 132 MG/DL — HIGH (ref 70–99)
GLUCOSE BLDC GLUCOMTR-MCNC: 141 MG/DL — HIGH (ref 70–99)
GLUCOSE BLDC GLUCOMTR-MCNC: 187 MG/DL — HIGH (ref 70–99)
GLUCOSE SERPL-MCNC: 137 MG/DL — HIGH (ref 70–99)
GLUCOSE SERPL-MCNC: 191 MG/DL — HIGH (ref 70–99)
GLUCOSE SERPL-MCNC: 213 MG/DL — HIGH (ref 70–99)
HCT VFR BLD CALC: 39.1 % — SIGNIFICANT CHANGE UP (ref 34.5–45)
HCT VFR BLD CALC: 39.2 % — SIGNIFICANT CHANGE UP (ref 34.5–45)
HCV AB S/CO SERPL IA: 0.12 S/CO — SIGNIFICANT CHANGE UP (ref 0–0.79)
HCV AB SERPL-IMP: SIGNIFICANT CHANGE UP
HGB BLD-MCNC: 12.9 G/DL — SIGNIFICANT CHANGE UP (ref 11.5–15.5)
HGB BLD-MCNC: 13 G/DL — SIGNIFICANT CHANGE UP (ref 11.5–15.5)
HIV 1 & 2 AB SERPL IA.RAPID: SIGNIFICANT CHANGE UP
IMM GRANULOCYTES NFR BLD AUTO: 0.3 % — SIGNIFICANT CHANGE UP (ref 0–0.9)
INR BLD: 1.04 RATIO — SIGNIFICANT CHANGE UP (ref 0.85–1.16)
LACTATE SERPL-SCNC: 1.2 MMOL/L — SIGNIFICANT CHANGE UP (ref 0.5–2)
LIDOCAIN IGE QN: 9 U/L — LOW (ref 13–75)
LYMPHOCYTES # BLD AUTO: 0.82 K/UL — LOW (ref 1–3.3)
LYMPHOCYTES # BLD AUTO: 9.2 % — LOW (ref 13–44)
MAGNESIUM SERPL-MCNC: 1.4 MG/DL — LOW (ref 1.6–2.6)
MAGNESIUM SERPL-MCNC: 1.4 MG/DL — LOW (ref 1.6–2.6)
MCHC RBC-ENTMCNC: 27.3 PG — SIGNIFICANT CHANGE UP (ref 27–34)
MCHC RBC-ENTMCNC: 27.8 PG — SIGNIFICANT CHANGE UP (ref 27–34)
MCHC RBC-ENTMCNC: 32.9 G/DL — SIGNIFICANT CHANGE UP (ref 32–36)
MCHC RBC-ENTMCNC: 33.2 G/DL — SIGNIFICANT CHANGE UP (ref 32–36)
MCV RBC AUTO: 82.1 FL — SIGNIFICANT CHANGE UP (ref 80–100)
MCV RBC AUTO: 84.5 FL — SIGNIFICANT CHANGE UP (ref 80–100)
MONOCYTES # BLD AUTO: 0.14 K/UL — SIGNIFICANT CHANGE UP (ref 0–0.9)
MONOCYTES NFR BLD AUTO: 1.6 % — LOW (ref 2–14)
NEUTROPHILS # BLD AUTO: 7.89 K/UL — HIGH (ref 1.8–7.4)
NEUTROPHILS NFR BLD AUTO: 88.6 % — HIGH (ref 43–77)
NRBC # BLD AUTO: 0 K/UL — SIGNIFICANT CHANGE UP (ref 0–0)
NRBC # FLD: 0 K/UL — SIGNIFICANT CHANGE UP (ref 0–0)
NRBC BLD AUTO-RTO: 0 /100 WBCS — SIGNIFICANT CHANGE UP (ref 0–0)
NRBC BLD AUTO-RTO: 0 /100 WBCS — SIGNIFICANT CHANGE UP (ref 0–0)
PHOSPHATE SERPL-MCNC: 3.2 MG/DL — SIGNIFICANT CHANGE UP (ref 2.5–4.5)
PLATELET # BLD AUTO: 172 K/UL — SIGNIFICANT CHANGE UP (ref 150–400)
PLATELET # BLD AUTO: 175 K/UL — SIGNIFICANT CHANGE UP (ref 150–400)
PMV BLD: 11.4 FL — SIGNIFICANT CHANGE UP (ref 7–13)
POTASSIUM SERPL-MCNC: 3.8 MMOL/L — SIGNIFICANT CHANGE UP (ref 3.5–5.3)
POTASSIUM SERPL-MCNC: 3.9 MMOL/L — SIGNIFICANT CHANGE UP (ref 3.5–5.3)
POTASSIUM SERPL-MCNC: 4 MMOL/L — SIGNIFICANT CHANGE UP (ref 3.5–5.3)
POTASSIUM SERPL-SCNC: 3.8 MMOL/L — SIGNIFICANT CHANGE UP (ref 3.5–5.3)
POTASSIUM SERPL-SCNC: 3.9 MMOL/L — SIGNIFICANT CHANGE UP (ref 3.5–5.3)
POTASSIUM SERPL-SCNC: 4 MMOL/L — SIGNIFICANT CHANGE UP (ref 3.5–5.3)
PROT SERPL-MCNC: 6.8 G/DL — SIGNIFICANT CHANGE UP (ref 6–8.3)
PROT SERPL-MCNC: 8 G/DL — SIGNIFICANT CHANGE UP (ref 6–8.3)
PROTHROM AB SERPL-ACNC: 12.1 SEC — SIGNIFICANT CHANGE UP (ref 9.9–13.4)
RBC # BLD: 4.64 M/UL — SIGNIFICANT CHANGE UP (ref 3.8–5.2)
RBC # BLD: 4.76 M/UL — SIGNIFICANT CHANGE UP (ref 3.8–5.2)
RBC # FLD: 15.4 % — HIGH (ref 10.3–14.5)
RBC # FLD: 15.5 % — HIGH (ref 10.3–14.5)
RH IG SCN BLD-IMP: POSITIVE — SIGNIFICANT CHANGE UP
SODIUM SERPL-SCNC: 135 MMOL/L — SIGNIFICANT CHANGE UP (ref 135–145)
SODIUM SERPL-SCNC: 137 MMOL/L — SIGNIFICANT CHANGE UP (ref 135–145)
SODIUM SERPL-SCNC: 138 MMOL/L — SIGNIFICANT CHANGE UP (ref 135–145)
WBC # BLD: 8.91 K/UL — SIGNIFICANT CHANGE UP (ref 3.8–10.5)
WBC # BLD: 9.36 K/UL — SIGNIFICANT CHANGE UP (ref 3.8–10.5)
WBC # FLD AUTO: 8.91 K/UL — SIGNIFICANT CHANGE UP (ref 3.8–10.5)
WBC # FLD AUTO: 9.36 K/UL — SIGNIFICANT CHANGE UP (ref 3.8–10.5)

## 2025-07-06 PROCEDURE — 85025 COMPLETE CBC W/AUTO DIFF WBC: CPT

## 2025-07-06 PROCEDURE — 36415 COLL VENOUS BLD VENIPUNCTURE: CPT

## 2025-07-06 PROCEDURE — 86803 HEPATITIS C AB TEST: CPT

## 2025-07-06 PROCEDURE — 83690 ASSAY OF LIPASE: CPT

## 2025-07-06 PROCEDURE — 86703 HIV-1/HIV-2 1 RESULT ANTBDY: CPT

## 2025-07-06 PROCEDURE — 99497 ADVNCD CARE PLAN 30 MIN: CPT | Mod: 25

## 2025-07-06 PROCEDURE — 99285 EMERGENCY DEPT VISIT HI MDM: CPT

## 2025-07-06 PROCEDURE — 99223 1ST HOSP IP/OBS HIGH 75: CPT

## 2025-07-06 PROCEDURE — 74177 CT ABD & PELVIS W/CONTRAST: CPT

## 2025-07-06 PROCEDURE — 74177 CT ABD & PELVIS W/CONTRAST: CPT | Mod: 26

## 2025-07-06 PROCEDURE — 99222 1ST HOSP IP/OBS MODERATE 55: CPT | Mod: GC

## 2025-07-06 PROCEDURE — 80053 COMPREHEN METABOLIC PANEL: CPT

## 2025-07-06 RX ORDER — SODIUM CHLORIDE 9 G/1000ML
1000 INJECTION, SOLUTION INTRAVENOUS ONCE
Refills: 0 | Status: COMPLETED | OUTPATIENT
Start: 2025-07-06 | End: 2025-07-06

## 2025-07-06 RX ORDER — MAGNESIUM, ALUMINUM HYDROXIDE 200-200 MG
30 TABLET,CHEWABLE ORAL EVERY 4 HOURS
Refills: 0 | Status: DISCONTINUED | OUTPATIENT
Start: 2025-07-06 | End: 2025-07-08

## 2025-07-06 RX ORDER — SODIUM CHLORIDE 9 G/1000ML
1000 INJECTION, SOLUTION INTRAVENOUS
Refills: 0 | Status: DISCONTINUED | OUTPATIENT
Start: 2025-07-06 | End: 2025-07-08

## 2025-07-06 RX ORDER — DEXTROSE 50 % IN WATER 50 %
25 SYRINGE (ML) INTRAVENOUS ONCE
Refills: 0 | Status: DISCONTINUED | OUTPATIENT
Start: 2025-07-06 | End: 2025-07-08

## 2025-07-06 RX ORDER — DROPERIDOL 2.5 MG/ML
1.25 INJECTION, SOLUTION INTRAMUSCULAR; INTRAVENOUS ONCE
Refills: 0 | Status: COMPLETED | OUTPATIENT
Start: 2025-07-06 | End: 2025-07-06

## 2025-07-06 RX ORDER — AMLODIPINE BESYLATE 10 MG/1
10 TABLET ORAL ONCE
Refills: 0 | Status: COMPLETED | OUTPATIENT
Start: 2025-07-06 | End: 2025-07-06

## 2025-07-06 RX ORDER — AMLODIPINE BESYLATE 10 MG/1
10 TABLET ORAL AT BEDTIME
Refills: 0 | Status: DISCONTINUED | OUTPATIENT
Start: 2025-07-06 | End: 2025-07-08

## 2025-07-06 RX ORDER — MELATONIN 5 MG
3 TABLET ORAL AT BEDTIME
Refills: 0 | Status: DISCONTINUED | OUTPATIENT
Start: 2025-07-06 | End: 2025-07-08

## 2025-07-06 RX ORDER — METOCLOPRAMIDE HCL 10 MG
10 TABLET ORAL ONCE
Refills: 0 | Status: COMPLETED | OUTPATIENT
Start: 2025-07-06 | End: 2025-07-06

## 2025-07-06 RX ORDER — DEXTROSE 50 % IN WATER 50 %
15 SYRINGE (ML) INTRAVENOUS ONCE
Refills: 0 | Status: DISCONTINUED | OUTPATIENT
Start: 2025-07-06 | End: 2025-07-08

## 2025-07-06 RX ORDER — AMLODIPINE BESYLATE 10 MG/1
10 TABLET ORAL ONCE
Refills: 0 | Status: DISCONTINUED | OUTPATIENT
Start: 2025-07-06 | End: 2025-07-06

## 2025-07-06 RX ORDER — ACETAMINOPHEN 500 MG/5ML
650 LIQUID (ML) ORAL EVERY 6 HOURS
Refills: 0 | Status: DISCONTINUED | OUTPATIENT
Start: 2025-07-06 | End: 2025-07-08

## 2025-07-06 RX ORDER — ATORVASTATIN CALCIUM 80 MG/1
10 TABLET, FILM COATED ORAL AT BEDTIME
Refills: 0 | Status: DISCONTINUED | OUTPATIENT
Start: 2025-07-06 | End: 2025-07-08

## 2025-07-06 RX ORDER — SUCRALFATE 1 G
1 TABLET ORAL ONCE
Refills: 0 | Status: COMPLETED | OUTPATIENT
Start: 2025-07-06 | End: 2025-07-06

## 2025-07-06 RX ORDER — MAGNESIUM SULFATE 500 MG/ML
2 SYRINGE (ML) INJECTION ONCE
Refills: 0 | Status: COMPLETED | OUTPATIENT
Start: 2025-07-06 | End: 2025-07-06

## 2025-07-06 RX ORDER — ONDANSETRON HCL/PF 4 MG/2 ML
4 VIAL (ML) INJECTION EVERY 8 HOURS
Refills: 0 | Status: DISCONTINUED | OUTPATIENT
Start: 2025-07-06 | End: 2025-07-08

## 2025-07-06 RX ORDER — KETOROLAC TROMETHAMINE 30 MG/ML
15 INJECTION, SOLUTION INTRAMUSCULAR; INTRAVENOUS ONCE
Refills: 0 | Status: DISCONTINUED | OUTPATIENT
Start: 2025-07-06 | End: 2025-07-06

## 2025-07-06 RX ORDER — INSULIN LISPRO 100 U/ML
INJECTION, SOLUTION INTRAVENOUS; SUBCUTANEOUS
Refills: 0 | Status: DISCONTINUED | OUTPATIENT
Start: 2025-07-06 | End: 2025-07-08

## 2025-07-06 RX ORDER — GLUCAGON 3 MG/1
1 POWDER NASAL ONCE
Refills: 0 | Status: DISCONTINUED | OUTPATIENT
Start: 2025-07-06 | End: 2025-07-08

## 2025-07-06 RX ORDER — LOSARTAN POTASSIUM 100 MG/1
50 TABLET, FILM COATED ORAL ONCE
Refills: 0 | Status: COMPLETED | OUTPATIENT
Start: 2025-07-06 | End: 2025-07-06

## 2025-07-06 RX ORDER — ENOXAPARIN SODIUM 100 MG/ML
40 INJECTION SUBCUTANEOUS EVERY 24 HOURS
Refills: 0 | Status: DISCONTINUED | OUTPATIENT
Start: 2025-07-06 | End: 2025-07-06

## 2025-07-06 RX ORDER — LOSARTAN POTASSIUM 100 MG/1
25 TABLET, FILM COATED ORAL DAILY
Refills: 0 | Status: DISCONTINUED | OUTPATIENT
Start: 2025-07-06 | End: 2025-07-08

## 2025-07-06 RX ADMIN — SODIUM CHLORIDE 1000 MILLILITER(S): 9 INJECTION, SOLUTION INTRAVENOUS at 02:06

## 2025-07-06 RX ADMIN — AMLODIPINE BESYLATE 10 MILLIGRAM(S): 10 TABLET ORAL at 21:36

## 2025-07-06 RX ADMIN — Medication 4 MILLIGRAM(S): at 09:32

## 2025-07-06 RX ADMIN — ATORVASTATIN CALCIUM 10 MILLIGRAM(S): 80 TABLET, FILM COATED ORAL at 21:36

## 2025-07-06 RX ADMIN — Medication 20 MILLIGRAM(S): at 09:32

## 2025-07-06 RX ADMIN — Medication 4 MILLIGRAM(S): at 18:21

## 2025-07-06 RX ADMIN — Medication 650 MILLIGRAM(S): at 22:51

## 2025-07-06 RX ADMIN — Medication 75 MILLILITER(S): at 21:35

## 2025-07-06 RX ADMIN — Medication 10 MILLIGRAM(S): at 17:02

## 2025-07-06 RX ADMIN — Medication 40 MILLIGRAM(S): at 02:58

## 2025-07-06 RX ADMIN — Medication 650 MILLIGRAM(S): at 23:51

## 2025-07-06 RX ADMIN — Medication 10 MILLIGRAM(S): at 07:58

## 2025-07-06 RX ADMIN — KETOROLAC TROMETHAMINE 15 MILLIGRAM(S): 30 INJECTION, SOLUTION INTRAMUSCULAR; INTRAVENOUS at 02:00

## 2025-07-06 RX ADMIN — AMLODIPINE BESYLATE 10 MILLIGRAM(S): 10 TABLET ORAL at 14:01

## 2025-07-06 RX ADMIN — Medication 10 MILLIGRAM(S): at 02:00

## 2025-07-06 RX ADMIN — Medication 75 MILLILITER(S): at 17:02

## 2025-07-06 RX ADMIN — LOSARTAN POTASSIUM 50 MILLIGRAM(S): 100 TABLET, FILM COATED ORAL at 14:01

## 2025-07-06 RX ADMIN — Medication 25 GRAM(S): at 15:43

## 2025-07-06 RX ADMIN — DROPERIDOL 1.25 MILLIGRAM(S): 2.5 INJECTION, SOLUTION INTRAMUSCULAR; INTRAVENOUS at 09:33

## 2025-07-06 RX ADMIN — SODIUM CHLORIDE 1000 MILLILITER(S): 9 INJECTION, SOLUTION INTRAVENOUS at 09:33

## 2025-07-06 RX ADMIN — Medication 40 MILLIGRAM(S): at 17:08

## 2025-07-06 RX ADMIN — Medication 4 MILLIGRAM(S): at 02:00

## 2025-07-06 RX ADMIN — Medication 1 GRAM(S): at 02:58

## 2025-07-06 NOTE — CONSULT NOTE ADULT - ASSESSMENT
ENRIKE MORLEY is a 69year old Female with history of Pancreatic adenocarcinoma and L renal cancer s/p L partial nephrectomy 11/2024, s/p chemotherapy (last 6/2024) and radiation treatment 3/2024 currently on surveillance monitoring, DM2, HTN, GERD presenting in the setting of progressive dysphagia for the past week, w/ associated N/V and epigastric pain for the past 2 days  w/ CT demonstrating moderate thickening of the GE junction on the distal esophagus concerning for esophagitis as well as a fluid distended stomach with mild distal gastric wall thickening.    #Progressive Dysphagia to Solids and Liquids for the Past 1 week  #2 Days N/V   #Distal Esophagitis on CT  #Pancreatic Adenocarcinoma s/p chemotherapy (last 8/2024)and radiation (last 3/2024) on Surveillance monitoring  Patient stating progressive dysphagia primarily solids however with symptoms as well with liquids for the past week with associated now nausea, vomiting and significant epigastric pain.  CT demonstrating evidence of distal esophagitis as well as some mild distal gastric wall thickening.  Suspect dysphagia in the setting of erosive esophagitis though given known pancreatic cancer and large area of esophageal wall thickening cannot rule out malignancy versus stricturing from prior radiation. Currently patient tolerating thin liquids without further emesis, though with persistent abdominal pain which given symptoms may be in the setting of peptic ulcer disease vs gastritis    Recommendations:  - Plan for EGD tomorrow 7/6  - Trend CBC, transfuse for Hgb < 7  - Please obtain CBC, CMP, PT/INR, active T&S at 4 AM  - Please make patient NPO after midnight  - Pantoprazole 40 mg Q12 for now  - Clear liquid diet today as tolerated  - Continue antiemetics as per primary team    Note incomplete until finalized by attending signature/attestation.    Jermain Johnson  GI/Hepatology Fellow, PGY-4    MONDAY-FRIDAY 8AM-5PM:  Please message via .com or email [email or web address]     On Weekends/Holidays (All day) and Weekdays after 5 PM to 8 AM  For nonurgent consults please email:  Please email [email or web address]  For urgent consults:  Please contact on call GI team. See Amion schedule ([facility]), [place] paging system ([facility]), or call hospital  ([facility])      ENRIKE MORLEY is a 69year old Female with history of Pancreatic adenocarcinoma and L renal cancer s/p L partial nephrectomy 11/2024, s/p chemotherapy (last 6/2024) and radiation treatment 3/2024 currently on surveillance monitoring, DM2, HTN, GERD presenting in the setting of progressive dysphagia for the past week, w/ associated N/V and epigastric pain for the past 2 days  w/ CT demonstrating moderate thickening of the GE junction on the distal esophagus concerning for esophagitis as well as a fluid distended stomach with mild distal gastric wall thickening.    #Progressive Dysphagia to Solids and Liquids for the Past 1 week  #2 Days N/V   #Distal Esophagitis on CT  #Pancreatic Adenocarcinoma s/p chemotherapy (last 8/2024)and radiation (last 3/2024) on Surveillance monitoring  Patient stating progressive dysphagia primarily solids however with symptoms as well with liquids for the past week with associated now nausea, vomiting and significant epigastric pain.  CT demonstrating evidence of distal esophagitis as well as some mild distal gastric wall thickening.  Suspect dysphagia in the setting of erosive esophagitis though given known pancreatic cancer and large area of esophageal wall thickening cannot rule out malignancy versus stricturing from prior radiation. Currently patient tolerating thin liquids without further emesis, though with persistent abdominal pain which given symptoms may be in the setting of peptic ulcer disease vs gastritis    Recommendations:  - Plan for EGD tomorrow 7/6  - Trend CBC, transfuse for Hgb < 7  - Please obtain CBC, CMP, PT/INR, active T&S at 4 AM  - Please make patient NPO after midnight  - Pantoprazole 40 mg Q12 for now  - Continue antiemetics as per primary team    Note incomplete until finalized by attending signature/attestation.    Jermain Johnson  GI/Hepatology Fellow, PGY-4    MONDAY-FRIDAY 8AM-5PM:  Please message via [employer] or email [email or web address]     On Weekends/Holidays (All day) and Weekdays after 5 PM to 8 AM  For nonurgent consults please email:  Please email [email or web address]  For urgent consults:  Please contact on call GI team. See Amion schedule ([facility]), [employer] system ([facility]), or call hospital  ([facility])

## 2025-07-06 NOTE — ED PROVIDER NOTE - OBJECTIVE STATEMENT
Elissa Chen is a 68yo female with a PMHx of pancreatic cancer x 2y (no tx in the last 1y), DM2, HTN, and GERD BIBEMS as a transfer from  at [facility] due to epigastric abdominal pain and n/v x 2d and difficulty swallowing x 1w. She says she has trouble swallowing solids and liquids, but is able to swallow with a lot of effort although it feels like food is getting stuck in her esophagus. She describes the abdominal pain as epigastric with occasional radiation to the RLQ, she states the pain is a 8/10 and feels sharp. She had a CT done at [facility] that showed esophageal wall thickening with a possible mass, along with a fluid distended stomach with gastritis. Her BP was elevated so she received hydralazine and zofran in the ambulance, although her vomiting has persisted. She is a current tobacco smoker and former opiate user currently on buprenorphine tx.

## 2025-07-06 NOTE — ED ADULT TRIAGE NOTE - CHIEF COMPLAINT QUOTE
pt transferred from [facility] for GI.  pt has c/o abd pain with n/v x 2 days.  pt was diagnosed with esophageal wall thickening.  pt BP was elevated pta and received hydralizine and zofran 8mg

## 2025-07-06 NOTE — ED PROVIDER NOTE - PHYSICAL EXAMINATION
General: mild acute distress, appears stated age  HEENT: normocephalic, atraumatic   Respiratory: normal work of breathing  MSK: no swelling or tenderness of lower extremities, moving all extremities spontaneously   Skin: warm, dry  Neuro: A&Ox3, cranial nerves II-XII intact, 5/5 strength in all extremities, no sensory deficits, normal gait   Psych: appropriate affect  ABD: soft, epigastric tender, nondistended, no guarding, no rebound, no CVA tenderness

## 2025-07-06 NOTE — H&P ADULT - PROBLEM SELECTOR PLAN 4
- AG initially normal at [facility], elevated to 20 at [facility]. suspect starvation ketosis due to dysphagia and no PO intake since ED visit. low suspicion for DKA  - IV fluids  - check LA, BHB, repeat CMP.

## 2025-07-06 NOTE — H&P ADULT - CONVERSATION DETAILS
In the event of a medical emergency, patient would not like to receive CPR or Intubation. She is DNR DNI. MOLST completed. Otherwise, patient is pursuing restorative medical treatment for her acute complaint of dysphagia and abdominal pain.

## 2025-07-06 NOTE — ED ADULT NURSE NOTE - NSFALLHARMRISKINTERV_ED_ALL_ED
Assistance OOB with selected safe patient handling equipment if applicable/Assistance with ambulation/Communicate risk of Fall with Harm to all staff, patient, and family/Monitor gait and stability/Provide visual cue: red socks, yellow wristband, yellow gown, etc/Reinforce activity limits and safety measures with patient and family/Use of alarms - bed, stretcher, chair and/or video monitoring/Bed in lowest position, wheels locked, appropriate side rails in place/Call bell, personal items and telephone in reach/Instruct patient to call for assistance before getting out of bed/chair/stretcher/Non-slip footwear applied when patient is off stretcher/[place] to call system/Physically safe environment - no spills, clutter or unnecessary equipment/Purposeful Proactive Rounding/Room/bathroom lighting operational, light cord in reach

## 2025-07-06 NOTE — PATIENT PROFILE ADULT - FUNCTIONAL ASSESSMENT - BASIC MOBILITY 4.
4 = No assist / stand by assistance Psychoeducation done re: need for Rx adherence for sx management with patient saying "OK, I will take it"

## 2025-07-06 NOTE — ED PROVIDER NOTE - ATTENDING CONTRIBUTION TO CARE
GEN: no acute respiratory distress. nontoxic, speaking comfortably in full sentences  HEENT: NCAT. face symmetrical. PERRL 4mm, EOMI, MMM, oropharynx wnl.  Neck: no JVD, trachea midline, no lymphadenopathy, FROM  CV: RRR. +S1S2, no murmur. 2+ pulses in 4 extremities, cap refill <2 sec  Chest: CTA B/l. no wheezing, rales, rhonchi. no retractions. good air movement.   ABD: +BS, soft, non distended, mild epigastric/LUQ tenderness.   MSK: No clubbing, cyanosis, edema. FROM of all extremities. no tenderness to palpation. No midline or paraspinal tenderness. .  Neuro: AAOX3.  Sensation intact, motor 5/5 throughout.   SKIN: No rash      69-year-old female past medical history of pancreatic cancer (currently not on treatment since last fall), diabetes type 2, hypertension, GERD presenting from outside hospital as transfer for GI consult.  Patient reports epigastric abdominal pain, nausea, vomiting, inability to tolerate p.o.  Denies fevers, chills, chest pain, shortness of breath.  Patient reports when swallowing food and liquid feels like it is getting stuck.  CTA at outside hospital demonstrated distal esophageal thickening concerning for possible mass.  Finding of gastritis noted as well.  Stomach without evidence of obstruction noted as well.    Plan: Repeat labs, GI consult, symptom relief as needed, plan for admission

## 2025-07-06 NOTE — ED ADULT NURSE REASSESSMENT NOTE - NS ED NURSE REASSESS COMMENT FT1
Report received from Rupali, Pt resting in stretcher, RR even and unlabored on RA. VS obtained and noted to be hypertensive. Pt denies abd pain, N/V at this time. safety maintained,

## 2025-07-06 NOTE — ED ADULT NURSE NOTE - OBJECTIVE STATEMENT
Received pt in bed A and Ox 3 in pain, dry heaving, pt repots has abd pain due to cancer and has been having multiple episodes of N/V , abd distended but soft, tender to LUQ, skin color appropriate for ethnicity, well nourished and hydrated in appearance, IV in place 20 G left hand form previous visit, medication orders noted and completed

## 2025-07-06 NOTE — ED PROVIDER NOTE - CLINICAL SUMMARY MEDICAL DECISION MAKING FREE TEXT BOX
Elissa Chen is a 70yo female with a PMHx of pancreatic cancer x 2y (no tx in the last 1y), DM2, HTN, and GERD BIBEMS as a transfer from GI at [facility] due to epigastric abdominal pain and n/v x 2d and difficulty swallowing x 1w. She says she has trouble swallowing solids and liquids, but is able to swallow with a lot of effort although it feels like food is getting stuck in her esophagus. She describes the abdominal pain as epigastric with occasional radiation to the RLQ, she states the pain is a 8/10 and feels sharp. She had a CT done at [facility] that showed esophageal wall thickening with a possible mass, along with a fluid distended stomach with gastritis. Her BP was elevated so she received hydralazine and zofran in the ambulance, although her vomiting has persisted. She is a current tobacco smoker and former opiate user currently on buprenorphine tx. Her vitals were stable with some hypertension. She was tender to palpation in the epigastric region with some distention and firmness. On presentation, she was still vomiting, so she received droperidol. She was still endorsing 8/10 stomach pain, so she received 4mg of morphine. GI was consulted and she will be admitted.

## 2025-07-06 NOTE — PATIENT PROFILE ADULT - FALL HARM RISK - HARM RISK INTERVENTIONS

## 2025-07-06 NOTE — ED ADULT NURSE NOTE - NSICDXFAMILYHX_GEN_ALL_CORE_FT
FAMILY HISTORY:  Father  Still living? No  FH: diabetes mellitus, Age at diagnosis: Age Unknown     [id number]

## 2025-07-06 NOTE — H&P ADULT - ASSESSMENT
68 yo F PMH pancreatic adenoca,  renal ca sp L partial nephrectomy, sp chemotherapy and radiation 2024, HTN, T2DM, HLD, GERD presents to ED for dysphagia and abdominal pain. Plan for EGD.

## 2025-07-06 NOTE — ED PROVIDER NOTE - OBJECTIVE STATEMENT
69-year-old female with pancreatic cancer presents with decreased ability to swallow.  Patient states for the last week she is having inability to swallow foods and even some liquids.  She feels like it is getting stuck at her epigastrium.

## 2025-07-06 NOTE — H&P ADULT - NSHPPHYSICALEXAM_GEN_ALL_CORE
General: Alert and no acute distress.  Head: Normocephalic.  Neck: Supple, trachea midline  Eye: Pupils are equal, round and reactive to light and extraocular movements are intact.  Ears, nose, mouth and throat: Tympanic membranes clear and oral mucosa moist.  Cardiovascular: Regular rate and rhythm, No murmur  Respiratory: Lungs are clear to auscultation.  Chest wall: No tenderness.  Back: Nontender and no step-offs.  Musculoskeletal: Normal ROM, no tenderness, no swelling and no deformity.  Gastrointestinal: Soft, mild epigastric tenderness. Normal bowel sounds and No organomegaly.  Neurological: Alert and orientated to person, place, time, and situation, CN II – XII intact, normal sensory observed and normal motor observed.  Lymphatics: No lymphadenopathy.  Psychiatric: Cooperative and appropriate mood and affect.

## 2025-07-06 NOTE — ED PROVIDER NOTE - PATIENT'S PREFERRED PRONOUN
[facility] Oncologist Progress Note    SUBJECTIVE  Patient seen and examined. No acute events overnight. IABP remains in place.  Denies any shortness of breath. Remains on IV heparin gtt.       OBJECTIVE  Blood pressure 106/68, pulse 79, temperature 97.7 °F (36.5 °C), temperature source PA Catheter, resp. rate (!) 26, height 5' 7\" (1.702 m), weight 80.1 kg (176 lb 9.4 oz), SpO2 100 %.    Physical Exam  Physical Exam  Constitutional:       Appearance: Normal appearance.   HENT:      Head: Normocephalic and atraumatic.   Eyes:      Pupils: Pupils are equal, round, and reactive to light.   Cardiovascular:      Rate and Rhythm: Normal rate and regular rhythm.      Pulses: Normal pulses.      Heart sounds: Normal heart sounds.   Pulmonary:      Effort: Pulmonary effort is normal.   Abdominal:      General: Abdomen is flat.   Skin:     General: Skin is warm.   Neurological:      General: No focal deficit present.      Mental Status: He is alert.   Labs  Lab Results   Component Value Date    WBC 7.8 10/15/2022    WBC 7.7 11/25/2020    HGB 11.7 (L) 10/15/2022    HGB 12.8 (L) 11/25/2020    HCT 37.1 (L) 10/15/2022    HCT 41.8 11/25/2020     10/15/2022     11/25/2020       Lab Results   Component Value Date    SODIUM 129 (L) 10/15/2022    SODIUM 142 11/25/2020    POTASSIUM 4.3 10/15/2022    POTASSIUM 4.0 11/25/2020    CHLORIDE 97 10/15/2022    CHLORIDE 106 11/25/2020    BUN 18 10/15/2022    BUN 26 (H) 11/25/2020    CREATININE 1.13 10/15/2022    CREATININE 1.27 (H) 11/25/2020    GLUCOSE 107 (H) 10/15/2022    GLUCOSE 100 (H) 11/25/2020        Lab Results   Component Value Date    AST 17 10/15/2022    AST 14 11/25/2020    ALKPT 54 10/15/2022    ALKPT 30 (L) 11/25/2020    BILIRUBIN 0.4 10/15/2022    BILIRUBIN 1.2 (H) 11/25/2020        Imaging       No specimens collected during this procedure.     Assessment/Plan  1) Acute on chronic systolic failure s/p ICD  2) Non ischemic cardiomyopathy  3) Double aortic arch  -  previously seen my by myself September 2022, cleared for LVAD/OHT per hematology service     4) s/p IABP placement on 10/3  - mgmt per AHF team     5) Thrombocytopenia, resolved  6)  Positive PF4 10/5  - OD positive at 0.6   - this was discussed with primary team  - swtiched to agatroban  - ESTRADA negative, resumed on heparin 10/13  - platelets are normal     7) Paroxysmal Atrial Fibrillation  - on heparin gtt    8. Microcytic anemia  - will send iron studies, B12/folate    Maria L Marquis MD     Her/She

## 2025-07-06 NOTE — H&P ADULT - PROBLEM SELECTOR PLAN 1
- CT abd pel reviewed  - GI consulted and plans for EGD tomorrow  - NPO PMN  - clear liquids for now  - IV PPI BID  - maintenance IV fluids

## 2025-07-06 NOTE — ED ADULT NURSE NOTE - OBJECTIVE STATEMENT
Patient presents to ED reporting  diffused abdominal pain with nausea/vomiting x2 days, hx pancreatic cancer, last chem april/24

## 2025-07-06 NOTE — ED PROVIDER NOTE - CLINICAL SUMMARY MEDICAL DECISION MAKING FREE TEXT BOX
ct ordered because of change in swolling which shows concern for new mass, deandre pamzhou however no GI at [facility] , will transfer to [facility]

## 2025-07-06 NOTE — CONSULT NOTE ADULT - SUBJECTIVE AND OBJECTIVE BOX
Chief Complaint:  Patient is a 69y old  Female who presents with a chief complaint of     HPI:  ENRIKE MORLEY is a 69year old Female with history of Pancreatic adenocarcinoma and L renal cancer s/p L partial nephrectomy 11/2024, s/p chemotherapy (last 6/2024) and radiation treatment 3/2024 currently on surveillance monitoring, DM2, HTN, GERD presenting in the setting of progressive dysphagia for the past week, w/ associated N/V and epigastric pain for the past 2 days.     Patient reporting significant abdominal pain, nausea vomiting for the past 3 days, with symptoms similar to prior episodes, highly suspicious for flare of her known acute on chronic abdominal pain.  Patient has been thought to have gastroparesis in the setting of uncontrolled type 2 diabetes with A1c of 8.5.  Though would suspect possible whole gut dysmotility especially constipation as well.     Patient reports that she was in her otherwise you state of health able to eat all solids and liquids when last week she began having worsening dysphagia initially to solids then progressed to both solids and liquids.  Initially she would feel difficulty with food and liquids on the way down however this quickly progressed to her regurgitating her food and liquids intermittently.  2 days ago she started developing significant epigastric pain with associated nausea and nonbilious nonbloody emesis.  Patient has not been able to eat or drink anything for the past week due to the symptoms and due to worsening weakness she had presented herself to the hospital for further evaluation.    Patient denies any prior symptoms of dysphagia prior to this week.  Last endoscopy with EUS was performed on 2/1/2024 at that time esophagus, stomach duodenal bulb are all normal, EUS demonstrated hypoechoic body of pancreas mass with fiducials placed.  Patient denies any associated recent weight loss, no new medications or recent changes to her diet.    In the ED patient was afebrile, tachycardic to 107, hypertensive 171/82.  Initial labs without evidence of leukocytosis, normal hemoglobin.  CT of the abdomen pelvis with IV contrast demonstrating known pancreatic body mass that increased in size measuring 1.6 x 2 cm with severe dilation of upstream pancreatic duct and in the pancreatic tail with associated tumor encasement of the celiac axis which appears to be increasing previously.  There also appears to be moderate thickening of the gastroesophageal junction distal to the esophagus measuring up to 5X 4 cm as well as a fluid distended stomach with air-fluid levels without any evidence of obstruction and mild distal gastric wall thickening concerning for gastritis.  Given patient's abdominal pain and CT findings GI was consulted for further evaluation.        ROS:   General:  No fevers, chills, night sweats  Eyes:  Good vision, no reported pain  ENT:  No sore throat, pain, runny nose  CV:  No pain, palpitations  Pulm:  No dyspnea, cough  GI:  See HPI, otherwise negative  :  No incontinence, nocturia  Muscle:  No reported pain, weakness  Neuro:  No memory problems  Psych:  No insomnia, psychosis  Endocrine:  No polyuria, polydipsia  Heme:  No petechiae, ecchymosis, easy bruisability  Skin:  No reported rash    PMHX/PSHX:    No pertinent past medical history    Diabetes    HTN (hypertension)    Liver, polycystic    Renal cyst    Malignant neoplasm of pancreas    Asthma    Left renal mass    No significant past surgical history    History of knee replacement, total, left      Allergies:  shellfish (Rash)  No Known Drug Allergies      Home Medications: reviewed  Hospital Medications:      Social History:   Tobacco: Denies  EtOH: Denies  Illicit Drugs: Denies    Family history:    FH: diabetes mellitus (Father)      Denies family history of colon cancer/polyps, stomach cancer/polyps, pancreatic cancer/masses, liver cancer/disease, ovarian cancer and endometrial cancer.    PHYSICAL EXAM:   Vital Signs:  Vital Signs Last 24 Hrs  T(C): 37.4 (06 Jul 2025 12:22), Max: 37.4 (06 Jul 2025 12:22)  T(F): 99.3 (06 Jul 2025 12:22), Max: 99.3 (06 Jul 2025 12:22)  HR: 100 (06 Jul 2025 12:22) (77 - 107)  BP: 173/97 (06 Jul 2025 12:22) (169/109 - 173/116)  BP(mean): --  RR: 16 (06 Jul 2025 12:22) (16 - 18)  SpO2: 100% (06 Jul 2025 12:22) (97% - 100%)    Parameters below as of 06 Jul 2025 12:22  Patient On (Oxygen Delivery Method): room air      Daily Height in cm: 162.56 (06 Jul 2025 08:30)    Daily     GENERAL: in mild distress 2/2 abdoiminal discomfort  NEURO: alert  HEENT: anicteric sclera, no conjunctival pallor appreciated  CHEST: no respiratory distress, no accessory muscle use  CARDIAC: regular rate, +S1/S2  ABDOMEN: TTP in epigastrium and RUQ   EXTREMITIES: warm, well perfused, no edema  SKIN: no lesions noted    LABS: reviewed                        13.0   9.36  )-----------( 175      ( 06 Jul 2025 09:40 )             39.1     07-06    137  |  98  |  13  ----------------------------<  213[H]  3.8   |  19[L]  |  0.85    Ca    9.8      06 Jul 2025 09:40  Phos  3.2     07-06  Mg     1.40     07-06    TPro  8.0  /  Alb  3.8  /  TBili  0.5  /  DBili  x   /  AST  16  /  ALT  19  /  AlkPhos  120  07-06    LIVER FUNCTIONS - ( 06 Jul 2025 01:50 )  Alb: 3.8 g/dL / Pro: 8.0 g/dL / ALK PHOS: 120 U/L / ALT: 19 U/L DA / AST: 16 U/L / GGT: x               Diagnostic Studies: see sunrise for full report    < from: CT Abdomen and Pelvis w/ IV Cont (07.06.25 @ 03:11) >    INTERPRETATION:  PROCEDURE INFORMATION:  Exam: CT Abdomen And Pelvis With Contrast  Exam date and time: 7/6/2025 3:08 AM  Age: 69years old  Clinical indication: Pancreatic CA now cant swollow eval ? mets    TECHNIQUE:  Imaging protocol: Computed tomography of the abdomen and pelvis with   contrast.  Contrast material: IV: OMNIPAQUE 350; Contrast volume: 90 ml; Contrast   route:  IV;    COMPARISON:  CT ABDOMEN AND PELVIS WITH ORAL CONTRAST WITH IV CONTRAST 2/20/2025 6:55   PM    FINDINGS:  Esophagus: Moderate wall thickening of the gastroesophageal junction   distal  esophagus, measuring up to 5 x 4 cm    Liver: Normal. No mass.  Gallbladder and biliary ducts: Normal. No calcified stones. No ductal   dilation.  Pancreas: Known pancreatic body mass, slightly increased in size since   previous study, measuring 1.6 x 2.0 cm (2:23). Severe dilatation of the   upstream pancreatic duct, in the pancreatic tail, as noted previously.   Atrophy of pancreatic tail. Pancreatic neck and head are unremarkable.   The splenic vein appears either occluded or critically stenosed at the   level of the portal confluence (2:30). Tumor encases (greater than 180   degrees) the celiac axis, increased in extent since the previous study  Spleen: Normal. No splenomegaly.  Adrenal glands: Mild bilateral adrenal gland thickening without discrete  lesion.  Kidneys and ureters: Small bilateral renal cysts. No hydronephrosis.  Stomach and bowel: Fluid distended stomach with air-fluid level without  obstructive lesion may be due to ileus. Mild distal gastric wall   thickening  concerning for gastritis. Small bowel loops unremarkable. No bowel   obstruction.  Mild wall thickening in sigmoid, descending colon, concerning for colitis.  Appendix: No evidence of appendicitis.    Intraperitoneal space: No free fluid. No free air.  Vasculature: Atherosclerotic aortic disease without aneurysm..  Lymph nodes: Unremarkable. No enlarged lymph nodes.  Urinary bladder: Unremarkable as visualized.  Reproductive: Unremarkable as visualized.  Bones/joints: Degenerate changes in thoracolumbar spine no acute fracture.  Soft tissues: Unremarkable.    IMPRESSION:  1.   Moderate wall thickening of the gastroesophageal junction distal  esophagus possible esophagitis. Cannot totally exclude a distal   esophageal mass.Consider upper  GI study and/or endoscopy.  2.   Fluid distended stomach with air-fluid level without obstructive   lesion  may be due to ileus. Mild distal gastric wall thickening concerning for  gastritis.  3.   Small bowel loops unremarkable. No bowel obstruction. Mild wall   thickening  in sigmoid, descending colon, concerning forcolitis.  4.  known pancreatic body mass, slightly increased in size since previous   study with severe dilatation of the upstream pancreatic duct in the   pancreatic tail. Atrophy of the pancreatic tail. Vascular involvement by   the tumor as described above.    --- End of Report ---      < end of copied text >

## 2025-07-06 NOTE — H&P ADULT - HISTORY OF PRESENT ILLNESS
68 yo F PMH pancreatic adenoca,  renal ca sp L partial nephrectomy, sp chemotherapy and radiation 2024, HTN, T2DM, HLD, GERD initially presented to [facility] and transferred to [facility] ED for dysphagia and abdominal pain. Patient reports start 1 week ago, she developed dysphagia to solid foods. She reports dysphagia has progressed over the last week and also developed abdominal pain throughout the week. She reports dysphagia progressed from solid food to soft food. She reports still able to drink liquids, albeit slowly. She reports regurgitating her food soon after eating. Denies blood in emesis. Reports loss of appetite. Denies dysphagia symptoms prior to 1 week ago. Per GI "Last endoscopy with EUS was performed on 2/1/2024 at that time esophagus, stomach duodenal bulb are all normal, EUS demonstrated hypoechoic body of pancreas mass with fiducials placed"    CT:   IMPRESSION:  1.   Moderate wall thickening of the gastroesophageal junction distal  esophagus possible esophagitis. Cannot totally exclude a distal   esophageal mass.Consider upper  GI study and/or endoscopy.  2.   Fluid distended stomach with air-fluid level without obstructive   lesion  may be due to ileus. Mild distal gastric wall thickening concerning for  gastritis.  3.   Small bowel loops unremarkable. No bowel obstruction. Mild wall   thickening  in sigmoid, descending colon, concerning for colitis.  4.  known pancreatic body mass, slightly increased in size since previous   study with severe dilatation of the upstream pancreatic duct in the   pancreatic tail. Atrophy of the pancreatic tail. Vascular involvement by   the tumor as described above.

## 2025-07-06 NOTE — CONSULT NOTE ADULT - ATTENDING COMMENTS
GI consulted for dysphagia to solids and liquids x 1 week.   CT abd/pelvis - moderate wall thickenign fo GE junction.   DDx - esophagitis, stricture, mass    Plan -   plan for EGD tomorrow for intraluminal eval  IV PPI   rest of care per primary team  GI to follow, please call w questions

## 2025-07-07 LAB
ALBUMIN SERPL ELPH-MCNC: 4.1 G/DL — SIGNIFICANT CHANGE UP (ref 3.3–5)
ALP SERPL-CCNC: 107 U/L — SIGNIFICANT CHANGE UP (ref 40–120)
ALT FLD-CCNC: 13 U/L — SIGNIFICANT CHANGE UP (ref 4–33)
ANION GAP SERPL CALC-SCNC: 16 MMOL/L — HIGH (ref 7–14)
APTT BLD: 26.3 SEC — SIGNIFICANT CHANGE UP (ref 26.1–36.8)
AST SERPL-CCNC: 13 U/L — SIGNIFICANT CHANGE UP (ref 4–32)
BASOPHILS # BLD AUTO: 0.03 K/UL — SIGNIFICANT CHANGE UP (ref 0–0.2)
BASOPHILS NFR BLD AUTO: 0.3 % — SIGNIFICANT CHANGE UP (ref 0–2)
BILIRUB SERPL-MCNC: 0.5 MG/DL — SIGNIFICANT CHANGE UP (ref 0.2–1.2)
BLD GP AB SCN SERPL QL: NEGATIVE — SIGNIFICANT CHANGE UP
BUN SERPL-MCNC: 11 MG/DL — SIGNIFICANT CHANGE UP (ref 7–23)
CALCIUM SERPL-MCNC: 9.4 MG/DL — SIGNIFICANT CHANGE UP (ref 8.4–10.5)
CHLORIDE SERPL-SCNC: 100 MMOL/L — SIGNIFICANT CHANGE UP (ref 98–107)
CO2 SERPL-SCNC: 19 MMOL/L — LOW (ref 22–31)
CREAT SERPL-MCNC: 0.88 MG/DL — SIGNIFICANT CHANGE UP (ref 0.5–1.3)
EGFR: 71 ML/MIN/1.73M2 — SIGNIFICANT CHANGE UP
EGFR: 71 ML/MIN/1.73M2 — SIGNIFICANT CHANGE UP
EOSINOPHIL # BLD AUTO: 0.01 K/UL — SIGNIFICANT CHANGE UP (ref 0–0.5)
EOSINOPHIL NFR BLD AUTO: 0.1 % — SIGNIFICANT CHANGE UP (ref 0–6)
GLUCOSE BLDC GLUCOMTR-MCNC: 115 MG/DL — HIGH (ref 70–99)
GLUCOSE BLDC GLUCOMTR-MCNC: 120 MG/DL — HIGH (ref 70–99)
GLUCOSE BLDC GLUCOMTR-MCNC: 137 MG/DL — HIGH (ref 70–99)
GLUCOSE BLDC GLUCOMTR-MCNC: 151 MG/DL — HIGH (ref 70–99)
GLUCOSE SERPL-MCNC: 168 MG/DL — HIGH (ref 70–99)
HCT VFR BLD CALC: 37.8 % — SIGNIFICANT CHANGE UP (ref 34.5–45)
HGB BLD-MCNC: 12.7 G/DL — SIGNIFICANT CHANGE UP (ref 11.5–15.5)
IMM GRANULOCYTES # BLD AUTO: 0.03 K/UL — SIGNIFICANT CHANGE UP (ref 0–0.07)
IMM GRANULOCYTES NFR BLD AUTO: 0.3 % — SIGNIFICANT CHANGE UP (ref 0–0.9)
INR BLD: 1 RATIO — SIGNIFICANT CHANGE UP (ref 0.85–1.16)
LYMPHOCYTES # BLD AUTO: 1.31 K/UL — SIGNIFICANT CHANGE UP (ref 1–3.3)
LYMPHOCYTES NFR BLD AUTO: 14.5 % — SIGNIFICANT CHANGE UP (ref 13–44)
MAGNESIUM SERPL-MCNC: 1.8 MG/DL — SIGNIFICANT CHANGE UP (ref 1.6–2.6)
MCHC RBC-ENTMCNC: 27.3 PG — SIGNIFICANT CHANGE UP (ref 27–34)
MCHC RBC-ENTMCNC: 33.6 G/DL — SIGNIFICANT CHANGE UP (ref 32–36)
MCV RBC AUTO: 81.1 FL — SIGNIFICANT CHANGE UP (ref 80–100)
MONOCYTES # BLD AUTO: 0.46 K/UL — SIGNIFICANT CHANGE UP (ref 0–0.9)
MONOCYTES NFR BLD AUTO: 5.1 % — SIGNIFICANT CHANGE UP (ref 2–14)
NEUTROPHILS # BLD AUTO: 7.2 K/UL — SIGNIFICANT CHANGE UP (ref 1.8–7.4)
NEUTROPHILS NFR BLD AUTO: 79.7 % — HIGH (ref 43–77)
NRBC # BLD AUTO: 0 K/UL — SIGNIFICANT CHANGE UP (ref 0–0)
NRBC # FLD: 0 K/UL — SIGNIFICANT CHANGE UP (ref 0–0)
NRBC BLD AUTO-RTO: 0 /100 WBCS — SIGNIFICANT CHANGE UP (ref 0–0)
PHOSPHATE SERPL-MCNC: 3.4 MG/DL — SIGNIFICANT CHANGE UP (ref 2.5–4.5)
PLATELET # BLD AUTO: 180 K/UL — SIGNIFICANT CHANGE UP (ref 150–400)
PMV BLD: 11.1 FL — SIGNIFICANT CHANGE UP (ref 7–13)
POTASSIUM SERPL-MCNC: 3.8 MMOL/L — SIGNIFICANT CHANGE UP (ref 3.5–5.3)
POTASSIUM SERPL-SCNC: 3.8 MMOL/L — SIGNIFICANT CHANGE UP (ref 3.5–5.3)
PROT SERPL-MCNC: 7.2 G/DL — SIGNIFICANT CHANGE UP (ref 6–8.3)
PROTHROM AB SERPL-ACNC: 11.9 SEC — SIGNIFICANT CHANGE UP (ref 9.9–13.4)
RBC # BLD: 4.66 M/UL — SIGNIFICANT CHANGE UP (ref 3.8–5.2)
RBC # FLD: 15.7 % — HIGH (ref 10.3–14.5)
RH IG SCN BLD-IMP: POSITIVE — SIGNIFICANT CHANGE UP
SODIUM SERPL-SCNC: 135 MMOL/L — SIGNIFICANT CHANGE UP (ref 135–145)
WBC # BLD: 9.04 K/UL — SIGNIFICANT CHANGE UP (ref 3.8–10.5)
WBC # FLD AUTO: 9.04 K/UL — SIGNIFICANT CHANGE UP (ref 3.8–10.5)

## 2025-07-07 PROCEDURE — 99233 SBSQ HOSP IP/OBS HIGH 50: CPT

## 2025-07-07 PROCEDURE — 88342 IMHCHEM/IMCYTCHM 1ST ANTB: CPT | Mod: 26

## 2025-07-07 PROCEDURE — 43239 EGD BIOPSY SINGLE/MULTIPLE: CPT | Mod: GC

## 2025-07-07 PROCEDURE — 88341 IMHCHEM/IMCYTCHM EA ADD ANTB: CPT | Mod: 26,59

## 2025-07-07 PROCEDURE — 88305 TISSUE EXAM BY PATHOLOGIST: CPT | Mod: 26

## 2025-07-07 PROCEDURE — 88360 TUMOR IMMUNOHISTOCHEM/MANUAL: CPT | Mod: 26,59

## 2025-07-07 RX ORDER — HYDROMORPHONE/SOD CHLOR,ISO/PF 2 MG/10 ML
0.2 SYRINGE (ML) INJECTION ONCE
Refills: 0 | Status: DISCONTINUED | OUTPATIENT
Start: 2025-07-07 | End: 2025-07-07

## 2025-07-07 RX ORDER — ACETAMINOPHEN 500 MG/5ML
1000 LIQUID (ML) ORAL ONCE
Refills: 0 | Status: COMPLETED | OUTPATIENT
Start: 2025-07-07 | End: 2025-07-07

## 2025-07-07 RX ORDER — ACETAMINOPHEN 500 MG/5ML
1000 LIQUID (ML) ORAL ONCE
Refills: 0 | Status: DISCONTINUED | OUTPATIENT
Start: 2025-07-07 | End: 2025-07-07

## 2025-07-07 RX ORDER — ONDANSETRON HCL/PF 4 MG/2 ML
4 VIAL (ML) INJECTION ONCE
Refills: 0 | Status: COMPLETED | OUTPATIENT
Start: 2025-07-07 | End: 2025-07-07

## 2025-07-07 RX ADMIN — Medication 0.2 MILLIGRAM(S): at 17:51

## 2025-07-07 RX ADMIN — Medication 4 MILLIGRAM(S): at 14:54

## 2025-07-07 RX ADMIN — Medication 4 MILLIGRAM(S): at 16:13

## 2025-07-07 RX ADMIN — ATORVASTATIN CALCIUM 10 MILLIGRAM(S): 80 TABLET, FILM COATED ORAL at 21:49

## 2025-07-07 RX ADMIN — Medication 40 MILLIGRAM(S): at 05:01

## 2025-07-07 RX ADMIN — Medication 40 MILLIGRAM(S): at 17:38

## 2025-07-07 RX ADMIN — Medication 10 MILLIGRAM(S): at 18:57

## 2025-07-07 RX ADMIN — LOSARTAN POTASSIUM 25 MILLIGRAM(S): 100 TABLET, FILM COATED ORAL at 10:42

## 2025-07-07 RX ADMIN — Medication 4 MILLIGRAM(S): at 21:48

## 2025-07-07 RX ADMIN — Medication 20 MILLIGRAM(S): at 19:31

## 2025-07-07 RX ADMIN — Medication 0.2 MILLIGRAM(S): at 17:36

## 2025-07-07 RX ADMIN — AMLODIPINE BESYLATE 10 MILLIGRAM(S): 10 TABLET ORAL at 21:49

## 2025-07-07 RX ADMIN — INSULIN LISPRO 1: 100 INJECTION, SOLUTION INTRAVENOUS; SUBCUTANEOUS at 17:42

## 2025-07-07 RX ADMIN — Medication 400 MILLIGRAM(S): at 16:13

## 2025-07-07 NOTE — DIETITIAN INITIAL EVALUATION ADULT - ADD RECOMMEND
1. Gradually advance PO diet at medical team's discretion  --> Defer diet texture and liquid consistency as per medical team, SLP  2. Defer fluid management to medical team   3. Monitor weights, labs, BM's, skin integrity, diet progression

## 2025-07-07 NOTE — DIETITIAN INITIAL EVALUATION ADULT - REASON INDICATOR FOR ASSESSMENT
Nutrition Consult for Assessment  Nutrition Risk Screen for Oncology    Per chart, patient is a 69y Female with PMH pancreatic adenocarcinoma, renal cancer s/p L partial nephrectomy, s/p chemo and radiation (2024), HTN, T2DM, HLD, GERD who presented to [facility] for dysphagia and abdominal pain.

## 2025-07-07 NOTE — DIETITIAN INITIAL EVALUATION ADULT - NSPROEDALEARNPREF_GEN_A_NUR
Detail Level: Detailed Body Location Override (Optional - Billing Will Still Be Based On Selected Body Map Location If Applicable): right lower lid/upper cheek junction X Size Of Lesion In Cm (Optional): 0 Incorporate Mauc In Note: No verbal instruction

## 2025-07-07 NOTE — DIETITIAN INITIAL EVALUATION ADULT - OTHER INFO
Patient previously ordered for a clear liquids diet, now NPO for EGD. Once medically feasible to resume PO diet, defer diet texture and liquid consistency as per medical team, SLP. Patient reports nausea at present. Denies vomiting, diarrhea, constipation. Last BM 7/5/25 per RN flowsheet documentation. Not noted to be on a bowel regimen. Patient informed of NPO status with diet progression as medically feasible. Patient verbalized understanding and appreciative of information provided.

## 2025-07-07 NOTE — DIETITIAN INITIAL EVALUATION ADULT - PERTINENT LABORATORY DATA
07-07    135  |  100  |  11  ----------------------------<  168[H]  3.8   |  19[L]  |  0.88    Ca    9.4      07 Jul 2025 05:00  Phos  3.4     07-07  Mg     1.80     07-07    TPro  7.2  /  Alb  4.1  /  TBili  0.5  /  DBili  x   /  AST  13  /  ALT  13  /  AlkPhos  107  07-07  POCT Blood Glucose.: 120 mg/dL (07-07-25 @ 11:25)  A1C with Estimated Average Glucose Result: 7.6 % (11-04-24 @ 10:30)

## 2025-07-07 NOTE — DIETITIAN INITIAL EVALUATION ADULT - ORAL INTAKE PTA/DIET HISTORY
Patient reports no known food allergies or food intolerances. Patient does not take any nutrition supplements at home. Patient reports she had been eating well, eating normally until 1 week prior to admission. Patient reports dysphagia to solids and liquids x1 week, consuming scarce amounts of food or beverage. Likely meeting <50% estimated energy needs as a result. Patient confirms food allergy to shellfish. Patient does not take any nutrition supplements at home. Patient reports she had been eating well, eating normally until 1 week prior to admission. Patient reports dysphagia to solids and liquids x1 week, consuming scarce amounts of food or beverage. Likely meeting <50% estimated energy needs as a result.

## 2025-07-07 NOTE — DIETITIAN INITIAL EVALUATION ADULT - OTHER CALCULATIONS
IBW: 120 lb +/- 10%, %%  Per Carlo PATEL, noted the following weight history: 64.5kg (7/7), 68.1kg (6/23), 72.1kg (2/4)  Objective weight measurements suggest 3.6kg (5.3%) weight loss <1 month period of time (significant). Of note, patient documented with mild edema which may be masking even further weight loss.

## 2025-07-07 NOTE — DIETITIAN INITIAL EVALUATION ADULT - PERTINENT MEDS FT
MEDICATIONS  (STANDING):  amLODIPine   Tablet 10 milliGRAM(s) Oral at bedtime  atorvastatin 10 milliGRAM(s) Oral at bedtime  dextrose 5%. 1000 milliLiter(s) (50 mL/Hr) IV Continuous <Continuous>  dextrose 50% Injectable 25 Gram(s) IV Push once  famotidine Injectable 20 milliGRAM(s) IV Push once  glucagon  Injectable 1 milliGRAM(s) IntraMuscular once  insulin lispro (ADMELOG) corrective regimen sliding scale   SubCutaneous three times a day before meals  losartan 25 milliGRAM(s) Oral daily  pantoprazole  Injectable 40 milliGRAM(s) IV Push every 12 hours  sodium chloride 0.9%. 1000 milliLiter(s) (75 mL/Hr) IV Continuous <Continuous>    MEDICATIONS  (PRN):  acetaminophen     Tablet .. 650 milliGRAM(s) Oral every 6 hours PRN Temp greater or equal to 38C (100.4F), Mild Pain (1 - 3)  aluminum hydroxide/magnesium hydroxide/simethicone Suspension 30 milliLiter(s) Oral every 4 hours PRN Dyspepsia  dextrose Oral Gel 15 Gram(s) Oral once PRN Blood Glucose LESS THAN 70 milliGRAM(s)/deciliter  hydrALAZINE Injectable 10 milliGRAM(s) IV Push every 6 hours PRN SBP>160  melatonin 3 milliGRAM(s) Oral at bedtime PRN Insomnia  ondansetron Injectable 4 milliGRAM(s) IV Push every 8 hours PRN Nausea and/or Vomiting

## 2025-07-07 NOTE — DIETITIAN NUTRITION RISK NOTIFICATION - ADDITIONAL COMMENTS/DIETITIAN RECOMMENDATIONS
Please see Dietitian Initial Assessment for complete recommendations.    Cony Roy MS RDN CDN  On [employer] Teams (Preferred), Pager #[phone]

## 2025-07-07 NOTE — PROGRESS NOTE ADULT - TIME BILLING
Reviewing laboratory data, consultants' recommendations, documentation in [place], performing medically appropriate examinations/evaluations, discussion with patient/family/RN/ACP/Residents and interdisciplinary staff (such as , social workers, etc), counseling patient/family/care giver, ordering medically appropriate medication, tests, or procedures

## 2025-07-07 NOTE — DIETITIAN INITIAL EVALUATION ADULT - NS FNS DIET ORDER
NPO after Midnight:   NPO Start Date: 06-Jul-2025  NPO Start Time: 23:59  Except Medications  With Ice Chips/Sips of Water (07-07-25 @ 10:34)  Clear Liquid (07-06-25 @ 16:27)

## 2025-07-08 ENCOUNTER — TRANSCRIPTION ENCOUNTER (OUTPATIENT)
Age: 70
End: 2025-07-08

## 2025-07-08 VITALS
OXYGEN SATURATION: 99 % | DIASTOLIC BLOOD PRESSURE: 82 MMHG | SYSTOLIC BLOOD PRESSURE: 141 MMHG | HEART RATE: 104 BPM | TEMPERATURE: 99 F | RESPIRATION RATE: 16 BRPM

## 2025-07-08 LAB
ALBUMIN SERPL ELPH-MCNC: 4.3 G/DL — SIGNIFICANT CHANGE UP (ref 3.3–5)
ALP SERPL-CCNC: 114 U/L — SIGNIFICANT CHANGE UP (ref 40–120)
ALT FLD-CCNC: 10 U/L — SIGNIFICANT CHANGE UP (ref 4–33)
AMPHET UR-MCNC: NEGATIVE — SIGNIFICANT CHANGE UP
ANION GAP SERPL CALC-SCNC: 19 MMOL/L — HIGH (ref 7–14)
AST SERPL-CCNC: 17 U/L — SIGNIFICANT CHANGE UP (ref 4–32)
BARBITURATES UR SCN-MCNC: NEGATIVE — SIGNIFICANT CHANGE UP
BASOPHILS # BLD AUTO: 0.03 K/UL — SIGNIFICANT CHANGE UP (ref 0–0.2)
BASOPHILS NFR BLD AUTO: 0.3 % — SIGNIFICANT CHANGE UP (ref 0–2)
BENZODIAZ UR-MCNC: NEGATIVE — SIGNIFICANT CHANGE UP
BILIRUB SERPL-MCNC: 0.6 MG/DL — SIGNIFICANT CHANGE UP (ref 0.2–1.2)
BUN SERPL-MCNC: 12 MG/DL — SIGNIFICANT CHANGE UP (ref 7–23)
CALCIUM SERPL-MCNC: 9.7 MG/DL — SIGNIFICANT CHANGE UP (ref 8.4–10.5)
CHLORIDE SERPL-SCNC: 96 MMOL/L — LOW (ref 98–107)
CO2 SERPL-SCNC: 19 MMOL/L — LOW (ref 22–31)
COCAINE METAB.OTHER UR-MCNC: NEGATIVE — SIGNIFICANT CHANGE UP
CREAT SERPL-MCNC: 0.84 MG/DL — SIGNIFICANT CHANGE UP (ref 0.5–1.3)
CREATININE URINE RESULT, DAU: 114 MG/DL — SIGNIFICANT CHANGE UP
EGFR: 75 ML/MIN/1.73M2 — SIGNIFICANT CHANGE UP
EGFR: 75 ML/MIN/1.73M2 — SIGNIFICANT CHANGE UP
EOSINOPHIL # BLD AUTO: 0 K/UL — SIGNIFICANT CHANGE UP (ref 0–0.5)
EOSINOPHIL NFR BLD AUTO: 0 % — SIGNIFICANT CHANGE UP (ref 0–6)
FENTANYL UR QL SCN: POSITIVE
GLUCOSE SERPL-MCNC: 167 MG/DL — HIGH (ref 70–99)
HCT VFR BLD CALC: 41.1 % — SIGNIFICANT CHANGE UP (ref 34.5–45)
HGB BLD-MCNC: 13.8 G/DL — SIGNIFICANT CHANGE UP (ref 11.5–15.5)
IMM GRANULOCYTES # BLD AUTO: 0.04 K/UL — SIGNIFICANT CHANGE UP (ref 0–0.07)
IMM GRANULOCYTES NFR BLD AUTO: 0.4 % — SIGNIFICANT CHANGE UP (ref 0–0.9)
LYMPHOCYTES # BLD AUTO: 1.1 K/UL — SIGNIFICANT CHANGE UP (ref 1–3.3)
LYMPHOCYTES NFR BLD AUTO: 11.1 % — LOW (ref 13–44)
MAGNESIUM SERPL-MCNC: 1.7 MG/DL — SIGNIFICANT CHANGE UP (ref 1.6–2.6)
MCHC RBC-ENTMCNC: 27.5 PG — SIGNIFICANT CHANGE UP (ref 27–34)
MCHC RBC-ENTMCNC: 33.6 G/DL — SIGNIFICANT CHANGE UP (ref 32–36)
MCV RBC AUTO: 82 FL — SIGNIFICANT CHANGE UP (ref 80–100)
METHADONE UR-MCNC: POSITIVE
MONOCYTES # BLD AUTO: 0.6 K/UL — SIGNIFICANT CHANGE UP (ref 0–0.9)
MONOCYTES NFR BLD AUTO: 6.1 % — SIGNIFICANT CHANGE UP (ref 2–14)
NEUTROPHILS # BLD AUTO: 8.1 K/UL — HIGH (ref 1.8–7.4)
NEUTROPHILS NFR BLD AUTO: 82.1 % — HIGH (ref 43–77)
NRBC # BLD AUTO: 0 K/UL — SIGNIFICANT CHANGE UP (ref 0–0)
NRBC # FLD: 0 K/UL — SIGNIFICANT CHANGE UP (ref 0–0)
NRBC BLD AUTO-RTO: 0 /100 WBCS — SIGNIFICANT CHANGE UP (ref 0–0)
OPIATES UR-MCNC: NEGATIVE — SIGNIFICANT CHANGE UP
OXYCODONE UR-MCNC: NEGATIVE — SIGNIFICANT CHANGE UP
PCP SPEC-MCNC: SIGNIFICANT CHANGE UP
PCP UR-MCNC: NEGATIVE — SIGNIFICANT CHANGE UP
PHOSPHATE SERPL-MCNC: 2.5 MG/DL — SIGNIFICANT CHANGE UP (ref 2.5–4.5)
PLATELET # BLD AUTO: 160 K/UL — SIGNIFICANT CHANGE UP (ref 150–400)
PMV BLD: 10 FL — SIGNIFICANT CHANGE UP (ref 7–13)
POTASSIUM SERPL-MCNC: 3.6 MMOL/L — SIGNIFICANT CHANGE UP (ref 3.5–5.3)
POTASSIUM SERPL-SCNC: 3.6 MMOL/L — SIGNIFICANT CHANGE UP (ref 3.5–5.3)
PROT SERPL-MCNC: 7.5 G/DL — SIGNIFICANT CHANGE UP (ref 6–8.3)
RBC # BLD: 5.01 M/UL — SIGNIFICANT CHANGE UP (ref 3.8–5.2)
RBC # FLD: 15.5 % — HIGH (ref 10.3–14.5)
SODIUM SERPL-SCNC: 134 MMOL/L — LOW (ref 135–145)
THC UR QL: POSITIVE
WBC # BLD: 9.87 K/UL — SIGNIFICANT CHANGE UP (ref 3.8–10.5)
WBC # FLD AUTO: 9.87 K/UL — SIGNIFICANT CHANGE UP (ref 3.8–10.5)

## 2025-07-08 PROCEDURE — 99232 SBSQ HOSP IP/OBS MODERATE 35: CPT | Mod: GC

## 2025-07-08 PROCEDURE — 81001 URINALYSIS AUTO W/SCOPE: CPT

## 2025-07-08 PROCEDURE — 99239 HOSP IP/OBS DSCHRG MGMT >30: CPT

## 2025-07-08 PROCEDURE — 85025 COMPLETE CBC W/AUTO DIFF WBC: CPT

## 2025-07-08 PROCEDURE — 96375 TX/PRO/DX INJ NEW DRUG ADDON: CPT

## 2025-07-08 PROCEDURE — 80053 COMPREHEN METABOLIC PANEL: CPT

## 2025-07-08 PROCEDURE — 74220 X-RAY XM ESOPHAGUS 1CNTRST: CPT | Mod: 26

## 2025-07-08 PROCEDURE — 86803 HEPATITIS C AB TEST: CPT

## 2025-07-08 PROCEDURE — 83690 ASSAY OF LIPASE: CPT

## 2025-07-08 PROCEDURE — 96374 THER/PROPH/DIAG INJ IV PUSH: CPT

## 2025-07-08 PROCEDURE — 36415 COLL VENOUS BLD VENIPUNCTURE: CPT

## 2025-07-08 PROCEDURE — 74177 CT ABD & PELVIS W/CONTRAST: CPT

## 2025-07-08 PROCEDURE — 99285 EMERGENCY DEPT VISIT HI MDM: CPT | Mod: 25

## 2025-07-08 PROCEDURE — 86703 HIV-1/HIV-2 1 RESULT ANTBDY: CPT

## 2025-07-08 RX ADMIN — Medication 4 MILLIGRAM(S): at 18:19

## 2025-07-08 RX ADMIN — INSULIN LISPRO 1: 100 INJECTION, SOLUTION INTRAVENOUS; SUBCUTANEOUS at 18:15

## 2025-07-08 RX ADMIN — Medication 40 MILLIGRAM(S): at 06:48

## 2025-07-08 RX ADMIN — INSULIN LISPRO 1: 100 INJECTION, SOLUTION INTRAVENOUS; SUBCUTANEOUS at 08:39

## 2025-07-08 RX ADMIN — Medication 30 MILLILITER(S): at 00:19

## 2025-07-08 RX ADMIN — Medication 40 MILLIGRAM(S): at 18:16

## 2025-07-08 RX ADMIN — LOSARTAN POTASSIUM 25 MILLIGRAM(S): 100 TABLET, FILM COATED ORAL at 06:49

## 2025-07-08 RX ADMIN — INSULIN LISPRO 1: 100 INJECTION, SOLUTION INTRAVENOUS; SUBCUTANEOUS at 13:10

## 2025-07-08 NOTE — PHYSICAL THERAPY INITIAL EVALUATION ADULT - ADDITIONAL COMMENTS
Patient lives with family in a house, main floor, no falls.     Patient left semi-supine in NAD, +call bell.

## 2025-07-08 NOTE — DISCHARGE NOTE PROVIDER - NSDCQMERRANDS_GEN_ALL_CORE
[facility] Quality Flow/Interdisciplinary Rounds Progress Note        Quality Flow Rounds held on January 6, 2021    Disciplines Attending:  Bedside Nurse, ,  and Nursing Unit Leadership    Amanda Connors was admitted on 12/31/2020  5:20 AM    Anticipated Discharge Date:  Expected Discharge Date: 01/03/21    Disposition:    Natan Score:  Natan Scale Score: 19    Readmission Risk              Risk of Unplanned Readmission:        12           Discussed patient goal for the day, patient clinical progression, and barriers to discharge.   The following Goal(s) of the Day/Commitment(s) have been identified:  Discharge [address] [place]  January 6, 2021 No

## 2025-07-08 NOTE — PHYSICAL THERAPY INITIAL EVALUATION ADULT - GAIT DEVIATIONS NOTED, PT EVAL
decreased clay/decreased velocity of limb motion/decreased step length/decreased stride length/decreased weight-shifting ability

## 2025-07-08 NOTE — PHYSICAL THERAPY INITIAL EVALUATION ADULT - GENERAL OBSERVATIONS, REHAB EVAL
Patient received semi-supine in NAD, agreeable to participate. Patient cleared to participate by TINO Mead. 98% oxygen saturation on room air.

## 2025-07-08 NOTE — DISCHARGE NOTE PROVIDER - HOSPITAL COURSE
70 yo F PMH pancreatic adeno ca,  renal ca sp L partial nephrectomy, sp chemotherapy and radiation 2024, HTN, T2DM, HLD, GERD presents to ED for dysphagia and abdominal pain. GI performed EGD which showed the source of dysphagia likely to stenosis that was indentified at the GE junction, also confirmed on the barium esophagram which showed narrowing. Plan to continue full liquid diet and follow-up with GI outpatient for biopsy results.       Follow-up with Dr. Mahoney    Case discussed with Dr. Gruber on 7/8/25. Patient is medically stable and optimized for discharge as per attending. All medications were reviewed and prescriptions were sent to a mutually agreed upon pharmacy. Discharge plan reviewed with patient and/or family.

## 2025-07-08 NOTE — DISCHARGE NOTE NURSING/CASE MANAGEMENT/SOCIAL WORK - NSDCPEEMAIL_GEN_ALL_CORE
[facility] for Tobacco Control email [email or web address]

## 2025-07-08 NOTE — SBIRT NOTE ADULT - NSSBIRTDRGPOSREINDET_GEN_A_CORE
Patient reports daily heroin use that stopped in early Jan 2025. Patient denies any other substance use. Patient reports she goes to AA and NA meetings daily and reports she has a sponsor. Patient denies cravings. Patient denies SI/HI. SW provided Mental health and substance use outpatient [facility] clinics and the [facility] crisis clinic information. CHARLOTTE informed the team.

## 2025-07-08 NOTE — PROGRESS NOTE ADULT - PROBLEM SELECTOR PLAN 1
- CT abd pel reviewed  - GI consulted and plans for EGD today  - NPO  - clear liquids for now  - IV PPI BID  - maintenance IV fluids
- CT abd pel reviewed  - s/p EGD on 7/7- showed stenosis at GE junction, confirmed on xray esophagram  - Discussed with GI. Rec discharging pt on full liquid diet and f/u outpt   - f/u pathology  - Cont PPI

## 2025-07-08 NOTE — DISCHARGE NOTE PROVIDER - NSDCMRMEDTOKEN_GEN_ALL_CORE_FT
amLODIPine 10 mg oral tablet: 1 tab(s) orally once a day (at bedtime)  Losartan Potassium 25 MG Oral Tablet:   MetFORMIN (Eqv-Fortamet) 500 mg oral tablet, extended release: 1 tab(s) orally once a day AM  omeprazole 40 mg oral delayed release capsule: 1 cap(s) orally once a day AM  Simvastatin 10 MG Oral Tablet:

## 2025-07-08 NOTE — PROVIDER CONTACT NOTE (OTHER) - ASSESSMENT
Patient is A&Ox4. Denies chest pain, SOB, stomach pain. Patient reports nausea after one dose of zofran and after vomiting.

## 2025-07-08 NOTE — SBIRT NOTE ADULT - NSSBIRTDRGWITHDRSX_GEN_A_CORE
Notes were all reviewed and agreed with or any disagreements were addressed in the HPI. REVIEW OF SYSTEMS    (2-9 systems for level 4, 10 or more for level 5)     Review of Systems   Constitutional: Negative for activity change, appetite change, chills, diaphoresis, fatigue and fever. HENT: Negative for congestion, drooling, ear discharge, ear pain, facial swelling, postnasal drip, rhinorrhea, sinus pressure, sinus pain, sore throat, trouble swallowing and voice change. Eyes: Positive for photophobia, pain, discharge, redness and itching. Negative for visual disturbance. Respiratory: Negative. Negative for cough and shortness of breath. Cardiovascular: Negative. Negative for chest pain. Gastrointestinal: Negative for abdominal pain, constipation, diarrhea, nausea and vomiting. Genitourinary: Negative for decreased urine volume, difficulty urinating, dysuria, flank pain, frequency, hematuria and urgency. Musculoskeletal: Negative for arthralgias, back pain, myalgias, neck pain and neck stiffness. Neurological: Negative for dizziness, light-headedness and headaches. Positives and Pertinent negatives as per HPI. Except as noted above in the ROS, all other systems were reviewed and negative. PAST MEDICAL HISTORY     Past Medical History:   Diagnosis Date    Asthma     RSV (acute bronchiolitis due to respiratory syncytial virus)          SURGICAL HISTORY   History reviewed. No pertinent surgical history. CURRENTMEDICATIONS       Previous Medications    NAPROXEN (NAPROSYN) 500 MG TABLET    Take 1 tablet by mouth 2 times daily (with meals) for 20 doses Do not take with ibuprofen or other NSAIDs or anti-inflammatories    ONDANSETRON (ZOFRAN ODT) 4 MG DISINTEGRATING TABLET    Take 1 tablet by mouth every 8 hours as needed for Nausea Let dissolve in mouth. ALLERGIES     Benzoin; Coconut oil; and Codeine    FAMILYHISTORY     History reviewed. No pertinent family history. Yes

## 2025-07-08 NOTE — DISCHARGE NOTE NURSING/CASE MANAGEMENT/SOCIAL WORK - NSDCPETBCESMAN_GEN_ALL_CORE
If you are a smoker, it is important for your health to stop smoking. Please be aware that second hand smoke is also harmful. never had anesthesia

## 2025-07-08 NOTE — DISCHARGE NOTE NURSING/CASE MANAGEMENT/SOCIAL WORK - NSDCPEWEB_GEN_ALL_CORE
[facility] for Tobacco Control website --- [email or web address] website --- [email or web address]

## 2025-07-08 NOTE — PROVIDER CONTACT NOTE (OTHER) - BACKGROUND
Patient was admitted for other specified disease of the esophagus. Upon endoscopy, abnormal cells were found.

## 2025-07-08 NOTE — DISCHARGE NOTE NURSING/CASE MANAGEMENT/SOCIAL WORK - FINANCIAL ASSISTANCE
[facility] provides services at a reduced cost to those who are determined to be eligible through [facility]’s financial assistance program. Information regarding [facility]’s financial assistance program can be found by going to [email or web address] or by calling [phone].

## 2025-07-08 NOTE — PROGRESS NOTE ADULT - NUTRITIONAL ASSESSMENT
This patient has been assessed with a concern for Malnutrition and has been determined to have a diagnosis/diagnoses of Severe protein-calorie malnutrition.    This patient is being managed with:   Diet Full Liquid-  Entered: Jul 7 2025  5:40PM

## 2025-07-08 NOTE — DISCHARGE NOTE PROVIDER - NSDCCPCAREPLAN_GEN_ALL_CORE_FT
PRINCIPAL DISCHARGE DIAGNOSIS  Diagnosis: Dysphagia  Assessment and Plan of Treatment: You came with difficulty swallowing, and endoscopy was performed which showed narrowing at the  gastroesophageal junction, where your esophagus (where you swallow food) meets with the stomach. At this time please continue a FULL LIQUID DIET until you follow-up with the Gastroenterologist team. They will reach out to you to schedule a follow-up appointment

## 2025-07-08 NOTE — DISCHARGE NOTE NURSING/CASE MANAGEMENT/SOCIAL WORK - PATIENT PORTAL LINK FT
You can access the FollowMyHealth Patient Portal offered by [facility] by registering at the following website: [email or web address]. By joining [facility]’s FollowMyHealth portal, you will also be able to view your health information using other applications (apps) compatible with our system.

## 2025-07-08 NOTE — PROGRESS NOTE ADULT - ATTENDING COMMENTS
GI consulted for dysphagia to solids and liquids x 1 week.   CT abd/pelvis - moderate wall thickening of GE junction.   s/p EGD 7/7/25 - narrowing and tortuosity at GE junction indicative of a stenosis but no obvious stricture. Otherwise notable for Abnl mucosa noted in cardia, biopsied. Please see report for full details     Plan -   f/u path (emailed to expedite).   xray esophagram   IV PPI   rest of care per primary team  GI to follow, please call w questions

## 2025-07-08 NOTE — DISCHARGE NOTE PROVIDER - CARE PROVIDER_API CALL
Tommy Mahoney  Hematology & Oncology  [address] [place] [id number]  Phone: [phone]  Fax: [phone]  Follow Up Time:

## 2025-07-08 NOTE — PROGRESS NOTE ADULT - SUBJECTIVE AND OBJECTIVE BOX
Patient is a 69y old  Female who presents with a chief complaint of abdominal pain (08 Jul 2025 16:23)      SUBJECTIVE / OVERNIGHT EVENTS: No acute events overnight. Pt has no new complaints. Pt reportedly with prior substance abuse. Pt spoke with SW. She declined psych. States her last use was over 6months ago    ADDITIONAL REVIEW OF SYSTEMS:    MEDICATIONS  (STANDING):  amLODIPine   Tablet 10 milliGRAM(s) Oral at bedtime  atorvastatin 10 milliGRAM(s) Oral at bedtime  dextrose 5%. 1000 milliLiter(s) (50 mL/Hr) IV Continuous <Continuous>  dextrose 50% Injectable 25 Gram(s) IV Push once  glucagon  Injectable 1 milliGRAM(s) IntraMuscular once  insulin lispro (ADMELOG) corrective regimen sliding scale   SubCutaneous three times a day before meals  losartan 25 milliGRAM(s) Oral daily  pantoprazole  Injectable 40 milliGRAM(s) IV Push every 12 hours  sodium chloride 0.9%. 1000 milliLiter(s) (75 mL/Hr) IV Continuous <Continuous>    MEDICATIONS  (PRN):  acetaminophen     Tablet .. 650 milliGRAM(s) Oral every 6 hours PRN Temp greater or equal to 38C (100.4F), Mild Pain (1 - 3)  aluminum hydroxide/magnesium hydroxide/simethicone Suspension 30 milliLiter(s) Oral every 4 hours PRN Dyspepsia  dextrose Oral Gel 15 Gram(s) Oral once PRN Blood Glucose LESS THAN 70 milliGRAM(s)/deciliter  hydrALAZINE Injectable 10 milliGRAM(s) IV Push every 6 hours PRN SBP>160  melatonin 3 milliGRAM(s) Oral at bedtime PRN Insomnia  ondansetron Injectable 4 milliGRAM(s) IV Push every 8 hours PRN Nausea and/or Vomiting      CAPILLARY BLOOD GLUCOSE      POCT Blood Glucose.: 194 mg/dL (08 Jul 2025 12:28)  POCT Blood Glucose.: 179 mg/dL (08 Jul 2025 08:15)  POCT Blood Glucose.: 171 mg/dL (07 Jul 2025 22:34)  POCT Blood Glucose.: 173 mg/dL (07 Jul 2025 17:26)    I&O's Summary      PHYSICAL EXAM:  Vital Signs Last 24 Hrs  T(C): 37.3 (08 Jul 2025 12:39), Max: 37.4 (08 Jul 2025 05:45)  T(F): 99.2 (08 Jul 2025 12:39), Max: 99.3 (08 Jul 2025 05:45)  HR: 104 (08 Jul 2025 12:39) (81 - 112)  BP: 141/82 (08 Jul 2025 12:39) (141/82 - 171/94)  BP(mean): --  RR: 16 (08 Jul 2025 12:39) (16 - 18)  SpO2: 99% (08 Jul 2025 12:39) (99% - 100%)    Parameters below as of 08 Jul 2025 12:39  Patient On (Oxygen Delivery Method): room air      CONSTITUTIONAL: NAD  EYES: PERRLA; conjunctiva and sclera clear  ENMT: Moist oral mucosa, no pharyngeal injection or exudates; normal dentition  NECK: Supple, no palpable masses; no thyromegaly  RESPIRATORY: Normal respiratory effort; lungs are clear to auscultation bilaterally  CARDIOVASCULAR: Regular rate and rhythm, normal S1 and S2, no murmur/rub/gallop; No lower extremity edema; Peripheral pulses are 2+ bilaterally  ABDOMEN: Nontender to palpation, normoactive bowel sounds, no rebound/guarding; No hepatosplenomegaly  MUSCULOSKELETAL:  No clubbing or cyanosis of digits; no joint swelling or tenderness to palpation  PSYCH: A+O to person, place, and time; affect appropriate  NEUROLOGY: CN 2-12 are intact and symmetric; no gross sensory deficits   SKIN: No rashes; no palpable lesions    LABS:                        13.8   9.87  )-----------( 160      ( 08 Jul 2025 07:08 )             41.1     07-08    134[L]  |  96[L]  |  12  ----------------------------<  167[H]  3.6   |  19[L]  |  0.84    Ca    9.7      08 Jul 2025 07:08  Phos  2.5     07-08  Mg     1.70     07-08    TPro  7.5  /  Alb  4.3  /  TBili  0.6  /  DBili  x   /  AST  17  /  ALT  10  /  AlkPhos  114  07-08    PT/INR - ( 07 Jul 2025 05:00 )   PT: 11.9 sec;   INR: 1.00 ratio         PTT - ( 07 Jul 2025 05:00 )  PTT:26.3 sec      Urinalysis Basic - ( 08 Jul 2025 07:08 )    Color: x / Appearance: x / SG: x / pH: x  Gluc: 167 mg/dL / Ketone: x  / Bili: x / Urobili: x   Blood: x / Protein: x / Nitrite: x   Leuk Esterase: x / RBC: x / WBC x   Sq Epi: x / Non Sq Epi: x / Bacteria: x          RADIOLOGY & ADDITIONAL TESTS:  Results Reviewed:   Imaging Personally Reviewed:  Electrocardiogram Personally Reviewed:    COORDINATION OF CARE:  Care Discussed with Consultants/Other Providers [Y/N]:  Prior or Outpatient Records Reviewed [Y/N]:  
Progress Note     INTERVAL:   - patient w/ abdominal pain and nausea after procedure but improved this AM  - pending barium esophagram    OBJECTIVE:    VITAL SIGNS:  ICU Vital Signs Last 24 Hrs  T(C): 37.4 (08 Jul 2025 05:45), Max: 37.4 (08 Jul 2025 05:45)  T(F): 99.3 (08 Jul 2025 05:45), Max: 99.3 (08 Jul 2025 05:45)  HR: 112 (08 Jul 2025 05:45) (61 - 112)  BP: 144/93 (08 Jul 2025 05:45) (104/81 - 175/80)  BP(mean): --  ABP: --  ABP(mean): --  RR: 18 (08 Jul 2025 05:45) (14 - 22)  SpO2: 99% (08 Jul 2025 05:45) (97% - 100%)    O2 Parameters below as of 08 Jul 2025 05:45  Patient On (Oxygen Delivery Method): room air    PHYSICAL EXAM:    GENERAL: in mild distress 2/2 abdoiminal discomfort  NEURO: alert  HEENT: anicteric sclera, no conjunctival pallor appreciated  CHEST: no respiratory distress, no accessory muscle use  CARDIAC: regular rate, +S1/S2  ABDOMEN: TTP in epigastrium and RUQ   EXTREMITIES: warm, well perfused, no edema  SKIN: no lesions noted    MEDICATIONS:  MEDICATIONS  (STANDING):  amLODIPine   Tablet 10 milliGRAM(s) Oral at bedtime  atorvastatin 10 milliGRAM(s) Oral at bedtime  dextrose 5%. 1000 milliLiter(s) (50 mL/Hr) IV Continuous <Continuous>  dextrose 50% Injectable 25 Gram(s) IV Push once  glucagon  Injectable 1 milliGRAM(s) IntraMuscular once  insulin lispro (ADMELOG) corrective regimen sliding scale   SubCutaneous three times a day before meals  losartan 25 milliGRAM(s) Oral daily  pantoprazole  Injectable 40 milliGRAM(s) IV Push every 12 hours  sodium chloride 0.9%. 1000 milliLiter(s) (75 mL/Hr) IV Continuous <Continuous>    MEDICATIONS  (PRN):  acetaminophen     Tablet .. 650 milliGRAM(s) Oral every 6 hours PRN Temp greater or equal to 38C (100.4F), Mild Pain (1 - 3)  aluminum hydroxide/magnesium hydroxide/simethicone Suspension 30 milliLiter(s) Oral every 4 hours PRN Dyspepsia  dextrose Oral Gel 15 Gram(s) Oral once PRN Blood Glucose LESS THAN 70 milliGRAM(s)/deciliter  hydrALAZINE Injectable 10 milliGRAM(s) IV Push every 6 hours PRN SBP>160  melatonin 3 milliGRAM(s) Oral at bedtime PRN Insomnia  ondansetron Injectable 4 milliGRAM(s) IV Push every 8 hours PRN Nausea and/or Vomiting      ALLERGIES:  Allergies    shellfish (Rash)  No Known Drug Allergies    Intolerances        LABS:                        13.8   9.87  )-----------( 160      ( 08 Jul 2025 07:08 )             41.1     07-08    134[L]  |  96[L]  |  12  ----------------------------<  167[H]  3.6   |  19[L]  |  0.84    Ca    9.7      08 Jul 2025 07:08  Phos  2.5     07-08  Mg     1.70     07-08    TPro  7.5  /  Alb  4.3  /  TBili  0.6  /  DBili  x   /  AST  17  /  ALT  10  /  AlkPhos  114  07-08    PT/INR - ( 07 Jul 2025 05:00 )   PT: 11.9 sec;   INR: 1.00 ratio         PTT - ( 07 Jul 2025 05:00 )  PTT:26.3 sec  Urinalysis Basic - ( 08 Jul 2025 07:08 )    Color: x / Appearance: x / SG: x / pH: x  Gluc: 167 mg/dL / Ketone: x  / Bili: x / Urobili: x   Blood: x / Protein: x / Nitrite: x   Leuk Esterase: x / RBC: x / WBC x   Sq Epi: x / Non Sq Epi: x / Bacteria: x        
Patient is a 69y old  Female who presents with a chief complaint of abdominal pain (06 Jul 2025 16:10)      SUBJECTIVE / OVERNIGHT EVENTS: No acute events overnight. Pt has no new complaints     ADDITIONAL REVIEW OF SYSTEMS:    MEDICATIONS  (STANDING):  amLODIPine   Tablet 10 milliGRAM(s) Oral at bedtime  atorvastatin 10 milliGRAM(s) Oral at bedtime  dextrose 5%. 1000 milliLiter(s) (50 mL/Hr) IV Continuous <Continuous>  dextrose 50% Injectable 25 Gram(s) IV Push once  famotidine Injectable 20 milliGRAM(s) IV Push once  glucagon  Injectable 1 milliGRAM(s) IntraMuscular once  insulin lispro (ADMELOG) corrective regimen sliding scale   SubCutaneous three times a day before meals  losartan 25 milliGRAM(s) Oral daily  pantoprazole  Injectable 40 milliGRAM(s) IV Push every 12 hours  sodium chloride 0.9%. 1000 milliLiter(s) (75 mL/Hr) IV Continuous <Continuous>    MEDICATIONS  (PRN):  acetaminophen     Tablet .. 650 milliGRAM(s) Oral every 6 hours PRN Temp greater or equal to 38C (100.4F), Mild Pain (1 - 3)  aluminum hydroxide/magnesium hydroxide/simethicone Suspension 30 milliLiter(s) Oral every 4 hours PRN Dyspepsia  dextrose Oral Gel 15 Gram(s) Oral once PRN Blood Glucose LESS THAN 70 milliGRAM(s)/deciliter  hydrALAZINE Injectable 10 milliGRAM(s) IV Push every 6 hours PRN SBP>160  melatonin 3 milliGRAM(s) Oral at bedtime PRN Insomnia  ondansetron Injectable 4 milliGRAM(s) IV Push every 8 hours PRN Nausea and/or Vomiting      CAPILLARY BLOOD GLUCOSE      POCT Blood Glucose.: 120 mg/dL (07 Jul 2025 11:25)  POCT Blood Glucose.: 115 mg/dL (07 Jul 2025 10:53)  POCT Blood Glucose.: 137 mg/dL (07 Jul 2025 08:53)  POCT Blood Glucose.: 187 mg/dL (06 Jul 2025 21:32)  POCT Blood Glucose.: 130 mg/dL (06 Jul 2025 17:33)  POCT Blood Glucose.: 132 mg/dL (06 Jul 2025 16:09)  POCT Blood Glucose.: 141 mg/dL (06 Jul 2025 13:51)    I&O's Summary      PHYSICAL EXAM:  Vital Signs Last 24 Hrs  T(C): 36.2 (07 Jul 2025 11:37), Max: 37.4 (06 Jul 2025 12:22)  T(F): 97.1 (07 Jul 2025 11:37), Max: 99.4 (06 Jul 2025 22:57)  HR: 80 (07 Jul 2025 11:37) (61 - 109)  BP: 135/88 (07 Jul 2025 11:37) (133/78 - 175/92)  BP(mean): --  RR: 17 (07 Jul 2025 11:37) (16 - 19)  SpO2: 97% (07 Jul 2025 11:37) (97% - 100%)    Parameters below as of 07 Jul 2025 11:37  Patient On (Oxygen Delivery Method): room air      CONSTITUTIONAL: NAD  EYES: PERRLA; conjunctiva and sclera clear  ENMT: Moist oral mucosa, no pharyngeal injection or exudates; normal dentition  NECK: Supple, no palpable masses; no thyromegaly  RESPIRATORY: Normal respiratory effort; lungs are clear to auscultation bilaterally  CARDIOVASCULAR: Regular rate and rhythm, normal S1 and S2, no murmur/rub/gallop; No lower extremity edema; Peripheral pulses are 2+ bilaterally  ABDOMEN: Nontender to palpation, normoactive bowel sounds, no rebound/guarding; No hepatosplenomegaly  MUSCULOSKELETAL:  No clubbing or cyanosis of digits; no joint swelling or tenderness to palpation  PSYCH: A+O to person, place, and time; affect appropriate  NEUROLOGY: CN 2-12 are intact and symmetric; no gross sensory deficits   SKIN: No rashes; no palpable lesions    LABS:                        12.7   9.04  )-----------( 180      ( 07 Jul 2025 05:00 )             37.8     07-07    135  |  100  |  11  ----------------------------<  168[H]  3.8   |  19[L]  |  0.88    Ca    9.4      07 Jul 2025 05:00  Phos  3.4     07-07  Mg     1.80     07-07    TPro  7.2  /  Alb  4.1  /  TBili  0.5  /  DBili  x   /  AST  13  /  ALT  13  /  AlkPhos  107  07-07    PT/INR - ( 07 Jul 2025 05:00 )   PT: 11.9 sec;   INR: 1.00 ratio         PTT - ( 07 Jul 2025 05:00 )  PTT:26.3 sec      Urinalysis Basic - ( 07 Jul 2025 05:00 )    Color: x / Appearance: x / SG: x / pH: x  Gluc: 168 mg/dL / Ketone: x  / Bili: x / Urobili: x   Blood: x / Protein: x / Nitrite: x   Leuk Esterase: x / RBC: x / WBC x   Sq Epi: x / Non Sq Epi: x / Bacteria: x          RADIOLOGY & ADDITIONAL TESTS:  Results Reviewed:   Imaging Personally Reviewed:  Electrocardiogram Personally Reviewed:    COORDINATION OF CARE:  Care Discussed with Consultants/Other Providers [Y/N]:  Prior or Outpatient Records Reviewed [Y/N]:

## 2025-07-08 NOTE — PROGRESS NOTE ADULT - ASSESSMENT
ENRIKE MORLEY is a 69year old Female with history of Pancreatic adenocarcinoma and L renal cancer s/p L partial nephrectomy 11/2024, s/p chemotherapy (last 6/2024) and radiation treatment 3/2024 currently on surveillance monitoring, DM2, HTN, GERD presenting in the setting of progressive dysphagia for the past week, w/ associated N/V and epigastric pain for the past 2 days  w/ CT demonstrating moderate thickening of the GE junction on the distal esophagus concerning for esophagitis as well as a fluid distended stomach with mild distal gastric wall thickening.    #Progressive Dysphagia to Solids and Liquids for the Past 1 week  #2 Days N/V   #Distal Esophagitis on CT  #Pancreatic Adenocarcinoma s/p chemotherapy (last 8/2024)and radiation (last 3/2024) on Surveillance monitoring  Patient stating progressive dysphagia primarily solids however with symptoms as well with liquids for the past week with associated now nausea, vomiting and significant epigastric pain.  CT demonstrating evidence of distal esophagitis as well as some mild distal gastric wall thickening. s/p EGD on 7/7 w/ source of dysphagia likely 2/2 stenosis that was identified at the GE junction and also visualized congested, erythematous and friable (with contact bleeding) mucosa in the cardia that could be the cause of stenosis.     Recommendations:  - Full liquid diet until GI follow up  - Await pathology results (expedited path results)  - Please obtain barium esophagram with tablet (pending)   - once barium esophagram completed, please notify GI team. After this, can be discharged w/ close outpatient GI vs motility f/u  - Continue antiemetics as per primary team    Note incomplete until finalized by attending signature/attestation.    Jens López, PGY-5  Gastroenterology & Hepatology Fellow  Available on TEAMS  Long range pager #: [phone]  Short range pager#: [phone]    For non-urgent consults, please send email to [email or web address] (for [facility]) or [email or web address] (for [facility])    
70 yo F PMH pancreatic adenoca,  renal ca sp L partial nephrectomy, sp chemotherapy and radiation 2024, HTN, T2DM, HLD, GERD presents to ED for dysphagia and abdominal pain. Plan for EGD.
68 yo F PMH pancreatic adenoca,  renal ca sp L partial nephrectomy, sp chemotherapy and radiation 2024, HTN, T2DM, HLD, GERD presents to ED for dysphagia and abdominal pain. Plan for EGD.

## 2025-07-08 NOTE — PHYSICAL THERAPY INITIAL EVALUATION ADULT - PERTINENT HX OF CURRENT PROBLEM, REHAB EVAL
69 year old female PMH pancreatic adenoca, renal cancer sp L partial nephrectomy, sp chemotherapy and radiation 2024, HTN, T2DM, HLD, GERD presents to ED for dysphagia and abdominal pain. Status post EGD.

## 2025-07-10 ENCOUNTER — NON-APPOINTMENT (OUTPATIENT)
Age: 70
End: 2025-07-10

## 2025-07-11 LAB — SURGICAL PATHOLOGY STUDY: SIGNIFICANT CHANGE UP

## 2025-07-14 ENCOUNTER — APPOINTMENT (OUTPATIENT)
Dept: HEMATOLOGY ONCOLOGY | Facility: CLINIC | Age: 70
End: 2025-07-14

## 2025-07-14 VITALS
DIASTOLIC BLOOD PRESSURE: 88 MMHG | OXYGEN SATURATION: 97 % | BODY MASS INDEX: 24.19 KG/M2 | HEIGHT: 63.6 IN | SYSTOLIC BLOOD PRESSURE: 129 MMHG | RESPIRATION RATE: 16 BRPM | WEIGHT: 139.98 LBS | HEART RATE: 80 BPM | TEMPERATURE: 97.9 F

## 2025-07-14 PROBLEM — B37.0 CANDIDIASIS OF MOUTH: Status: ACTIVE | Noted: 2025-07-14

## 2025-07-14 PROCEDURE — G2211 COMPLEX E/M VISIT ADD ON: CPT

## 2025-07-14 PROCEDURE — 99214 OFFICE O/P EST MOD 30 MIN: CPT

## 2025-07-14 RX ORDER — SUCRALFATE 1 G/10ML
1 SUSPENSION ORAL 3 TIMES DAILY
Qty: 350 | Refills: 6 | Status: ACTIVE | COMMUNITY
Start: 2025-07-14 | End: 1900-01-01

## 2025-07-14 RX ORDER — NYSTATIN 100000 [USP'U]/ML
100000 SUSPENSION ORAL 3 TIMES DAILY
Qty: 105 | Refills: 2 | Status: ACTIVE | COMMUNITY
Start: 2025-07-14 | End: 1900-01-01

## 2025-07-15 ENCOUNTER — NON-APPOINTMENT (OUTPATIENT)
Age: 70
End: 2025-07-15

## 2025-08-12 ENCOUNTER — APPOINTMENT (OUTPATIENT)
Dept: GASTROENTEROLOGY | Facility: CLINIC | Age: 70
End: 2025-08-12
Payer: MEDICARE

## 2025-08-12 DIAGNOSIS — C25.9 MALIGNANT NEOPLASM OF PANCREAS, UNSPECIFIED: ICD-10-CM

## 2025-08-12 PROCEDURE — 99214 OFFICE O/P EST MOD 30 MIN: CPT | Mod: 2W

## 2025-09-02 ENCOUNTER — APPOINTMENT (OUTPATIENT)
Dept: HEMATOLOGY ONCOLOGY | Facility: CLINIC | Age: 70
End: 2025-09-02

## 2025-09-04 ENCOUNTER — APPOINTMENT (OUTPATIENT)
Dept: HEMATOLOGY ONCOLOGY | Facility: CLINIC | Age: 70
End: 2025-09-04

## 2025-09-10 ENCOUNTER — TRANSCRIPTION ENCOUNTER (OUTPATIENT)
Age: 70
End: 2025-09-10

## 2025-09-12 ENCOUNTER — EMERGENCY (EMERGENCY)
Facility: HOSPITAL | Age: 70
LOS: 0 days | Discharge: HOME | End: 2025-09-12
Attending: EMERGENCY MEDICINE
Payer: MEDICARE

## 2025-09-12 VITALS
DIASTOLIC BLOOD PRESSURE: 110 MMHG | RESPIRATION RATE: 20 BRPM | OXYGEN SATURATION: 98 % | WEIGHT: 149.91 LBS | SYSTOLIC BLOOD PRESSURE: 150 MMHG | TEMPERATURE: 98 F | HEART RATE: 79 BPM | HEIGHT: 64 IN

## 2025-09-12 VITALS
DIASTOLIC BLOOD PRESSURE: 98 MMHG | HEART RATE: 75 BPM | SYSTOLIC BLOOD PRESSURE: 142 MMHG | OXYGEN SATURATION: 98 % | RESPIRATION RATE: 18 BRPM

## 2025-09-12 DIAGNOSIS — T40.1X1A POISONING BY HEROIN, ACCIDENTAL (UNINTENTIONAL), INITIAL ENCOUNTER: ICD-10-CM

## 2025-09-12 DIAGNOSIS — Z96.652 PRESENCE OF LEFT ARTIFICIAL KNEE JOINT: Chronic | ICD-10-CM

## 2025-09-12 DIAGNOSIS — Z03.6 ENCOUNTER FOR OBSERVATION FOR SUSPECTED TOXIC EFFECT FROM INGESTED SUBSTANCE RULED OUT: ICD-10-CM

## 2025-09-12 LAB
ALBUMIN SERPL ELPH-MCNC: 2.9 G/DL — LOW (ref 3.3–5)
ALP SERPL-CCNC: 85 U/L — SIGNIFICANT CHANGE UP (ref 40–120)
ALT FLD-CCNC: 25 U/L — SIGNIFICANT CHANGE UP (ref 12–78)
ANION GAP SERPL CALC-SCNC: 8 MMOL/L — SIGNIFICANT CHANGE UP (ref 5–17)
APTT BLD: 23.3 SEC — LOW (ref 26.1–36.8)
AST SERPL-CCNC: 19 U/L — SIGNIFICANT CHANGE UP (ref 15–37)
BASOPHILS # BLD AUTO: 0.04 K/UL — SIGNIFICANT CHANGE UP (ref 0–0.2)
BASOPHILS NFR BLD AUTO: 0.4 % — SIGNIFICANT CHANGE UP (ref 0–2)
BILIRUB SERPL-MCNC: 0.3 MG/DL — SIGNIFICANT CHANGE UP (ref 0.2–1.2)
BUN SERPL-MCNC: 20 MG/DL — SIGNIFICANT CHANGE UP (ref 7–23)
CALCIUM SERPL-MCNC: 9 MG/DL — SIGNIFICANT CHANGE UP (ref 8.5–10.1)
CHLORIDE SERPL-SCNC: 104 MMOL/L — SIGNIFICANT CHANGE UP (ref 96–108)
CO2 SERPL-SCNC: 26 MMOL/L — SIGNIFICANT CHANGE UP (ref 22–31)
CREAT SERPL-MCNC: 1.16 MG/DL — SIGNIFICANT CHANGE UP (ref 0.5–1.3)
EGFR: 51 ML/MIN/1.73M2 — LOW
EGFR: 51 ML/MIN/1.73M2 — LOW
EOSINOPHIL # BLD AUTO: 0.04 K/UL — SIGNIFICANT CHANGE UP (ref 0–0.5)
EOSINOPHIL NFR BLD AUTO: 0.4 % — SIGNIFICANT CHANGE UP (ref 0–6)
GLUCOSE SERPL-MCNC: 355 MG/DL — HIGH (ref 70–99)
HCT VFR BLD CALC: 33.1 % — LOW (ref 34.5–45)
HGB BLD-MCNC: 10.8 G/DL — LOW (ref 11.5–15.5)
IMM GRANULOCYTES NFR BLD AUTO: 1.3 % — HIGH (ref 0–0.9)
INR BLD: 0.97 RATIO — SIGNIFICANT CHANGE UP (ref 0.85–1.16)
LYMPHOCYTES # BLD AUTO: 0.92 K/UL — LOW (ref 1–3.3)
LYMPHOCYTES # BLD AUTO: 9 % — LOW (ref 13–44)
MCHC RBC-ENTMCNC: 27.3 PG — SIGNIFICANT CHANGE UP (ref 27–34)
MCHC RBC-ENTMCNC: 32.6 G/DL — SIGNIFICANT CHANGE UP (ref 32–36)
MCV RBC AUTO: 83.6 FL — SIGNIFICANT CHANGE UP (ref 80–100)
MONOCYTES # BLD AUTO: 0.36 K/UL — SIGNIFICANT CHANGE UP (ref 0–0.9)
MONOCYTES NFR BLD AUTO: 3.5 % — SIGNIFICANT CHANGE UP (ref 2–14)
NEUTROPHILS # BLD AUTO: 8.73 K/UL — HIGH (ref 1.8–7.4)
NEUTROPHILS NFR BLD AUTO: 85.4 % — HIGH (ref 43–77)
NRBC BLD AUTO-RTO: 0 /100 WBCS — SIGNIFICANT CHANGE UP (ref 0–0)
PLATELET # BLD AUTO: 226 K/UL — SIGNIFICANT CHANGE UP (ref 150–400)
POTASSIUM SERPL-MCNC: 4.2 MMOL/L — SIGNIFICANT CHANGE UP (ref 3.5–5.3)
POTASSIUM SERPL-SCNC: 4.2 MMOL/L — SIGNIFICANT CHANGE UP (ref 3.5–5.3)
PROT SERPL-MCNC: 6.4 GM/DL — SIGNIFICANT CHANGE UP (ref 6–8.3)
PROTHROM AB SERPL-ACNC: 11.3 SEC — SIGNIFICANT CHANGE UP (ref 9.9–13.4)
RBC # BLD: 3.96 M/UL — SIGNIFICANT CHANGE UP (ref 3.8–5.2)
RBC # FLD: 19.6 % — HIGH (ref 10.3–14.5)
SODIUM SERPL-SCNC: 138 MMOL/L — SIGNIFICANT CHANGE UP (ref 135–145)
TROPONIN I, HIGH SENSITIVITY RESULT: 9.3 NG/L — SIGNIFICANT CHANGE UP
WBC # BLD: 10.22 K/UL — SIGNIFICANT CHANGE UP (ref 3.8–10.5)
WBC # FLD AUTO: 10.22 K/UL — SIGNIFICANT CHANGE UP (ref 3.8–10.5)

## 2025-09-12 PROCEDURE — 99284 EMERGENCY DEPT VISIT MOD MDM: CPT

## (undated) DEVICE — XI ARM FORCEP TENACULUM

## (undated) DEVICE — TUBING AIRSEAL TRI-LUMEN FILTERED

## (undated) DEVICE — TUBING MEDI-VAC W MAXIGRIP CONNECTORS 1/4"X6'

## (undated) DEVICE — ELCTR BOVIE PENCIL SMOKE EVACUATION

## (undated) DEVICE — NDL HYPO SAFE 22G X 1" (BLACK)

## (undated) DEVICE — SYR LUER LOK 10CC

## (undated) DEVICE — GOWN LG

## (undated) DEVICE — PACK IV START WITH CHG

## (undated) DEVICE — GLV 7.5 PROTEXIS (CREAM) MICRO

## (undated) DEVICE — XI TIP COVER

## (undated) DEVICE — ELCTR GROUNDING PAD ADULT COVIDIEN

## (undated) DEVICE — XI ARM FORCEP PROGRASP 8MM

## (undated) DEVICE — GOWN XXXL

## (undated) DEVICE — DRSG TEGADERM 2.5 X 3"

## (undated) DEVICE — DRSG CURITY GAUZE SPONGE 4 X 4" 12-PLY NON-STERILE

## (undated) DEVICE — ENDOCATCH 10MM SPECIMEN POUCH

## (undated) DEVICE — XI OBTURATOR OPTICAL BLADELESS 8MM

## (undated) DEVICE — CATH IV SAFE BC 22G X 1" (BLUE)

## (undated) DEVICE — POSITIONER FOAM HEAD CRADLE (PINK)

## (undated) DEVICE — SUT VICRYL 0 27" UR-6

## (undated) DEVICE — CONTAINER FORMALIN 80ML YELLOW

## (undated) DEVICE — BITE BLOCK ADULT 20 X 27MM (GREEN)

## (undated) DEVICE — SYR LUER LOK 3CC

## (undated) DEVICE — XI ARM DISSECTOR CURVED BIPOLAR 8MM

## (undated) DEVICE — LUBRICATING JELLY HR ONE SHOT 3G

## (undated) DEVICE — BASIN EMESIS 10IN GRADUATED MAUVE

## (undated) DEVICE — DRAPE 3/4 SHEET 52X76"

## (undated) DEVICE — DRSG CURITY GAUZE SPONGE 4 X 4" 12-PLY

## (undated) DEVICE — XI ARM NEEDLE DRIVER SUTURECUT LRG 8MM

## (undated) DEVICE — VENODYNE/SCD SLEEVE CALF MEDIUM

## (undated) DEVICE — BIOPSY FORCEP COLD DISP

## (undated) DEVICE — XI ARM GRASPER TIP UP FENESTRATED

## (undated) DEVICE — BAG URINE W METER 2L

## (undated) DEVICE — POSITIONER FOAM EGG CRATE ULNAR 2PCS (PINK)

## (undated) DEVICE — PACK ROBOTIC LIJ

## (undated) DEVICE — BALLOON US ENDO

## (undated) DEVICE — TUBING SUCTION NONCONDUCTIVE 6MM X 12FT

## (undated) DEVICE — UNDERPAD LINEN SAVER 17 X 24"

## (undated) DEVICE — ELCTR ROCKER SWITCH PENCIL BLUE 10FT

## (undated) DEVICE — XI ARM CLIP APPLIER LARGE

## (undated) DEVICE — DRSG BANDAID 0.75X3"

## (undated) DEVICE — TUBING IV SET GRAVITY 1Y 78" MACRO

## (undated) DEVICE — NDL HYPO SAFE 18G X 1.5" (PINK)

## (undated) DEVICE — PACK ENDO PROCEDURE CUSTOM NO VLV

## (undated) DEVICE — WARMING BLANKET FULL ADULT

## (undated) DEVICE — SALIVA EJECTOR (BLUE)

## (undated) DEVICE — PREP BETADINE KIT

## (undated) DEVICE — Device

## (undated) DEVICE — NDL SPINAL 22G X 3.5" (BLACK)

## (undated) DEVICE — CLAMP BX HOT RAD JAW 3

## (undated) DEVICE — SOL IRR POUR NS 0.9% 1000ML

## (undated) DEVICE — BASIN SET SINGLE

## (undated) DEVICE — DRAPE TOWEL BLUE 17" X 24"

## (undated) DEVICE — XI SEAL UNIVERSIAL 5-12MM

## (undated) DEVICE — FORCEP BIOPSY 2.5MM DISP

## (undated) DEVICE — SOL IRR POUR NS 0.9% 500ML

## (undated) DEVICE — DENTURE CUP PINK

## (undated) DEVICE — CONTAINER FORMALIN 10% 20ML

## (undated) DEVICE — POSITIONER PINK PAD PIGAZZI SYSTEM

## (undated) DEVICE — XI ARM CLIP APPLIER MEDIUM-LARGE

## (undated) DEVICE — ELCTR ECG CONDUCTIVE ADHESIVE

## (undated) DEVICE — GLV 8 PROTEXIS (CREAM) MICRO

## (undated) DEVICE — XI DRAPE ARM

## (undated) DEVICE — LABELS BLANK W PEN

## (undated) DEVICE — TUBING STRYKEFLOW II SUCTION / IRRIGATOR

## (undated) DEVICE — LINE BREATHE SAMPLNG

## (undated) DEVICE — XI ARM PERMANENT CAUTERY SPATULA

## (undated) DEVICE — GLV 7 PROTEXIS (WHITE)

## (undated) DEVICE — XI ARM PERMANENT CAUTERY HOOK

## (undated) DEVICE — ANESTHESIA CIRCUIT ADULT HMEF

## (undated) DEVICE — SOL INJ NS 0.9% 500ML 1-PORT

## (undated) DEVICE — POSITIONER PURPLE ARM ONE STEP (LARGE)

## (undated) DEVICE — DRSG 2X2

## (undated) DEVICE — TUBING OLYMPUS INSUFFLATION

## (undated) DEVICE — CATH BLLN ULTRASONIC ENSOSCOPE

## (undated) DEVICE — DRAPE BACK TABLE COVER 44X90"

## (undated) DEVICE — WARMING BLANKET LITHOTOMY PEDS

## (undated) DEVICE — D HELP - CLEARVIEW CLEARIFY SYSTEM

## (undated) DEVICE — XI ARM SCISSOR MONO CURVED

## (undated) DEVICE — DRSG ALLEVYN BORDER LITE 2X2"

## (undated) DEVICE — XI ARM NEEDLE DRIVER LARGE

## (undated) DEVICE — DRAIN RESERVOIR FOR JACKSON PRATT 100CC CARDINAL

## (undated) DEVICE — BIOPSY FORCEP RADIAL JAW 4 STANDARD WITH NEEDLE

## (undated) DEVICE — SYR CONTROL LUER LOK 10CC

## (undated) DEVICE — DRAPE SOL WARMING 44X44IN

## (undated) DEVICE — XI DRAPE COLUMN

## (undated) DEVICE — TUBING IV SET GRAVITY 3Y 100" MACRO

## (undated) DEVICE — POSITIONER STRAP ARMBOARD VELCRO TS-30

## (undated) DEVICE — LIJ/LIA-CAMERA VIDEO C MOUNT DIGITAL 3 CHIP CAM: Type: DURABLE MEDICAL EQUIPMENT

## (undated) DEVICE — XI ARM FORCEP MARYLAND BIPOLAR

## (undated) DEVICE — SYS EUS BEACON W/ PRELOADED NDL 22GA

## (undated) DEVICE — XI ARM FORCEP FENESTRATED BIPOLAR 8MM

## (undated) DEVICE — NDL ACQUIRE EUS FNB 22G

## (undated) DEVICE — TROCAR SURGIQUEST AIRSEAL 12MM X 100MM

## (undated) DEVICE — GOWN XL